# Patient Record
Sex: FEMALE | Race: WHITE | NOT HISPANIC OR LATINO | Employment: FULL TIME | URBAN - METROPOLITAN AREA
[De-identification: names, ages, dates, MRNs, and addresses within clinical notes are randomized per-mention and may not be internally consistent; named-entity substitution may affect disease eponyms.]

---

## 2017-02-21 ENCOUNTER — PATIENT MESSAGE (OUTPATIENT)
Dept: GASTROENTEROLOGY | Facility: CLINIC | Age: 62
End: 2017-02-21

## 2017-06-26 ENCOUNTER — INFUSION (OUTPATIENT)
Dept: INFECTIOUS DISEASES | Facility: HOSPITAL | Age: 62
End: 2017-06-26
Attending: INTERNAL MEDICINE
Payer: COMMERCIAL

## 2017-06-26 VITALS — HEIGHT: 65 IN | BODY MASS INDEX: 20.99 KG/M2 | WEIGHT: 126 LBS

## 2017-06-26 DIAGNOSIS — M81.0 OSTEOPOROSIS, POST-MENOPAUSAL: Primary | ICD-10-CM

## 2017-06-26 PROCEDURE — 63600175 PHARM REV CODE 636 W HCPCS: Performed by: INTERNAL MEDICINE

## 2017-06-26 PROCEDURE — 96401 CHEMO ANTI-NEOPL SQ/IM: CPT

## 2017-06-26 RX ADMIN — DENOSUMAB 60 MG: 60 INJECTION SUBCUTANEOUS at 04:06

## 2017-07-30 ENCOUNTER — PATIENT MESSAGE (OUTPATIENT)
Dept: GASTROENTEROLOGY | Facility: CLINIC | Age: 62
End: 2017-07-30

## 2017-08-04 ENCOUNTER — PATIENT MESSAGE (OUTPATIENT)
Dept: INTERNAL MEDICINE | Facility: CLINIC | Age: 62
End: 2017-08-04

## 2017-08-07 ENCOUNTER — PATIENT MESSAGE (OUTPATIENT)
Dept: INTERNAL MEDICINE | Facility: CLINIC | Age: 62
End: 2017-08-07

## 2017-08-08 ENCOUNTER — PATIENT MESSAGE (OUTPATIENT)
Dept: INTERNAL MEDICINE | Facility: CLINIC | Age: 62
End: 2017-08-08

## 2017-08-08 DIAGNOSIS — M81.0 OSTEOPOROSIS, UNSPECIFIED OSTEOPOROSIS TYPE, UNSPECIFIED PATHOLOGICAL FRACTURE PRESENCE: Primary | ICD-10-CM

## 2017-08-17 ENCOUNTER — OFFICE VISIT (OUTPATIENT)
Dept: GASTROENTEROLOGY | Facility: CLINIC | Age: 62
End: 2017-08-17
Payer: COMMERCIAL

## 2017-08-17 VITALS
SYSTOLIC BLOOD PRESSURE: 101 MMHG | HEIGHT: 65 IN | DIASTOLIC BLOOD PRESSURE: 60 MMHG | BODY MASS INDEX: 21.04 KG/M2 | WEIGHT: 126.31 LBS

## 2017-08-17 DIAGNOSIS — R19.5 LOOSE STOOLS: ICD-10-CM

## 2017-08-17 DIAGNOSIS — Z86.010 HISTORY OF COLON POLYPS: ICD-10-CM

## 2017-08-17 DIAGNOSIS — R11.0 NAUSEA: ICD-10-CM

## 2017-08-17 DIAGNOSIS — R63.0 DECREASED APPETITE: ICD-10-CM

## 2017-08-17 DIAGNOSIS — R10.13 EPIGASTRIC PAIN: Primary | ICD-10-CM

## 2017-08-17 DIAGNOSIS — Z83.719 FAMILY HISTORY OF COLONIC POLYPS: ICD-10-CM

## 2017-08-17 PROCEDURE — 99214 OFFICE O/P EST MOD 30 MIN: CPT | Mod: S$GLB,,, | Performed by: NURSE PRACTITIONER

## 2017-08-17 PROCEDURE — 3008F BODY MASS INDEX DOCD: CPT | Mod: S$GLB,,, | Performed by: NURSE PRACTITIONER

## 2017-08-17 PROCEDURE — 99999 PR PBB SHADOW E&M-EST. PATIENT-LVL II: CPT | Mod: PBBFAC,,, | Performed by: NURSE PRACTITIONER

## 2017-08-17 RX ORDER — LAMOTRIGINE 100 MG/1
100 TABLET ORAL DAILY
COMMUNITY
End: 2020-01-22

## 2017-08-17 NOTE — PROGRESS NOTES
Subjective:       Patient ID: Adelaida Flores is a 61 y.o. female.    Chief Complaint: Colonoscopy and Follow-up    HPI  Presents to discuss colonoscopy.  She has a personal and family history ( mother and father) of colon polyps.  She last had colonoscopy in 5/2012 which was normal.  She has had loose stools chronically.  May have 5 bowel movements daily.  No blood with bowel movements or black stools.    She has had a volvulus in the past and reports that she is hesitant to eat and typically has a liquid or very soft diet since that experience.    She reports today decreased appetite, though reports weight is stable.  She describes epigastric pain.  Occasional nausea. Continues to have heartburn or reflux if she eats late at night.  Will have to use omeprazole for relief.  No dysphagia.  She had EGD 6/2015:  - Mild reflux esophagitis.                        - Small hiatal hernia.                        - Gastric biopsies obtained for Helicobacter pylori     Review of Systems   Constitutional: Positive for appetite change. Negative for activity change, fatigue, fever and unexpected weight change.   HENT: Negative.  Negative for sore throat and trouble swallowing.    Respiratory: Negative.  Negative for cough, choking and shortness of breath.    Cardiovascular: Negative.  Negative for chest pain.   Gastrointestinal: Positive for abdominal pain, diarrhea and nausea. Negative for abdominal distention, blood in stool, constipation and vomiting.   Genitourinary: Negative.    Skin: Negative.    Neurological: Negative.  Negative for dizziness, syncope, weakness and light-headedness.   Psychiatric/Behavioral: Negative.        Objective:      Physical Exam   Constitutional: She is oriented to person, place, and time. No distress.   thin   HENT:   Head: Normocephalic.   Eyes: No scleral icterus.   Cardiovascular: Normal rate.    Pulmonary/Chest: Effort normal. No respiratory distress.   Abdominal: Soft. Bowel sounds are  normal. She exhibits no distension and no mass. There is tenderness in the epigastric area.   Musculoskeletal: Normal range of motion.   Neurological: She is alert and oriented to person, place, and time.   Skin: Skin is warm and dry. She is not diaphoretic.   Psychiatric: She has a normal mood and affect. Her behavior is normal. Judgment and thought content normal.   Vitals reviewed.      Assessment:       1. Epigastric pain    2. Loose stools    3. History of colon polyps    4. Family history of colonic polyps    5. Decreased appetite    6. Nausea        Plan:     Adelaida was seen today for colonoscopy and follow-up.    Diagnoses and all orders for this visit:    Epigastric pain    Loose stools    History of colon polyps    Family history of colonic polyps    Decreased appetite    Nausea    Other orders  -     Case request GI: ESOPHAGOGASTRODUODENOSCOPY (EGD), COLONOSCOPY         I have explained the planned procedures to the patient.The risks, benefits and alternatives of the procedure were also explained in detail. Patient verbalized understanding, all questions were answered. The patient agrees to proceed as planned        Add fiber and probiotics..  She has used questran in the past but reports she was unable to obtain this at some point.  Could consider resuming if needed.

## 2017-08-28 ENCOUNTER — ANESTHESIA (OUTPATIENT)
Dept: ENDOSCOPY | Facility: HOSPITAL | Age: 62
End: 2017-08-28
Payer: COMMERCIAL

## 2017-08-28 ENCOUNTER — HOSPITAL ENCOUNTER (OUTPATIENT)
Facility: HOSPITAL | Age: 62
Discharge: HOME | End: 2017-08-28
Attending: INTERNAL MEDICINE | Admitting: INTERNAL MEDICINE
Payer: COMMERCIAL

## 2017-08-28 ENCOUNTER — ANESTHESIA EVENT (OUTPATIENT)
Dept: ENDOSCOPY | Facility: HOSPITAL | Age: 62
End: 2017-08-28
Payer: COMMERCIAL

## 2017-08-28 VITALS
TEMPERATURE: 98 F | WEIGHT: 126 LBS | BODY MASS INDEX: 20.99 KG/M2 | SYSTOLIC BLOOD PRESSURE: 120 MMHG | OXYGEN SATURATION: 98 % | HEIGHT: 65 IN | DIASTOLIC BLOOD PRESSURE: 62 MMHG | RESPIRATION RATE: 13 BRPM | HEART RATE: 64 BPM

## 2017-08-28 DIAGNOSIS — Z12.11 SCREENING FOR COLORECTAL CANCER: ICD-10-CM

## 2017-08-28 DIAGNOSIS — Z12.12 SCREENING FOR COLORECTAL CANCER: ICD-10-CM

## 2017-08-28 DIAGNOSIS — R10.13 DYSPEPSIA: ICD-10-CM

## 2017-08-28 DIAGNOSIS — Z86.010 HISTORY OF COLON POLYPS: Primary | ICD-10-CM

## 2017-08-28 DIAGNOSIS — R10.13 EPIGASTRIC PAIN: ICD-10-CM

## 2017-08-28 DIAGNOSIS — R19.7 DIARRHEA, UNSPECIFIED TYPE: ICD-10-CM

## 2017-08-28 PROCEDURE — 37000009 HC ANESTHESIA EA ADD 15 MINS: Performed by: INTERNAL MEDICINE

## 2017-08-28 PROCEDURE — 37000008 HC ANESTHESIA 1ST 15 MINUTES: Performed by: INTERNAL MEDICINE

## 2017-08-28 PROCEDURE — 43239 EGD BIOPSY SINGLE/MULTIPLE: CPT | Mod: 51,,, | Performed by: INTERNAL MEDICINE

## 2017-08-28 PROCEDURE — 43239 EGD BIOPSY SINGLE/MULTIPLE: CPT | Performed by: INTERNAL MEDICINE

## 2017-08-28 PROCEDURE — 27201012 HC FORCEPS, HOT/COLD, DISP: Performed by: INTERNAL MEDICINE

## 2017-08-28 PROCEDURE — G0105 COLORECTAL SCRN; HI RISK IND: HCPCS | Mod: ,,, | Performed by: INTERNAL MEDICINE

## 2017-08-28 PROCEDURE — 63600175 PHARM REV CODE 636 W HCPCS: Performed by: NURSE ANESTHETIST, CERTIFIED REGISTERED

## 2017-08-28 PROCEDURE — G0105 COLORECTAL SCRN; HI RISK IND: HCPCS | Performed by: INTERNAL MEDICINE

## 2017-08-28 PROCEDURE — 88312 SPECIAL STAINS GROUP 1: CPT | Mod: 26,,, | Performed by: PATHOLOGY

## 2017-08-28 PROCEDURE — 88305 TISSUE EXAM BY PATHOLOGIST: CPT | Performed by: PATHOLOGY

## 2017-08-28 PROCEDURE — 25000003 PHARM REV CODE 250: Performed by: INTERNAL MEDICINE

## 2017-08-28 PROCEDURE — 88305 TISSUE EXAM BY PATHOLOGIST: CPT | Mod: 26,,, | Performed by: PATHOLOGY

## 2017-08-28 RX ORDER — LIDOCAINE HCL/PF 100 MG/5ML
SYRINGE (ML) INTRAVENOUS
Status: DISCONTINUED | OUTPATIENT
Start: 2017-08-28 | End: 2017-08-28

## 2017-08-28 RX ORDER — SODIUM CHLORIDE 9 MG/ML
INJECTION, SOLUTION INTRAVENOUS CONTINUOUS
Status: DISCONTINUED | OUTPATIENT
Start: 2017-08-29 | End: 2017-08-29 | Stop reason: HOSPADM

## 2017-08-28 RX ORDER — FENTANYL CITRATE 50 UG/ML
INJECTION, SOLUTION INTRAMUSCULAR; INTRAVENOUS
Status: DISCONTINUED | OUTPATIENT
Start: 2017-08-28 | End: 2017-08-28

## 2017-08-28 RX ORDER — PROPOFOL 10 MG/ML
VIAL (ML) INTRAVENOUS
Status: DISCONTINUED | OUTPATIENT
Start: 2017-08-28 | End: 2017-08-28

## 2017-08-28 RX ORDER — PROPOFOL 10 MG/ML
VIAL (ML) INTRAVENOUS CONTINUOUS PRN
Status: DISCONTINUED | OUTPATIENT
Start: 2017-08-28 | End: 2017-08-28

## 2017-08-28 RX ADMIN — LIDOCAINE HYDROCHLORIDE 60 MG: 20 INJECTION, SOLUTION INTRAVENOUS at 02:08

## 2017-08-28 RX ADMIN — SODIUM CHLORIDE: 0.9 INJECTION, SOLUTION INTRAVENOUS at 02:08

## 2017-08-28 RX ADMIN — FENTANYL CITRATE 50 MCG: 50 INJECTION, SOLUTION INTRAMUSCULAR; INTRAVENOUS at 02:08

## 2017-08-28 RX ADMIN — PROPOFOL 60 MG: 10 INJECTION, EMULSION INTRAVENOUS at 02:08

## 2017-08-28 RX ADMIN — PROPOFOL 150 MCG/KG/MIN: 10 INJECTION, EMULSION INTRAVENOUS at 02:08

## 2017-08-28 NOTE — ANESTHESIA PREPROCEDURE EVALUATION
08/28/2017  Adelaida Flores is a 61 y.o., female w multiple  GI complaints for EGD & colonoscopy    PRIOR ANES (in Epic)   2015-06-02 EGD MAC    benzoc prop 70+30+30+20 VSS [facility]  2016-07-15 Lumbar_Fusion GA   [mid 2, fent 100, prop 150 => ->50]   [gabrielle, fent 300, prop 200...175...150...100 mcg/kg/min,    yobany 0.6...0.5 mcg/kg/min,    dexmedetom 0.7...1...0.7 mcg/kg/h phenyleph 450]   [easy mask vent; ETT 7.0, Burger#2, Grade I]    ANES-RELATED MED/SURG  Patient Active Problem List   Diagnosis    Osteoporosis, post-menopausal    Irritable bowel syndrome    Depression    Ventral hernia    Onychomycosis    T12 compression fracture s/p corpectomy    Preop examination    Gastric ulcer, unspecified as acute or chronic, without mention of hemorrhage, perforation, or obstruction    Abnormal EKG    Respiratory insufficiency following shock, trauma, or surgery    Pleural effusion    Back pain    Weakness of back    Joint hyperextensibility of multiple sites    Epigastric pain    Dyspepsia    Decreased appetite    Frequent loose stools    Abdominal pain    Senile osteoporosis     Past Medical History:   Diagnosis Date    Allergy     Colon polyps     Depression     IBS (irritable bowel syndrome)     Osteoporosis      Past Surgical History:   Procedure Laterality Date    COLON SURGERY      FRACTURE SURGERY      right broken wrist and had surgery    SPINE SURGERY      TONSILLECTOMY         ALLERGIES  Review of patient's allergies indicates:  No Known Allergies    ANES-RELATED HOME Rx 2017-08-17  omeprazole    Pre-op Assessment    I have reviewed the Patient Summary Reports.     I have reviewed the Nursing Notes.   I have reviewed the Medications.     Review of Systems  Anesthesia Hx:  No problems with previous Anesthesia  History of prior surgery of interest to airway management or  planning:  Denies Personal Hx of Anesthesia complications.   Social:  Non-Smoker, No Alcohol Use History of ETOH 10 yrs ago   EENT/Dental:EENT/Dental Normal   Cardiovascular:   Exercise tolerance: good Denies Hypertension.  Denies MI.  Dysrhythmias (hx of afib)  History of a. Fib     Test Reason : 553.20  Blood Pressure :mmHG  Vent. Rate : 056 BPM     Atrial Rate : 056 BPM     P-R Int : 168 ms          QRS Dur : 068 ms      QT Int : 428 ms       P-R-T Axes : 070 062 059 degrees     QTc Int : 413 ms    Sinus bradycardia  Otherwise normal ECG  When compared with ECG of 23-JUL-2011 20:36,  Vent. rate has decreased BY  35 BPM   Pulmonary:  Pulmonary Normal    Renal/:  Renal/ Normal     Hepatic/GI:   PUD, GERD    Neurological:   Fracture lumbar vertebra-closed   Endocrine:  Endocrine Normal    Psych:   Psychiatric History        Wt Readings from Last 1 Encounters:   08/17/17 57.3 kg (126 lb 5.2 oz)     Temp Readings from Last 1 Encounters:   10/17/16 37.1 °C (98.8 °F) (Oral)     BP Readings from Last 1 Encounters:   08/17/17 101/60     Pulse Readings from Last 1 Encounters:   10/17/16 64     SpO2 Readings from Last 1 Encounters:   06/02/15 100%       Physical Exam  General:  Well nourished    Airway/Jaw/Neck:  Airway Findings: Mouth Opening: Normal Tongue: Normal  General Airway Assessment: Adult  Mallampati: III  Improves to III, II with phonation.  TM Distance: Normal, at least 6 cm     Eyes/Ears/Nose:  Eyes/Ears/Nose Findings:    Dental:  Dental Findings: In tact   Chest/Lungs:  Chest/Lungs Findings: Clear to auscultation     Heart/Vascular:  Heart Findings: Rate: Bradycardia, Normal        Mental Status:  Mental Status Findings:  Cooperative, Alert and Oriented       Lab Results   Component Value Date    WBC 5.12 08/25/2016    HGB 13.6 08/25/2016    HCT 42.9 08/25/2016    MCV 90 08/25/2016     08/25/2016       Chemistry        Component Value Date/Time     08/25/2016 0700    K 4.2 08/25/2016 0700      08/25/2016 0700    CO2 30 (H) 08/25/2016 0700    BUN 14 08/25/2016 0700    CREATININE 0.8 08/25/2016 0700    GLU 77 08/25/2016 0700        Component Value Date/Time    CALCIUM 9.7 08/25/2016 0700    ALKPHOS 73 08/25/2016 0700    AST 28 08/25/2016 0700    ALT 27 08/25/2016 0700    BILITOT 0.4 08/25/2016 0700    ESTGFRAFRICA >60.0 08/25/2016 0700    EGFRNONAA >60.0 08/25/2016 0700            Lab Results   Component Value Date    ALBUMIN 4.1 08/25/2016      Lab Results   Component Value Date    TSH 4.069 (H) 08/25/2016       CXR 2013-07-19  ... no significant interval change in the appearance of the chest since 7/18/13, with the volume of pleural fluid on the left side appearing essentially stable.  No   pneumothorax.    EKG 2015-06-02  Sinus bradycardia  Nonspecific T wave abnormality  When compared with ECG of 19-JUL-2013 13:45,  Premature atrial complexes are no longer Present  Non-specific change in ST segment in Lateral leads  Nonspecific T wave abnormality no longer evident in Lateral leads  Confirmed by Avinash    ECHO      Anesthesia Plan  Type of Anesthesia, risks & benefits discussed:  Anesthesia Type:  MAC, general  Patient's Preference:   Intra-op Monitoring Plan:   Intra-op Monitoring Plan Comments:   Post Op Pain Control Plan:   Post Op Pain Control Plan Comments:   Induction:   IV  Beta Blocker:         Informed Consent: Patient understands risks and agrees with Anesthesia plan.  Questions answered. Anesthesia consent signed with patient.  ASA Score: 2     Day of Surgery Review of History & Physical: I have interviewed and examined the patient. I have reviewed the patient's H&P dated:  There are no significant changes.  H&P update referred to the surgeon.         Ready For Surgery From Anesthesia Perspective.

## 2017-08-28 NOTE — TRANSFER OF CARE
"Anesthesia Transfer of Care Note    Patient: Adelaida Flores    Procedure(s) Performed: Procedure(s) (LRB):  ESOPHAGOGASTRODUODENOSCOPY (EGD) (N/A)  COLONOSCOPY miralax (N/A)    Patient location: GI    Anesthesia Type: MAC    Transport from OR: Transported from OR on room air with adequate spontaneous ventilation    Post pain: adequate analgesia    Post assessment: no apparent anesthetic complications and tolerated procedure well    Post vital signs: stable    Level of consciousness: awake, alert and oriented    Nausea/Vomiting: no nausea/vomiting    Complications: none    Transfer of care protocol was followed      Last vitals:   Visit Vitals  /62 (BP Location: Right arm, Patient Position: Lying)   Pulse (!) 53   Temp 36.6 °C (97.8 °F) (Oral)   Resp 18   Ht 5' 5" (1.651 m)   Wt 57.2 kg (126 lb)   SpO2 100%   Breastfeeding? No   BMI 20.97 kg/m²     "

## 2017-08-28 NOTE — ANESTHESIA POSTPROCEDURE EVALUATION
"Anesthesia Post Evaluation    Patient: Adelaida Flores    Procedure(s) Performed: Procedure(s) (LRB):  ESOPHAGOGASTRODUODENOSCOPY (EGD) (N/A)  COLONOSCOPY miralax (N/A)    Final Anesthesia Type: MAC  Patient location during evaluation: GI PACU  Patient participation: Yes- Able to Participate  Level of consciousness: awake and alert and oriented  Post-procedure vital signs: reviewed and stable  Pain management: adequate  Airway patency: patent  PONV status at discharge: No PONV  Anesthetic complications: no      Cardiovascular status: blood pressure returned to baseline and hemodynamically stable  Respiratory status: unassisted, spontaneous ventilation and room air  Hydration status: euvolemic  Follow-up not needed.        Visit Vitals  /62 (BP Location: Right arm, Patient Position: Lying)   Pulse (!) 53   Temp 36.6 °C (97.8 °F) (Oral)   Resp 18   Ht 5' 5" (1.651 m)   Wt 57.2 kg (126 lb)   SpO2 100%   Breastfeeding? No   BMI 20.97 kg/m²       Pain/Pascale Score: Pain Assessment Performed: Yes (8/28/2017  3:14 PM)  Presence of Pain: denies (8/28/2017  3:14 PM)  Pascale Score: 9 (8/28/2017  3:14 PM)      "

## 2017-08-29 DIAGNOSIS — R10.13 EPIGASTRIC PAIN: Primary | ICD-10-CM

## 2017-08-29 DIAGNOSIS — K44.9 HIATAL HERNIA: ICD-10-CM

## 2017-08-30 ENCOUNTER — PATIENT MESSAGE (OUTPATIENT)
Dept: GASTROENTEROLOGY | Facility: CLINIC | Age: 62
End: 2017-08-30

## 2017-08-31 ENCOUNTER — TELEPHONE (OUTPATIENT)
Dept: GASTROENTEROLOGY | Facility: CLINIC | Age: 62
End: 2017-08-31

## 2017-08-31 ENCOUNTER — PATIENT MESSAGE (OUTPATIENT)
Dept: GASTROENTEROLOGY | Facility: CLINIC | Age: 62
End: 2017-08-31

## 2017-08-31 DIAGNOSIS — K44.9 HIATAL HERNIA: Primary | ICD-10-CM

## 2017-09-01 ENCOUNTER — TELEPHONE (OUTPATIENT)
Dept: GASTROENTEROLOGY | Facility: CLINIC | Age: 62
End: 2017-09-01

## 2017-09-01 NOTE — TELEPHONE ENCOUNTER
----- Message from Trey Haas Jr., MD sent at 8/29/2017  5:01 PM CDT -----  Patient needs to schedule an upper GI series with radiology.  She has a large hiatal hernia.  I'm trying to define the anatomy.  Order is in Epic  Thanks JH

## 2017-09-06 ENCOUNTER — TELEPHONE (OUTPATIENT)
Dept: ENDOCRINOLOGY | Facility: CLINIC | Age: 62
End: 2017-09-06

## 2017-09-06 ENCOUNTER — HOSPITAL ENCOUNTER (OUTPATIENT)
Dept: RADIOLOGY | Facility: HOSPITAL | Age: 62
Discharge: HOME | End: 2017-09-06
Attending: NURSE PRACTITIONER
Payer: COMMERCIAL

## 2017-09-06 ENCOUNTER — PATIENT MESSAGE (OUTPATIENT)
Dept: ENDOCRINOLOGY | Facility: CLINIC | Age: 62
End: 2017-09-06

## 2017-09-06 DIAGNOSIS — M81.0 OSTEOPOROSIS, UNSPECIFIED OSTEOPOROSIS TYPE, UNSPECIFIED PATHOLOGICAL FRACTURE PRESENCE: Primary | ICD-10-CM

## 2017-09-06 DIAGNOSIS — K44.9 HIATAL HERNIA: ICD-10-CM

## 2017-09-06 PROCEDURE — 74247 FL UPPER GI AIR CONTRAST WITH KUB: CPT | Mod: 26,,, | Performed by: RADIOLOGY

## 2017-09-06 PROCEDURE — 74247 FL UPPER GI AIR CONTRAST WITH KUB: CPT | Mod: TC

## 2017-09-06 NOTE — TELEPHONE ENCOUNTER
----- Message from Amy Adia sent at 9/6/2017 12:06 PM CDT -----  Contact: Adelaida tel:  [phone]   Caller is asking who would she schedule with if she used to see Dr. Massey for Prolia injections and who else specializes in this?   Asking for a nurse to call her w/some info on this.  Due to have another injection in December.    Asking who can she get to order this for her?   Pls call.

## 2017-09-08 ENCOUNTER — PATIENT MESSAGE (OUTPATIENT)
Dept: GASTROENTEROLOGY | Facility: CLINIC | Age: 62
End: 2017-09-08

## 2017-09-13 NOTE — TELEPHONE ENCOUNTER
Patient was notified of report results, patient is scheduled for an OV with the NP on 09/21/2017.

## 2017-09-21 ENCOUNTER — OFFICE VISIT (OUTPATIENT)
Dept: GASTROENTEROLOGY | Facility: CLINIC | Age: 62
End: 2017-09-21
Payer: COMMERCIAL

## 2017-09-21 VITALS
DIASTOLIC BLOOD PRESSURE: 62 MMHG | BODY MASS INDEX: 20.87 KG/M2 | HEIGHT: 65 IN | SYSTOLIC BLOOD PRESSURE: 89 MMHG | WEIGHT: 125.25 LBS

## 2017-09-21 DIAGNOSIS — K21.9 GASTROESOPHAGEAL REFLUX DISEASE, ESOPHAGITIS PRESENCE NOT SPECIFIED: Primary | ICD-10-CM

## 2017-09-21 DIAGNOSIS — R19.5 LOOSE STOOLS: ICD-10-CM

## 2017-09-21 DIAGNOSIS — Z83.719 FAMILY HISTORY OF COLONIC POLYPS: ICD-10-CM

## 2017-09-21 PROCEDURE — 99213 OFFICE O/P EST LOW 20 MIN: CPT | Mod: S$GLB,,, | Performed by: NURSE PRACTITIONER

## 2017-09-21 PROCEDURE — 3008F BODY MASS INDEX DOCD: CPT | Mod: S$GLB,,, | Performed by: NURSE PRACTITIONER

## 2017-09-21 PROCEDURE — 99999 PR PBB SHADOW E&M-EST. PATIENT-LVL II: CPT | Mod: PBBFAC,,, | Performed by: NURSE PRACTITIONER

## 2017-09-21 NOTE — PROGRESS NOTES
Subjective:       Patient ID: Adelaida Flores is a 61 y.o. female.    Chief Complaint: Follow-up    HPI  Presents to follow up after recent EGD and colonoscopy.  EGD:  - Normal antrum. Biopsied.                        - Normal duodenal bulb and second portion of the                         duodenum.                        - Large hiatal hernia.                        - Z-line regular, 40 cm from the incisors.                        - Monilial esophagitis. Biopsied.                        - Normal upper third of esophagus and middle third                         of esophagus.  Colonoscopy:  - Patent end-to-side ileo-colonic anastomosis,                         characterized by healthy appearing mucosa.                        - The examination was otherwise normal.                        - No specimens collected.  Pathology:    1. Antrum, biopsy:  -Mild chronic inflammation in background of reactive change  -No Helicobacter organisms identified on H&E  -Negative for intestinal metaplasia  2. Lower third esophagus, biopsy:  -Squamous mucosa with ulcer  -Mixed neutrophilic and eosinophilic intraepithelial infiltrates  -PAS stain for fungal organisms pending; results will follow in an addendum  -Negative for dysplasia or malignancy  Fungal stains negative      UGI series obtained to further evaluate hiatal hernia:  Postoperative change thoracic lumbar junction noted.     Patient swallowed barium meal erect and recumbent positions without difficulty.  Mild hypertrophied cricopharyngeus muscle.  Esophageal peristalsis and GE junction normal.  Stomach, pyloric channel, duodenal C-loop, proximal small bowel are normal.  No ulcer, mass or other abnormality.    She reports as long as she remembers to use omeprazole at night, that her reflux complaints are well controlled.    She continues to have loose stools, which has been a chronic complaint.  I suggested the addition of fiber and probiotic at our last visit, which she has  not yet started.  Denies any new concerns or complaints.    Review of Systems   Constitutional: Negative.  Negative for activity change, appetite change, fatigue, fever and unexpected weight change.   HENT: Negative for trouble swallowing.    Respiratory: Negative for shortness of breath.    Cardiovascular: Negative for chest pain.   Gastrointestinal: Positive for diarrhea. Negative for abdominal distention, blood in stool, constipation, nausea and vomiting.   Skin: Negative.    Psychiatric/Behavioral: Negative.        Objective:      Physical Exam   Constitutional: She is oriented to person, place, and time. She appears well-developed and well-nourished. No distress.   Eyes: No scleral icterus.   Cardiovascular: Normal rate.    Pulmonary/Chest: Effort normal. No respiratory distress.   Abdominal: Soft. She exhibits no distension. There is no tenderness.   Neurological: She is alert and oriented to person, place, and time.   Skin: Skin is warm and dry. She is not diaphoretic.   Psychiatric: She has a normal mood and affect. Her behavior is normal. Judgment and thought content normal.   Vitals reviewed.      Assessment:       1. Gastroesophageal reflux disease, esophagitis presence not specified    2. Loose stools    3. Family history of colonic polyps        Plan:         Adelaida was seen today for follow-up.    Diagnoses and all orders for this visit:    Gastroesophageal reflux disease, esophagitis presence not specified    Loose stools    Family history of colonic polyps         Continue acid suppressive therapy.  Avoid eating late at night and overeating.     Add fiber supplement and probiotic.    Follow up with continued complaints.     Repeat colonoscopy in 5 years ( family history of colon polyps)

## 2017-11-08 ENCOUNTER — OFFICE VISIT (OUTPATIENT)
Dept: ENDOCRINOLOGY | Facility: CLINIC | Age: 62
End: 2017-11-08
Payer: COMMERCIAL

## 2017-11-08 VITALS
SYSTOLIC BLOOD PRESSURE: 98 MMHG | BODY MASS INDEX: 20.83 KG/M2 | HEIGHT: 65 IN | HEART RATE: 66 BPM | WEIGHT: 125 LBS | DIASTOLIC BLOOD PRESSURE: 64 MMHG

## 2017-11-08 DIAGNOSIS — M81.0 OSTEOPOROSIS, POST-MENOPAUSAL: Primary | ICD-10-CM

## 2017-11-08 PROBLEM — Z12.11 SCREENING FOR COLORECTAL CANCER: Status: RESOLVED | Noted: 2017-08-28 | Resolved: 2017-11-08

## 2017-11-08 PROBLEM — Z12.12 SCREENING FOR COLORECTAL CANCER: Status: RESOLVED | Noted: 2017-08-28 | Resolved: 2017-11-08

## 2017-11-08 PROCEDURE — 99214 OFFICE O/P EST MOD 30 MIN: CPT | Mod: S$GLB,,, | Performed by: INTERNAL MEDICINE

## 2017-11-08 PROCEDURE — 99999 PR PBB SHADOW E&M-EST. PATIENT-LVL IV: CPT | Mod: PBBFAC,,, | Performed by: INTERNAL MEDICINE

## 2017-11-08 NOTE — PROGRESS NOTES
Subjective:      Patient ID: Adelaida Flores is a 61 y.o. female.    Chief Complaint:  Osteoporosis      History of Present Illness  Pt returns for management of postmenopausal osteoporosis.  She was a pt of Dr. Massey and Dr Alex      She used alendronate 2010- 2014 - had GERD and fracture while on therapy    was changed to prolia 2014       No fractures since last visit  Prone to falls - poor balance         Strong FH of OP     Fracture hx:  Right wrist 2009  T12 fx with metal cage placed. 2013      Menarche 11 y/o  Menopause 37 y/o   no hrt use      last bmd 12/31/15  Osteoporosis of lumbar spine. No significant change since prior study.      IBS with abd pain and diarrhea       chronic neck pain - gets acupuncture   +dry eyes and dry mouth     Review of Systems   Constitutional: Negative for unexpected weight change.   Eyes: Negative for visual disturbance.   Respiratory: Negative for shortness of breath.    Cardiovascular: Negative for chest pain.   Gastrointestinal: Negative for abdominal pain.   Musculoskeletal: Negative for arthralgias.   Skin: Negative for wound.   Neurological: Negative for headaches.   Hematological: Does not bruise/bleed easily.   Psychiatric/Behavioral: Negative for sleep disturbance.       Objective:   Physical Exam   Neck: No thyromegaly present.   Cardiovascular: Normal rate.    Pulmonary/Chest: Effort normal.   Abdominal: Soft.   Musculoskeletal: She exhibits no edema.   Vitals reviewed.      Body mass index is 20.8 kg/m².    Lab Review:   Lab Results   Component Value Date    HGBA1C 5.7 10/02/2012     Lab Results   Component Value Date    CHOL 231 (H) 08/25/2016    HDL 58 08/25/2016    LDLCALC 155.4 08/25/2016    TRIG 88 08/25/2016    CHOLHDL 25.1 08/25/2016     Lab Results   Component Value Date     08/25/2016    K 4.2 08/25/2016     08/25/2016    CO2 30 (H) 08/25/2016    GLU 77 08/25/2016    BUN 14 08/25/2016    CREATININE 0.8 08/25/2016    CALCIUM 9.7 08/25/2016     PROT 6.7 08/25/2016    ALBUMIN 4.1 08/25/2016    BILITOT 0.4 08/25/2016    ALKPHOS 73 08/25/2016    AST 28 08/25/2016    ALT 27 08/25/2016    ANIONGAP 6 (L) 08/25/2016    ESTGFRAFRICA >60.0 08/25/2016    EGFRNONAA >60.0 08/25/2016    TSH 4.069 (H) 08/25/2016       Assessment and Plan     Osteoporosis, post-menopausal     Reviewed basics of quantity versus quality  Reassuring she is not fracturing   continue prolia    check bmd 1/2018     RDA of calcium (0700-8618 mg /day in divided doses) and vitamin D 2000iu a day  discussed  Calcium from food sources preferred  Fall precautions emphasized  +weight bearing exercise  [email or web address] website information given    Treatment options and potential side effects discussed for   Denosumab     Low risk for ONJ and atypical fractures reviewed and discussed. Alerted that if dental work needs to be done it should be done prior to initiating therapy     Discussed optimal duration of Denosumab use is unknown        abnormal tsh and hba1c - repeat tests

## 2017-11-09 NOTE — ASSESSMENT & PLAN NOTE
Reviewed basics of quantity versus quality  Reassuring she is not fracturing   continue prolia    check bmd 1/2018     RDA of calcium (2165-0518 mg /day in divided doses) and vitamin D 2000iu a day  discussed  Calcium from food sources preferred  Fall precautions emphasized  +weight bearing exercise  [email or web address] website information given    Treatment options and potential side effects discussed for   Denosumab     Low risk for ONJ and atypical fractures reviewed and discussed. Alerted that if dental work needs to be done it should be done prior to initiating therapy     Discussed optimal duration of Denosumab use is unknown

## 2017-11-10 ENCOUNTER — OFFICE VISIT (OUTPATIENT)
Dept: INTERNAL MEDICINE | Facility: CLINIC | Age: 62
End: 2017-11-10
Payer: COMMERCIAL

## 2017-11-10 ENCOUNTER — TELEPHONE (OUTPATIENT)
Dept: ENDOCRINOLOGY | Facility: CLINIC | Age: 62
End: 2017-11-10

## 2017-11-10 ENCOUNTER — LAB VISIT (OUTPATIENT)
Dept: LAB | Facility: HOSPITAL | Age: 62
End: 2017-11-10
Attending: INTERNAL MEDICINE
Payer: COMMERCIAL

## 2017-11-10 VITALS
TEMPERATURE: 99 F | SYSTOLIC BLOOD PRESSURE: 128 MMHG | HEIGHT: 65 IN | HEART RATE: 72 BPM | WEIGHT: 127.88 LBS | RESPIRATION RATE: 16 BRPM | BODY MASS INDEX: 21.31 KG/M2 | DIASTOLIC BLOOD PRESSURE: 76 MMHG

## 2017-11-10 DIAGNOSIS — Z12.31 VISIT FOR SCREENING MAMMOGRAM: ICD-10-CM

## 2017-11-10 DIAGNOSIS — Z00.00 ANNUAL PHYSICAL EXAM: ICD-10-CM

## 2017-11-10 DIAGNOSIS — Z00.00 ANNUAL PHYSICAL EXAM: Primary | ICD-10-CM

## 2017-11-10 DIAGNOSIS — M81.0 OSTEOPOROSIS, UNSPECIFIED OSTEOPOROSIS TYPE, UNSPECIFIED PATHOLOGICAL FRACTURE PRESENCE: ICD-10-CM

## 2017-11-10 DIAGNOSIS — M81.0 OSTEOPOROSIS, POST-MENOPAUSAL: ICD-10-CM

## 2017-11-10 DIAGNOSIS — Z12.4 SCREENING FOR CERVICAL CANCER: ICD-10-CM

## 2017-11-10 LAB
25(OH)D3+25(OH)D2 SERPL-MCNC: 29 NG/ML
ALBUMIN SERPL BCP-MCNC: 3.7 G/DL
ALP SERPL-CCNC: 71 U/L
ALT SERPL W/O P-5'-P-CCNC: 22 U/L
ANION GAP SERPL CALC-SCNC: 5 MMOL/L
AST SERPL-CCNC: 31 U/L
BASOPHILS # BLD AUTO: 0.02 K/UL
BASOPHILS NFR BLD: 0.4 %
BILIRUB SERPL-MCNC: 0.2 MG/DL
BUN SERPL-MCNC: 7 MG/DL
CALCIUM SERPL-MCNC: 9.5 MG/DL
CHLORIDE SERPL-SCNC: 103 MMOL/L
CHOLEST SERPL-MCNC: 265 MG/DL
CHOLEST/HDLC SERPL: 7.2 {RATIO}
CO2 SERPL-SCNC: 31 MMOL/L
CREAT SERPL-MCNC: 0.8 MG/DL
DIFFERENTIAL METHOD: ABNORMAL
EOSINOPHIL # BLD AUTO: 0.2 K/UL
EOSINOPHIL NFR BLD: 3.8 %
ERYTHROCYTE [DISTWIDTH] IN BLOOD BY AUTOMATED COUNT: 13.9 %
EST. GFR  (AFRICAN AMERICAN): >60 ML/MIN/1.73 M^2
EST. GFR  (NON AFRICAN AMERICAN): >60 ML/MIN/1.73 M^2
ESTIMATED AVG GLUCOSE: 100 MG/DL
GLUCOSE SERPL-MCNC: 47 MG/DL
HBA1C MFR BLD HPLC: 5.1 %
HCT VFR BLD AUTO: 36.4 %
HDLC SERPL-MCNC: 37 MG/DL
HDLC SERPL: 14 %
HGB BLD-MCNC: 11.7 G/DL
IMM GRANULOCYTES # BLD AUTO: 0.01 K/UL
IMM GRANULOCYTES NFR BLD AUTO: 0.2 %
LDLC SERPL CALC-MCNC: ABNORMAL MG/DL
LYMPHOCYTES # BLD AUTO: 1.9 K/UL
LYMPHOCYTES NFR BLD: 34.3 %
MCH RBC QN AUTO: 28.8 PG
MCHC RBC AUTO-ENTMCNC: 32.1 G/DL
MCV RBC AUTO: 90 FL
MONOCYTES # BLD AUTO: 0.4 K/UL
MONOCYTES NFR BLD: 7.9 %
NEUTROPHILS # BLD AUTO: 3 K/UL
NEUTROPHILS NFR BLD: 53.4 %
NONHDLC SERPL-MCNC: 228 MG/DL
NRBC BLD-RTO: 0 /100 WBC
PLATELET # BLD AUTO: 270 K/UL
PMV BLD AUTO: 11.8 FL
POTASSIUM SERPL-SCNC: 3.9 MMOL/L
PROT SERPL-MCNC: 6.6 G/DL
RBC # BLD AUTO: 4.06 M/UL
SODIUM SERPL-SCNC: 139 MMOL/L
TRIGL SERPL-MCNC: 814 MG/DL
TSH SERPL DL<=0.005 MIU/L-ACNC: 2.53 UIU/ML
WBC # BLD AUTO: 5.54 K/UL

## 2017-11-10 PROCEDURE — 82306 VITAMIN D 25 HYDROXY: CPT

## 2017-11-10 PROCEDURE — 80053 COMPREHEN METABOLIC PANEL: CPT

## 2017-11-10 PROCEDURE — 99396 PREV VISIT EST AGE 40-64: CPT | Mod: S$GLB,,, | Performed by: INTERNAL MEDICINE

## 2017-11-10 PROCEDURE — 85025 COMPLETE CBC W/AUTO DIFF WBC: CPT

## 2017-11-10 PROCEDURE — 99999 PR PBB SHADOW E&M-EST. PATIENT-LVL IV: CPT | Mod: PBBFAC,,, | Performed by: INTERNAL MEDICINE

## 2017-11-10 PROCEDURE — 83036 HEMOGLOBIN GLYCOSYLATED A1C: CPT

## 2017-11-10 PROCEDURE — 86376 MICROSOMAL ANTIBODY EACH: CPT

## 2017-11-10 PROCEDURE — 80061 LIPID PANEL: CPT

## 2017-11-10 PROCEDURE — 84443 ASSAY THYROID STIM HORMONE: CPT

## 2017-11-10 PROCEDURE — 36415 COLL VENOUS BLD VENIPUNCTURE: CPT | Mod: PO

## 2017-11-10 RX ORDER — LEVOCETIRIZINE DIHYDROCHLORIDE 5 MG/1
5 TABLET, FILM COATED ORAL NIGHTLY
COMMUNITY
End: 2019-05-08

## 2017-11-10 NOTE — PROGRESS NOTES
Subjective:       Patient ID: Adelaida Flores is a 61 y.o. female.    Chief Complaint: Annual Exam    Patient is a 61 y.o.female who presents today for annual.    Cholesterol: (due now)  Vaccines: Influenza (yearly); Tetanus (2009) ; Pneumovax (up to date); Zoster (done)  Eye exam: up to date  Mammogram: due now  Gyn exam: due now  Colonoscopy: due aug 2022  DEXA: pending    Review of Systems   Constitutional: Negative for activity change, appetite change, chills, diaphoresis, fatigue, fever and unexpected weight change.   HENT: Negative for congestion, dental problem, ear discharge, ear pain, hearing loss, postnasal drip, rhinorrhea, sinus pressure, sore throat and trouble swallowing.    Eyes: Positive for visual disturbance. Negative for discharge, redness and itching.   Respiratory: Negative for cough, chest tightness, shortness of breath and wheezing.    Cardiovascular: Negative for chest pain, palpitations and leg swelling.   Gastrointestinal: Positive for diarrhea. Negative for abdominal pain, blood in stool, constipation, nausea and vomiting.   Endocrine: Negative for cold intolerance, heat intolerance and polydipsia.   Genitourinary: Negative for difficulty urinating, dysuria, frequency, hematuria, menstrual problem and urgency.   Musculoskeletal: Positive for neck pain. Negative for arthralgias, back pain, gait problem, joint swelling and myalgias.   Skin: Negative for color change and rash.   Neurological: Negative for dizziness, syncope, weakness and headaches.   Hematological: Negative for adenopathy.   Psychiatric/Behavioral: Positive for dysphoric mood. Negative for behavioral problems, confusion and sleep disturbance. The patient is not nervous/anxious.        Objective:      Physical Exam   Constitutional: She is oriented to person, place, and time. She appears well-developed and well-nourished. No distress.   HENT:   Head: Normocephalic and atraumatic.   Right Ear: External ear normal.   Left Ear:  External ear normal.   Nose: Nose normal.   Mouth/Throat: Oropharynx is clear and moist. No oropharyngeal exudate.   Eyes: Conjunctivae and EOM are normal. Pupils are equal, round, and reactive to light. Right eye exhibits no discharge. Left eye exhibits no discharge. No scleral icterus.   Neck: Normal range of motion. Neck supple. No JVD present. No thyromegaly present.   Cardiovascular: Normal rate, regular rhythm, normal heart sounds and intact distal pulses.  Exam reveals no gallop and no friction rub.    No murmur heard.  Pulmonary/Chest: Effort normal and breath sounds normal. No stridor. No respiratory distress. She has no wheezes. She has no rales. She exhibits no tenderness.   Abdominal: Soft. Bowel sounds are normal. She exhibits no distension. There is no tenderness. There is no rebound.   Musculoskeletal: Normal range of motion. She exhibits no edema or tenderness.   Lymphadenopathy:     She has no cervical adenopathy.   Neurological: She is alert and oriented to person, place, and time. No cranial nerve deficit.   Skin: Skin is warm and dry. No rash noted. She is not diaphoretic. No erythema.   Psychiatric: She has a normal mood and affect. Her behavior is normal.   Nursing note and vitals reviewed.      Assessment and Plan:       1. Annual physical exam  - labs due now  - gyn exam due now  - mammo due now  - CBC auto differential; Future  - Lipid panel; Future    2. Visit for screening mammogram    - Mammo Digital Screening Bilat with CAD; Future    3. Screening for cervical cancer    - Ambulatory consult to Gynecology          No Follow-up on file.

## 2017-11-11 NOTE — TELEPHONE ENCOUNTER
Called by lab for critical glucose value of 47. Suspect this is from hypertriglyceridemia and not true hypoglycemia. Called patient with no answer, but advised patient to call me back if have any symptoms of a low glucose.

## 2017-11-13 ENCOUNTER — TELEPHONE (OUTPATIENT)
Dept: INTERNAL MEDICINE | Facility: CLINIC | Age: 62
End: 2017-11-13

## 2017-11-13 ENCOUNTER — PATIENT MESSAGE (OUTPATIENT)
Dept: ENDOCRINOLOGY | Facility: CLINIC | Age: 62
End: 2017-11-13

## 2017-11-13 DIAGNOSIS — D64.9 ANEMIA, UNSPECIFIED TYPE: Primary | ICD-10-CM

## 2017-11-13 LAB — THYROPEROXIDASE IGG SERPL-ACNC: <6 IU/ML

## 2017-11-13 NOTE — TELEPHONE ENCOUNTER
Notify pt:  - kidney and liver function is stable  - thyroid function is stable  - diabetic marker is negative at 5.1  - she is anemic; will need iron panel and stool card to determine cause  - cholesterol could not be read due to high triglycerides of 814; start low fat diet; would like to start her on a medication called fenofibrate to lower this level. Let me know if she is agreeable  - iron work up ordered

## 2017-11-14 ENCOUNTER — PATIENT MESSAGE (OUTPATIENT)
Dept: INTERNAL MEDICINE | Facility: CLINIC | Age: 62
End: 2017-11-14

## 2017-11-14 NOTE — TELEPHONE ENCOUNTER
Spoke to pt and informed of lab results. Pt stated that she is already on a low fat diet and that she doesn't want to be on a medication. She stated that she wants her labs to be retested since she wasn't fasting. Clarified with pt that she wasn't fasting for her fasting labs. She said yes. She repeated over and over that she would email Dr. Lees through the portal. Offered to schedule lab apt for iron studies and repeat fasting labs, pt refused stating that she would have to call back. Pt also refused the stool studies.

## 2017-11-15 ENCOUNTER — PATIENT MESSAGE (OUTPATIENT)
Dept: INTERNAL MEDICINE | Facility: CLINIC | Age: 62
End: 2017-11-15

## 2017-11-30 ENCOUNTER — LAB VISIT (OUTPATIENT)
Dept: LAB | Facility: HOSPITAL | Age: 62
End: 2017-11-30
Attending: INTERNAL MEDICINE
Payer: COMMERCIAL

## 2017-11-30 ENCOUNTER — PATIENT MESSAGE (OUTPATIENT)
Dept: INTERNAL MEDICINE | Facility: CLINIC | Age: 62
End: 2017-11-30

## 2017-11-30 ENCOUNTER — TELEPHONE (OUTPATIENT)
Dept: INTERNAL MEDICINE | Facility: CLINIC | Age: 62
End: 2017-11-30

## 2017-11-30 DIAGNOSIS — D64.9 ANEMIA, UNSPECIFIED TYPE: ICD-10-CM

## 2017-11-30 LAB
BASOPHILS # BLD AUTO: 0.02 K/UL
BASOPHILS NFR BLD: 0.4 %
CHOLEST SERPL-MCNC: 191 MG/DL
CHOLEST/HDLC SERPL: 4.2 {RATIO}
DIFFERENTIAL METHOD: NORMAL
EOSINOPHIL # BLD AUTO: 0.3 K/UL
EOSINOPHIL NFR BLD: 5.7 %
ERYTHROCYTE [DISTWIDTH] IN BLOOD BY AUTOMATED COUNT: 13.9 %
FERRITIN SERPL-MCNC: 53 NG/ML
HCT VFR BLD AUTO: 40.7 %
HDLC SERPL-MCNC: 46 MG/DL
HDLC SERPL: 24.1 %
HGB BLD-MCNC: 13.4 G/DL
IRON SERPL-MCNC: 65 UG/DL
LDLC SERPL CALC-MCNC: 127.8 MG/DL
LYMPHOCYTES # BLD AUTO: 1.7 K/UL
LYMPHOCYTES NFR BLD: 31.4 %
MCH RBC QN AUTO: 28.6 PG
MCHC RBC AUTO-ENTMCNC: 32.9 G/DL
MCV RBC AUTO: 87 FL
MONOCYTES # BLD AUTO: 0.5 K/UL
MONOCYTES NFR BLD: 8.3 %
NEUTROPHILS # BLD AUTO: 2.9 K/UL
NEUTROPHILS NFR BLD: 54.2 %
NONHDLC SERPL-MCNC: 145 MG/DL
PLATELET # BLD AUTO: 279 K/UL
PMV BLD AUTO: 11.7 FL
RBC # BLD AUTO: 4.68 M/UL
SATURATED IRON: 17 %
TOTAL IRON BINDING CAPACITY: 380 UG/DL
TRANSFERRIN SERPL-MCNC: 257 MG/DL
TRIGL SERPL-MCNC: 86 MG/DL
WBC # BLD AUTO: 5.42 K/UL

## 2017-11-30 PROCEDURE — 82728 ASSAY OF FERRITIN: CPT

## 2017-11-30 PROCEDURE — 36415 COLL VENOUS BLD VENIPUNCTURE: CPT

## 2017-11-30 PROCEDURE — 85025 COMPLETE CBC W/AUTO DIFF WBC: CPT

## 2017-11-30 PROCEDURE — 83540 ASSAY OF IRON: CPT

## 2017-11-30 PROCEDURE — 80061 LIPID PANEL: CPT

## 2017-12-08 ENCOUNTER — HOSPITAL ENCOUNTER (OUTPATIENT)
Dept: RADIOLOGY | Facility: HOSPITAL | Age: 62
Discharge: HOME | End: 2017-12-08
Attending: INTERNAL MEDICINE
Payer: COMMERCIAL

## 2017-12-08 DIAGNOSIS — Z12.31 VISIT FOR SCREENING MAMMOGRAM: ICD-10-CM

## 2017-12-08 PROCEDURE — 77067 SCR MAMMO BI INCL CAD: CPT | Mod: 26,,, | Performed by: RADIOLOGY

## 2017-12-08 PROCEDURE — 77067 SCR MAMMO BI INCL CAD: CPT | Mod: TC,PO

## 2017-12-08 PROCEDURE — 77063 BREAST TOMOSYNTHESIS BI: CPT | Mod: 26,,, | Performed by: RADIOLOGY

## 2017-12-27 ENCOUNTER — INFUSION (OUTPATIENT)
Dept: INFECTIOUS DISEASES | Facility: HOSPITAL | Age: 62
End: 2017-12-27
Attending: INTERNAL MEDICINE
Payer: COMMERCIAL

## 2017-12-27 VITALS — WEIGHT: 127.88 LBS | HEIGHT: 65 IN | BODY MASS INDEX: 21.31 KG/M2

## 2017-12-27 DIAGNOSIS — M81.0 SENILE OSTEOPOROSIS: Primary | ICD-10-CM

## 2017-12-27 PROCEDURE — 63600175 PHARM REV CODE 636 W HCPCS: Performed by: INTERNAL MEDICINE

## 2017-12-27 PROCEDURE — 96372 THER/PROPH/DIAG INJ SC/IM: CPT

## 2017-12-27 RX ADMIN — DENOSUMAB 60 MG: 60 INJECTION SUBCUTANEOUS at 04:12

## 2017-12-29 ENCOUNTER — OFFICE VISIT (OUTPATIENT)
Dept: OBSTETRICS AND GYNECOLOGY | Facility: CLINIC | Age: 62
End: 2017-12-29
Payer: COMMERCIAL

## 2017-12-29 VITALS — SYSTOLIC BLOOD PRESSURE: 122 MMHG | DIASTOLIC BLOOD PRESSURE: 76 MMHG

## 2017-12-29 DIAGNOSIS — Z01.419 ENCOUNTER FOR GYNECOLOGICAL EXAMINATION WITHOUT ABNORMAL FINDING: Primary | ICD-10-CM

## 2017-12-29 DIAGNOSIS — N95.2 VAGINAL ATROPHY: ICD-10-CM

## 2017-12-29 DIAGNOSIS — Z78.0 POSTMENOPAUSAL: ICD-10-CM

## 2017-12-29 PROCEDURE — 88175 CYTOPATH C/V AUTO FLUID REDO: CPT

## 2017-12-29 PROCEDURE — 99999 PR PBB SHADOW E&M-EST. PATIENT-LVL III: CPT | Mod: PBBFAC,,, | Performed by: OBSTETRICS & GYNECOLOGY

## 2017-12-29 PROCEDURE — 99386 PREV VISIT NEW AGE 40-64: CPT | Mod: S$GLB,,, | Performed by: OBSTETRICS & GYNECOLOGY

## 2017-12-29 RX ORDER — LOTEPREDNOL ETABONATE 5 MG/G
GEL OPHTHALMIC
Refills: 0 | COMMUNITY
Start: 2017-11-30 | End: 2021-01-19

## 2017-12-29 NOTE — LETTER
December 29, 2017      Ingrid Corral, DO  [address] [facility]  [place] [address]           [place] [facility]  [address] [place] [address]  [place] [address]  Phone: [phone]  Fax: [phone]          Patient: Adelaida Flores   MR Number: [id number]   YOB: 1955   Date of Visit: 12/29/2017       Dear Dr. Ingrid Corral:    Thank you for referring Adelaida Flores to me for evaluation. Attached you will find relevant portions of my assessment and plan of care.    If you have questions, please do not hesitate to call me. I look forward to following Adelaida Flores along with you.    Sincerely,    Angella Ricks MD    Enclosure  CC:  No Recipients    If you would like to receive this communication electronically, please contact [email or web address] or [phone] to request more information on [employer] Link access.    For providers and/or their staff who would like to refer a patient to [facility], please contact us through our one-stop-shop provider referral line, [facility], at [phone].    If you feel you have received this communication in error or would no longer like to receive these types of communications, please e-mail [email or web address]

## 2017-12-29 NOTE — PROGRESS NOTES
CC: Well woman exam    Adelaida Flores is a 62 y.o. female No obstetric history on file. presents for a well woman exam.  LMP: No LMP recorded. Patient is postmenopausal..  Vaginal dryness is problematic for the patient.    She did not like the creams she used in the past.     Past Medical History:   Diagnosis Date    Allergy     Colon polyps     Depression     IBS (irritable bowel syndrome)     Osteoporosis     T12 compression fracture s/p corpectomy 2013     Past Surgical History:   Procedure Laterality Date    COLON SURGERY      COLONOSCOPY N/A 2017    Procedure: COLONOSCOPY miralax;  Surgeon: Trey Haas Jr., MD;  Location: [facility] ENDO;  Service: Endoscopy;  Laterality: N/A;    FRACTURE SURGERY      right broken wrist and had surgery    SPINE SURGERY      TONSILLECTOMY       Social History     Social History    Marital status:      Spouse name: N/A    Number of children: N/A    Years of education: N/A     Occupational History    [facility]     Social History Main Topics    Smoking status: Former Smoker     Packs/day: 1.00     Years: 10.00     Quit date: 1993    Smokeless tobacco: Never Used    Alcohol use No    Drug use: No    Sexual activity: Yes     Partners: Male     Other Topics Concern    None     Social History Narrative    None     Family History   Problem Relation Age of Onset    Colon polyps Mother     Cancer Mother      skin    Osteoarthritis Mother     Colon polyps Father     Cancer Father      prostate    Osteoarthritis Maternal Aunt     Osteoarthritis Maternal Grandmother     Diabetes Neg Hx     Heart disease Neg Hx      OB History      Para Term  AB Living        0          SAB TAB Ectopic Multiple Live Births                       /76       ROS:    ROS:  GENERAL: Denies weight gain or weight loss. Feeling well overall.   SKIN: Denies rash or lesions.   HEAD: Denies head injury or headache.   NODES: Denies  enlarged lymph nodes.   CHEST: Denies chest pain or shortness of breath.   CARDIOVASCULAR: Denies palpitations or left sided chest pain.   ABDOMEN: No abdominal pain, constipation, diarrhea, nausea, vomiting or rectal bleeding.   URINARY: No frequency, dysuria, hematuria, or burning on urination.  REPRODUCTIVE: See HPI.   BREASTS: The patient performs breast self-examination and denies pain, lumps, or nipple discharge.   HEMATOLOGIC: No easy bruisability or excessive bleeding.   MUSCULOSKELETAL: Denies joint pain or swelling.   NEUROLOGIC: Denies syncope or weakness.   PSYCHIATRIC: Denies depression, anxiety or mood swings.    PHYSICAL EXAM:    APPEARANCE: Well nourished, well developed, in no acute distress.  AFFECT: WNL, alert and oriented x 3  SKIN: No acne or hirsutism  NECK: Neck symmetric without masses or thyromegaly  NODES: No inguinal, cervical, axillary, or femoral lymph node enlargement  CHEST: Good respiratory effect  ABDOMEN: Soft.  No tenderness or masses.  No hepatosplenomegaly.  No hernias.  BREASTS: Symmetrical, no skin changes or visible lesions.  No palpable masses, nipple discharge bilaterally.  PELVIC: atrohpic external genitalia without lesions.  Labia agglutinated around the clitoral area.  Normal hair distribution.  Adequate perineal body, normal urethral meatus.  Vagina moist and well rugated without lesions or discharge.  Cervix pink, without lesions, discharge or tenderness.  No significant cystocele or rectocele.  Bimanual exam shows uterus to be normal size, regular, mobile and nontender.  Adnexa without masses or tenderness.    RECTAL: Rectovaginal exam confirms above with normal sphincter tone, no masses.  EXTREMITIES: No edema.    Diagnosis    ICD-10-CM ICD-9-CM    1. Encounter for gynecological examination without abnormal finding Z01.419 V72.31 Liquid-based pap smear, screening   2. Postmenopausal Z78.0 V49.81    3. Vaginal atrophy N95.2 627.3      Replens, coconut/ olive oil  PRN    Patient was counseled today on A.C.S. Pap guidelines and recommendations for yearly pelvic exams, mammograms and monthly self breast exams; to see her PCP for other health maintenance.     F/U PRN

## 2018-01-05 ENCOUNTER — HOSPITAL ENCOUNTER (OUTPATIENT)
Dept: RADIOLOGY | Facility: CLINIC | Age: 63
Discharge: HOME | End: 2018-01-05
Attending: INTERNAL MEDICINE
Payer: COMMERCIAL

## 2018-01-05 DIAGNOSIS — M81.0 OSTEOPOROSIS, POST-MENOPAUSAL: ICD-10-CM

## 2018-01-05 PROCEDURE — 77080 DXA BONE DENSITY AXIAL: CPT | Mod: TC

## 2018-01-05 PROCEDURE — 77080 DXA BONE DENSITY AXIAL: CPT | Mod: 26,,, | Performed by: INTERNAL MEDICINE

## 2018-06-28 ENCOUNTER — INFUSION (OUTPATIENT)
Dept: INFECTIOUS DISEASES | Facility: HOSPITAL | Age: 63
End: 2018-06-28
Attending: INTERNAL MEDICINE
Payer: COMMERCIAL

## 2018-06-28 VITALS — HEIGHT: 65 IN | WEIGHT: 127.88 LBS | BODY MASS INDEX: 21.31 KG/M2

## 2018-06-28 DIAGNOSIS — M81.0 SENILE OSTEOPOROSIS: Primary | ICD-10-CM

## 2018-06-28 PROCEDURE — 96372 THER/PROPH/DIAG INJ SC/IM: CPT

## 2018-06-28 PROCEDURE — 63600175 PHARM REV CODE 636 W HCPCS: Mod: JG | Performed by: INTERNAL MEDICINE

## 2018-06-28 RX ADMIN — DENOSUMAB 60 MG: 60 INJECTION SUBCUTANEOUS at 04:06

## 2018-06-28 NOTE — PLAN OF CARE
Problem: Health Knowledge, Opportunity to Enhance (Adult,NICU,[place],Obstetrics,Pediatric)  Goal: Knowledgeable about Health Subject/Topic  Patient will demonstrate the desired outcomes by discharge/transition of care.  Outcome: Revised  PATIENT EDUCATED ON HAND WASHING AND INFECTION CONTROL. PATIENT VERBALIZED UNDERSTANDING

## 2018-08-21 ENCOUNTER — PATIENT MESSAGE (OUTPATIENT)
Dept: ENDOCRINOLOGY | Facility: CLINIC | Age: 63
End: 2018-08-21

## 2018-11-27 ENCOUNTER — PATIENT MESSAGE (OUTPATIENT)
Dept: ENDOCRINOLOGY | Facility: CLINIC | Age: 63
End: 2018-11-27

## 2018-11-27 ENCOUNTER — TELEPHONE (OUTPATIENT)
Dept: INFECTIOUS DISEASES | Facility: HOSPITAL | Age: 63
End: 2018-11-27

## 2018-11-27 ENCOUNTER — TELEPHONE (OUTPATIENT)
Dept: ENDOCRINOLOGY | Facility: CLINIC | Age: 63
End: 2018-11-27

## 2018-11-27 DIAGNOSIS — E55.9 VITAMIN D DEFICIENCY: ICD-10-CM

## 2018-11-27 DIAGNOSIS — M81.0 OSTEOPOROSIS, UNSPECIFIED OSTEOPOROSIS TYPE, UNSPECIFIED PATHOLOGICAL FRACTURE PRESENCE: Primary | ICD-10-CM

## 2018-11-27 NOTE — TELEPHONE ENCOUNTER
I left a message for patient regarding Her prolia inj appt on 12/31/18 that I would need a new order from Dr Ybarra.  I gave her Dr Ybarra appt  And told her to call me if she had a problem.

## 2018-11-29 ENCOUNTER — PATIENT MESSAGE (OUTPATIENT)
Dept: INTERNAL MEDICINE | Facility: CLINIC | Age: 63
End: 2018-11-29

## 2018-11-29 DIAGNOSIS — Z51.81 ENCOUNTER FOR MONITORING PROTON PUMP INHIBITOR THERAPY: ICD-10-CM

## 2018-11-29 DIAGNOSIS — Z00.00 ANNUAL PHYSICAL EXAM: Primary | ICD-10-CM

## 2018-11-29 DIAGNOSIS — Z79.899 ENCOUNTER FOR MONITORING PROTON PUMP INHIBITOR THERAPY: ICD-10-CM

## 2018-11-29 DIAGNOSIS — Z51.81 ENCOUNTER FOR MEDICATION MONITORING: ICD-10-CM

## 2018-11-29 DIAGNOSIS — E55.9 VITAMIN D DEFICIENCY: ICD-10-CM

## 2019-01-02 ENCOUNTER — PATIENT MESSAGE (OUTPATIENT)
Dept: INTERNAL MEDICINE | Facility: CLINIC | Age: 64
End: 2019-01-02

## 2019-01-08 ENCOUNTER — TELEPHONE (OUTPATIENT)
Dept: INFECTIOUS DISEASES | Facility: HOSPITAL | Age: 64
End: 2019-01-08

## 2019-01-09 ENCOUNTER — LAB VISIT (OUTPATIENT)
Dept: LAB | Facility: HOSPITAL | Age: 64
End: 2019-01-09
Attending: INTERNAL MEDICINE
Payer: MEDICARE

## 2019-01-09 DIAGNOSIS — E55.9 VITAMIN D DEFICIENCY: ICD-10-CM

## 2019-01-09 DIAGNOSIS — Z51.81 ENCOUNTER FOR MEDICATION MONITORING: ICD-10-CM

## 2019-01-09 DIAGNOSIS — Z79.899 ENCOUNTER FOR MONITORING PROTON PUMP INHIBITOR THERAPY: ICD-10-CM

## 2019-01-09 DIAGNOSIS — Z51.81 ENCOUNTER FOR MONITORING PROTON PUMP INHIBITOR THERAPY: ICD-10-CM

## 2019-01-09 DIAGNOSIS — Z00.00 ANNUAL PHYSICAL EXAM: ICD-10-CM

## 2019-01-09 DIAGNOSIS — M81.0 OSTEOPOROSIS, UNSPECIFIED OSTEOPOROSIS TYPE, UNSPECIFIED PATHOLOGICAL FRACTURE PRESENCE: ICD-10-CM

## 2019-01-09 LAB
25(OH)D3+25(OH)D2 SERPL-MCNC: 34 NG/ML
ALBUMIN SERPL BCP-MCNC: 3.8 G/DL
ALP SERPL-CCNC: 93 U/L
ALT SERPL W/O P-5'-P-CCNC: 14 U/L
ANION GAP SERPL CALC-SCNC: 8 MMOL/L
AST SERPL-CCNC: 22 U/L
BASOPHILS # BLD AUTO: 0.02 K/UL
BASOPHILS NFR BLD: 0.3 %
BILIRUB SERPL-MCNC: 0.3 MG/DL
BUN SERPL-MCNC: 14 MG/DL
CALCIUM SERPL-MCNC: 10.4 MG/DL
CHLORIDE SERPL-SCNC: 101 MMOL/L
CHOLEST SERPL-MCNC: 230 MG/DL
CHOLEST/HDLC SERPL: 4.3 {RATIO}
CO2 SERPL-SCNC: 31 MMOL/L
CREAT SERPL-MCNC: 0.8 MG/DL
DIFFERENTIAL METHOD: ABNORMAL
EOSINOPHIL # BLD AUTO: 0.4 K/UL
EOSINOPHIL NFR BLD: 6.2 %
ERYTHROCYTE [DISTWIDTH] IN BLOOD BY AUTOMATED COUNT: 14 %
EST. GFR  (AFRICAN AMERICAN): >60 ML/MIN/1.73 M^2
EST. GFR  (NON AFRICAN AMERICAN): >60 ML/MIN/1.73 M^2
ESTIMATED AVG GLUCOSE: 105 MG/DL
GLUCOSE SERPL-MCNC: 97 MG/DL
HBA1C MFR BLD HPLC: 5.3 %
HCT VFR BLD AUTO: 41 %
HDLC SERPL-MCNC: 53 MG/DL
HDLC SERPL: 23 %
HGB BLD-MCNC: 12.8 G/DL
IMM GRANULOCYTES # BLD AUTO: 0.01 K/UL
IMM GRANULOCYTES NFR BLD AUTO: 0.2 %
LDLC SERPL CALC-MCNC: 159.2 MG/DL
LYMPHOCYTES # BLD AUTO: 2 K/UL
LYMPHOCYTES NFR BLD: 34.2 %
MAGNESIUM SERPL-MCNC: 2.4 MG/DL
MCH RBC QN AUTO: 28.5 PG
MCHC RBC AUTO-ENTMCNC: 31.2 G/DL
MCV RBC AUTO: 91 FL
MONOCYTES # BLD AUTO: 0.5 K/UL
MONOCYTES NFR BLD: 8.2 %
NEUTROPHILS # BLD AUTO: 3 K/UL
NEUTROPHILS NFR BLD: 50.9 %
NONHDLC SERPL-MCNC: 177 MG/DL
NRBC BLD-RTO: 0 /100 WBC
PLATELET # BLD AUTO: 341 K/UL
PMV BLD AUTO: 10.8 FL
POTASSIUM SERPL-SCNC: 4.9 MMOL/L
PROT SERPL-MCNC: 7 G/DL
RBC # BLD AUTO: 4.49 M/UL
SODIUM SERPL-SCNC: 140 MMOL/L
TRIGL SERPL-MCNC: 89 MG/DL
TSH SERPL DL<=0.005 MIU/L-ACNC: 3.14 UIU/ML
VIT B12 SERPL-MCNC: 747 PG/ML
WBC # BLD AUTO: 5.94 K/UL

## 2019-01-09 PROCEDURE — 80175 DRUG SCREEN QUAN LAMOTRIGINE: CPT

## 2019-01-09 PROCEDURE — 83735 ASSAY OF MAGNESIUM: CPT

## 2019-01-09 PROCEDURE — 85025 COMPLETE CBC W/AUTO DIFF WBC: CPT

## 2019-01-09 PROCEDURE — 82306 VITAMIN D 25 HYDROXY: CPT

## 2019-01-09 PROCEDURE — 80061 LIPID PANEL: CPT

## 2019-01-09 PROCEDURE — 82607 VITAMIN B-12: CPT

## 2019-01-09 PROCEDURE — 84443 ASSAY THYROID STIM HORMONE: CPT

## 2019-01-09 PROCEDURE — 83036 HEMOGLOBIN GLYCOSYLATED A1C: CPT

## 2019-01-09 PROCEDURE — 80053 COMPREHEN METABOLIC PANEL: CPT

## 2019-01-09 PROCEDURE — 36415 COLL VENOUS BLD VENIPUNCTURE: CPT

## 2019-01-10 LAB — LAMOTRIGINE SERPL-MCNC: 1.5 UG/ML (ref 2–15)

## 2019-01-16 ENCOUNTER — PATIENT MESSAGE (OUTPATIENT)
Dept: INTERNAL MEDICINE | Facility: CLINIC | Age: 64
End: 2019-01-16

## 2019-01-16 ENCOUNTER — OFFICE VISIT (OUTPATIENT)
Dept: ENDOCRINOLOGY | Facility: CLINIC | Age: 64
End: 2019-01-16
Payer: MEDICARE

## 2019-01-16 VITALS
HEIGHT: 65 IN | DIASTOLIC BLOOD PRESSURE: 68 MMHG | SYSTOLIC BLOOD PRESSURE: 108 MMHG | BODY MASS INDEX: 20.9 KG/M2 | HEART RATE: 74 BPM | WEIGHT: 125.44 LBS

## 2019-01-16 DIAGNOSIS — M81.0 OSTEOPOROSIS, POST-MENOPAUSAL: Primary | ICD-10-CM

## 2019-01-16 PROCEDURE — 99214 OFFICE O/P EST MOD 30 MIN: CPT | Mod: PBBFAC | Performed by: INTERNAL MEDICINE

## 2019-01-16 PROCEDURE — 99999 PR PBB SHADOW E&M-EST. PATIENT-LVL IV: CPT | Mod: PBBFAC,,, | Performed by: INTERNAL MEDICINE

## 2019-01-16 PROCEDURE — 99213 OFFICE O/P EST LOW 20 MIN: CPT | Mod: S$PBB,,, | Performed by: INTERNAL MEDICINE

## 2019-01-16 PROCEDURE — 99999 PR PBB SHADOW E&M-EST. PATIENT-LVL IV: ICD-10-PCS | Mod: PBBFAC,,, | Performed by: INTERNAL MEDICINE

## 2019-01-16 PROCEDURE — 99213 PR OFFICE/OUTPT VISIT, EST, LEVL III, 20-29 MIN: ICD-10-PCS | Mod: S$PBB,,, | Performed by: INTERNAL MEDICINE

## 2019-01-16 RX ORDER — FLUOXETINE HYDROCHLORIDE 40 MG/1
80 CAPSULE ORAL DAILY
COMMUNITY
End: 2022-03-21 | Stop reason: SDUPTHER

## 2019-01-16 RX ORDER — LOTEPREDNOL ETABONATE 5 MG/ML
SUSPENSION/ DROPS OPHTHALMIC
Refills: 2 | COMMUNITY
Start: 2018-10-25 | End: 2019-02-22

## 2019-01-16 NOTE — PROGRESS NOTES
Subjective:      Patient ID: Adelaida Flores is a 63 y.o. female.    Chief Complaint:  Osteoporosis      History of Present Illness  Pt returns for management of postmenopausal osteoporosis.  She was a pt of Dr. Massey and Dr Alex      She used alendronate 2010- 2014 - had GERD and fracture while on therapy    was changed to prolia 2014       No fractures since last visit  Prone to falls - poor balance    she had a bad fall with hiking - no fractures        Strong FH of OP     Fracture hx:  Right wrist 2009  T12 fx  2013      Menarche 11 y/o  Menopause 39 y/o   no hrt use      last bmd 1/5/18  Impression      BMD indicates osteopenia, most severe at the lumbar spine. This patient has a diagnosis of osteoporosis on chart review based on past fractures.  Compared with previous DXA, BMD at the total hip has remained stable, BMD at the lumbar spine has increased by 8.7%; correlate clinically for possible artifactual contribution by arthritic changes at the spine.       IBS with abd pain and diarrhea       chronic neck pain - gets acupuncture   +dry eyes and dry mouth     Review of Systems   Constitutional: Negative for unexpected weight change.   Eyes: Negative for visual disturbance.   Respiratory: Negative for shortness of breath.    Cardiovascular: Negative for chest pain.   Gastrointestinal: Negative for abdominal pain.   Musculoskeletal: Negative for arthralgias.   Skin: Negative for wound.   Neurological: Negative for headaches.   Hematological: Does not bruise/bleed easily.   Psychiatric/Behavioral: Negative for sleep disturbance.       Objective:   Physical Exam   Neck: No thyromegaly present.   Cardiovascular: Normal rate.   Pulmonary/Chest: Effort normal.   Abdominal: Soft.   Musculoskeletal: She exhibits no edema.   Vitals reviewed.      Body mass index is 20.87 kg/m².    Lab Review:   Lab Results   Component Value Date    HGBA1C 5.3 01/09/2019     Lab Results   Component Value Date    CHOL 230 (H)  01/09/2019    HDL 53 01/09/2019    LDLCALC 159.2 (H) 01/09/2019    TRIG 89 01/09/2019    CHOLHDL 23.0 01/09/2019     Lab Results   Component Value Date     01/09/2019    K 4.9 01/09/2019     01/09/2019    CO2 31 (H) 01/09/2019    GLU 97 01/09/2019    BUN 14 01/09/2019    CREATININE 0.8 01/09/2019    CALCIUM 10.4 01/09/2019    PROT 7.0 01/09/2019    ALBUMIN 3.8 01/09/2019    BILITOT 0.3 01/09/2019    ALKPHOS 93 01/09/2019    AST 22 01/09/2019    ALT 14 01/09/2019    ANIONGAP 8 01/09/2019    ESTGFRAFRICA >60.0 01/09/2019    EGFRNONAA >60.0 01/09/2019    TSH 3.139 01/09/2019       Assessment and Plan     Osteoporosis, post-menopausal     Reviewed basics of quantity versus quality  Reassuring she is not fracturing   continue prolia    check bmd next year     RDA of calcium (8511-9177 mg /day in divided doses) and vitamin D 2000iu a day  discussed  Calcium from food sources preferred  Fall precautions emphasized  +weight bearing exercise  NOF.org website information given    Treatment options and potential side effects discussed for   Denosumab     Low risk for ONJ and atypical fractures reviewed and discussed.    Discussed optimal duration of Denosumab use is unknown

## 2019-01-16 NOTE — ASSESSMENT & PLAN NOTE
Reviewed basics of quantity versus quality  Reassuring she is not fracturing   continue prolia    check bmd next year     RDA of calcium (0228-8669 mg /day in divided doses) and vitamin D 2000iu a day  discussed  Calcium from food sources preferred  Fall precautions emphasized  +weight bearing exercise  [email or web address] website information given    Treatment options and potential side effects discussed for   Denosumab     Low risk for ONJ and atypical fractures reviewed and discussed.    Discussed optimal duration of Denosumab use is unknown

## 2019-01-25 ENCOUNTER — INFUSION (OUTPATIENT)
Dept: INFECTIOUS DISEASES | Facility: HOSPITAL | Age: 64
End: 2019-01-25
Attending: INTERNAL MEDICINE
Payer: MEDICARE

## 2019-01-25 VITALS — HEIGHT: 65 IN | BODY MASS INDEX: 20.9 KG/M2 | WEIGHT: 125.44 LBS

## 2019-01-25 DIAGNOSIS — M81.0 OSTEOPOROSIS, POST-MENOPAUSAL: Primary | ICD-10-CM

## 2019-01-25 PROCEDURE — 96372 THER/PROPH/DIAG INJ SC/IM: CPT

## 2019-01-25 PROCEDURE — 63600175 PHARM REV CODE 636 W HCPCS: Mod: JG | Performed by: INTERNAL MEDICINE

## 2019-01-25 RX ADMIN — DENOSUMAB 60 MG: 60 INJECTION SUBCUTANEOUS at 03:01

## 2019-02-22 ENCOUNTER — CLINICAL SUPPORT (OUTPATIENT)
Dept: INTERNAL MEDICINE | Facility: CLINIC | Age: 64
End: 2019-02-22
Payer: MEDICARE

## 2019-02-22 ENCOUNTER — OFFICE VISIT (OUTPATIENT)
Dept: INTERNAL MEDICINE | Facility: CLINIC | Age: 64
End: 2019-02-22
Payer: MEDICARE

## 2019-02-22 VITALS
HEIGHT: 66 IN | SYSTOLIC BLOOD PRESSURE: 110 MMHG | TEMPERATURE: 99 F | HEART RATE: 80 BPM | BODY MASS INDEX: 20.05 KG/M2 | DIASTOLIC BLOOD PRESSURE: 64 MMHG | WEIGHT: 124.75 LBS

## 2019-02-22 DIAGNOSIS — F41.9 ANXIETY: ICD-10-CM

## 2019-02-22 DIAGNOSIS — M81.0 SENILE OSTEOPOROSIS: ICD-10-CM

## 2019-02-22 DIAGNOSIS — R10.13 DYSPEPSIA: ICD-10-CM

## 2019-02-22 DIAGNOSIS — E78.5 HYPERLIPIDEMIA, UNSPECIFIED HYPERLIPIDEMIA TYPE: ICD-10-CM

## 2019-02-22 DIAGNOSIS — F32.A DEPRESSION, UNSPECIFIED DEPRESSION TYPE: Primary | ICD-10-CM

## 2019-02-22 DIAGNOSIS — R19.7 DIARRHEA, UNSPECIFIED TYPE: ICD-10-CM

## 2019-02-22 PROCEDURE — 99214 OFFICE O/P EST MOD 30 MIN: CPT | Mod: S$PBB,,, | Performed by: INTERNAL MEDICINE

## 2019-02-22 PROCEDURE — 99211 OFF/OP EST MAY X REQ PHY/QHP: CPT | Mod: PBBFAC,27,25

## 2019-02-22 PROCEDURE — 99213 OFFICE O/P EST LOW 20 MIN: CPT | Mod: PBBFAC,25 | Performed by: INTERNAL MEDICINE

## 2019-02-22 PROCEDURE — 99999 PR PBB SHADOW E&M-EST. PATIENT-LVL I: ICD-10-PCS | Mod: PBBFAC,,,

## 2019-02-22 PROCEDURE — 99999 PR PBB SHADOW E&M-EST. PATIENT-LVL III: ICD-10-PCS | Mod: PBBFAC,,, | Performed by: INTERNAL MEDICINE

## 2019-02-22 PROCEDURE — 99999 PR PBB SHADOW E&M-EST. PATIENT-LVL III: CPT | Mod: PBBFAC,,, | Performed by: INTERNAL MEDICINE

## 2019-02-22 PROCEDURE — G0009 ADMIN PNEUMOCOCCAL VACCINE: HCPCS | Mod: PBBFAC

## 2019-02-22 PROCEDURE — 99214 PR OFFICE/OUTPT VISIT, EST, LEVL IV, 30-39 MIN: ICD-10-PCS | Mod: S$PBB,,, | Performed by: INTERNAL MEDICINE

## 2019-02-22 PROCEDURE — 99999 PR PBB SHADOW E&M-EST. PATIENT-LVL I: CPT | Mod: PBBFAC,,,

## 2019-02-22 RX ORDER — HYDROXYZINE HYDROCHLORIDE 10 MG/1
TABLET, FILM COATED ORAL
COMMUNITY
Start: 2019-01-23 | End: 2019-05-08

## 2019-02-22 RX ORDER — DULOXETIN HYDROCHLORIDE 60 MG/1
CAPSULE, DELAYED RELEASE ORAL
COMMUNITY
Start: 2019-01-23 | End: 2020-01-22

## 2019-02-22 NOTE — PROGRESS NOTES
INTERNAL MEDICINE INITIAL VISIT NOTE      CHIEF COMPLAINT     Chief Complaint   Patient presents with    [facility]     HPI     Adelaida Flores is a 63 y.o. C female who presents to est care.  Trevin Calderon.    H/o volvulus x 2 s/p partial colectomy.  Last Cscope 8/2017 - ileocolonic anastomosis looks ok.   Frequent diarrhea - about 3x/day; assoc w/ anxiety also.  No blood in stools. Sometimes w/ abdominal pain/tight huong now while she's thinking about her mom  GERD - takes omeprazole prn. Tries to watch what she eats.    Osteoporosis - prolia.   Dr. Ybarra 11/9/17  Drinks a lot of soy milk w/ calcium.   Alendronate-->GERD and fx while on therapy.  DEXA 11/8/17-->osteonepnia. Increased BMD at L spine by 8.7%.    MDD, MISTY  cymbalta 60mg daily, prozac 80mg daily, lamictal 100mg daily.   Only had to take atarax once - takes prn.   Last seen Dr. Lottie Singh.  93 y/o mom in ICU in [place].    Allergies - on xyzal 5mg nightly.     Lipids reviewed.  The 10-year ASCVD risk score (Owen VANESSA Mackey., et al., 2013) is: 4.6%    Values used to calculate the score:      Age: 63 years      Sex: Female      Is Non- : No      Diabetic: No      Tobacco smoker: No      Systolic Blood Pressure: 110 mmHg      Is BP treated: No      HDL Cholesterol: 53 mg/dL      Total Cholesterol: 230 mg/dL      Past Medical History:  Past Medical History:   Diagnosis Date    Allergy     Colon polyps     Depression     IBS (irritable bowel syndrome)     Osteoporosis     T12 compression fracture s/p corpectomy 7/13/2013       Past Surgical History:  Past Surgical History:   Procedure Laterality Date    COLON SURGERY      COLONOSCOPY miralax N/A 8/28/2017    [facility] ENDO    ESOPHAGOGASTRODUODENOSCOPY (EGD) N/A 8/28/2017    [facility] ENDO    ESOPHAGOGASTRODUODENOSCOPY (EGD) N/A 6/2/2015    [facility] by Gamal Beatty [facility] at [facility] ENDO    FRACTURE  SURGERY      right broken wrist and had surgery    FUSION, SPINE, LUMBAR, DLIF, WITH MINIMALLY INVASIVE POSTERIOR COLUMN FUSION OF LUMBAR SPINE N/A 7/15/2013    Per[facility] at [facility] OR [address] [place] [address]    SPINE SURGERY      TONSILLECTOMY         Allergies:  Review of patient's allergies indicates:  No Known Allergies    Home Medications:  Prior to Admission medications    Medication Sig Start Date End Date Taking? Authorizing Provider   duloxetine (CYMBALTA) 30 MG capsule Take 1 capsule (30 mg total) by mouth once daily.  Patient taking differently: Take 60 mg by mouth once daily.  7/25/16  Yes Estrella Condon DO   FLUoxetine 40 MG capsule Take 80 mg by mouth once daily.   Yes Historical Provider, MD   lamotrigine (LAMICTAL) 100 MG tablet Take 100 mg by mouth once daily.   Yes Historical Provider, MD   levocetirizine (XYZAL) 5 MG tablet Take 5 mg by mouth every evening.   Yes Historical Provider, MD   LOTEMAX 0.5 % ophthalmic suspension SHAKE BOTTLE WELL AND INSTILL 1 GTT IN BOTH EYES TID 10/25/18  Yes Historical Provider, MD   multivitamin capsule Take 1 capsule by mouth once daily.     Yes Historical Provider, MD   omeprazole (PRILOSEC) 40 MG capsule Take 1 capsule (40 mg total) by mouth once daily. 12/23/16 2/22/19 Yes SRIRAM Ray   LOTEMAX 0.5 % DrpG PLACE 1 DROP IN OU TID 11/30/17   Historical Provider, MD   CA/D3/MAG OX/ZINC//ESMER/BOR (CALCIUM 600+D3 PLUS ORAL) Take by mouth.  2/22/19  Historical Provider, MD       Family History:  Family History   Problem Relation Age of Onset    Colon polyps Mother     Cancer Mother         skin    Osteoarthritis Mother     Colon polyps Father     Cancer Father         prostate    Osteoarthritis Maternal Aunt     Osteoarthritis Maternal Grandmother     Diabetes Neg Hx     Heart disease Neg Hx        Social History:  Social History     Tobacco Use    Smoking status: Former Smoker     Packs/day: 1.00     Years: 10.00     Pack years: 10.00      "Last attempt to quit: 1993     Years since quittin.1    Smokeless tobacco: Never Used   Substance Use Topics    Alcohol use: No    Drug use: No       Review of Systems:  Review of Systems   Constitutional: Negative for activity change and unexpected weight change.   HENT: Positive for hearing loss. Negative for rhinorrhea and trouble swallowing.    Eyes: Negative for discharge and visual disturbance.   Respiratory: Negative for chest tightness and wheezing.    Cardiovascular: Negative for chest pain and palpitations.   Gastrointestinal: Positive for diarrhea. Negative for blood in stool, constipation and vomiting.   Endocrine: Negative for polydipsia and polyuria.   Genitourinary: Negative for difficulty urinating, dysuria, hematuria and menstrual problem.   Musculoskeletal: Positive for neck pain. Negative for arthralgias and joint swelling.   Neurological: Negative for weakness and headaches.   Psychiatric/Behavioral: Positive for dysphoric mood. Negative for confusion.       Health Maintainence:   Td 10/5/09  Flu updated.   Prevnar - due  Pneumovax - next yr  Zoster   MMG 2017  C-SCOPE 17  DEXA 18  Hep C screening 7/28/10    PHYSICAL EXAM     /64 (BP Location: Left arm, Patient Position: Sitting, BP Method: Medium (Manual))   Pulse 80   Temp 98.5 °F (36.9 °C)   Ht 5' 6" (1.676 m)   Wt 56.6 kg (124 lb 12.5 oz)   BMI 20.14 kg/m²     GEN - A+OX4, NAD   HEENT - PERRL, EOMI, OP clear. MMM.   Neck - No thyromegaly or cervical LAD. No thyroid masses felt.  CV - RRR, no m/r   Chest - CTAB, no wheezing or rhonchi  Abd - S/NT/ND/+BS.   Ext - 2+BDP and radial pulses. No LE edema.   Neuro - PERRL, EOMI, no nystagmus, eyebrow raise, facial sensation, m of mastication, smile, palatal raise, shoulder shrug, tongue protrusion symmetric and intact. 5/5 BUE and BLE strength. Sensation to light touch intact throughout. 2+ DTRs. Normal gait. Decreased hearing on the R. Slight head tremor.  MSK " - No spinal tenderness to palpation. Normal gait.   Skin - No rash.    LABS     Previous labs reviewed.    ASSESSMENT/PLAN     Adelaida Flores is a 63 y.o. female with  Adelaida was seen today for establish care.    Diagnoses and all orders for this visit:    Depression, unspecified depression type/anxiety - on cymbalta, prozac, lamictal. F/u w/ psychiatrist. Doing ok except mom is in ICU currently.     Diarrhea, unspecified type - chronic w/ h/o partial colectomy due to volvulus.     Dyspepsia - omeprazole prn.     Senile osteoporosis - cont prolia. Cont high Ca and vit D diet. Cont light weight bearing exercises.    Hyperlipidemia - low fat diet.    Other orders  -     DULoxetine (CYMBALTA) 60 MG capsule  -     OMEPRAZOLE ORAL  -     hydrOXYzine HCl (ATARAX) 10 MG Tab  -     KRILL OIL ORAL    prevnar today.  MMG due in Dec.    RTC in 12 months, sooner if needed and depending on labs.    Catie Portillo MD  Department of Internal Medicine - [facility]  7:34 AM

## 2019-05-08 ENCOUNTER — OFFICE VISIT (OUTPATIENT)
Dept: URGENT CARE | Facility: CLINIC | Age: 64
End: 2019-05-08
Payer: MEDICARE

## 2019-05-08 VITALS
HEART RATE: 72 BPM | WEIGHT: 119 LBS | BODY MASS INDEX: 18.68 KG/M2 | TEMPERATURE: 99 F | HEIGHT: 67 IN | SYSTOLIC BLOOD PRESSURE: 121 MMHG | DIASTOLIC BLOOD PRESSURE: 79 MMHG

## 2019-05-08 DIAGNOSIS — B96.89 ACUTE BACTERIAL SINUSITIS: Primary | ICD-10-CM

## 2019-05-08 DIAGNOSIS — R05.9 COUGH: ICD-10-CM

## 2019-05-08 DIAGNOSIS — J01.90 ACUTE BACTERIAL SINUSITIS: Primary | ICD-10-CM

## 2019-05-08 PROCEDURE — 99214 PR OFFICE/OUTPT VISIT, EST, LEVL IV, 30-39 MIN: ICD-10-PCS | Mod: 25,S$GLB,, | Performed by: PHYSICIAN ASSISTANT

## 2019-05-08 PROCEDURE — 99214 OFFICE O/P EST MOD 30 MIN: CPT | Mod: 25,S$GLB,, | Performed by: PHYSICIAN ASSISTANT

## 2019-05-08 PROCEDURE — 96372 THER/PROPH/DIAG INJ SC/IM: CPT | Mod: S$GLB,,, | Performed by: PHYSICIAN ASSISTANT

## 2019-05-08 PROCEDURE — 96372 PR INJECTION,THERAP/PROPH/DIAG2ST, IM OR SUBCUT: ICD-10-PCS | Mod: S$GLB,,, | Performed by: PHYSICIAN ASSISTANT

## 2019-05-08 RX ORDER — DEXAMETHASONE SODIUM PHOSPHATE 100 MG/10ML
6 INJECTION INTRAMUSCULAR; INTRAVENOUS
Status: COMPLETED | OUTPATIENT
Start: 2019-05-08 | End: 2019-05-08

## 2019-05-08 RX ORDER — AMOXICILLIN AND CLAVULANATE POTASSIUM 875; 125 MG/1; MG/1
1 TABLET, FILM COATED ORAL 2 TIMES DAILY
Qty: 20 TABLET | Refills: 0 | Status: SHIPPED | OUTPATIENT
Start: 2019-05-08 | End: 2019-05-18

## 2019-05-08 RX ADMIN — DEXAMETHASONE SODIUM PHOSPHATE 6 MG: 100 INJECTION INTRAMUSCULAR; INTRAVENOUS at 12:05

## 2019-05-08 NOTE — PATIENT INSTRUCTIONS
You must understand that you've received an Urgent Care treatment only and that you may be released before all your medical problems are known or treated. You, the patient, will arrange for follow up care as instructed.  Follow up with your PCP or specialty clinic as directed if not improved or as needed. You can call [phone] to schedule an appointment with the appropriate provider.  If your condition worsens we recommend that you receive another evaluation at the Emergency Department for any concerns or worsening of condition.  Patient aware and verbalized understanding.    You received a steroid shot today - this can elevate your blood pressure, elevate your blood sugar, water weight gain, nervous energy, redness to the face and dimpling of the skin where the shot goes in.   Patient aware and verbalized understanding.    Increase fluids and rest is important.  Avoid contact with sick individuals.  Humidifier use at home.  Augmentin RX as prescribed for sinusitis.  OTC Claritin or Zyrtec daily as directed.  Flonase Nasal [place] as directed for nasal congestion.  OTC Tylenol or Motrin every 4 - 6 hours as needed for fever or pain.  Follow-up with your PCP in the next 48hrs or sooner for re-evaluation especially if no improvement in symptoms.  ER precautions given to patient.  Patient aware and verbalized understanding.    Acute Bacterial Rhinosinusitis (ABRS)    Acute bacterial rhinosinusitis (ABRS) is an infection of your nasal cavity and sinuses. Its caused by bacteria. Acute means that youve had symptoms for less than 12 weeks.  Understanding your sinuses  The nasal cavity is the large air-filled space behind your nose. The sinuses are a group of spaces formed by the bones of your face. They connect with your nasal cavity. ABRS causes the tissue lining these spaces to become inflamed. Mucus may not drain normally. This leads to facial pain and other symptoms.  What causes ABRS?  ABRS most often follows an  upper respiratory infection caused by a virus. Bacteria then infect the lining of your nasal cavity and sinuses. But you can also get ABRS if you have:  · Nasal allergies  · Long-term nasal swelling and congestion not caused by allergies  · Blockage in the nose  Symptoms of ABRS  The symptoms of ABRS may be different for each person, and can include:  · Nasal congestion  · Runny nose  · Fluid draining from the nose down the throat (postnasal drip)  · Headache  · Cough  · Pain in the sinuses  · Thick, colored fluid from the nose (mucus)  · Fever  Diagnosing ABRS  ABRS may be diagnosed if youve had an upper respiratory infection like a cold and cough for longer than 10 to 14 days. Your health care provider will ask about your symptoms and your medical history. The provider will check your vital signs, including your temperature. Youll have a physical exam. The health care provider will check your ears, nose, and throat. You likely wont need any tests. If ABRS comes back, you may have a culture or other tests.  Treatment for ABRS  Treatment may include:  · Antibiotic medicine. This is for symptoms that last for at least 10 to 14 days.  · Nasal corticosteroid medicine. Drops or spray used in the nose can lessen swelling and congestion.  · Over-the-counter pain medicine. This is to lessen sinus pain and pressure.  · Nasal decongestant medicine. Spray or drops may help to lessen congestion. Do not use them for more than a few days.  · Salt wash (saline irrigation). This can help to loosen mucus.  Possible complications of ABRS  ABRS may come back or become long-term (chronic).  In rare cases, ABRS may cause complications such as:   · Inflamed tissue around the brain and spinal cord (meningitis)  · Inflamed tissue around the eyes (orbital cellulitis)  · Inflamed bones around the sinuses (osteitis)  These problems may need to be treated in a hospital with intravenous (IV) antibiotic medicine or surgery.  When to call the  health care provider  Call your health care provider if you have any of the following:  · Symptoms that dont get better, or get worse  · Symptoms that dont get better after 3 to 5 days on antibiotics  · Trouble seeing  · Swelling around your eyes  · Confusion or trouble staying awake   Date Last Reviewed: 3/3/2015  © 3545-1258 [employer]. [address] [place] [address], [place] [address]. All rights reserved. This information is not intended as a substitute for professional medical care. Always follow your healthcare professional's instructions.

## 2019-05-14 ENCOUNTER — PATIENT MESSAGE (OUTPATIENT)
Dept: INTERNAL MEDICINE | Facility: CLINIC | Age: 64
End: 2019-05-14

## 2019-07-26 ENCOUNTER — INFUSION (OUTPATIENT)
Dept: INFECTIOUS DISEASES | Facility: HOSPITAL | Age: 64
End: 2019-07-26
Attending: INTERNAL MEDICINE
Payer: MEDICARE

## 2019-07-26 VITALS — WEIGHT: 119.06 LBS | BODY MASS INDEX: 18.69 KG/M2 | HEIGHT: 67 IN

## 2019-07-26 DIAGNOSIS — M81.0 OSTEOPOROSIS, POST-MENOPAUSAL: Primary | ICD-10-CM

## 2019-07-26 PROCEDURE — 96372 THER/PROPH/DIAG INJ SC/IM: CPT

## 2019-07-26 PROCEDURE — 63600175 PHARM REV CODE 636 W HCPCS: Mod: JG | Performed by: INTERNAL MEDICINE

## 2019-07-26 RX ADMIN — DENOSUMAB 60 MG: 60 INJECTION SUBCUTANEOUS at 02:07

## 2020-01-15 ENCOUNTER — PATIENT MESSAGE (OUTPATIENT)
Dept: ENDOCRINOLOGY | Facility: CLINIC | Age: 65
End: 2020-01-15

## 2020-01-16 ENCOUNTER — TELEPHONE (OUTPATIENT)
Dept: ENDOCRINOLOGY | Facility: CLINIC | Age: 65
End: 2020-01-16

## 2020-01-16 ENCOUNTER — OFFICE VISIT (OUTPATIENT)
Dept: URGENT CARE | Facility: CLINIC | Age: 65
End: 2020-01-16
Payer: MEDICARE

## 2020-01-16 VITALS
HEIGHT: 67 IN | WEIGHT: 119 LBS | BODY MASS INDEX: 18.68 KG/M2 | RESPIRATION RATE: 16 BRPM | TEMPERATURE: 98 F | OXYGEN SATURATION: 99 % | DIASTOLIC BLOOD PRESSURE: 90 MMHG | SYSTOLIC BLOOD PRESSURE: 114 MMHG | HEART RATE: 60 BPM

## 2020-01-16 DIAGNOSIS — M81.0 OSTEOPOROSIS, UNSPECIFIED OSTEOPOROSIS TYPE, UNSPECIFIED PATHOLOGICAL FRACTURE PRESENCE: Primary | ICD-10-CM

## 2020-01-16 DIAGNOSIS — M79.671 PAIN IN RIGHT FOOT: Primary | ICD-10-CM

## 2020-01-16 DIAGNOSIS — E55.9 VITAMIN D DEFICIENCY: ICD-10-CM

## 2020-01-16 PROCEDURE — 99214 OFFICE O/P EST MOD 30 MIN: CPT | Mod: S$GLB,,, | Performed by: FAMILY MEDICINE

## 2020-01-16 PROCEDURE — 73630 X-RAY EXAM OF FOOT: CPT | Mod: FY,RT,S$GLB, | Performed by: RADIOLOGY

## 2020-01-16 PROCEDURE — 99214 PR OFFICE/OUTPT VISIT, EST, LEVL IV, 30-39 MIN: ICD-10-PCS | Mod: S$GLB,,, | Performed by: FAMILY MEDICINE

## 2020-01-16 PROCEDURE — 73630 XR FOOT COMPLETE 3 VIEW RIGHT: ICD-10-PCS | Mod: FY,RT,S$GLB, | Performed by: RADIOLOGY

## 2020-01-16 NOTE — PROGRESS NOTES
"Subjective:       Patient ID: Adelaida Flores is a 64 y.o. female.    Vitals:  height is 5' 7" (1.702 m) and weight is 54 kg (119 lb).     Chief Complaint: Foot Injury (R FOOT)    64-year-old female with complaint of right foot pain x2 days recalls 2 sting while at home the pain did not start until later on that evening no previous injuries or fractures otherwise ambulatory without    Foot Injury    The incident occurred 2 days ago. The incident occurred at home. The injury mechanism was a twisting injury. The pain is present in the right foot. The quality of the pain is described as aching. The pain is at a severity of 4/10. The pain is mild. The pain has been constant since onset. Associated symptoms include an inability to bear weight and muscle weakness. She reports no foreign bodies present. The symptoms are aggravated by movement and weight bearing. Treatments tried: IBUPROFEN. The treatment provided no relief.       Constitution: Negative for fatigue.   HENT: Negative for facial swelling and facial trauma.    Neck: Negative for neck stiffness.   Cardiovascular: Negative for chest trauma.   Eyes: Negative for eye trauma, double vision and blurred vision.   Gastrointestinal: Negative for abdominal trauma, abdominal pain and rectal bleeding.   Genitourinary: Negative for hematuria, missed menses, genital trauma and pelvic pain.   Musculoskeletal: Positive for pain, trauma and joint swelling. Negative for abnormal ROM of joint.   Skin: Negative for color change, wound, abrasion, laceration and bruising.   Neurological: Negative for dizziness, history of vertigo, light-headedness, coordination disturbances, altered mental status and loss of consciousness.   Hematologic/Lymphatic: Negative for history of bleeding disorder.   Psychiatric/Behavioral: Negative for altered mental status.       Objective:      Physical Exam   Constitutional: She is oriented to person, place, and time. She appears well-developed and " well-nourished. She is cooperative.  Non-toxic appearance. She does not appear ill. No distress.   HENT:   Head: Normocephalic and atraumatic.   Right Ear: Hearing, tympanic membrane, external ear and ear canal normal.   Left Ear: Hearing, tympanic membrane, external ear and ear canal normal.   Nose: Nose normal. No mucosal edema, rhinorrhea or nasal deformity. No epistaxis. Right sinus exhibits no maxillary sinus tenderness and no frontal sinus tenderness. Left sinus exhibits no maxillary sinus tenderness and no frontal sinus tenderness.   Mouth/Throat: Uvula is midline, oropharynx is clear and moist and mucous membranes are normal. No trismus in the jaw. Normal dentition. No uvula swelling. No posterior oropharyngeal erythema.   Eyes: Conjunctivae and lids are normal. Right eye exhibits no discharge. Left eye exhibits no discharge. No scleral icterus.   Neck: Trachea normal, normal range of motion, full passive range of motion without pain and phonation normal. Neck supple.   Cardiovascular: Normal rate, regular rhythm, normal heart sounds, intact distal pulses and normal pulses.   Pulmonary/Chest: Effort normal and breath sounds normal. No respiratory distress.   Abdominal: Soft. Normal appearance and bowel sounds are normal. She exhibits no distension, no pulsatile midline mass and no mass. There is no tenderness.   Musculoskeletal: Normal range of motion. She exhibits no edema or deformity.   Right foot no swelling or edema mild tenderness at the arch of the right foot   Neurological: She is alert and oriented to person, place, and time. She exhibits normal muscle tone. Coordination normal.   Skin: Skin is warm, dry, intact, not diaphoretic and not pale.   Psychiatric: She has a normal mood and affect. Her speech is normal and behavior is normal. Judgment and thought content normal. Cognition and memory are normal.   Nursing note and vitals reviewed.        Assessment:       No diagnosis found.    Plan:          There are no diagnoses linked to this encounter.

## 2020-01-17 DIAGNOSIS — Z12.39 BREAST CANCER SCREENING: ICD-10-CM

## 2020-01-17 DIAGNOSIS — Z12.31 ENCOUNTER FOR SCREENING MAMMOGRAM FOR MALIGNANT NEOPLASM OF BREAST: ICD-10-CM

## 2020-01-20 ENCOUNTER — APPOINTMENT (OUTPATIENT)
Dept: RADIOLOGY | Facility: CLINIC | Age: 65
End: 2020-01-20
Attending: INTERNAL MEDICINE
Payer: MEDICARE

## 2020-01-20 DIAGNOSIS — M81.0 OSTEOPOROSIS, UNSPECIFIED OSTEOPOROSIS TYPE, UNSPECIFIED PATHOLOGICAL FRACTURE PRESENCE: ICD-10-CM

## 2020-01-20 PROCEDURE — 77080 DEXA BONE DENSITY SPINE HIP: ICD-10-PCS | Mod: 26,,, | Performed by: INTERNAL MEDICINE

## 2020-01-20 PROCEDURE — 77080 DXA BONE DENSITY AXIAL: CPT | Mod: 26,,, | Performed by: INTERNAL MEDICINE

## 2020-01-20 PROCEDURE — 77080 DXA BONE DENSITY AXIAL: CPT | Mod: TC,PO
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
## 2020-01-22 ENCOUNTER — OFFICE VISIT (OUTPATIENT)
Dept: ENDOCRINOLOGY | Facility: CLINIC | Age: 65
End: 2020-01-22
Payer: MEDICARE

## 2020-01-22 ENCOUNTER — PATIENT OUTREACH (OUTPATIENT)
Dept: ADMINISTRATIVE | Facility: OTHER | Age: 65
End: 2020-01-22

## 2020-01-22 ENCOUNTER — PATIENT MESSAGE (OUTPATIENT)
Dept: ENDOCRINOLOGY | Facility: CLINIC | Age: 65
End: 2020-01-22

## 2020-01-22 VITALS
SYSTOLIC BLOOD PRESSURE: 100 MMHG | WEIGHT: 123 LBS | HEIGHT: 67 IN | RESPIRATION RATE: 16 BRPM | DIASTOLIC BLOOD PRESSURE: 70 MMHG | BODY MASS INDEX: 19.3 KG/M2

## 2020-01-22 DIAGNOSIS — E78.5 HYPERLIPIDEMIA, UNSPECIFIED HYPERLIPIDEMIA TYPE: ICD-10-CM

## 2020-01-22 DIAGNOSIS — M81.0 OSTEOPOROSIS, POST-MENOPAUSAL: Primary | ICD-10-CM

## 2020-01-22 PROCEDURE — 1126F PR PAIN SEVERITY QUANTIFIED, NO PAIN PRESENT: ICD-10-PCS | Mod: ,,, | Performed by: NURSE PRACTITIONER

## 2020-01-22 PROCEDURE — 1126F AMNT PAIN NOTED NONE PRSNT: CPT | Mod: ,,, | Performed by: NURSE PRACTITIONER

## 2020-01-22 PROCEDURE — 1159F PR MEDICATION LIST DOCUMENTED IN MEDICAL RECORD: ICD-10-PCS | Mod: ,,, | Performed by: NURSE PRACTITIONER

## 2020-01-22 PROCEDURE — 99213 OFFICE O/P EST LOW 20 MIN: CPT | Mod: PBBFAC | Performed by: NURSE PRACTITIONER

## 2020-01-22 PROCEDURE — 99999 PR PBB SHADOW E&M-EST. PATIENT-LVL III: ICD-10-PCS | Mod: PBBFAC,,, | Performed by: NURSE PRACTITIONER

## 2020-01-22 PROCEDURE — 1159F MED LIST DOCD IN RCRD: CPT | Mod: ,,, | Performed by: NURSE PRACTITIONER

## 2020-01-22 PROCEDURE — 99214 PR OFFICE/OUTPT VISIT, EST, LEVL IV, 30-39 MIN: ICD-10-PCS | Mod: S$PBB,,, | Performed by: NURSE PRACTITIONER

## 2020-01-22 PROCEDURE — 99214 OFFICE O/P EST MOD 30 MIN: CPT | Mod: S$PBB,,, | Performed by: NURSE PRACTITIONER

## 2020-01-22 PROCEDURE — 99999 PR PBB SHADOW E&M-EST. PATIENT-LVL III: CPT | Mod: PBBFAC,,, | Performed by: NURSE PRACTITIONER

## 2020-01-22 NOTE — ASSESSMENT & PLAN NOTE
-- Risks include low weight,  race, menopause,  +FH.  -- Reviewed basics of quantity versus quality.  -- Reassuring they are not fracturing.  -- Recommend:  -Pharmacological therapy is recommended for patients with osteopenia if the 10 year probability of a hip fracture is >3% and 10 year probability of other major osteoporotic fractures is >20%.  Treatment options and potential side effects discussed for PO bisphosphonates, reclast, Denosumab, and Teriparatide.   -Treatment: Prolia (started 2014) last dose 7/26/19.  CMP Vit D UTD and unremarkable.  Therapy plan reordered.   -Calcium and Vitamin D RDD provided.  -Exercise: recommended.  -Fall precautions made in the home.  -Alerted that if dental work needs to be done it should be done prior to initiating therapy. Dental health: UTD.  -- Repeat DEXA scan 1/2022.

## 2020-01-28 ENCOUNTER — INFUSION (OUTPATIENT)
Dept: INFECTIOUS DISEASES | Facility: HOSPITAL | Age: 65
End: 2020-01-28
Attending: INTERNAL MEDICINE
Payer: MEDICARE

## 2020-01-28 VITALS
TEMPERATURE: 99 F | SYSTOLIC BLOOD PRESSURE: 108 MMHG | OXYGEN SATURATION: 98 % | RESPIRATION RATE: 15 BRPM | HEART RATE: 61 BPM | DIASTOLIC BLOOD PRESSURE: 53 MMHG

## 2020-01-28 DIAGNOSIS — M81.0 SENILE OSTEOPOROSIS: Primary | ICD-10-CM

## 2020-01-28 PROCEDURE — 96401 CHEMO ANTI-NEOPL SQ/IM: CPT

## 2020-01-28 PROCEDURE — 63600175 PHARM REV CODE 636 W HCPCS: Mod: JG | Performed by: NURSE PRACTITIONER

## 2020-01-28 RX ADMIN — DENOSUMAB 60 MG: 60 INJECTION SUBCUTANEOUS at 04:01

## 2020-01-28 NOTE — PROGRESS NOTES
Pt tolerated Prolia injection to left arm without incidence, pt instructed to move as frequently as possible  and to call with any questions/concerns related to this visit, pt acknowledged understanding, no questions/concerns noted at this time

## 2020-02-14 ENCOUNTER — PATIENT MESSAGE (OUTPATIENT)
Dept: ENDOCRINOLOGY | Facility: CLINIC | Age: 65
End: 2020-02-14

## 2020-02-21 ENCOUNTER — PATIENT MESSAGE (OUTPATIENT)
Dept: ENDOCRINOLOGY | Facility: CLINIC | Age: 65
End: 2020-02-21

## 2020-03-19 ENCOUNTER — PATIENT MESSAGE (OUTPATIENT)
Dept: INTERNAL MEDICINE | Facility: CLINIC | Age: 65
End: 2020-03-19

## 2020-03-24 ENCOUNTER — PATIENT MESSAGE (OUTPATIENT)
Dept: INTERNAL MEDICINE | Facility: CLINIC | Age: 65
End: 2020-03-24

## 2020-04-10 ENCOUNTER — PATIENT MESSAGE (OUTPATIENT)
Dept: INTERNAL MEDICINE | Facility: CLINIC | Age: 65
End: 2020-04-10

## 2020-06-30 ENCOUNTER — HOSPITAL ENCOUNTER (OUTPATIENT)
Dept: RADIOLOGY | Facility: HOSPITAL | Age: 65
Discharge: HOME | End: 2020-06-30
Attending: INTERNAL MEDICINE
Payer: MEDICARE

## 2020-06-30 DIAGNOSIS — Z12.31 BREAST CANCER SCREENING BY MAMMOGRAM: ICD-10-CM

## 2020-06-30 DIAGNOSIS — Z12.39 BREAST CANCER SCREENING: ICD-10-CM

## 2020-06-30 PROCEDURE — 77067 SCR MAMMO BI INCL CAD: CPT | Mod: 26,,, | Performed by: RADIOLOGY

## 2020-06-30 PROCEDURE — 77067 MAMMO DIGITAL SCREENING BILAT WITH TOMOSYNTHESIS_CAD: ICD-10-PCS | Mod: 26,,, | Performed by: RADIOLOGY

## 2020-06-30 PROCEDURE — 77063 MAMMO DIGITAL SCREENING BILAT WITH TOMOSYNTHESIS_CAD: ICD-10-PCS | Mod: 26,,, | Performed by: RADIOLOGY

## 2020-06-30 PROCEDURE — 77067 SCR MAMMO BI INCL CAD: CPT | Mod: TC

## 2020-06-30 PROCEDURE — 77063 BREAST TOMOSYNTHESIS BI: CPT | Mod: 26,,, | Performed by: RADIOLOGY

## 2020-07-17 DIAGNOSIS — Z71.89 COMPLEX CARE COORDINATION: ICD-10-CM

## 2020-08-10 ENCOUNTER — INFUSION (OUTPATIENT)
Dept: INFECTIOUS DISEASES | Facility: HOSPITAL | Age: 65
End: 2020-08-10
Attending: INTERNAL MEDICINE
Payer: MEDICARE

## 2020-08-10 VITALS
HEART RATE: 58 BPM | BODY MASS INDEX: 19.3 KG/M2 | WEIGHT: 123 LBS | HEIGHT: 67 IN | DIASTOLIC BLOOD PRESSURE: 66 MMHG | SYSTOLIC BLOOD PRESSURE: 102 MMHG

## 2020-08-10 DIAGNOSIS — M81.0 SENILE OSTEOPOROSIS: Primary | ICD-10-CM

## 2020-08-10 PROCEDURE — 63600175 PHARM REV CODE 636 W HCPCS: Mod: JG | Performed by: NURSE PRACTITIONER

## 2020-08-10 PROCEDURE — 96372 THER/PROPH/DIAG INJ SC/IM: CPT

## 2020-08-10 RX ORDER — HYDROCODONE BITARTRATE AND ACETAMINOPHEN 7.5; 325 MG/1; MG/1
TABLET ORAL
COMMUNITY
Start: 2020-06-08 | End: 2020-09-24

## 2020-08-10 RX ORDER — NEOMYCIN SULFATE, POLYMYXIN B SULFATE AND DEXAMETHASONE 3.5; 10000; 1 MG/ML; [USP'U]/ML; MG/ML
SUSPENSION/ DROPS OPHTHALMIC
COMMUNITY
Start: 2020-07-28 | End: 2021-10-13

## 2020-08-10 RX ORDER — LEVOCETIRIZINE DIHYDROCHLORIDE 5 MG/1
TABLET, FILM COATED ORAL
COMMUNITY
End: 2023-12-12

## 2020-08-10 RX ADMIN — DENOSUMAB 60 MG: 60 INJECTION SUBCUTANEOUS at 08:08

## 2020-08-10 NOTE — PROGRESS NOTES
Patient received Prolia 60 mg injection sub Q in the left arm.  Tolerated well and left in NAD.    Patient is taking vit d

## 2020-09-24 ENCOUNTER — CLINICAL SUPPORT (OUTPATIENT)
Dept: INTERNAL MEDICINE | Facility: CLINIC | Age: 65
End: 2020-09-24
Payer: MEDICARE

## 2020-09-24 ENCOUNTER — OFFICE VISIT (OUTPATIENT)
Dept: INTERNAL MEDICINE | Facility: CLINIC | Age: 65
End: 2020-09-24
Payer: MEDICARE

## 2020-09-24 ENCOUNTER — LAB VISIT (OUTPATIENT)
Dept: LAB | Facility: HOSPITAL | Age: 65
End: 2020-09-24
Attending: INTERNAL MEDICINE
Payer: MEDICARE

## 2020-09-24 VITALS
HEART RATE: 59 BPM | BODY MASS INDEX: 19.27 KG/M2 | SYSTOLIC BLOOD PRESSURE: 112 MMHG | HEIGHT: 67 IN | DIASTOLIC BLOOD PRESSURE: 74 MMHG

## 2020-09-24 DIAGNOSIS — F32.A DEPRESSION, UNSPECIFIED DEPRESSION TYPE: ICD-10-CM

## 2020-09-24 DIAGNOSIS — E78.5 HYPERLIPIDEMIA, UNSPECIFIED HYPERLIPIDEMIA TYPE: ICD-10-CM

## 2020-09-24 DIAGNOSIS — Z51.81 MEDICATION MONITORING ENCOUNTER: ICD-10-CM

## 2020-09-24 DIAGNOSIS — M81.0 SENILE OSTEOPOROSIS: ICD-10-CM

## 2020-09-24 DIAGNOSIS — R19.7 DIARRHEA, UNSPECIFIED TYPE: ICD-10-CM

## 2020-09-24 DIAGNOSIS — F41.9 ANXIETY: ICD-10-CM

## 2020-09-24 DIAGNOSIS — F41.9 ANXIETY: Primary | ICD-10-CM

## 2020-09-24 DIAGNOSIS — J30.9 ALLERGIC RHINITIS, UNSPECIFIED SEASONALITY, UNSPECIFIED TRIGGER: ICD-10-CM

## 2020-09-24 DIAGNOSIS — Z90.49 HISTORY OF PARTIAL COLECTOMY: ICD-10-CM

## 2020-09-24 LAB
ALBUMIN SERPL BCP-MCNC: 4.2 G/DL (ref 3.5–5.2)
ALP SERPL-CCNC: 94 U/L (ref 55–135)
ALT SERPL W/O P-5'-P-CCNC: 57 U/L (ref 10–44)
ANION GAP SERPL CALC-SCNC: 6 MMOL/L (ref 8–16)
AST SERPL-CCNC: 49 U/L (ref 10–40)
BASOPHILS # BLD AUTO: 0.03 K/UL (ref 0–0.2)
BASOPHILS NFR BLD: 0.5 % (ref 0–1.9)
BILIRUB SERPL-MCNC: 0.3 MG/DL (ref 0.1–1)
BUN SERPL-MCNC: 15 MG/DL (ref 8–23)
CALCIUM SERPL-MCNC: 9.5 MG/DL (ref 8.7–10.5)
CHLORIDE SERPL-SCNC: 100 MMOL/L (ref 95–110)
CHOLEST SERPL-MCNC: 207 MG/DL (ref 120–199)
CHOLEST/HDLC SERPL: 4.2 {RATIO} (ref 2–5)
CO2 SERPL-SCNC: 31 MMOL/L (ref 23–29)
CREAT SERPL-MCNC: 0.9 MG/DL (ref 0.5–1.4)
DIFFERENTIAL METHOD: ABNORMAL
EOSINOPHIL # BLD AUTO: 0.3 K/UL (ref 0–0.5)
EOSINOPHIL NFR BLD: 4.2 % (ref 0–8)
ERYTHROCYTE [DISTWIDTH] IN BLOOD BY AUTOMATED COUNT: 13.3 % (ref 11.5–14.5)
EST. GFR  (AFRICAN AMERICAN): >60 ML/MIN/1.73 M^2
EST. GFR  (NON AFRICAN AMERICAN): >60 ML/MIN/1.73 M^2
GLUCOSE SERPL-MCNC: 78 MG/DL (ref 70–110)
HCT VFR BLD AUTO: 43.6 % (ref 37–48.5)
HDLC SERPL-MCNC: 49 MG/DL (ref 40–75)
HDLC SERPL: 23.7 % (ref 20–50)
HGB BLD-MCNC: 13.5 G/DL (ref 12–16)
IMM GRANULOCYTES # BLD AUTO: 0.02 K/UL (ref 0–0.04)
IMM GRANULOCYTES NFR BLD AUTO: 0.3 % (ref 0–0.5)
LDLC SERPL CALC-MCNC: 143 MG/DL (ref 63–159)
LYMPHOCYTES # BLD AUTO: 2.1 K/UL (ref 1–4.8)
LYMPHOCYTES NFR BLD: 32.5 % (ref 18–48)
MCH RBC QN AUTO: 29.6 PG (ref 27–31)
MCHC RBC AUTO-ENTMCNC: 31 G/DL (ref 32–36)
MCV RBC AUTO: 96 FL (ref 82–98)
MONOCYTES # BLD AUTO: 0.5 K/UL (ref 0.3–1)
MONOCYTES NFR BLD: 8.3 % (ref 4–15)
NEUTROPHILS # BLD AUTO: 3.5 K/UL (ref 1.8–7.7)
NEUTROPHILS NFR BLD: 54.2 % (ref 38–73)
NONHDLC SERPL-MCNC: 158 MG/DL
NRBC BLD-RTO: 0 /100 WBC
PLATELET # BLD AUTO: 281 K/UL (ref 150–350)
PMV BLD AUTO: 11.9 FL (ref 9.2–12.9)
POTASSIUM SERPL-SCNC: 4.6 MMOL/L (ref 3.5–5.1)
PROT SERPL-MCNC: 6.9 G/DL (ref 6–8.4)
RBC # BLD AUTO: 4.56 M/UL (ref 4–5.4)
SODIUM SERPL-SCNC: 137 MMOL/L (ref 136–145)
TRIGL SERPL-MCNC: 75 MG/DL (ref 30–150)
TSH SERPL DL<=0.005 MIU/L-ACNC: 3.6 UIU/ML (ref 0.4–4)
WBC # BLD AUTO: 6.37 K/UL (ref 3.9–12.7)

## 2020-09-24 PROCEDURE — 99999 PR PBB SHADOW E&M-EST. PATIENT-LVL I: ICD-10-PCS | Mod: PBBFAC,,,

## 2020-09-24 PROCEDURE — 99999 PR PBB SHADOW E&M-EST. PATIENT-LVL I: CPT | Mod: PBBFAC,,,

## 2020-09-24 PROCEDURE — 99211 OFF/OP EST MAY X REQ PHY/QHP: CPT | Mod: PBBFAC,27,25

## 2020-09-24 PROCEDURE — 99214 PR OFFICE/OUTPT VISIT, EST, LEVL IV, 30-39 MIN: ICD-10-PCS | Mod: S$PBB,,, | Performed by: INTERNAL MEDICINE

## 2020-09-24 PROCEDURE — 84443 ASSAY THYROID STIM HORMONE: CPT

## 2020-09-24 PROCEDURE — 80175 DRUG SCREEN QUAN LAMOTRIGINE: CPT

## 2020-09-24 PROCEDURE — 85025 COMPLETE CBC W/AUTO DIFF WBC: CPT

## 2020-09-24 PROCEDURE — 99999 PR PBB SHADOW E&M-EST. PATIENT-LVL III: ICD-10-PCS | Mod: PBBFAC,,, | Performed by: INTERNAL MEDICINE

## 2020-09-24 PROCEDURE — 99999 PR PBB SHADOW E&M-EST. PATIENT-LVL III: CPT | Mod: PBBFAC,,, | Performed by: INTERNAL MEDICINE

## 2020-09-24 PROCEDURE — 99214 OFFICE O/P EST MOD 30 MIN: CPT | Mod: S$PBB,,, | Performed by: INTERNAL MEDICINE

## 2020-09-24 PROCEDURE — 80053 COMPREHEN METABOLIC PANEL: CPT

## 2020-09-24 PROCEDURE — G0009 ADMIN PNEUMOCOCCAL VACCINE: HCPCS | Mod: PBBFAC

## 2020-09-24 PROCEDURE — 80061 LIPID PANEL: CPT

## 2020-09-24 PROCEDURE — 99213 OFFICE O/P EST LOW 20 MIN: CPT | Mod: PBBFAC,25 | Performed by: INTERNAL MEDICINE

## 2020-09-24 RX ORDER — LAMOTRIGINE 100 MG/1
100 TABLET ORAL DAILY
COMMUNITY
End: 2022-02-17
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
## 2020-09-28 ENCOUNTER — TELEPHONE (OUTPATIENT)
Dept: INTERNAL MEDICINE | Facility: CLINIC | Age: 65
End: 2020-09-28

## 2020-09-28 DIAGNOSIS — R74.01 TRANSAMINITIS: Primary | ICD-10-CM

## 2020-09-28 LAB — LAMOTRIGINE SERPL-MCNC: 2.1 UG/ML (ref 2–15)

## 2020-09-28 NOTE — TELEPHONE ENCOUNTER
Please call and notify pt:    lamictal level is normal. Blood counts, kidney and thyroid functions are normal. Liver enzymes are elevated. Would like to repeat in 2 weeks. If continued elevation, will get abdominal US.     Cholesterol is high. Would recommend low fat diet or trying Mediterranean diet at this time.

## 2020-09-29 NOTE — TELEPHONE ENCOUNTER
Pt informed of all results and to follow a low fat diet or trying a mediterranean diet. Pt verbally understood. Labs scheduled for October 13th at the [place] location.

## 2020-10-13 ENCOUNTER — LAB VISIT (OUTPATIENT)
Dept: LAB | Facility: HOSPITAL | Age: 65
End: 2020-10-13
Attending: INTERNAL MEDICINE
Payer: MEDICARE

## 2020-10-13 DIAGNOSIS — R74.01 TRANSAMINITIS: ICD-10-CM

## 2020-10-13 LAB
ALBUMIN SERPL BCP-MCNC: 3.9 G/DL (ref 3.5–5.2)
ALP SERPL-CCNC: 82 U/L (ref 55–135)
ALT SERPL W/O P-5'-P-CCNC: 38 U/L (ref 10–44)
AST SERPL-CCNC: 34 U/L (ref 10–40)
BILIRUB DIRECT SERPL-MCNC: 0.2 MG/DL (ref 0.1–0.3)
BILIRUB SERPL-MCNC: 0.4 MG/DL (ref 0.1–1)
PROT SERPL-MCNC: 6.7 G/DL (ref 6–8.4)

## 2020-10-13 PROCEDURE — 80076 HEPATIC FUNCTION PANEL: CPT

## 2020-10-13 PROCEDURE — 36415 COLL VENOUS BLD VENIPUNCTURE: CPT | Mod: PO

## 2020-12-22 ENCOUNTER — PATIENT MESSAGE (OUTPATIENT)
Dept: INTERNAL MEDICINE | Facility: CLINIC | Age: 65
End: 2020-12-22

## 2021-01-16 ENCOUNTER — OFFICE VISIT (OUTPATIENT)
Dept: URGENT CARE | Facility: CLINIC | Age: 66
End: 2021-01-16
Payer: MEDICARE

## 2021-01-16 ENCOUNTER — NURSE TRIAGE (OUTPATIENT)
Dept: ADMINISTRATIVE | Facility: CLINIC | Age: 66
End: 2021-01-16

## 2021-01-16 VITALS
SYSTOLIC BLOOD PRESSURE: 110 MMHG | HEIGHT: 67 IN | WEIGHT: 123 LBS | OXYGEN SATURATION: 95 % | HEART RATE: 66 BPM | BODY MASS INDEX: 19.3 KG/M2 | TEMPERATURE: 99 F | RESPIRATION RATE: 18 BRPM | DIASTOLIC BLOOD PRESSURE: 76 MMHG

## 2021-01-16 DIAGNOSIS — U07.1 COVID-19 VIRUS INFECTION: Primary | ICD-10-CM

## 2021-01-16 DIAGNOSIS — J32.9 SINUSITIS, UNSPECIFIED CHRONICITY, UNSPECIFIED LOCATION: ICD-10-CM

## 2021-01-16 LAB
CTP QC/QA: YES
SARS-COV-2 RDRP RESP QL NAA+PROBE: POSITIVE

## 2021-01-16 PROCEDURE — 99213 PR OFFICE/OUTPT VISIT, EST, LEVL III, 20-29 MIN: ICD-10-PCS | Mod: CR,S$GLB,, | Performed by: NURSE PRACTITIONER

## 2021-01-16 PROCEDURE — U0002 COVID-19 LAB TEST NON-CDC: HCPCS | Mod: QW,CR,S$GLB, | Performed by: NURSE PRACTITIONER

## 2021-01-16 PROCEDURE — 99213 OFFICE O/P EST LOW 20 MIN: CPT | Mod: CR,S$GLB,, | Performed by: NURSE PRACTITIONER

## 2021-01-16 PROCEDURE — U0002: ICD-10-PCS | Mod: QW,CR,S$GLB, | Performed by: NURSE PRACTITIONER

## 2021-01-16 RX ORDER — ALBUTEROL SULFATE 90 UG/1
2 AEROSOL, METERED RESPIRATORY (INHALATION) EVERY 4 HOURS PRN
Qty: 8 G | Refills: 0 | Status: SHIPPED | OUTPATIENT
Start: 2021-01-16 | End: 2021-10-13

## 2021-01-18 ENCOUNTER — NURSE TRIAGE (OUTPATIENT)
Dept: ADMINISTRATIVE | Facility: CLINIC | Age: 66
End: 2021-01-18

## 2021-01-18 ENCOUNTER — PATIENT MESSAGE (OUTPATIENT)
Dept: INTERNAL MEDICINE | Facility: CLINIC | Age: 66
End: 2021-01-18

## 2021-01-19 ENCOUNTER — OFFICE VISIT (OUTPATIENT)
Dept: INTERNAL MEDICINE | Facility: CLINIC | Age: 66
End: 2021-01-19
Payer: MEDICARE

## 2021-01-19 DIAGNOSIS — U07.1 COVID-19 VIRUS INFECTION: Primary | ICD-10-CM

## 2021-01-19 PROCEDURE — 99213 PR OFFICE/OUTPT VISIT, EST, LEVL III, 20-29 MIN: ICD-10-PCS | Mod: CR,95,, | Performed by: INTERNAL MEDICINE

## 2021-01-19 PROCEDURE — 99213 OFFICE O/P EST LOW 20 MIN: CPT | Mod: CR,95,, | Performed by: INTERNAL MEDICINE

## 2021-01-21 ENCOUNTER — PATIENT MESSAGE (OUTPATIENT)
Dept: ENDOCRINOLOGY | Facility: CLINIC | Age: 66
End: 2021-01-21

## 2021-01-21 ENCOUNTER — PATIENT MESSAGE (OUTPATIENT)
Dept: ADMINISTRATIVE | Facility: OTHER | Age: 66
End: 2021-01-21

## 2021-01-22 ENCOUNTER — PATIENT MESSAGE (OUTPATIENT)
Dept: INTERNAL MEDICINE | Facility: CLINIC | Age: 66
End: 2021-01-22

## 2021-02-04 ENCOUNTER — OFFICE VISIT (OUTPATIENT)
Dept: ENDOCRINOLOGY | Facility: CLINIC | Age: 66
End: 2021-02-04
Payer: MEDICARE

## 2021-02-04 DIAGNOSIS — M81.0 OSTEOPOROSIS, POST-MENOPAUSAL: ICD-10-CM

## 2021-02-04 PROCEDURE — 99213 PR OFFICE/OUTPT VISIT, EST, LEVL III, 20-29 MIN: ICD-10-PCS | Mod: 95,,, | Performed by: INTERNAL MEDICINE

## 2021-02-04 PROCEDURE — 99213 OFFICE O/P EST LOW 20 MIN: CPT | Mod: 95,,, | Performed by: INTERNAL MEDICINE

## 2021-02-11 ENCOUNTER — INFUSION (OUTPATIENT)
Dept: INFECTIOUS DISEASES | Facility: HOSPITAL | Age: 66
End: 2021-02-11
Attending: INTERNAL MEDICINE
Payer: MEDICARE

## 2021-02-11 VITALS
HEART RATE: 60 BPM | SYSTOLIC BLOOD PRESSURE: 94 MMHG | HEIGHT: 67 IN | BODY MASS INDEX: 19.3 KG/M2 | DIASTOLIC BLOOD PRESSURE: 62 MMHG | WEIGHT: 123 LBS

## 2021-02-11 DIAGNOSIS — M81.0 OSTEOPOROSIS, POST-MENOPAUSAL: Primary | ICD-10-CM

## 2021-02-11 PROCEDURE — 63600175 PHARM REV CODE 636 W HCPCS: Mod: JG | Performed by: INTERNAL MEDICINE

## 2021-02-11 PROCEDURE — 96372 THER/PROPH/DIAG INJ SC/IM: CPT

## 2021-02-11 RX ADMIN — DENOSUMAB 60 MG: 60 INJECTION SUBCUTANEOUS at 08:02

## 2021-04-12 ENCOUNTER — PATIENT MESSAGE (OUTPATIENT)
Dept: INTERNAL MEDICINE | Facility: CLINIC | Age: 66
End: 2021-04-12

## 2021-04-14 ENCOUNTER — PATIENT MESSAGE (OUTPATIENT)
Dept: INTERNAL MEDICINE | Facility: CLINIC | Age: 66
End: 2021-04-14

## 2021-05-25 ENCOUNTER — PES CALL (OUTPATIENT)
Dept: ADMINISTRATIVE | Facility: CLINIC | Age: 66
End: 2021-05-25

## 2021-08-12 ENCOUNTER — INFUSION (OUTPATIENT)
Dept: INFECTIOUS DISEASES | Facility: HOSPITAL | Age: 66
End: 2021-08-12
Attending: INTERNAL MEDICINE
Payer: MEDICARE

## 2021-08-12 VITALS
OXYGEN SATURATION: 100 % | DIASTOLIC BLOOD PRESSURE: 60 MMHG | HEIGHT: 67 IN | TEMPERATURE: 99 F | BODY MASS INDEX: 19.24 KG/M2 | SYSTOLIC BLOOD PRESSURE: 106 MMHG | HEART RATE: 53 BPM | WEIGHT: 122.56 LBS | RESPIRATION RATE: 18 BRPM

## 2021-08-12 DIAGNOSIS — M81.0 OSTEOPOROSIS, POST-MENOPAUSAL: Primary | ICD-10-CM

## 2021-08-12 PROCEDURE — 96372 THER/PROPH/DIAG INJ SC/IM: CPT

## 2021-08-12 PROCEDURE — 63600175 PHARM REV CODE 636 W HCPCS: Mod: JG | Performed by: INTERNAL MEDICINE

## 2021-08-12 RX ADMIN — DENOSUMAB 60 MG: 60 INJECTION SUBCUTANEOUS at 08:08

## 2021-10-08 ENCOUNTER — PATIENT MESSAGE (OUTPATIENT)
Dept: ENDOCRINOLOGY | Facility: CLINIC | Age: 66
End: 2021-10-08

## 2021-10-13 ENCOUNTER — OFFICE VISIT (OUTPATIENT)
Dept: INTERNAL MEDICINE | Facility: CLINIC | Age: 66
End: 2021-10-13
Payer: MEDICARE

## 2021-10-13 VITALS
BODY MASS INDEX: 18.39 KG/M2 | DIASTOLIC BLOOD PRESSURE: 60 MMHG | WEIGHT: 114.44 LBS | HEIGHT: 66 IN | HEART RATE: 58 BPM | SYSTOLIC BLOOD PRESSURE: 110 MMHG | OXYGEN SATURATION: 96 %

## 2021-10-13 DIAGNOSIS — R45.89 DEPRESSED MOOD: ICD-10-CM

## 2021-10-13 DIAGNOSIS — M81.0 OSTEOPOROSIS, POST-MENOPAUSAL: ICD-10-CM

## 2021-10-13 DIAGNOSIS — Z00.00 ENCOUNTER FOR PREVENTIVE HEALTH EXAMINATION: Primary | ICD-10-CM

## 2021-10-13 DIAGNOSIS — E78.2 MIXED HYPERLIPIDEMIA: ICD-10-CM

## 2021-10-13 DIAGNOSIS — R63.6 UNDERWEIGHT: ICD-10-CM

## 2021-10-13 DIAGNOSIS — F33.9 EPISODE OF RECURRENT MAJOR DEPRESSIVE DISORDER, UNSPECIFIED DEPRESSION EPISODE SEVERITY: ICD-10-CM

## 2021-10-13 DIAGNOSIS — R10.13 DYSPEPSIA: ICD-10-CM

## 2021-10-13 DIAGNOSIS — Z90.49 HISTORY OF PARTIAL COLECTOMY: ICD-10-CM

## 2021-10-13 DIAGNOSIS — R63.4 WEIGHT LOSS, ABNORMAL: ICD-10-CM

## 2021-10-13 DIAGNOSIS — M81.0 SENILE OSTEOPOROSIS: ICD-10-CM

## 2021-10-13 DIAGNOSIS — F41.9 ANXIETY: ICD-10-CM

## 2021-10-13 DIAGNOSIS — E46 MALNUTRITION, UNSPECIFIED TYPE: ICD-10-CM

## 2021-10-13 DIAGNOSIS — Z13.31 POSITIVE DEPRESSION SCREENING: ICD-10-CM

## 2021-10-13 PROCEDURE — G0439 PR MEDICARE ANNUAL WELLNESS SUBSEQUENT VISIT: ICD-10-PCS | Mod: ,,, | Performed by: NURSE PRACTITIONER

## 2021-10-13 PROCEDURE — 99999 PR PBB SHADOW E&M-EST. PATIENT-LVL III: ICD-10-PCS | Mod: PBBFAC,,, | Performed by: NURSE PRACTITIONER

## 2021-10-13 PROCEDURE — G0439 PPPS, SUBSEQ VISIT: HCPCS | Mod: ,,, | Performed by: NURSE PRACTITIONER

## 2021-10-13 PROCEDURE — 99999 PR PBB SHADOW E&M-EST. PATIENT-LVL III: CPT | Mod: PBBFAC,,, | Performed by: NURSE PRACTITIONER

## 2021-10-13 PROCEDURE — 99213 OFFICE O/P EST LOW 20 MIN: CPT | Mod: PBBFAC | Performed by: NURSE PRACTITIONER

## 2021-10-20 ENCOUNTER — PATIENT MESSAGE (OUTPATIENT)
Dept: INTERNAL MEDICINE | Facility: CLINIC | Age: 66
End: 2021-10-20
Payer: MEDICARE

## 2021-10-21 ENCOUNTER — OFFICE VISIT (OUTPATIENT)
Dept: INTERNAL MEDICINE | Facility: CLINIC | Age: 66
End: 2021-10-21
Payer: MEDICARE

## 2021-10-21 VITALS
HEIGHT: 66 IN | BODY MASS INDEX: 19.03 KG/M2 | WEIGHT: 118.38 LBS | OXYGEN SATURATION: 98 % | SYSTOLIC BLOOD PRESSURE: 92 MMHG | DIASTOLIC BLOOD PRESSURE: 66 MMHG | HEART RATE: 66 BPM

## 2021-10-21 DIAGNOSIS — I83.93 ASYMPTOMATIC VARICOSE VEINS OF BOTH LOWER EXTREMITIES: ICD-10-CM

## 2021-10-21 DIAGNOSIS — K90.9 INTESTINAL MALABSORPTION, UNSPECIFIED TYPE: ICD-10-CM

## 2021-10-21 DIAGNOSIS — R79.9 ABNORMAL FINDING OF BLOOD CHEMISTRY, UNSPECIFIED: ICD-10-CM

## 2021-10-21 DIAGNOSIS — M81.0 SENILE OSTEOPOROSIS: ICD-10-CM

## 2021-10-21 DIAGNOSIS — Z00.00 PREVENTATIVE HEALTH CARE: Primary | ICD-10-CM

## 2021-10-21 DIAGNOSIS — F32.A DEPRESSION, UNSPECIFIED DEPRESSION TYPE: ICD-10-CM

## 2021-10-21 DIAGNOSIS — K21.9 GASTROESOPHAGEAL REFLUX DISEASE, UNSPECIFIED WHETHER ESOPHAGITIS PRESENT: ICD-10-CM

## 2021-10-21 DIAGNOSIS — Z90.49 HISTORY OF PARTIAL COLECTOMY: ICD-10-CM

## 2021-10-21 DIAGNOSIS — F41.9 ANXIETY: ICD-10-CM

## 2021-10-21 DIAGNOSIS — E78.2 MIXED HYPERLIPIDEMIA: ICD-10-CM

## 2021-10-21 DIAGNOSIS — R19.7 DIARRHEA, UNSPECIFIED TYPE: ICD-10-CM

## 2021-10-21 PROCEDURE — 99214 OFFICE O/P EST MOD 30 MIN: CPT | Mod: S$PBB,,, | Performed by: NURSE PRACTITIONER

## 2021-10-21 PROCEDURE — 99999 PR PBB SHADOW E&M-EST. PATIENT-LVL IV: CPT | Mod: PBBFAC,,, | Performed by: NURSE PRACTITIONER

## 2021-10-21 PROCEDURE — 99214 OFFICE O/P EST MOD 30 MIN: CPT | Mod: PBBFAC | Performed by: NURSE PRACTITIONER

## 2021-10-21 PROCEDURE — 99214 PR OFFICE/OUTPT VISIT, EST, LEVL IV, 30-39 MIN: ICD-10-PCS | Mod: S$PBB,,, | Performed by: NURSE PRACTITIONER

## 2021-10-21 PROCEDURE — 99999 PR PBB SHADOW E&M-EST. PATIENT-LVL IV: ICD-10-PCS | Mod: PBBFAC,,, | Performed by: NURSE PRACTITIONER

## 2021-10-22 ENCOUNTER — LAB VISIT (OUTPATIENT)
Dept: LAB | Facility: HOSPITAL | Age: 66
End: 2021-10-22
Payer: MEDICARE

## 2021-10-22 ENCOUNTER — PATIENT MESSAGE (OUTPATIENT)
Dept: INTERNAL MEDICINE | Facility: CLINIC | Age: 66
End: 2021-10-22

## 2021-10-22 DIAGNOSIS — K90.9 INTESTINAL MALABSORPTION, UNSPECIFIED TYPE: ICD-10-CM

## 2021-10-22 DIAGNOSIS — R79.9 ABNORMAL FINDING OF BLOOD CHEMISTRY, UNSPECIFIED: ICD-10-CM

## 2021-10-22 DIAGNOSIS — F41.9 ANXIETY: ICD-10-CM

## 2021-10-22 DIAGNOSIS — Z90.49 HISTORY OF PARTIAL COLECTOMY: ICD-10-CM

## 2021-10-22 DIAGNOSIS — Z00.00 PREVENTATIVE HEALTH CARE: ICD-10-CM

## 2021-10-22 DIAGNOSIS — F32.A DEPRESSION, UNSPECIFIED DEPRESSION TYPE: ICD-10-CM

## 2021-10-22 DIAGNOSIS — E78.2 MIXED HYPERLIPIDEMIA: ICD-10-CM

## 2021-10-22 DIAGNOSIS — K21.9 GASTROESOPHAGEAL REFLUX DISEASE, UNSPECIFIED WHETHER ESOPHAGITIS PRESENT: ICD-10-CM

## 2021-10-22 DIAGNOSIS — R19.7 DIARRHEA, UNSPECIFIED TYPE: ICD-10-CM

## 2021-10-22 LAB
25(OH)D3+25(OH)D2 SERPL-MCNC: 43 NG/ML (ref 30–96)
ALBUMIN SERPL BCP-MCNC: 3.9 G/DL (ref 3.5–5.2)
ALP SERPL-CCNC: 91 U/L (ref 55–135)
ALT SERPL W/O P-5'-P-CCNC: 101 U/L (ref 10–44)
ANION GAP SERPL CALC-SCNC: 10 MMOL/L (ref 8–16)
AST SERPL-CCNC: 77 U/L (ref 10–40)
BASOPHILS # BLD AUTO: 0.03 K/UL (ref 0–0.2)
BASOPHILS NFR BLD: 0.4 % (ref 0–1.9)
BILIRUB SERPL-MCNC: 0.4 MG/DL (ref 0.1–1)
BUN SERPL-MCNC: 13 MG/DL (ref 8–23)
CALCIUM SERPL-MCNC: 10.2 MG/DL (ref 8.7–10.5)
CHLORIDE SERPL-SCNC: 97 MMOL/L (ref 95–110)
CHOLEST SERPL-MCNC: 196 MG/DL (ref 120–199)
CHOLEST/HDLC SERPL: 3.8 {RATIO} (ref 2–5)
CO2 SERPL-SCNC: 28 MMOL/L (ref 23–29)
CREAT SERPL-MCNC: 0.8 MG/DL (ref 0.5–1.4)
DIFFERENTIAL METHOD: NORMAL
EOSINOPHIL # BLD AUTO: 0.3 K/UL (ref 0–0.5)
EOSINOPHIL NFR BLD: 3.7 % (ref 0–8)
ERYTHROCYTE [DISTWIDTH] IN BLOOD BY AUTOMATED COUNT: 14.1 % (ref 11.5–14.5)
EST. GFR  (AFRICAN AMERICAN): >60 ML/MIN/1.73 M^2
EST. GFR  (NON AFRICAN AMERICAN): >60 ML/MIN/1.73 M^2
ESTIMATED AVG GLUCOSE: 103 MG/DL (ref 68–131)
GLUCOSE SERPL-MCNC: 87 MG/DL (ref 70–110)
HBA1C MFR BLD: 5.2 % (ref 4–5.6)
HCT VFR BLD AUTO: 40 % (ref 37–48.5)
HDLC SERPL-MCNC: 52 MG/DL (ref 40–75)
HDLC SERPL: 26.5 % (ref 20–50)
HGB BLD-MCNC: 12.8 G/DL (ref 12–16)
IMM GRANULOCYTES # BLD AUTO: 0.02 K/UL (ref 0–0.04)
IMM GRANULOCYTES NFR BLD AUTO: 0.3 % (ref 0–0.5)
LDLC SERPL CALC-MCNC: 126.2 MG/DL (ref 63–159)
LYMPHOCYTES # BLD AUTO: 2 K/UL (ref 1–4.8)
LYMPHOCYTES NFR BLD: 29.4 % (ref 18–48)
MCH RBC QN AUTO: 29.1 PG (ref 27–31)
MCHC RBC AUTO-ENTMCNC: 32 G/DL (ref 32–36)
MCV RBC AUTO: 91 FL (ref 82–98)
MONOCYTES # BLD AUTO: 0.5 K/UL (ref 0.3–1)
MONOCYTES NFR BLD: 7.9 % (ref 4–15)
NEUTROPHILS # BLD AUTO: 3.9 K/UL (ref 1.8–7.7)
NEUTROPHILS NFR BLD: 58.3 % (ref 38–73)
NONHDLC SERPL-MCNC: 144 MG/DL
NRBC BLD-RTO: 0 /100 WBC
PLATELET # BLD AUTO: 261 K/UL (ref 150–450)
PMV BLD AUTO: 11.7 FL (ref 9.2–12.9)
POTASSIUM SERPL-SCNC: 4.4 MMOL/L (ref 3.5–5.1)
PROT SERPL-MCNC: 6.8 G/DL (ref 6–8.4)
RBC # BLD AUTO: 4.4 M/UL (ref 4–5.4)
SODIUM SERPL-SCNC: 135 MMOL/L (ref 136–145)
TRIGL SERPL-MCNC: 89 MG/DL (ref 30–150)
TSH SERPL DL<=0.005 MIU/L-ACNC: 2.78 UIU/ML (ref 0.4–4)
WBC # BLD AUTO: 6.67 K/UL (ref 3.9–12.7)

## 2021-10-22 PROCEDURE — 80175 DRUG SCREEN QUAN LAMOTRIGINE: CPT | Performed by: NURSE PRACTITIONER

## 2021-10-22 PROCEDURE — 36415 COLL VENOUS BLD VENIPUNCTURE: CPT | Performed by: NURSE PRACTITIONER

## 2021-10-22 PROCEDURE — 83036 HEMOGLOBIN GLYCOSYLATED A1C: CPT | Performed by: NURSE PRACTITIONER

## 2021-10-22 PROCEDURE — 85025 COMPLETE CBC W/AUTO DIFF WBC: CPT | Performed by: NURSE PRACTITIONER

## 2021-10-22 PROCEDURE — 80061 LIPID PANEL: CPT | Performed by: NURSE PRACTITIONER

## 2021-10-22 PROCEDURE — 80053 COMPREHEN METABOLIC PANEL: CPT | Performed by: NURSE PRACTITIONER

## 2021-10-22 PROCEDURE — 84443 ASSAY THYROID STIM HORMONE: CPT | Performed by: NURSE PRACTITIONER

## 2021-10-22 PROCEDURE — 82306 VITAMIN D 25 HYDROXY: CPT | Performed by: NURSE PRACTITIONER

## 2021-10-24 ENCOUNTER — TELEPHONE (OUTPATIENT)
Dept: INTERNAL MEDICINE | Facility: CLINIC | Age: 66
End: 2021-10-24
Payer: MEDICARE

## 2021-10-24 DIAGNOSIS — R19.7 DIARRHEA, UNSPECIFIED TYPE: Primary | ICD-10-CM

## 2021-10-24 DIAGNOSIS — R79.89 ELEVATED LFTS: ICD-10-CM

## 2021-10-24 DIAGNOSIS — R10.13 DYSPEPSIA: ICD-10-CM

## 2021-10-25 ENCOUNTER — TELEPHONE (OUTPATIENT)
Dept: INTERNAL MEDICINE | Facility: CLINIC | Age: 66
End: 2021-10-25
Payer: MEDICARE

## 2021-10-25 ENCOUNTER — PATIENT MESSAGE (OUTPATIENT)
Dept: INTERNAL MEDICINE | Facility: CLINIC | Age: 66
End: 2021-10-25
Payer: MEDICARE

## 2021-10-25 LAB — LAMOTRIGINE SERPL-MCNC: 2.4 UG/ML (ref 2–15)

## 2021-10-26 ENCOUNTER — TELEPHONE (OUTPATIENT)
Dept: INTERNAL MEDICINE | Facility: CLINIC | Age: 66
End: 2021-10-26
Payer: MEDICARE

## 2021-10-26 ENCOUNTER — HOSPITAL ENCOUNTER (OUTPATIENT)
Dept: RADIOLOGY | Facility: HOSPITAL | Age: 66
Discharge: HOME | End: 2021-10-26
Attending: NURSE PRACTITIONER
Payer: MEDICARE

## 2021-10-26 DIAGNOSIS — K76.0 HEPATIC STEATOSIS: Primary | ICD-10-CM

## 2021-10-26 DIAGNOSIS — R10.13 DYSPEPSIA: ICD-10-CM

## 2021-10-26 DIAGNOSIS — R79.89 ELEVATED LFTS: ICD-10-CM

## 2021-10-26 DIAGNOSIS — R19.7 DIARRHEA, UNSPECIFIED TYPE: ICD-10-CM

## 2021-10-26 PROCEDURE — 76700 US ABDOMEN COMPLETE: ICD-10-PCS | Mod: 26,,, | Performed by: RADIOLOGY

## 2021-10-26 PROCEDURE — 76700 US EXAM ABDOM COMPLETE: CPT | Mod: 26,,, | Performed by: RADIOLOGY

## 2021-10-26 PROCEDURE — 76700 US EXAM ABDOM COMPLETE: CPT | Mod: TC

## 2021-10-28 ENCOUNTER — OFFICE VISIT (OUTPATIENT)
Dept: GASTROENTEROLOGY | Facility: CLINIC | Age: 66
End: 2021-10-28
Payer: MEDICARE

## 2021-10-28 ENCOUNTER — PATIENT MESSAGE (OUTPATIENT)
Dept: GASTROENTEROLOGY | Facility: CLINIC | Age: 66
End: 2021-10-28

## 2021-10-28 ENCOUNTER — LAB VISIT (OUTPATIENT)
Dept: LAB | Facility: HOSPITAL | Age: 66
End: 2021-10-28
Attending: INTERNAL MEDICINE
Payer: MEDICARE

## 2021-10-28 VITALS
SYSTOLIC BLOOD PRESSURE: 102 MMHG | BODY MASS INDEX: 18.67 KG/M2 | HEART RATE: 53 BPM | HEIGHT: 66 IN | WEIGHT: 116.19 LBS | DIASTOLIC BLOOD PRESSURE: 64 MMHG

## 2021-10-28 DIAGNOSIS — R93.2 ABNORMAL ULTRASOUND OF LIVER: ICD-10-CM

## 2021-10-28 DIAGNOSIS — K52.9 CHRONIC DIARRHEA: ICD-10-CM

## 2021-10-28 DIAGNOSIS — Z90.49 HISTORY OF PARTIAL COLECTOMY: ICD-10-CM

## 2021-10-28 DIAGNOSIS — R79.89 ELEVATED LFTS: ICD-10-CM

## 2021-10-28 DIAGNOSIS — K52.9 CHRONIC DIARRHEA: Primary | ICD-10-CM

## 2021-10-28 LAB
A1AT SERPL-MCNC: 185 MG/DL (ref 100–190)
CERULOPLASMIN SERPL-MCNC: 39 MG/DL (ref 15–45)
IGA SERPL-MCNC: 136 MG/DL (ref 40–350)
IGG SERPL-MCNC: 614 MG/DL (ref 650–1600)
IGM SERPL-MCNC: 62 MG/DL (ref 50–300)

## 2021-10-28 PROCEDURE — 87340 HEPATITIS B SURFACE AG IA: CPT | Performed by: INTERNAL MEDICINE

## 2021-10-28 PROCEDURE — 82784 ASSAY IGA/IGD/IGG/IGM EACH: CPT | Performed by: INTERNAL MEDICINE

## 2021-10-28 PROCEDURE — 86803 HEPATITIS C AB TEST: CPT | Performed by: INTERNAL MEDICINE

## 2021-10-28 PROCEDURE — 86376 MICROSOMAL ANTIBODY EACH: CPT | Performed by: INTERNAL MEDICINE

## 2021-10-28 PROCEDURE — 99214 OFFICE O/P EST MOD 30 MIN: CPT | Mod: PBBFAC | Performed by: INTERNAL MEDICINE

## 2021-10-28 PROCEDURE — 86038 ANTINUCLEAR ANTIBODIES: CPT | Performed by: INTERNAL MEDICINE

## 2021-10-28 PROCEDURE — 83516 IMMUNOASSAY NONANTIBODY: CPT | Performed by: INTERNAL MEDICINE

## 2021-10-28 PROCEDURE — 99204 PR OFFICE/OUTPT VISIT, NEW, LEVL IV, 45-59 MIN: ICD-10-PCS | Mod: S$PBB,,, | Performed by: INTERNAL MEDICINE

## 2021-10-28 PROCEDURE — 86704 HEP B CORE ANTIBODY TOTAL: CPT | Performed by: INTERNAL MEDICINE

## 2021-10-28 PROCEDURE — 82390 ASSAY OF CERULOPLASMIN: CPT | Performed by: INTERNAL MEDICINE

## 2021-10-28 PROCEDURE — 86235 NUCLEAR ANTIGEN ANTIBODY: CPT | Mod: 59 | Performed by: INTERNAL MEDICINE

## 2021-10-28 PROCEDURE — 99999 PR PBB SHADOW E&M-EST. PATIENT-LVL IV: ICD-10-PCS | Mod: PBBFAC,,, | Performed by: INTERNAL MEDICINE

## 2021-10-28 PROCEDURE — 99204 OFFICE O/P NEW MOD 45 MIN: CPT | Mod: S$PBB,,, | Performed by: INTERNAL MEDICINE

## 2021-10-28 PROCEDURE — 86790 VIRUS ANTIBODY NOS: CPT | Performed by: INTERNAL MEDICINE

## 2021-10-28 PROCEDURE — 86256 FLUORESCENT ANTIBODY TITER: CPT | Performed by: INTERNAL MEDICINE

## 2021-10-28 PROCEDURE — 86706 HEP B SURFACE ANTIBODY: CPT | Performed by: INTERNAL MEDICINE

## 2021-10-28 PROCEDURE — 86235 NUCLEAR ANTIGEN ANTIBODY: CPT | Mod: 91 | Performed by: INTERNAL MEDICINE

## 2021-10-28 PROCEDURE — 86039 ANTINUCLEAR ANTIBODIES (ANA): CPT | Performed by: INTERNAL MEDICINE

## 2021-10-28 PROCEDURE — 36415 COLL VENOUS BLD VENIPUNCTURE: CPT | Performed by: INTERNAL MEDICINE

## 2021-10-28 PROCEDURE — 99999 PR PBB SHADOW E&M-EST. PATIENT-LVL IV: CPT | Mod: PBBFAC,,, | Performed by: INTERNAL MEDICINE

## 2021-10-28 RX ORDER — COLESEVELAM 180 1/1
1250 TABLET ORAL NIGHTLY
Qty: 180 TABLET | Refills: 0 | OUTPATIENT
Start: 2021-10-28 | End: 2022-01-26

## 2021-10-28 RX ORDER — COLESEVELAM 180 1/1
1250 TABLET ORAL NIGHTLY
Qty: 180 TABLET | Refills: 0 | Status: SHIPPED | OUTPATIENT
Start: 2021-10-28 | End: 2021-10-28

## 2021-10-28 RX ORDER — CHOLESTYRAMINE 4 G/9G
4 POWDER, FOR SUSPENSION ORAL NIGHTLY
Qty: 90 PACKET | Refills: 0 | Status: SHIPPED | OUTPATIENT
Start: 2021-10-28 | End: 2023-01-26

## 2021-10-29 ENCOUNTER — PATIENT MESSAGE (OUTPATIENT)
Dept: INTERNAL MEDICINE | Facility: CLINIC | Age: 66
End: 2021-10-29
Payer: MEDICARE

## 2021-10-29 LAB
ANA PATTERN 1: NORMAL
ANA SER QL IF: POSITIVE
ANA TITR SER IF: NORMAL {TITER}
HBV CORE AB SERPL QL IA: NEGATIVE
HBV SURFACE AB SER-ACNC: ABNORMAL M[IU]/ML
HBV SURFACE AG SERPL QL IA: NEGATIVE
HCV AB SERPL QL IA: NEGATIVE
HEPATITIS A ANTIBODY, IGG: NEGATIVE

## 2021-11-01 LAB
LKM AB SER-ACNC: 1.3 UNITS
MITOCHONDRIA AB TITR SER IF: NORMAL {TITER}
TTG IGA SER-ACNC: 5 UNITS

## 2021-11-02 ENCOUNTER — LAB VISIT (OUTPATIENT)
Dept: LAB | Facility: HOSPITAL | Age: 66
End: 2021-11-02
Payer: MEDICARE

## 2021-11-02 ENCOUNTER — OFFICE VISIT (OUTPATIENT)
Dept: HEPATOLOGY | Facility: CLINIC | Age: 66
End: 2021-11-02
Payer: MEDICARE

## 2021-11-02 VITALS
BODY MASS INDEX: 18.5 KG/M2 | DIASTOLIC BLOOD PRESSURE: 53 MMHG | HEART RATE: 50 BPM | TEMPERATURE: 98 F | WEIGHT: 114.63 LBS | OXYGEN SATURATION: 99 % | SYSTOLIC BLOOD PRESSURE: 99 MMHG

## 2021-11-02 DIAGNOSIS — R74.8 ELEVATED LIVER ENZYMES: Primary | ICD-10-CM

## 2021-11-02 DIAGNOSIS — K76.0 HEPATIC STEATOSIS: ICD-10-CM

## 2021-11-02 DIAGNOSIS — R74.8 ELEVATED LIVER ENZYMES: ICD-10-CM

## 2021-11-02 LAB
ALBUMIN SERPL BCP-MCNC: 3.9 G/DL (ref 3.5–5.2)
ALP SERPL-CCNC: 100 U/L (ref 55–135)
ALT SERPL W/O P-5'-P-CCNC: 83 U/L (ref 10–44)
ANTI SM ANTIBODY: 0.09 RATIO (ref 0–0.99)
ANTI SM/RNP ANTIBODY: 0.36 RATIO (ref 0–0.99)
ANTI-SM INTERPRETATION: NEGATIVE
ANTI-SM/RNP INTERPRETATION: NEGATIVE
ANTI-SSA ANTIBODY: 0.08 RATIO (ref 0–0.99)
ANTI-SSA INTERPRETATION: NEGATIVE
ANTI-SSB ANTIBODY: 0.07 RATIO (ref 0–0.99)
ANTI-SSB INTERPRETATION: NEGATIVE
AST SERPL-CCNC: 72 U/L (ref 10–40)
BILIRUB DIRECT SERPL-MCNC: 0.1 MG/DL (ref 0.1–0.3)
BILIRUB SERPL-MCNC: 0.3 MG/DL (ref 0.1–1)
DSDNA AB SER-ACNC: NORMAL [IU]/ML
PROT SERPL-MCNC: 6.7 G/DL (ref 6–8.4)

## 2021-11-02 PROCEDURE — 99214 OFFICE O/P EST MOD 30 MIN: CPT | Mod: S$PBB,,, | Performed by: NURSE PRACTITIONER

## 2021-11-02 PROCEDURE — 36415 COLL VENOUS BLD VENIPUNCTURE: CPT | Performed by: NURSE PRACTITIONER

## 2021-11-02 PROCEDURE — 99999 PR PBB SHADOW E&M-EST. PATIENT-LVL V: CPT | Mod: PBBFAC,,, | Performed by: NURSE PRACTITIONER

## 2021-11-02 PROCEDURE — 99999 PR PBB SHADOW E&M-EST. PATIENT-LVL V: ICD-10-PCS | Mod: PBBFAC,,, | Performed by: NURSE PRACTITIONER

## 2021-11-02 PROCEDURE — 80076 HEPATIC FUNCTION PANEL: CPT | Performed by: NURSE PRACTITIONER

## 2021-11-02 PROCEDURE — 86706 HEP B SURFACE ANTIBODY: CPT | Performed by: NURSE PRACTITIONER

## 2021-11-02 PROCEDURE — 99215 OFFICE O/P EST HI 40 MIN: CPT | Mod: PBBFAC | Performed by: NURSE PRACTITIONER

## 2021-11-02 PROCEDURE — 86256 FLUORESCENT ANTIBODY TITER: CPT | Performed by: NURSE PRACTITIONER

## 2021-11-02 PROCEDURE — 99214 PR OFFICE/OUTPT VISIT, EST, LEVL IV, 30-39 MIN: ICD-10-PCS | Mod: S$PBB,,, | Performed by: NURSE PRACTITIONER

## 2021-11-02 RX ORDER — FLUOROMETHOLONE 1 MG/ML
SUSPENSION/ DROPS OPHTHALMIC
COMMUNITY
Start: 2021-10-18

## 2021-11-02 RX ORDER — AZITHROMYCIN MONOHYDRATE 10 MG/ML
1 SOLUTION/ DROPS OPHTHALMIC 2 TIMES DAILY
COMMUNITY
Start: 2021-06-25 | End: 2022-09-27

## 2021-11-02 RX ORDER — DENOSUMAB 60 MG/ML
INJECTION SUBCUTANEOUS
COMMUNITY
Start: 2013-12-01

## 2021-11-02 RX ORDER — HYDROXYZINE HYDROCHLORIDE 10 MG/1
TABLET, FILM COATED ORAL
COMMUNITY
Start: 2021-08-10 | End: 2022-03-25

## 2021-11-04 LAB
HBV SURFACE AB SER QL IA: POSITIVE
HBV SURFACE AB SERPL IA-ACNC: 12 MIU/ML
SMOOTH MUSCLE AB TITR SER IF: NORMAL {TITER}

## 2021-11-05 ENCOUNTER — PATIENT MESSAGE (OUTPATIENT)
Dept: HEPATOLOGY | Facility: CLINIC | Age: 66
End: 2021-11-05
Payer: MEDICARE

## 2021-11-05 LAB — SMOOTH MUSCLE AB TITR SER IF: ABNORMAL {TITER}

## 2021-11-10 ENCOUNTER — TELEPHONE (OUTPATIENT)
Dept: INTERNAL MEDICINE | Facility: CLINIC | Age: 66
End: 2021-11-10
Payer: MEDICARE

## 2021-11-12 ENCOUNTER — PATIENT MESSAGE (OUTPATIENT)
Dept: GASTROENTEROLOGY | Facility: CLINIC | Age: 66
End: 2021-11-12
Payer: MEDICARE

## 2021-11-16 ENCOUNTER — PATIENT MESSAGE (OUTPATIENT)
Dept: HEPATOLOGY | Facility: CLINIC | Age: 66
End: 2021-11-16
Payer: MEDICARE

## 2021-11-17 ENCOUNTER — OFFICE VISIT (OUTPATIENT)
Dept: HEPATOLOGY | Facility: CLINIC | Age: 66
End: 2021-11-17
Payer: MEDICARE

## 2021-11-17 ENCOUNTER — PROCEDURE VISIT (OUTPATIENT)
Dept: HEPATOLOGY | Facility: CLINIC | Age: 66
End: 2021-11-17
Payer: MEDICARE

## 2021-11-17 VITALS
BODY MASS INDEX: 18.49 KG/M2 | HEART RATE: 96 BPM | TEMPERATURE: 97 F | WEIGHT: 115.06 LBS | OXYGEN SATURATION: 98 % | RESPIRATION RATE: 18 BRPM | HEIGHT: 66 IN | DIASTOLIC BLOOD PRESSURE: 50 MMHG | SYSTOLIC BLOOD PRESSURE: 95 MMHG

## 2021-11-17 DIAGNOSIS — R74.8 ELEVATED LIVER ENZYMES: Primary | ICD-10-CM

## 2021-11-17 DIAGNOSIS — Z23 NEED FOR HEPATITIS A IMMUNIZATION: ICD-10-CM

## 2021-11-17 DIAGNOSIS — K75.9 INFLAMMATORY LIVER DISEASE, UNSPECIFIED: ICD-10-CM

## 2021-11-17 DIAGNOSIS — R74.8 ELEVATED LIVER ENZYMES: ICD-10-CM

## 2021-11-17 PROCEDURE — 99214 OFFICE O/P EST MOD 30 MIN: CPT | Mod: S$PBB,,, | Performed by: NURSE PRACTITIONER

## 2021-11-17 PROCEDURE — 91200 LIVER ELASTOGRAPHY: CPT | Mod: PBBFAC | Performed by: NURSE PRACTITIONER

## 2021-11-17 PROCEDURE — 99215 OFFICE O/P EST HI 40 MIN: CPT | Mod: PBBFAC | Performed by: NURSE PRACTITIONER

## 2021-11-17 PROCEDURE — 99214 PR OFFICE/OUTPT VISIT, EST, LEVL IV, 30-39 MIN: ICD-10-PCS | Mod: S$PBB,,, | Performed by: NURSE PRACTITIONER

## 2021-11-17 PROCEDURE — 99999 PR PBB SHADOW E&M-EST. PATIENT-LVL V: ICD-10-PCS | Mod: PBBFAC,,, | Performed by: NURSE PRACTITIONER

## 2021-11-17 PROCEDURE — 91200 LIVER ELASTOGRAPHY: CPT | Mod: 26,S$PBB,, | Performed by: NURSE PRACTITIONER

## 2021-11-17 PROCEDURE — 99999 PR PBB SHADOW E&M-EST. PATIENT-LVL V: CPT | Mod: PBBFAC,,, | Performed by: NURSE PRACTITIONER

## 2021-11-17 PROCEDURE — 91200 FIBROSCAN (VIBRATION CONTROLLED TRANSIENT ELASTOGRAPHY): ICD-10-PCS | Mod: 26,S$PBB,, | Performed by: NURSE PRACTITIONER

## 2021-11-19 ENCOUNTER — CLINICAL SUPPORT (OUTPATIENT)
Dept: INFECTIOUS DISEASES | Facility: CLINIC | Age: 66
End: 2021-11-19
Payer: MEDICARE

## 2021-11-19 DIAGNOSIS — Z23 NEED FOR HEPATITIS A IMMUNIZATION: ICD-10-CM

## 2021-11-19 PROCEDURE — 99212 OFFICE O/P EST SF 10 MIN: CPT | Mod: PBBFAC

## 2021-11-19 PROCEDURE — 99999 PR PBB SHADOW E&M-EST. PATIENT-LVL II: ICD-10-PCS | Mod: PBBFAC,,,

## 2021-11-19 PROCEDURE — 99999 PR PBB SHADOW E&M-EST. PATIENT-LVL II: CPT | Mod: PBBFAC,,,

## 2021-11-19 PROCEDURE — 90471 IMMUNIZATION ADMIN: CPT | Mod: PBBFAC

## 2021-12-02 ENCOUNTER — PATIENT MESSAGE (OUTPATIENT)
Dept: ENDOCRINOLOGY | Facility: CLINIC | Age: 66
End: 2021-12-02
Payer: MEDICARE

## 2021-12-03 ENCOUNTER — PATIENT MESSAGE (OUTPATIENT)
Dept: ENDOCRINOLOGY | Facility: CLINIC | Age: 66
End: 2021-12-03
Payer: MEDICARE

## 2021-12-14 ENCOUNTER — PATIENT MESSAGE (OUTPATIENT)
Dept: ENDOCRINOLOGY | Facility: CLINIC | Age: 66
End: 2021-12-14
Payer: MEDICARE

## 2021-12-27 ENCOUNTER — PATIENT MESSAGE (OUTPATIENT)
Dept: ENDOCRINOLOGY | Facility: CLINIC | Age: 66
End: 2021-12-27
Payer: MEDICARE
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
## 2022-01-06 ENCOUNTER — OFFICE VISIT (OUTPATIENT)
Dept: ENDOCRINOLOGY | Facility: CLINIC | Age: 67
End: 2022-01-06
Payer: MEDICARE

## 2022-01-06 DIAGNOSIS — M81.0 OSTEOPOROSIS, POST-MENOPAUSAL: Primary | ICD-10-CM

## 2022-01-06 PROCEDURE — 99214 OFFICE O/P EST MOD 30 MIN: CPT | Mod: 95,,, | Performed by: NURSE PRACTITIONER

## 2022-01-06 PROCEDURE — 1160F PR REVIEW ALL MEDS BY PRESCRIBER/CLIN PHARMACIST DOCUMENTED: ICD-10-PCS | Mod: CPTII,95,, | Performed by: NURSE PRACTITIONER

## 2022-01-06 PROCEDURE — 99214 PR OFFICE/OUTPT VISIT, EST, LEVL IV, 30-39 MIN: ICD-10-PCS | Mod: 95,,, | Performed by: NURSE PRACTITIONER

## 2022-01-06 PROCEDURE — 1159F PR MEDICATION LIST DOCUMENTED IN MEDICAL RECORD: ICD-10-PCS | Mod: CPTII,95,, | Performed by: NURSE PRACTITIONER

## 2022-01-06 PROCEDURE — 1159F MED LIST DOCD IN RCRD: CPT | Mod: CPTII,95,, | Performed by: NURSE PRACTITIONER

## 2022-01-06 PROCEDURE — 1160F RVW MEDS BY RX/DR IN RCRD: CPT | Mod: CPTII,95,, | Performed by: NURSE PRACTITIONER

## 2022-01-06 NOTE — ASSESSMENT & PLAN NOTE
-- Risks include low weight,  race, menopause,  +FH.  -- Reviewed basics of quantity versus quality.  -- Reassuring they are not fracturing.  -- Recommend:  -Pharmacological therapy is recommended for patients with osteopenia if the 10 year probability of a hip fracture is >3% and 10 year probability of other major osteoporotic fractures is >20%.  Treatment options and potential side effects discussed for PO bisphosphonates, reclast, Denosumab, and Teriparatide.   -Treatment:   Fosamax ([id number]) - GERD and Fx while on treatment.  Prolia   Started: 2014  Last dose: 8/12/2021    Therapy plan reordered.  -Calcium and Vitamin D RDD provided.  -Exercise: recommended.  -Fall precautions made in the home.  -Alerted that if dental work needs to be done it should be done prior to initiating therapy. Dental health: UTD.  -- Repeat DEXA scan 1/2022.

## 2022-01-15 ENCOUNTER — LAB VISIT (OUTPATIENT)
Dept: LAB | Facility: HOSPITAL | Age: 67
End: 2022-01-15
Attending: NURSE PRACTITIONER
Payer: MEDICARE

## 2022-01-15 DIAGNOSIS — R74.8 ELEVATED LIVER ENZYMES: ICD-10-CM

## 2022-01-15 LAB
ALBUMIN SERPL BCP-MCNC: 4.1 G/DL (ref 3.5–5.2)
ALP SERPL-CCNC: 92 U/L (ref 55–135)
ALT SERPL W/O P-5'-P-CCNC: 56 U/L (ref 10–44)
AST SERPL-CCNC: 38 U/L (ref 10–40)
BILIRUB DIRECT SERPL-MCNC: 0.1 MG/DL (ref 0.1–0.3)
BILIRUB SERPL-MCNC: 0.3 MG/DL (ref 0.1–1)
PROT SERPL-MCNC: 7.1 G/DL (ref 6–8.4)

## 2022-01-15 PROCEDURE — 80076 HEPATIC FUNCTION PANEL: CPT | Performed by: NURSE PRACTITIONER

## 2022-01-15 PROCEDURE — 80321 ALCOHOLS BIOMARKERS 1OR 2: CPT | Performed by: NURSE PRACTITIONER

## 2022-01-15 PROCEDURE — 36415 COLL VENOUS BLD VENIPUNCTURE: CPT | Performed by: NURSE PRACTITIONER

## 2022-01-19 LAB
PETH 16:0/18.1 (POPETH): <10 NG/ML
PETH 16:0/18.2 (PLPETH): <10 NG/ML

## 2022-01-21 ENCOUNTER — APPOINTMENT (OUTPATIENT)
Dept: RADIOLOGY | Facility: CLINIC | Age: 67
End: 2022-01-21
Attending: INTERNAL MEDICINE
Payer: MEDICARE

## 2022-01-21 DIAGNOSIS — M81.0 OSTEOPOROSIS, POST-MENOPAUSAL: ICD-10-CM

## 2022-01-21 PROCEDURE — 77080 DEXA BONE DENSITY SPINE HIP: ICD-10-PCS | Mod: 26,,, | Performed by: INTERNAL MEDICINE

## 2022-01-21 PROCEDURE — 77080 DXA BONE DENSITY AXIAL: CPT | Mod: 26,,, | Performed by: INTERNAL MEDICINE

## 2022-01-21 PROCEDURE — 77080 DXA BONE DENSITY AXIAL: CPT | Mod: TC,PO

## 2022-01-27 ENCOUNTER — PATIENT MESSAGE (OUTPATIENT)
Dept: HEPATOLOGY | Facility: CLINIC | Age: 67
End: 2022-01-27
Payer: MEDICARE

## 2022-02-08 ENCOUNTER — PATIENT MESSAGE (OUTPATIENT)
Dept: ENDOCRINOLOGY | Facility: CLINIC | Age: 67
End: 2022-02-08
Payer: MEDICARE

## 2022-02-08 DIAGNOSIS — M81.0 OSTEOPOROSIS, POST-MENOPAUSAL: Primary | ICD-10-CM

## 2022-02-15 ENCOUNTER — INFUSION (OUTPATIENT)
Dept: INFECTIOUS DISEASES | Facility: HOSPITAL | Age: 67
End: 2022-02-15
Attending: INTERNAL MEDICINE
Payer: MEDICARE

## 2022-02-15 VITALS
TEMPERATURE: 98 F | RESPIRATION RATE: 16 BRPM | OXYGEN SATURATION: 98 % | BODY MASS INDEX: 18.86 KG/M2 | DIASTOLIC BLOOD PRESSURE: 56 MMHG | HEIGHT: 66 IN | SYSTOLIC BLOOD PRESSURE: 98 MMHG | WEIGHT: 117.38 LBS | HEART RATE: 54 BPM

## 2022-02-15 DIAGNOSIS — M81.0 SENILE OSTEOPOROSIS: Primary | ICD-10-CM

## 2022-02-15 PROCEDURE — 96372 THER/PROPH/DIAG INJ SC/IM: CPT

## 2022-02-15 PROCEDURE — 63600175 PHARM REV CODE 636 W HCPCS: Mod: JG | Performed by: NURSE PRACTITIONER

## 2022-02-15 RX ADMIN — DENOSUMAB 60 MG: 60 INJECTION SUBCUTANEOUS at 08:02

## 2022-02-15 NOTE — PROGRESS NOTES
Arrived for Prolia injection, tolerated w/o difficulty,  currently taking Vitamin D and calcium supplements, instructed to remain on campus for the next fifteen minutes, if no reaction to Prolia injection okay to leave, pt acknowledged understanding, left unit in NAD with future appointment on hand

## 2022-02-16 ENCOUNTER — PATIENT MESSAGE (OUTPATIENT)
Dept: ENDOCRINOLOGY | Facility: CLINIC | Age: 67
End: 2022-02-16
Payer: MEDICARE

## 2022-02-16 ENCOUNTER — PATIENT MESSAGE (OUTPATIENT)
Dept: PRIMARY CARE CLINIC | Facility: CLINIC | Age: 67
End: 2022-02-16
Payer: MEDICARE

## 2022-02-17 ENCOUNTER — TELEPHONE (OUTPATIENT)
Dept: PRIMARY CARE CLINIC | Facility: CLINIC | Age: 67
End: 2022-02-17

## 2022-02-17 ENCOUNTER — LAB VISIT (OUTPATIENT)
Dept: LAB | Facility: HOSPITAL | Age: 67
End: 2022-02-17
Attending: INTERNAL MEDICINE
Payer: MEDICARE

## 2022-02-17 ENCOUNTER — OFFICE VISIT (OUTPATIENT)
Dept: PRIMARY CARE CLINIC | Facility: CLINIC | Age: 67
End: 2022-02-17
Payer: MEDICARE

## 2022-02-17 VITALS
DIASTOLIC BLOOD PRESSURE: 78 MMHG | OXYGEN SATURATION: 100 % | BODY MASS INDEX: 18.53 KG/M2 | HEIGHT: 66 IN | WEIGHT: 115.31 LBS | SYSTOLIC BLOOD PRESSURE: 132 MMHG | HEART RATE: 68 BPM

## 2022-02-17 DIAGNOSIS — R74.01 TRANSAMINITIS: ICD-10-CM

## 2022-02-17 DIAGNOSIS — F41.1 GAD (GENERALIZED ANXIETY DISORDER): Primary | ICD-10-CM

## 2022-02-17 DIAGNOSIS — F41.1 GAD (GENERALIZED ANXIETY DISORDER): ICD-10-CM

## 2022-02-17 DIAGNOSIS — F33.1 MODERATE EPISODE OF RECURRENT MAJOR DEPRESSIVE DISORDER: ICD-10-CM

## 2022-02-17 LAB
ALBUMIN SERPL BCP-MCNC: 3.5 G/DL (ref 3.5–5.2)
ALP SERPL-CCNC: 100 U/L (ref 55–135)
ALT SERPL W/O P-5'-P-CCNC: 56 U/L (ref 10–44)
ANION GAP SERPL CALC-SCNC: 7 MMOL/L (ref 8–16)
AST SERPL-CCNC: 37 U/L (ref 10–40)
BILIRUB SERPL-MCNC: 0.3 MG/DL (ref 0.1–1)
BUN SERPL-MCNC: 12 MG/DL (ref 8–23)
CALCIUM SERPL-MCNC: 9.7 MG/DL (ref 8.7–10.5)
CHLORIDE SERPL-SCNC: 103 MMOL/L (ref 95–110)
CO2 SERPL-SCNC: 27 MMOL/L (ref 23–29)
CREAT SERPL-MCNC: 0.7 MG/DL (ref 0.5–1.4)
EST. GFR  (AFRICAN AMERICAN): >60 ML/MIN/1.73 M^2
EST. GFR  (NON AFRICAN AMERICAN): >60 ML/MIN/1.73 M^2
GLUCOSE SERPL-MCNC: 83 MG/DL (ref 70–110)
POTASSIUM SERPL-SCNC: 3.8 MMOL/L (ref 3.5–5.1)
PROT SERPL-MCNC: 6.7 G/DL (ref 6–8.4)
SODIUM SERPL-SCNC: 137 MMOL/L (ref 136–145)

## 2022-02-17 PROCEDURE — 1126F PR PAIN SEVERITY QUANTIFIED, NO PAIN PRESENT: ICD-10-PCS | Mod: CPTII,S$GLB,, | Performed by: INTERNAL MEDICINE

## 2022-02-17 PROCEDURE — 1160F RVW MEDS BY RX/DR IN RCRD: CPT | Mod: CPTII,S$GLB,, | Performed by: INTERNAL MEDICINE

## 2022-02-17 PROCEDURE — 3288F PR FALLS RISK ASSESSMENT DOCUMENTED: ICD-10-PCS | Mod: CPTII,S$GLB,, | Performed by: INTERNAL MEDICINE

## 2022-02-17 PROCEDURE — 1159F PR MEDICATION LIST DOCUMENTED IN MEDICAL RECORD: ICD-10-PCS | Mod: CPTII,S$GLB,, | Performed by: INTERNAL MEDICINE

## 2022-02-17 PROCEDURE — 80053 COMPREHEN METABOLIC PANEL: CPT | Performed by: INTERNAL MEDICINE

## 2022-02-17 PROCEDURE — 3288F FALL RISK ASSESSMENT DOCD: CPT | Mod: CPTII,S$GLB,, | Performed by: INTERNAL MEDICINE

## 2022-02-17 PROCEDURE — 1101F PT FALLS ASSESS-DOCD LE1/YR: CPT | Mod: CPTII,S$GLB,, | Performed by: INTERNAL MEDICINE

## 2022-02-17 PROCEDURE — 3078F PR MOST RECENT DIASTOLIC BLOOD PRESSURE < 80 MM HG: ICD-10-PCS | Mod: CPTII,S$GLB,, | Performed by: INTERNAL MEDICINE

## 2022-02-17 PROCEDURE — 3008F PR BODY MASS INDEX (BMI) DOCUMENTED: ICD-10-PCS | Mod: CPTII,S$GLB,, | Performed by: INTERNAL MEDICINE

## 2022-02-17 PROCEDURE — 1101F PR PT FALLS ASSESS DOC 0-1 FALLS W/OUT INJ PAST YR: ICD-10-PCS | Mod: CPTII,S$GLB,, | Performed by: INTERNAL MEDICINE

## 2022-02-17 PROCEDURE — 3078F DIAST BP <80 MM HG: CPT | Mod: CPTII,S$GLB,, | Performed by: INTERNAL MEDICINE

## 2022-02-17 PROCEDURE — 1126F AMNT PAIN NOTED NONE PRSNT: CPT | Mod: CPTII,S$GLB,, | Performed by: INTERNAL MEDICINE

## 2022-02-17 PROCEDURE — 3075F SYST BP GE 130 - 139MM HG: CPT | Mod: CPTII,S$GLB,, | Performed by: INTERNAL MEDICINE

## 2022-02-17 PROCEDURE — 3075F PR MOST RECENT SYSTOLIC BLOOD PRESS GE 130-139MM HG: ICD-10-PCS | Mod: CPTII,S$GLB,, | Performed by: INTERNAL MEDICINE

## 2022-02-17 PROCEDURE — 36415 COLL VENOUS BLD VENIPUNCTURE: CPT | Mod: PN | Performed by: INTERNAL MEDICINE

## 2022-02-17 PROCEDURE — 1159F MED LIST DOCD IN RCRD: CPT | Mod: CPTII,S$GLB,, | Performed by: INTERNAL MEDICINE

## 2022-02-17 PROCEDURE — 1160F PR REVIEW ALL MEDS BY PRESCRIBER/CLIN PHARMACIST DOCUMENTED: ICD-10-PCS | Mod: CPTII,S$GLB,, | Performed by: INTERNAL MEDICINE

## 2022-02-17 PROCEDURE — 3008F BODY MASS INDEX DOCD: CPT | Mod: CPTII,S$GLB,, | Performed by: INTERNAL MEDICINE

## 2022-02-17 PROCEDURE — 99214 OFFICE O/P EST MOD 30 MIN: CPT | Mod: S$GLB,,, | Performed by: INTERNAL MEDICINE

## 2022-02-17 PROCEDURE — 99999 PR PBB SHADOW E&M-EST. PATIENT-LVL IV: ICD-10-PCS | Mod: PBBFAC,,, | Performed by: INTERNAL MEDICINE

## 2022-02-17 PROCEDURE — 99999 PR PBB SHADOW E&M-EST. PATIENT-LVL IV: CPT | Mod: PBBFAC,,, | Performed by: INTERNAL MEDICINE

## 2022-02-17 PROCEDURE — 99214 PR OFFICE/OUTPT VISIT, EST, LEVL IV, 30-39 MIN: ICD-10-PCS | Mod: S$GLB,,, | Performed by: INTERNAL MEDICINE

## 2022-02-17 RX ORDER — LAMOTRIGINE 25 MG/1
50 TABLET ORAL DAILY
Qty: 60 TABLET | Refills: 2 | Status: SHIPPED | OUTPATIENT
Start: 2022-02-17 | End: 2022-03-11 | Stop reason: SDUPTHER

## 2022-02-17 NOTE — Clinical Note
Milana/coverage provider, I saw Ms. Flores today for depression and anxiety. Since she's been off her lamictal in early Dec, she's not in a good place. Her psychiatrist is CHUCK and her therapist is now out of network. I plugged her in to our therapist w/ [facility] 63[facility] But I'm a little concerned b/c she's tried many different meds including ECT for her depression. Her AST did improve and ALT decreased mildly but is still elevated on labs 1/2022. I'm repeating labs today but was wondering if it's ok to go back on the lamictal. I'd probably start 50 instead of 100 that she was on.   Catie

## 2022-02-17 NOTE — PROGRESS NOTES
"Subjective:       Patient ID: Adelaida Flores is a 66 y.o. female.    Chief Complaint: Anxiety    HPI   MDD (had depression all her life and dx at 17 y/o)/Anxiety - fluoxetine 80mg daily and was previously on lamictal 100mg daily. Had to wean off lamictal due to elevated liver enzymes. Off since beginning of Dec 2021. AST normalized 1/2022, ALT dec from 83 to 56.   Has been on all kinds of meds including thorazine and ECT. Hasn't been hosp since 30s.   On hydroxyzine tid prn.   Follows w/ Dr. Singh in psychiatry but is currently [facility]UCK per pt.   Anhedonia - if she could sit and do nothing all day, she'd prefer it. Loss of appetite. Was biking and spinning, which helps w/ anxiety but stopped due to omicron risk.   PHQ-9 today 19 (moderately severe)  MISTY-7 14 (moderate)  No SI/HI.     Review of Systems  as above in HPI.     Objective:      Physical Exam    /78 (BP Location: Left arm, Patient Position: Sitting, BP Method: Medium (Manual))   Pulse 68   Ht 5' 6" (1.676 m)   Wt 52.3 kg (115 lb 4.8 oz)   SpO2 100%   BMI 18.61 kg/m²     GEN - A+OX4, NAD   pSYCH- anxious mood and affect. Fidgeting w/ hands.  HEENT - PERRL, EOMI, OP clear. MMM.   Neck - No thyromegaly or cervical LAD. No thyroid masses felt.  CV - RRR, no m/r   Chest - CTAB, no wheezing or rhonchi  Abd - S/NT/ND/+BS.   Ext - 2+BDP and radial pulses. No C/C/E.  Skin - No rash.    Previous labs reviewed.     Assessment/Plan     Adelaida was seen today for anxiety.    Diagnoses and all orders for this visit:    MISTY (generalized anxiety disorder) - moderate. Try to get back to exercising, which has helped in the past.   REMEDIOS BabbW will see pt today for CBT. Refer to psychiatry. Will send message to hepatology team to see if ok to restart lamictal. Feel that benefits may outweigh risk at this time, at least till she could get in to see psychiatry.   -     Ambulatory referral/consult to Psychiatry; Future  -     Comprehensive Metabolic Panel; " Future    Transaminitis  -     Comprehensive Metabolic Panel; Future    Moderate episode of recurrent major depressive disorder  No SI/HI. As above.       Follow up in about 4 weeks (around 3/17/2022).      Catie Portillo MD  Department of Internal Medicine - [facility]  2:23 PM

## 2022-02-18 ENCOUNTER — TELEPHONE (OUTPATIENT)
Dept: PRIMARY CARE CLINIC | Facility: CLINIC | Age: 67
End: 2022-02-18
Payer: MEDICARE

## 2022-02-18 NOTE — TELEPHONE ENCOUNTER
----- Message from Vidhi Corona sent at 2/18/2022  9:46 AM CST -----  Contact: Dana with [facility] @  [phone]  Pharmacy is calling to clarify an RX.  RX name:  lamoTRIgine (LAMICTAL) 25 MG tablet  What do they need to clarify: directions and mg  Comments:

## 2022-02-22 ENCOUNTER — PATIENT MESSAGE (OUTPATIENT)
Dept: ENDOCRINOLOGY | Facility: CLINIC | Age: 67
End: 2022-02-22
Payer: MEDICARE

## 2022-02-22 ENCOUNTER — PATIENT MESSAGE (OUTPATIENT)
Dept: RESEARCH | Facility: HOSPITAL | Age: 67
End: 2022-02-22
Payer: MEDICARE

## 2022-02-24 ENCOUNTER — PATIENT MESSAGE (OUTPATIENT)
Dept: PRIMARY CARE CLINIC | Facility: CLINIC | Age: 67
End: 2022-02-24
Payer: MEDICARE

## 2022-03-02 ENCOUNTER — PATIENT MESSAGE (OUTPATIENT)
Dept: PRIMARY CARE CLINIC | Facility: CLINIC | Age: 67
End: 2022-03-02
Payer: MEDICARE

## 2022-03-03 ENCOUNTER — SOCIAL WORK (OUTPATIENT)
Dept: PRIMARY CARE CLINIC | Facility: CLINIC | Age: 67
End: 2022-03-03
Payer: MEDICARE

## 2022-03-03 ENCOUNTER — PATIENT MESSAGE (OUTPATIENT)
Dept: PRIMARY CARE CLINIC | Facility: CLINIC | Age: 67
End: 2022-03-03
Payer: MEDICARE

## 2022-03-03 NOTE — PROGRESS NOTES
[facility] BEHAVIORAL HEALTH INTAKE    DATE:  3/3/2022  REFERRAL SOURCE:  Catie Portillo MD  TYPE OF VISIT:  Video Session  LENGTH OF SESSION: 30  .  HISTORY OF PRESENTING ILLNESS:  Adelaida Flores, a 66 y.o. female with history of Anxiety disorders; generalized anxiety disorder [F41.1] and Major Depressive Disorder, Recurrent, Moderate (F33.1).    CHIEF COMPLAINT/REASON FOR ENCOUNTER:  Met with patient. Pt's chief complaint includes the following: anxiety.    Patient does currently have a psychiatrist. CARMINA Manrique MD  Patient does not currently have a therapist.    Pt is taking fluoxetine (Prozac) 40mg once daily for Depression. They are interested in medication changes * to consult with psychiatrist    Current symptoms:  · Depression: worthlessness/guilt and difficulty concentrating.  · Anxiety: excessive worrying, muscle tension, panic attacks and obsessions/compulsions.  · Insomnia: denies.  · Kasey:  denies.  · Psychosis: denies .    PHQ9 2/17/2022   Total Score 19     GAD7 2/17/2022   1. Feeling nervous, anxious, or on edge? 3   2. Not being able to stop or control worrying? 3   3. Worrying too much about different things? 3   4. Trouble relaxing? 3   5. Being so restless that it is hard to sit still? 2   6. Becoming easily annoyed or irritable? 0   7. Feeling afraid as if something awful might happen? 0   8. If you checked off any problems, how difficult have these problems made it for you to do your work, take care of things at home, or get along with other people? -   MISTY-7 Score 14        Current social stressors:   Pt and  are currently in process of assessing and implementing long-term care plans. Pt reports decreasing support from step-children. Recent loss of income.    Risk assessment:  Patient reports no suicidal ideation  Patient reports no homicidal ideation  Patient reports no self-injurious behavior  Patient reports no violent behavior    PSYCHIATRIC HISTORY:  History of Kasey or diagnosis of Bipolar  Disorder in the past:  no  History of Psychosis or diagnosis of Schizophrenia in the past:  no  Previous Psychiatric Hospitalizations:  yes, last 1984 (o/d)  Previous SI/HI:   yes, several, last in 1984  Previous Suicide Attempts: yes, several previous, last in 1984  Previous Medication Trials: Yes Pt has taken fluoxetine (Prozac) 40mg once daily for Depression.  Previous Psychiatric Outpatient Treatment:  yes, outpatient eating disorder treatment and psychiatric  History of Trauma:  denies  History of Violence:  denies  Access to a Gun:  yes    SUBSTANCE ABUSE HISTORY:  Tobacco:  no  Alcohol: none  Illicit Substances: denies  Misuse of Prescription Medications:  no    MEDICAL HISTORY:  Past Medical History:   Diagnosis Date    Allergy     Anxiety 2/22/2019    Colon polyps     Depression     Hyperlipidemia 2/22/2019    IBS (irritable bowel syndrome)     Osteoporosis     T12 compression fracture s/p corpectomy 7/13/2013    Volvulus     s/p colectomy       NEUROLOGIC HISTORY:  Seizures:  no  Head trauma:  no  Memory loss:  no    SOCIAL HISTORY (MARRIAGE, EMPLOYMENT, etc.):  Living Situation: lives with , no bio children, 2 step-children  Family Life Cycle:   Family:   Nuclear/Marriage:   Extended Family:   Supports:  Education/Vocation: finance, CPA  Yarsanism/Spirituality: Synagogue  Hobbies and Interests: bike riding, exercise    PSYCHIATRIC FAMILY HISTORY: depression (mother and aunt)      MENTAL HEALTH STATUS EXAM  General Appearance:  unremarkable, age appropriate   Speech: normal tone, normal rate, normal pitch, normal volume      Level of Cooperation: cooperative      Thought Processes: normal and logical   Mood: euthymic, anxious      Thought Content: normal, no suicidality, no homicidality, delusions, or paranoia   Affect: congruent and appropriate   Orientation: Oriented x3   Memory: recent >  intact, remote >  intact   Attention Span & Concentration: intact   Fund of General Knowledge: intact  and appropriate to age and level of education   Abstract Reasoning: interpretation of similarities was abstract   Judgment & Insight: good     Language  intact       IMPRESSION:   My diagnostic impression is Anxiety disorders; generalized anxiety disorder [F41.1], as evidenced by ongoing anxiety symtpoms.     PROVISIONAL DIAGNOSES:  No diagnosis found.     STRENGTHS AND LIABILITIES: Strength: Patient accepts guidance/feedback, Strength: Patient is expressive/articulate., Strength: Patient is intelligent., Strength: Patient is motivated for change., Strength: Patient has reasonable judgment., Liability: Patient is impulsive., Liability: Patient has no suport network., Liability: Patient lacks coping skills.    TREATMENT GOALS: Anxiety: eliminating assumptions about dangerousness of anxiety, positive value of worry, or other assumptions (specify N/A), reducing negative automatic thoughts and reducing time spent worrying (<30 minutes/day)    PLAN: In this session a psych evaluation was conducted to get history and process pt's life. Problem-solving Therapy, Mindfulness Techniques and Relaxation Techniques  will be utilized in future individual  therapy sessions to increase insight and behavior modification.     RETURN TO CLINIC: 1 week

## 2022-03-09 ENCOUNTER — PATIENT MESSAGE (OUTPATIENT)
Dept: PRIMARY CARE CLINIC | Facility: CLINIC | Age: 67
End: 2022-03-09
Payer: MEDICARE

## 2022-03-10 ENCOUNTER — SOCIAL WORK (OUTPATIENT)
Dept: PRIMARY CARE CLINIC | Facility: CLINIC | Age: 67
End: 2022-03-10
Payer: MEDICARE

## 2022-03-10 NOTE — PROGRESS NOTES
"Individual Psychotherapy (LCSW/PhD)  Adelaida Flores,  3/10/2022    Site:  [place]/Virtual    Therapeutic Intervention: Met with patient for individual psychotherapy.    Chief complaint/reason for encounter: anxiety     Interval history and content of current session: Pt continues to practice deep breathing technique to calm anxiety. Pt reports they have practiced 30s of deep breathing each day since last session. Pt reports that it was difficult to perform "deep breathing". Through journaling pt noticed that her breathing was "shallow" and it was often difficult to not become distracted. SW and pt discussed physical sensations of deep breathing and bio-feedback. SW encouraged pt to extend practice time to 1 min and focus on breathing/bodily sensations.     Treatment plan:  · Target symptoms: distractability, anxiety   · Why chosen therapy is appropriate versus another modality: relevant to diagnosis  · Outcome monitoring methods: self-report, checklist/rating scale  · Therapeutic intervention type: behavior modifying psychotherapy, mindfulness    Risk parameters:  Patient reports no suicidal ideation  Patient reports no homicidal ideation  Patient reports no self-injurious behavior  Patient reports no violent behavior    Verbal deficits: None    Patient's response to intervention:  The patient's response to intervention is accepting.    Progress toward goals and other mental status changes:  The patient's progress toward goals is good.    Diagnosis:   No diagnosis found.    Plan: Pt plans to continue individual psychotherapy    Return to clinic: 2 weeks    Length of Service (minutes): 30        "

## 2022-03-15 RX ORDER — LAMOTRIGINE 25 MG/1
50 TABLET ORAL DAILY
Qty: 60 TABLET | Refills: 2 | Status: SHIPPED | OUTPATIENT
Start: 2022-03-15 | End: 2022-03-21 | Stop reason: DRUGHIGH

## 2022-03-15 NOTE — TELEPHONE ENCOUNTER
This Rx Request does not qualify for assessment with the [facility]   Please review protocol details and the Care Due Message for extra clinical information    Reasons Rx Request may be deferred:  1. Labs/Vitals overdue  2. Labs/Vitals abnormal  3. Patient has been seen in the ED/Hospital since the last PCP visit  4. Medication is non-delegated  5. Medication associated diagnosis code is outside of scope  6. Provider is a non-participating provider  7. Visit criteria not met (Patient needs to be seen at least every 15 months by PCP)    Note composed:2:32 PM 03/15/2022

## 2022-03-17 ENCOUNTER — PATIENT MESSAGE (OUTPATIENT)
Dept: PRIMARY CARE CLINIC | Facility: CLINIC | Age: 67
End: 2022-03-17
Payer: MEDICARE

## 2022-03-17 ENCOUNTER — SOCIAL WORK (OUTPATIENT)
Dept: PRIMARY CARE CLINIC | Facility: CLINIC | Age: 67
End: 2022-03-17
Payer: MEDICARE

## 2022-03-17 NOTE — PROGRESS NOTES
Individual Psychotherapy (LCSW/PhD)  Adelaida HIRSCH Mark,  3/17/2022    Site:  Telemed         Therapeutic Intervention: Met with patient for individual psychotherapy.    Chief complaint/reason for encounter: anxiety     Interval history and content of current session: Pt ctinues practicing deep breathing techniques to reduce anxiety. Pt reports ongoing anxiety regarding eating. Pt feels guilt and shame when spending money on food. Pt provided Hx realted to anxorexia  And bulimia. Pt would like to reduce anxiety and be able to eat more. Pt and SW disucssed deep breathing as a tool for reducing anxiety. SW and pt agreed to discuss specific eating goals I.e. 2 solid food eals per week further.    Treatment plan:  · Target symptoms: anxiety   · Why chosen therapy is appropriate versus another modality: relevant to diagnosis  · Outcome monitoring methods: self-report, checklist/rating scale  · Therapeutic intervention type: insight oriented psychotherapy, behavior modifying psychotherapy    Risk parameters:  Patient reports no suicidal ideation  Patient reports no homicidal ideation  Patient reports no self-injurious behavior  Patient reports no violent behavior    Verbal deficits: None    Patient's response to intervention:  The patient's response to intervention is accepting.    Progress toward goals and other mental status changes:  The patient's progress toward goals is good.    Diagnosis:   No diagnosis found.    Plan: Pt plans to continue individual psychotherapy    Return to clinic: 1 week, 2 weeks    Length of Service (minutes): 30       Problem: Safety Risk - Non-Violent Restraints  Goal: Patient will remain free from self-harm  Description: INTERVENTIONS:  - Apply the least restrictive restraint to prevent harm  - Notify patient and family of reasons restraints applied  - Assess for an Update / inform patient on plan of care  - See additional Care Plan goals for specific interventions  Outcome: Progressing     Problem: RESPIRATORY - ADULT  Goal: Achieves optimal ventilation and oxygenation  Description: INTERVENTIONS:  - Assess for peck gait  - Educate and engage patient/family in tolerated activity level and precautions  - Recommend use of total lift for transfers  - Recommend patient transfer to bedside chair toward strongest side  - Recommend use of chair position in bed 3 times per [employer] patient needs post-hospital services based on physician/LIP order or complex needs related to functional status, cognitive ability or social support system  Outcome: Progressing     Pt VSS. Receiving continuous Tube feedings.  Confused/ agitated/ combative;

## 2022-03-18 ENCOUNTER — LAB VISIT (OUTPATIENT)
Dept: LAB | Facility: HOSPITAL | Age: 67
End: 2022-03-18
Attending: INTERNAL MEDICINE
Payer: MEDICARE

## 2022-03-18 ENCOUNTER — OFFICE VISIT (OUTPATIENT)
Dept: PRIMARY CARE CLINIC | Facility: CLINIC | Age: 67
End: 2022-03-18
Payer: MEDICARE

## 2022-03-18 VITALS
WEIGHT: 114.88 LBS | TEMPERATURE: 98 F | OXYGEN SATURATION: 100 % | SYSTOLIC BLOOD PRESSURE: 134 MMHG | BODY MASS INDEX: 18.46 KG/M2 | HEIGHT: 66 IN | DIASTOLIC BLOOD PRESSURE: 82 MMHG

## 2022-03-18 DIAGNOSIS — R74.01 TRANSAMINITIS: ICD-10-CM

## 2022-03-18 DIAGNOSIS — F41.9 ANXIETY: ICD-10-CM

## 2022-03-18 DIAGNOSIS — F32.A DEPRESSION, UNSPECIFIED DEPRESSION TYPE: ICD-10-CM

## 2022-03-18 DIAGNOSIS — K75.9 INFLAMMATORY LIVER DISEASE, UNSPECIFIED: ICD-10-CM

## 2022-03-18 DIAGNOSIS — R09.81 SINUS CONGESTION: Primary | ICD-10-CM

## 2022-03-18 LAB
ALBUMIN SERPL BCP-MCNC: 3.9 G/DL (ref 3.5–5.2)
ALP SERPL-CCNC: 86 U/L (ref 55–135)
ALT SERPL W/O P-5'-P-CCNC: 62 U/L (ref 10–44)
ANION GAP SERPL CALC-SCNC: 9 MMOL/L (ref 8–16)
AST SERPL-CCNC: 40 U/L (ref 10–40)
BASOPHILS # BLD AUTO: 0.02 K/UL (ref 0–0.2)
BASOPHILS NFR BLD: 0.3 % (ref 0–1.9)
BILIRUB SERPL-MCNC: 0.3 MG/DL (ref 0.1–1)
BUN SERPL-MCNC: 25 MG/DL (ref 8–23)
CALCIUM SERPL-MCNC: 9.7 MG/DL (ref 8.7–10.5)
CHLORIDE SERPL-SCNC: 102 MMOL/L (ref 95–110)
CO2 SERPL-SCNC: 24 MMOL/L (ref 23–29)
CREAT SERPL-MCNC: 0.7 MG/DL (ref 0.5–1.4)
CTP QC/QA: YES
DIFFERENTIAL METHOD: ABNORMAL
EOSINOPHIL # BLD AUTO: 0.2 K/UL (ref 0–0.5)
EOSINOPHIL NFR BLD: 2.9 % (ref 0–8)
ERYTHROCYTE [DISTWIDTH] IN BLOOD BY AUTOMATED COUNT: 13.5 % (ref 11.5–14.5)
EST. GFR  (AFRICAN AMERICAN): >60 ML/MIN/1.73 M^2
EST. GFR  (NON AFRICAN AMERICAN): >60 ML/MIN/1.73 M^2
GLUCOSE SERPL-MCNC: 73 MG/DL (ref 70–110)
HCT VFR BLD AUTO: 40.6 % (ref 37–48.5)
HGB BLD-MCNC: 12.8 G/DL (ref 12–16)
IMM GRANULOCYTES # BLD AUTO: 0.02 K/UL (ref 0–0.04)
IMM GRANULOCYTES NFR BLD AUTO: 0.3 % (ref 0–0.5)
INR PPP: 1 (ref 0.8–1.2)
LYMPHOCYTES # BLD AUTO: 1.7 K/UL (ref 1–4.8)
LYMPHOCYTES NFR BLD: 27.6 % (ref 18–48)
MCH RBC QN AUTO: 29.6 PG (ref 27–31)
MCHC RBC AUTO-ENTMCNC: 31.5 G/DL (ref 32–36)
MCV RBC AUTO: 94 FL (ref 82–98)
MONOCYTES # BLD AUTO: 0.4 K/UL (ref 0.3–1)
MONOCYTES NFR BLD: 6.3 % (ref 4–15)
NEUTROPHILS # BLD AUTO: 3.9 K/UL (ref 1.8–7.7)
NEUTROPHILS NFR BLD: 62.6 % (ref 38–73)
NRBC BLD-RTO: 0 /100 WBC
PLATELET # BLD AUTO: 267 K/UL (ref 150–450)
PMV BLD AUTO: 11.9 FL (ref 9.2–12.9)
POTASSIUM SERPL-SCNC: 4.4 MMOL/L (ref 3.5–5.1)
PROT SERPL-MCNC: 6.7 G/DL (ref 6–8.4)
PROTHROMBIN TIME: 10.2 SEC (ref 9–12.5)
RBC # BLD AUTO: 4.32 M/UL (ref 4–5.4)
SARS-COV-2 RDRP RESP QL NAA+PROBE: NEGATIVE
SODIUM SERPL-SCNC: 135 MMOL/L (ref 136–145)
WBC # BLD AUTO: 6.3 K/UL (ref 3.9–12.7)

## 2022-03-18 PROCEDURE — 36415 COLL VENOUS BLD VENIPUNCTURE: CPT | Mod: PN | Performed by: INTERNAL MEDICINE

## 2022-03-18 PROCEDURE — 85610 PROTHROMBIN TIME: CPT | Performed by: INTERNAL MEDICINE

## 2022-03-18 PROCEDURE — 1159F MED LIST DOCD IN RCRD: CPT | Mod: CPTII,S$GLB,, | Performed by: INTERNAL MEDICINE

## 2022-03-18 PROCEDURE — 3079F PR MOST RECENT DIASTOLIC BLOOD PRESSURE 80-89 MM HG: ICD-10-PCS | Mod: CPTII,S$GLB,, | Performed by: INTERNAL MEDICINE

## 2022-03-18 PROCEDURE — 99214 OFFICE O/P EST MOD 30 MIN: CPT | Mod: S$GLB,,, | Performed by: INTERNAL MEDICINE

## 2022-03-18 PROCEDURE — 1101F PR PT FALLS ASSESS DOC 0-1 FALLS W/OUT INJ PAST YR: ICD-10-PCS | Mod: CPTII,S$GLB,, | Performed by: INTERNAL MEDICINE

## 2022-03-18 PROCEDURE — 3075F PR MOST RECENT SYSTOLIC BLOOD PRESS GE 130-139MM HG: ICD-10-PCS | Mod: CPTII,S$GLB,, | Performed by: INTERNAL MEDICINE

## 2022-03-18 PROCEDURE — 1160F RVW MEDS BY RX/DR IN RCRD: CPT | Mod: CPTII,S$GLB,, | Performed by: INTERNAL MEDICINE

## 2022-03-18 PROCEDURE — 3075F SYST BP GE 130 - 139MM HG: CPT | Mod: CPTII,S$GLB,, | Performed by: INTERNAL MEDICINE

## 2022-03-18 PROCEDURE — U0002: ICD-10-PCS | Mod: QW,S$GLB,, | Performed by: INTERNAL MEDICINE

## 2022-03-18 PROCEDURE — 3008F PR BODY MASS INDEX (BMI) DOCUMENTED: ICD-10-PCS | Mod: CPTII,S$GLB,, | Performed by: INTERNAL MEDICINE

## 2022-03-18 PROCEDURE — 1160F PR REVIEW ALL MEDS BY PRESCRIBER/CLIN PHARMACIST DOCUMENTED: ICD-10-PCS | Mod: CPTII,S$GLB,, | Performed by: INTERNAL MEDICINE

## 2022-03-18 PROCEDURE — U0002 COVID-19 LAB TEST NON-CDC: HCPCS | Mod: QW,S$GLB,, | Performed by: INTERNAL MEDICINE

## 2022-03-18 PROCEDURE — 3079F DIAST BP 80-89 MM HG: CPT | Mod: CPTII,S$GLB,, | Performed by: INTERNAL MEDICINE

## 2022-03-18 PROCEDURE — 99999 PR PBB SHADOW E&M-EST. PATIENT-LVL IV: ICD-10-PCS | Mod: PBBFAC,,, | Performed by: INTERNAL MEDICINE

## 2022-03-18 PROCEDURE — 1125F PR PAIN SEVERITY QUANTIFIED, PAIN PRESENT: ICD-10-PCS | Mod: CPTII,S$GLB,, | Performed by: INTERNAL MEDICINE

## 2022-03-18 PROCEDURE — 85025 COMPLETE CBC W/AUTO DIFF WBC: CPT | Performed by: INTERNAL MEDICINE

## 2022-03-18 PROCEDURE — 3008F BODY MASS INDEX DOCD: CPT | Mod: CPTII,S$GLB,, | Performed by: INTERNAL MEDICINE

## 2022-03-18 PROCEDURE — 1125F AMNT PAIN NOTED PAIN PRSNT: CPT | Mod: CPTII,S$GLB,, | Performed by: INTERNAL MEDICINE

## 2022-03-18 PROCEDURE — 99214 PR OFFICE/OUTPT VISIT, EST, LEVL IV, 30-39 MIN: ICD-10-PCS | Mod: S$GLB,,, | Performed by: INTERNAL MEDICINE

## 2022-03-18 PROCEDURE — 1101F PT FALLS ASSESS-DOCD LE1/YR: CPT | Mod: CPTII,S$GLB,, | Performed by: INTERNAL MEDICINE

## 2022-03-18 PROCEDURE — 3288F PR FALLS RISK ASSESSMENT DOCUMENTED: ICD-10-PCS | Mod: CPTII,S$GLB,, | Performed by: INTERNAL MEDICINE

## 2022-03-18 PROCEDURE — 80053 COMPREHEN METABOLIC PANEL: CPT | Performed by: INTERNAL MEDICINE

## 2022-03-18 PROCEDURE — 99999 PR PBB SHADOW E&M-EST. PATIENT-LVL IV: CPT | Mod: PBBFAC,,, | Performed by: INTERNAL MEDICINE

## 2022-03-18 PROCEDURE — 3288F FALL RISK ASSESSMENT DOCD: CPT | Mod: CPTII,S$GLB,, | Performed by: INTERNAL MEDICINE

## 2022-03-18 PROCEDURE — 1159F PR MEDICATION LIST DOCUMENTED IN MEDICAL RECORD: ICD-10-PCS | Mod: CPTII,S$GLB,, | Performed by: INTERNAL MEDICINE

## 2022-03-18 RX ORDER — DOXYCYCLINE HYCLATE 100 MG
100 TABLET ORAL 2 TIMES DAILY
Qty: 14 TABLET | Refills: 0 | Status: SHIPPED | OUTPATIENT
Start: 2022-03-18 | End: 2022-03-25

## 2022-03-18 RX ORDER — METHYLPREDNISOLONE 4 MG/1
TABLET ORAL
Qty: 21 TABLET | Refills: 0 | Status: SHIPPED | OUTPATIENT
Start: 2022-03-18 | End: 2022-04-08

## 2022-03-18 NOTE — PROGRESS NOTES
Subjective:       Patient ID: Adelaida Flores is a 66 y.o. female.    Chief Complaint: Follow-up    HPI   Reports always feels like she's a little congested in the chest. This week, started having more congestion in the nose, ears, sinuses. Slight sore throat and swollen glands. Feels more tired than usual. Hard to keep eyes open. For the most part sleeping ok. A week ago had some chills but none after that.   No loss of taste or smell but at baseline taste is not great even before her COVID in 1/2021. No abdominal pain/nausea/vomiting. No body or joint aches. Has diarrhea at baseline and so not more than usually for her. Not coughing. Some chest heaviness. No wheezing/SOB.  Always takes xyzal at night but this morning, took tylenol sinus. Didn't really help.   Did not do home COVID testing. Had 1 of 3 COVID vaccines today.   Visiting mom in [place] next week - in assisted living. Doesn't want to bring her any illnesses.    At our last visit 2/17/22, restarted on lamictal 50mg daily. The depression is definitely better. Still have the anxiety.     Will be seeing new psychiatrist - FEMI Munoz.   Working w/ our LMSW, Seth Jauregui.   PHQ-9 of 19 at last visit. Today is 14  GAD7 was  14 t last visit and 11 this visit.  Depression Patient Health Questionnaire 2/17/2022 2/15/2022 10/28/2021 10/13/2021 12/29/2017   Over the last two weeks how often have you been bothered by little interest or pleasure in doing things 3 0 0 2 0   Over the last two weeks how often have you been bothered by feeling down, depressed or hopeless 3 0 0 2 0   PHQ-2 Total Score 6 0 0 4 0   Over the last two weeks how often have you been bothered by trouble falling or staying asleep, or sleeping too much 1 - - 1 -   Over the last two weeks how often have you been bothered by feeling tired or having little energy 3 - - 0 -   Over the last two weeks how often have you been bothered by a poor appetite or overeating 3 - - 3 -   Over the last two  weeks how often have you been bothered by feeling bad about yourself - or that you are a failure or have let yourself or your family down 3 - - 2 -   Over the last two weeks how often have you been bothered by trouble concentrating on things, such as reading the newspaper or watching television 3 - - 1 -   Over the last two weeks how often have you been bothered by moving or speaking so slowly that other people could have noticed. Or the opposite - being so fidgety or restless that you have been moving around a lot more than usual. 0 - - 0 -   Over the last two weeks how often have you been bothered by thoughts that you would be better off dead, or of hurting yourself 0 - - 0 -   If you checked off any problems, how difficult have these problems made it for you to do your work, take care of things at home or get along with other people? Very difficult - - Somewhat difficult -   Total Score 19 - - 11 -   Interpretation Moderately Severe - - Moderate -     GAD7 2/17/2022 2/17/2022   1. Feeling nervous, anxious, or on edge? 3 3   2. Not being able to stop or control worrying? 3 3   3. Worrying too much about different things? 3 3   4. Trouble relaxing? 3 3   5. Being so restless that it is hard to sit still? 2 0   6. Becoming easily annoyed or irritable? 0 1   7. Feeling afraid as if something awful might happen? 0 1   8. If you checked off any problems, how difficult have these problems made it for you to do your work, take care of things at home, or get along with other people? - 2   MISTY-7 Score 14 14     Lost significant weight since [place] about 9-10lbs.   Used to weigh herself every day - recommended to weigh herself weekly per therapist.   Reports eats one good meal a week but otherwise drinks supplements - Premier Ensure or protein powder in soy milk/yogurt/no sugar ice cream/tomato juice. Eats/drinks about 1500 kcal a day.   H/o volvulus x 2 in her lifetime so afraid to be eating solids.   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 Motivated to not lose any more weight.     Review of Systems  Comprehensive review of systems otherwise negative. See history/subjective section for more details.    Objective:      Physical Exam    /82 (BP Location: Left arm, Patient Position: Sitting, BP Method: Medium (Manual))   Temp 98.1 °F (36.7 °C) (Oral)   Ht 5' 6" (1.676 m)   Wt 52.1 kg (114 lb 13.8 oz)   SpO2 100%   BMI 18.54 kg/m²     GEN - A+OX4, NAD, thin  HEENT - PERRL, EOMI, OP clear. MMM.   Neck - No thyromegaly or cervical LAD. No thyroid masses felt.  CV - RRR, no m/r   Chest - CTAB, no wheezing or rhonchi  Abd - S/NT/ND/+BS.   Ext - 2+BDP and radial pulses. No C/C/E.  MSK - no spinal tenderness to palpation. Normal gait.   Skin - No rash.    Previous labs reviewed.     Assessment/Plan     Adelaida was seen today for follow-up.    Diagnoses and all orders for this visit:    Sinus congestion  -     POCT COVID-19 Rapid Screening; Future  -     doxycycline (VIBRA-TABS) 100 MG tablet; Take 1 tablet (100 mg total) by mouth 2 (two) times daily. for 7 days  -     methylPREDNISolone (MEDROL DOSEPACK) 4 mg tablet; use as directed    Transaminitis - if ALT is stable, consider going up on lamictal from 50 to 75mg.  -     Comprehensive Metabolic Panel; Future  -     Protime-INR; Future  -     CBC Auto Differential; Future    Depression, unspecified depression type/Anxiety - doing better. As above. Has appt w/ new psychiatrist soon. Cont seeing SW.     Inflammatory liver disease, unspecified - has f/u w/ hepatology.   -     Protime-INR; Future    Adult BMI <19 kg/sq m - will work on 2 solid meals a week. Will send me message next Friday w/ her weight. Motivated to not lose any more weight.     Follow up in about 4 weeks (around 4/15/2022).      Catie Portillo MD  Department of Internal Medicine - [facility]  8:54 AM    "

## 2022-03-21 ENCOUNTER — PATIENT MESSAGE (OUTPATIENT)
Dept: PRIMARY CARE CLINIC | Facility: CLINIC | Age: 67
End: 2022-03-21
Payer: MEDICARE

## 2022-03-21 ENCOUNTER — OFFICE VISIT (OUTPATIENT)
Dept: PSYCHIATRY | Facility: CLINIC | Age: 67
End: 2022-03-21
Payer: MEDICARE

## 2022-03-21 ENCOUNTER — TELEPHONE (OUTPATIENT)
Dept: PRIMARY CARE CLINIC | Facility: CLINIC | Age: 67
End: 2022-03-21
Payer: MEDICARE

## 2022-03-21 VITALS
DIASTOLIC BLOOD PRESSURE: 61 MMHG | HEART RATE: 64 BPM | SYSTOLIC BLOOD PRESSURE: 104 MMHG | WEIGHT: 116.5 LBS | BODY MASS INDEX: 18.81 KG/M2

## 2022-03-21 DIAGNOSIS — F33.1 MDD (MAJOR DEPRESSIVE DISORDER), RECURRENT EPISODE, MODERATE: Primary | ICD-10-CM

## 2022-03-21 DIAGNOSIS — F10.21 ALCOHOL DEPENDENCE IN SUSTAINED FULL REMISSION: ICD-10-CM

## 2022-03-21 DIAGNOSIS — K76.0 FATTY LIVER: Primary | ICD-10-CM

## 2022-03-21 DIAGNOSIS — F41.1 GAD (GENERALIZED ANXIETY DISORDER): ICD-10-CM

## 2022-03-21 PROCEDURE — 99999 PR PBB SHADOW E&M-EST. PATIENT-LVL III: ICD-10-PCS | Mod: PBBFAC,,, | Performed by: PHYSICIAN ASSISTANT

## 2022-03-21 PROCEDURE — 3074F PR MOST RECENT SYSTOLIC BLOOD PRESSURE < 130 MM HG: ICD-10-PCS | Mod: CPTII,S$GLB,, | Performed by: PHYSICIAN ASSISTANT

## 2022-03-21 PROCEDURE — 99205 OFFICE O/P NEW HI 60 MIN: CPT | Mod: S$GLB,,, | Performed by: PHYSICIAN ASSISTANT

## 2022-03-21 PROCEDURE — 90833 PR PSYCHOTHERAPY W/PATIENT W/E&M, 30 MIN (ADD ON): ICD-10-PCS | Mod: S$GLB,,, | Performed by: PHYSICIAN ASSISTANT

## 2022-03-21 PROCEDURE — 3078F PR MOST RECENT DIASTOLIC BLOOD PRESSURE < 80 MM HG: ICD-10-PCS | Mod: CPTII,S$GLB,, | Performed by: PHYSICIAN ASSISTANT

## 2022-03-21 PROCEDURE — 1160F PR REVIEW ALL MEDS BY PRESCRIBER/CLIN PHARMACIST DOCUMENTED: ICD-10-PCS | Mod: CPTII,S$GLB,, | Performed by: PHYSICIAN ASSISTANT

## 2022-03-21 PROCEDURE — 3078F DIAST BP <80 MM HG: CPT | Mod: CPTII,S$GLB,, | Performed by: PHYSICIAN ASSISTANT

## 2022-03-21 PROCEDURE — 99205 PR OFFICE/OUTPT VISIT, NEW, LEVL V, 60-74 MIN: ICD-10-PCS | Mod: S$GLB,,, | Performed by: PHYSICIAN ASSISTANT

## 2022-03-21 PROCEDURE — 3008F PR BODY MASS INDEX (BMI) DOCUMENTED: ICD-10-PCS | Mod: CPTII,S$GLB,, | Performed by: PHYSICIAN ASSISTANT

## 2022-03-21 PROCEDURE — 99999 PR PBB SHADOW E&M-EST. PATIENT-LVL III: CPT | Mod: PBBFAC,,, | Performed by: PHYSICIAN ASSISTANT

## 2022-03-21 PROCEDURE — 1159F MED LIST DOCD IN RCRD: CPT | Mod: CPTII,S$GLB,, | Performed by: PHYSICIAN ASSISTANT

## 2022-03-21 PROCEDURE — 1160F RVW MEDS BY RX/DR IN RCRD: CPT | Mod: CPTII,S$GLB,, | Performed by: PHYSICIAN ASSISTANT

## 2022-03-21 PROCEDURE — 90833 PSYTX W PT W E/M 30 MIN: CPT | Mod: S$GLB,,, | Performed by: PHYSICIAN ASSISTANT

## 2022-03-21 PROCEDURE — 1159F PR MEDICATION LIST DOCUMENTED IN MEDICAL RECORD: ICD-10-PCS | Mod: CPTII,S$GLB,, | Performed by: PHYSICIAN ASSISTANT

## 2022-03-21 PROCEDURE — 3074F SYST BP LT 130 MM HG: CPT | Mod: CPTII,S$GLB,, | Performed by: PHYSICIAN ASSISTANT

## 2022-03-21 PROCEDURE — 3008F BODY MASS INDEX DOCD: CPT | Mod: CPTII,S$GLB,, | Performed by: PHYSICIAN ASSISTANT

## 2022-03-21 RX ORDER — FLUOXETINE HYDROCHLORIDE 40 MG/1
80 CAPSULE ORAL DAILY
Qty: 180 CAPSULE | Refills: 2 | Status: SHIPPED | OUTPATIENT
Start: 2022-03-21 | End: 2022-11-07 | Stop reason: SDUPTHER

## 2022-03-21 RX ORDER — LAMOTRIGINE 25 MG/1
75 TABLET ORAL DAILY
Qty: 270 TABLET | Refills: 2 | Status: SHIPPED | OUTPATIENT
Start: 2022-03-21 | End: 2022-04-24

## 2022-03-21 NOTE — TELEPHONE ENCOUNTER
Spoke with Ms. Son. She agreed to the increase in Lamictal. She stated she just came from Psych NP and they were in agreement as well.

## 2022-03-21 NOTE — TELEPHONE ENCOUNTER
Please call and notify pt:    Liver enzyme went up very mildly from 56 to 62 since restarting lamictal. Would recommend to go ahead and go up to 3 pills of lamictal 25mg (75mg total) daily. Recheck liver enzymes next week.

## 2022-03-21 NOTE — PROGRESS NOTES
"Outpatient Psychiatry Initial Visit (MD/AMBER)    3/21/2022    Adelaida Flores, a 66 y.o. female, presenting for initial evaluation visit. Met with patient.    Reason for Encounter: Referral from PCP. Patient complains of   Chief Complaint   Patient presents with    Depression         History of Present Illness:    SUBJECTIVE:   Psych Interview 03/21/2022:   Adelaida Flores is a 66 y.o. female with past psychiatric history of depression, anxiety, hx eating disorder with binging and purging presented to for initial evaluation and treatment for reestablish with psychiatry for management of anxiety and depression.    Hx multiple prior psychiatric hospitalizations for eating disorder and suicidal ideation. Hx 5 suicide attempts by intentionally ingested poisoning chemicals or OD on rx pills, last attempt 35 years ago. Pt denies hx self harm. Pt deniex hx hallucinations.    Hx severe eating disorder, binging and purging, sometimes episodes of starving self or extending periods of times.  Patient reports continues to have issues with food and have negative or anxiety inducing thoughts about food such as I do not deserve DE, I want be able to stop eating once I start, I will have panic attacks if I eat more"    Patient reports does not eat normal standard meals, states that she snacks on Ensure and other protein shakes throughout the day.    Patient is a registered dietician and continues to work.  Also has a master's degree in health administration.    Pt denies hx trauma. Denies physical/sexual abuse. Pt denies symptoms including nightmares, hypervigilance, flashbacks, avoidance behaviors, and disassociation.    About 16 yrs was first treated for anxiety and depression.  States did not respond well to medications used during her teenage years until Prozac was released decades later.    Pt reports currently taking lamictal 50mg daily, prozac 80mg daily.  Patient was previously on Lamictal 100 mg daily, there was concerned " "that elevation in liver enzymes related to Lamictal and it was discontinued last December.  Patient reports after discontinuing the Lamictal she became severely depressed, discussed with her PCP who has been monitoring liver enzymes and PCP has had her restarted on Lamictal 50 mg daily which she states that she started a about 1 month ago.  Reports although she does continue to experience depressive symptoms that her depression is significantly improved    Lamictal restarted 1 month ago and reports improvement in depression. Reports depression today as 5/10, down from 9/10 1-2 months ago and anxiety as 8/10, down from 10/10 1-2 months ago.    Denies suicidal ideation in the last month. Denies HI/AVH. Pt reports sleeping well 7 hrs per night and decreased appetite-states she eats ensures or other protein shakes, yogurt, soup during the day. Denies side effects of medications.    The patient complained of depressed mood with lethargy, decreased appetite , insomnia with broken sleep, anhedonia, apathy, worsening self-esteem, decreased concentration, decreased ability to make decisions.    Pt denies hx symptoms/episodes of brittni.    Pt thc recreational drug use, "very rare thing for me". Pt reports 0 drinks per week-previously severe alcoholic, sober for last 20 years. Denies tobacco use-states stopped smoking about 28 years ago.    Access to gun: has access but "I dont know how to use it." encouraged pt that  needs to lock gun away so she does not access. She agrees to plan to inform  to move gun to safe/somewhere she does not have access.      Review Of Systems:     Review of Systems   Constitutional: Negative for fever.   HENT: Negative for sore throat.    Eyes: Negative for photophobia.   Respiratory: Negative for cough.    Cardiovascular: Negative for chest pain and palpitations.   Gastrointestinal: Negative for abdominal pain.   Genitourinary: Negative for dysuria.   Musculoskeletal: Negative for " "myalgias.   Skin: Negative for rash.   Neurological: Negative for dizziness.   Endo/Heme/Allergies: Does not bruise/bleed easily.   Psychiatric/Behavioral: Positive for depression and substance abuse (alcoholism in remission). Negative for hallucinations and suicidal ideas. The patient is nervous/anxious and has insomnia (frequently has broken sleep).        Psychiatric Review Of Systems - Is patient experiencing or having changes in:  sleep: no  appetite: yes  weight: yes  energy/anergy: yes  interest/pleasure/anhedonia: yes  somatic symptoms: no  libido: no  anxiety/panic: yes  guilty/hopelessness: yes  concentration: no  S.I.B.s/risky behavior: no  Irritability: no  Racing thoughts: no  Impulsive behaviors: no  Paranoia: no  AVH: no    OBJECTIVE     Past Psychiatric History:   Previous Psychiatric Hospitalizations: YES:  Multiple hospitalizations     Previous Medication Trials: YES:  Prozac, Lamictal, Cymbalta, trazodone     History of psychotherapy:  YES     Previous Suicide Attempts: YES: x 5 last 35 years ago     History of Violence:  NO  History of physical/sexual abuse: NO  Outpatient psychiatric provider(past): YES       Substance Abuse History:   Tobacco: YES: former smoker     Alcohol: YES: former alcoholic     Illicit Substances: thc use-rare  Detox/Rehab: YES       Neurological History:   Seizures: NO  Head trauma: NO    Family Psychiatric History: maternal grandfather-dementia  Social History:  Developmental/Childhood:Achieved all developmental milestones timely  *Education:Professional Master's/PhD  Employment Status/Finances:Employed   Relationship Status/Sexual Orientation: : Relationship intact  Children: 0  Housing Status: Home    history:  NO  Access to gun: has access but "I dont know how to use it." encouraged pt that  needs to lock gun away so she does not access.    Legal History:   Past Charges/Incarcerations:  No      Past Medical/Surgical History:   Past Medical " History:   Diagnosis Date    Alcohol dependence in sustained full remission 3/21/2022    Allergy     Anxiety 2/22/2019    Colon polyps     Depression     Hyperlipidemia 2/22/2019    IBS (irritable bowel syndrome)     Osteoporosis     T12 compression fracture s/p corpectomy 7/13/2013    Volvulus     s/p colectomy     Past Surgical History:   Procedure Laterality Date    COLON SURGERY      due to volvulus    COLONOSCOPY N/A 8/28/2017    Procedure: COLONOSCOPY miralax;  Surgeon: Trey Haas Jr., MD;  Location: [facility];  Service: Endoscopy;  Laterality: N/A;    EYE SURGERY  2012    Cataract/torc    FRACTURE SURGERY      right broken wrist and had surgery    HERNIA REPAIR  2012    SMALL INTESTINE SURGERY  2011    volvulus x 2    SPINE SURGERY      due to fall while on a boat.    TONSILLECTOMY           Current Medications:   Medication List with Changes/Refills   Current Medications    AZASITE 1 % DROP    Place 1 drop into both eyes 2 (two) times daily.    CHOLESTYRAMINE (QUESTRAN) 4 GRAM PACKET    Take 1 packet (4 g total) by mouth every evening.    DENOSUMAB (PROLIA) 60 MG/ML SYRG        DOXYCYCLINE (VIBRA-TABS) 100 MG TABLET    Take 1 tablet (100 mg total) by mouth 2 (two) times daily. for 7 days    FLUOROMETHOLONE 0.1% (FML) 0.1 % DRPS    Place into both eyes.    FLUOXETINE 40 MG CAPSULE    Take 80 mg by mouth once daily.    HYDROXYZINE HCL (ATARAX) 10 MG TAB        KRILL OIL ORAL        LAMOTRIGINE (LAMICTAL) 25 MG TABLET    Take 2 tablets (50 mg total) by mouth once daily.    LEVOCETIRIZINE (XYZAL) 5 MG TABLET        METHYLPREDNISOLONE (MEDROL DOSEPACK) 4 MG TABLET    use as directed    MULTIVITAMIN CAPSULE    Take 1 capsule by mouth once daily.    ZINC 50 MG TAB           Allergies:   Review of patient's allergies indicates:   Allergen Reactions    Cefdinir Diarrhea    Grass pollen-red top, standard     House dust Itching         Vitals   Vitals:    03/21/22 1005   BP: 104/61   Pulse:  64        Labs/Imaging/Studies:   No results found for this or any previous visit (from the past 48 hour(s)).   No results found for: PHENYTOIN, PHENOBARB, VALPROATE, CBMZ      Nutritional Screening: Considering the patient's height and weight, medications, medical history and preferences, should a referral be made to the dietitian? no    Constitutional  Vitals:  Most recent vital signs, dated less than 90 days prior to this appointment, were reviewed.    Vitals:    03/21/22 1005   BP: 104/61   Pulse: 64   Weight: 52.9 kg (116 lb 8.2 oz)        General:  unremarkable, age appropriate     Musculoskeletal  Muscle Strength/Tone:  not examined   Gait & Station:  non-ataxic       Psychiatric Mental Status Exam:  Arousal: alert  Sensorium/Orientation: oriented to grossly intact  Behavior/Cooperation: normal, friendly and cooperative, eye contact normal   Speech: normal tone, normal rate, normal pitch, normal volume  Language: grossly intact  Mood: anxious, depressed  Affect: congruent and appropriate  Thought Process: normal and logical  Thought Content:   Auditory hallucinations: NO  Visual hallucinations: NO  Paranoia: NO  Delusions:  NO  Suicidal ideation: NO  Homicidal ideation: NO  Attention/Concentration:  intact  Memory:    Recent: [facility] Recent Memory: WNL   Remote: [facility] Remote Memory: WNL , past events, as relates history  Fund of Knowledge: Aware of current events   Intelligence: [facility] Intelligence: Above Average, based on history, based on vocabulary, syntax, grammar and content  Insight: {[facility] insight: Good, understanding severity of illness/history of present illness  Judgment: [facility] Judgement: Fair, per patient's behavior/history of present illness      Relevant Elements of Neurological Exam: normal gait        Laboratory Data  Lab Visit on 03/18/2022   Component Date Value Ref Range Status    Sodium 03/18/2022 135 (A) 136 - 145 mmol/L Final    Potassium 03/18/2022 4.4  3.5 - 5.1 mmol/L Final    Chloride  03/18/2022 102  95 - 110 mmol/L Final    CO2 03/18/2022 24  23 - 29 mmol/L Final    Glucose 03/18/2022 73  70 - 110 mg/dL Final    BUN 03/18/2022 25 (A) 8 - 23 mg/dL Final    Creatinine 03/18/2022 0.7  0.5 - 1.4 mg/dL Final    Calcium 03/18/2022 9.7  8.7 - 10.5 mg/dL Final    Total Protein 03/18/2022 6.7  6.0 - 8.4 g/dL Final    Albumin 03/18/2022 3.9  3.5 - 5.2 g/dL Final    Total Bilirubin 03/18/2022 0.3  0.1 - 1.0 mg/dL Final    Alkaline Phosphatase 03/18/2022 86  55 - 135 U/L Final    AST 03/18/2022 40  10 - 40 U/L Final    ALT 03/18/2022 62 (A) 10 - 44 U/L Final    Anion Gap 03/18/2022 9  8 - 16 mmol/L Final    eGFR if African American 03/18/2022 >60.0  >60 mL/min/1.73 m^2 Final    eGFR if non African American 03/18/2022 >60.0  >60 mL/min/1.73 m^2 Final    Prothrombin Time 03/18/2022 10.2  9.0 - 12.5 sec Final    INR 03/18/2022 1.0  0.8 - 1.2 Final    WBC 03/18/2022 6.30  3.90 - 12.70 K/uL Final    RBC 03/18/2022 4.32  4.00 - 5.40 M/uL Final    Hemoglobin 03/18/2022 12.8  12.0 - 16.0 g/dL Final    Hematocrit 03/18/2022 40.6  37.0 - 48.5 % Final    MCV 03/18/2022 94  82 - 98 fL Final    MCH 03/18/2022 29.6  27.0 - 31.0 pg Final    MCHC 03/18/2022 31.5 (A) 32.0 - 36.0 g/dL Final    RDW 03/18/2022 13.5  11.5 - 14.5 % Final    Platelets 03/18/2022 267  150 - 450 K/uL Final    MPV 03/18/2022 11.9  9.2 - 12.9 fL Final    Immature Granulocytes 03/18/2022 0.3  0.0 - 0.5 % Final    Gran # (ANC) 03/18/2022 3.9  1.8 - 7.7 K/uL Final    Immature Grans (Abs) 03/18/2022 0.02  0.00 - 0.04 K/uL Final    Lymph # 03/18/2022 1.7  1.0 - 4.8 K/uL Final    Mono # 03/18/2022 0.4  0.3 - 1.0 K/uL Final    Eos # 03/18/2022 0.2  0.0 - 0.5 K/uL Final    Baso # 03/18/2022 0.02  0.00 - 0.20 K/uL Final    nRBC 03/18/2022 0  0 /100 WBC Final    Gran % 03/18/2022 62.6  38.0 - 73.0 % Final    Lymph % 03/18/2022 27.6  18.0 - 48.0 % Final    Mono % 03/18/2022 6.3  4.0 - 15.0 % Final    Eosinophil % 03/18/2022  2.9  0.0 - 8.0 % Final    Basophil % 03/18/2022 0.3  0.0 - 1.9 % Final    Differential Method 03/18/2022 Automated   Final   Office Visit on 03/18/2022   Component Date Value Ref Range Status    POC Rapid COVID 03/18/2022 Negative  Negative Final     Acceptable 03/18/2022 Yes   Final         Medications  Outpatient Encounter Medications as of 3/21/2022   Medication Sig Dispense Refill    AZASITE 1 % Drop Place 1 drop into both eyes 2 (two) times daily.      cholestyramine (QUESTRAN) 4 gram packet Take 1 packet (4 g total) by mouth every evening. 90 packet 0    denosumab (PROLIA) 60 mg/mL Syrg       doxycycline (VIBRA-TABS) 100 MG tablet Take 1 tablet (100 mg total) by mouth 2 (two) times daily. for 7 days 14 tablet 0    fluorometholone 0.1% (FML) 0.1 % DrpS Place into both eyes.      FLUoxetine 40 MG capsule Take 80 mg by mouth once daily.      hydrOXYzine HCL (ATARAX) 10 MG Tab       KRILL OIL ORAL       lamoTRIgine (LAMICTAL) 25 MG tablet Take 2 tablets (50 mg total) by mouth once daily. 60 tablet 2    levocetirizine (XYZAL) 5 MG tablet       methylPREDNISolone (MEDROL DOSEPACK) 4 mg tablet use as directed 21 tablet 0    multivitamin capsule Take 1 capsule by mouth once daily.      zinc 50 mg Tab        No facility-administered encounter medications on file as of 3/21/2022.           Assessment / Plan:     Diagnosis:      ICD-10-CM ICD-9-CM   1. MISTY (generalized anxiety disorder)  F41.1 300.02   2. Alcohol dependence in sustained full remission  F10.21 303.93       Strengths and Liabilities: Strength: Patient accepts guidance/feedback, Strength: Patient is expressive/articulate., Strength: Patient is intelligent., Strength: Patient is motivated for change., Strength: Patient has positive support network., Strength: Patient has reasonable judgment., Liability: Patient is unstable., Liability: Patient lacks coping skills.    Treatment Goals:  Specify outcomes written in observable,  behavioral terms:   Anxiety: reducing negative automatic thoughts, reducing physical symptoms of anxiety and reducing time spent worrying (<30 minutes/day)  Depression: increasing energy, increasing interest in usual activities, increasing motivation, reducing excessive guilt, reducing fatigue and reducing negative automatic thoughts  Improved relationship with food, eat more standardized males, decrease anxiety regarding eating and worries about losing control of eating.  discussed at length CBT techniques for stopping depressive and anxious thought loops.    Treatment Plan/Recommendations:   · Medication Management: The risks and benefits of medication were discussed with the patient.  · The treatment plan and follow up plan were reviewed with the patient.  -increase lamictal to 75mg daily  -continue prozac 80mg daily  -restart vistaril 25mg q8h prn anxiety, can also take 25-50mg qhs prn insomnia  -restart vitamin D 1k IU daily      Return to Clinic: 6 weeks    Counseling time: 25 min  Total face to face time: 55 min  Total time (chart review, patient contact, documentation): 65 min    Last Manrique PA-C      Psychotherapy:  · Target symptoms: alcohol abuse, depression, anxiety , eating disorder  · Why chosen therapy is appropriate versus another modality: relevant to diagnosis  · Outcome monitoring methods: self-report  · Therapeutic intervention type: insight oriented psychotherapy, behavior modifying psychotherapy, supportive psychotherapy  · Topics discussed/themes: building skills sets for symptom management, symptom recognition, substance abuse  · The patient's response to the intervention is accepting. The patient's progress toward treatment goals is fair.   · Duration of intervention: 25 minutes.    *This note has been prepared using a combination of a dictation device and typing.  It has been checked for errors but some errors may still exist within the note as a result of speech recognition errors and/or  typographical errors.

## 2022-03-24 ENCOUNTER — PATIENT MESSAGE (OUTPATIENT)
Dept: PSYCHIATRY | Facility: CLINIC | Age: 67
End: 2022-03-24
Payer: MEDICARE

## 2022-03-25 ENCOUNTER — PATIENT MESSAGE (OUTPATIENT)
Dept: PRIMARY CARE CLINIC | Facility: CLINIC | Age: 67
End: 2022-03-25
Payer: MEDICARE

## 2022-03-25 ENCOUNTER — PATIENT MESSAGE (OUTPATIENT)
Dept: HEPATOLOGY | Facility: CLINIC | Age: 67
End: 2022-03-25
Payer: MEDICARE

## 2022-03-25 RX ORDER — HYDROXYZINE PAMOATE 25 MG/1
25 CAPSULE ORAL NIGHTLY PRN
Qty: 90 CAPSULE | Refills: 2 | Status: SHIPPED | OUTPATIENT
Start: 2022-03-25

## 2022-03-28 ENCOUNTER — LAB VISIT (OUTPATIENT)
Dept: LAB | Facility: HOSPITAL | Age: 67
End: 2022-03-28
Attending: INTERNAL MEDICINE
Payer: MEDICARE

## 2022-03-28 DIAGNOSIS — K76.0 FATTY LIVER: ICD-10-CM

## 2022-03-28 LAB
ALBUMIN SERPL BCP-MCNC: 3.7 G/DL (ref 3.5–5.2)
ALP SERPL-CCNC: 91 U/L (ref 55–135)
ALT SERPL W/O P-5'-P-CCNC: 35 U/L (ref 10–44)
ANION GAP SERPL CALC-SCNC: 10 MMOL/L (ref 8–16)
AST SERPL-CCNC: 30 U/L (ref 10–40)
BILIRUB SERPL-MCNC: 0.3 MG/DL (ref 0.1–1)
BUN SERPL-MCNC: 26 MG/DL (ref 8–23)
CALCIUM SERPL-MCNC: 10.2 MG/DL (ref 8.7–10.5)
CHLORIDE SERPL-SCNC: 106 MMOL/L (ref 95–110)
CO2 SERPL-SCNC: 24 MMOL/L (ref 23–29)
CREAT SERPL-MCNC: 0.7 MG/DL (ref 0.5–1.4)
EST. GFR  (AFRICAN AMERICAN): >60 ML/MIN/1.73 M^2
EST. GFR  (NON AFRICAN AMERICAN): >60 ML/MIN/1.73 M^2
GLUCOSE SERPL-MCNC: 61 MG/DL (ref 70–110)
POTASSIUM SERPL-SCNC: 4.4 MMOL/L (ref 3.5–5.1)
PROT SERPL-MCNC: 6.4 G/DL (ref 6–8.4)
SODIUM SERPL-SCNC: 140 MMOL/L (ref 136–145)

## 2022-03-28 PROCEDURE — 36415 COLL VENOUS BLD VENIPUNCTURE: CPT | Performed by: INTERNAL MEDICINE

## 2022-03-28 PROCEDURE — 80053 COMPREHEN METABOLIC PANEL: CPT | Performed by: INTERNAL MEDICINE

## 2022-03-30 ENCOUNTER — PATIENT MESSAGE (OUTPATIENT)
Dept: PRIMARY CARE CLINIC | Facility: CLINIC | Age: 67
End: 2022-03-30
Payer: MEDICARE

## 2022-03-31 ENCOUNTER — SOCIAL WORK (OUTPATIENT)
Dept: PRIMARY CARE CLINIC | Facility: CLINIC | Age: 67
End: 2022-03-31
Payer: MEDICARE

## 2022-03-31 NOTE — PROGRESS NOTES
"Individual Psychotherapy (LCSW/PhD)  Adelaida Flores,  3/31/2022    Site:  Telemed         Therapeutic Intervention: Met with patient for individual psychotherapy.    Chief complaint/reason for encounter: anxiety and eating disorder     Interval history and content of current session: Pt completed "Thought Record" worksheet from previous session. SW and pt reviewed thought record and discussed CBT principles and cognitive model. SW and pt discussed somatic experiences related to anxiety and practiced deep breathing as a coping skill. Pt to continue "Thought Record" realtive eating behaviors for review and discussion.    Treatment plan:  · Target symptoms: distractability, anxiety   · Why chosen therapy is appropriate versus another modality: relevant to diagnosis, evidence based practice  · Outcome monitoring methods: self-report, checklist/rating scale  · Therapeutic intervention type: insight oriented psychotherapy, behavior modifying psychotherapy, supportive psychotherapy    Risk parameters:  Patient reports no suicidal ideation  Patient reports no homicidal ideation  Patient reports no self-injurious behavior  Patient reports no violent behavior    Verbal deficits: None    Patient's response to intervention:  The patient's response to intervention is motivated.    Progress toward goals and other mental status changes:  The patient's progress toward goals is good.    Diagnosis:   No diagnosis found.    Plan: Pt plans to continue individual psychotherapy    Return to clinic: 1 week    Length of Service (minutes): 30        "

## 2022-04-01 ENCOUNTER — PATIENT MESSAGE (OUTPATIENT)
Dept: PRIMARY CARE CLINIC | Facility: CLINIC | Age: 67
End: 2022-04-01
Payer: MEDICARE

## 2022-04-04 ENCOUNTER — PATIENT MESSAGE (OUTPATIENT)
Dept: PRIMARY CARE CLINIC | Facility: CLINIC | Age: 67
End: 2022-04-04
Payer: MEDICARE

## 2022-04-06 ENCOUNTER — PATIENT MESSAGE (OUTPATIENT)
Dept: PRIMARY CARE CLINIC | Facility: CLINIC | Age: 67
End: 2022-04-06
Payer: MEDICARE

## 2022-04-08 ENCOUNTER — SOCIAL WORK (OUTPATIENT)
Dept: PRIMARY CARE CLINIC | Facility: CLINIC | Age: 67
End: 2022-04-08
Payer: MEDICARE

## 2022-04-08 ENCOUNTER — PATIENT MESSAGE (OUTPATIENT)
Dept: PRIMARY CARE CLINIC | Facility: CLINIC | Age: 67
End: 2022-04-08
Payer: MEDICARE

## 2022-04-08 NOTE — PROGRESS NOTES
Individual Psychotherapy (LCSW/PhD)  Adelaida HIRSCH Mark,  4/8/2022    Site:  Telemed         Therapeutic Intervention: Met with patient for individual psychotherapy.    Chief complaint/reason for encounter: anxiety and depression    Interval history and content of current session: Pt reports ongoing anxiety surrounding eating and meals. Per pt, still eating 1 meal per week. Pt states the meals are a trigger for anxiety and result in caloric restriction for remainder of the week. Pt provided additional Hx related to food insecurity and family Hx of anxiety, food and body weight beliefs.    SW and pt reviewed CBT triangle and CBT concepts I.e. automatic thoughts and conceptualized meal time experiences. Pt and SW reviewed CBT thought journal and explored pt's core beliefs related to food insecurity and self-image. Pt to continue thought journal and record thoughts related to eating/meals for further exploration next session.      Treatment plan:  · Target symptoms: depression, anxiety   · Why chosen therapy is appropriate versus another modality: relevant to diagnosis, evidence based practice  · Outcome monitoring methods: self-report, checklist/rating scale  · Therapeutic intervention type: insight oriented psychotherapy, behavior modifying psychotherapy    Risk parameters:  Patient reports no suicidal ideation  Patient reports no homicidal ideation  Patient reports no self-injurious behavior  Patient reports no violent behavior    Verbal deficits: None    Patient's response to intervention:  The patient's response to intervention is accepting.    Progress toward goals and other mental status changes:  The patient's progress toward goals is limited.    Diagnosis:   No diagnosis found.    Plan: Pt plans to continue individual psychotherapy    Return to clinic: 1 week    Length of Service (minutes): 30

## 2022-04-13 ENCOUNTER — PATIENT MESSAGE (OUTPATIENT)
Dept: ADMINISTRATIVE | Facility: OTHER | Age: 67
End: 2022-04-13
Payer: MEDICARE

## 2022-04-13 ENCOUNTER — PATIENT MESSAGE (OUTPATIENT)
Dept: PRIMARY CARE CLINIC | Facility: CLINIC | Age: 67
End: 2022-04-13
Payer: MEDICARE

## 2022-04-14 ENCOUNTER — SOCIAL WORK (OUTPATIENT)
Dept: PRIMARY CARE CLINIC | Facility: CLINIC | Age: 67
End: 2022-04-14
Payer: MEDICARE

## 2022-04-14 NOTE — PROGRESS NOTES
"Individual Psychotherapy (LCSW/PhD)  Adelaida Flores,  4/14/2022    Site:  Telemed         Therapeutic Intervention: Met with patient for individual psychotherapy.    Chief complaint/reason for encounter: anxiety     Interval history and content of current session: Pt reported feeling very anxious". Pt feels anxiety has worsened due to having to provide additional care for mother-in-law. Per pt, beginning last week she has been responsible for bringing her mother-in-law to several appointments per week. Pt worries that this new responsibility will complicate her ability to care for family's new dog, complete work at home and self-care.    SW and pt discussed current support from . Pt stated that their  often minimizes their feelings of anxiety. Pt referred to recent incident where they "exploded" in response to their  making comments about pt level of anxiety.    SW and pt reviewed communication skills I.e. using "I" statements, empathic listening. SW and pt role played hypothetical conversation with . Pt practiced taking breaks, deep breathing and specific statements of need. Pt and SW discussed additional strategies such as pre-planning talk, using non-verbal communication I.e. a list, calendar, to communicate goals in working with  to express goals and concerns. SW and pt discussed somatic sensations and emotions related to increased anxiety and anger for increased self-monitoring.    Treatment plan:  Target symptoms: anxiety   Why chosen therapy is appropriate versus another modality: relevant to diagnosis, evidence based practice  Outcome monitoring methods: self-report, checklist/rating scale  Therapeutic intervention type: insight oriented psychotherapy, behavior modifying psychotherapy, supportive psychotherapy    Risk parameters:  Patient reports no suicidal ideation  Patient reports no homicidal ideation  Patient reports no self-injurious behavior  Patient reports no " violent behavior    Verbal deficits: None    Patient's response to intervention:  The patient's response to intervention is accepting.    Progress toward goals and other mental status changes:  The patient's progress toward goals is fair .    Diagnosis:   No diagnosis found.    Plan: Pt plans to continue individual psychotherapy    Return to clinic: 1 week    Length of Service (minutes): 30

## 2022-04-18 ENCOUNTER — PATIENT MESSAGE (OUTPATIENT)
Dept: PRIMARY CARE CLINIC | Facility: CLINIC | Age: 67
End: 2022-04-18
Payer: MEDICARE

## 2022-04-20 ENCOUNTER — PATIENT MESSAGE (OUTPATIENT)
Dept: PRIMARY CARE CLINIC | Facility: CLINIC | Age: 67
End: 2022-04-20
Payer: MEDICARE

## 2022-04-21 ENCOUNTER — SOCIAL WORK (OUTPATIENT)
Dept: PRIMARY CARE CLINIC | Facility: CLINIC | Age: 67
End: 2022-04-21
Payer: MEDICARE
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
## 2022-04-22 ENCOUNTER — OFFICE VISIT (OUTPATIENT)
Dept: PRIMARY CARE CLINIC | Facility: CLINIC | Age: 67
End: 2022-04-22
Payer: MEDICARE

## 2022-04-22 ENCOUNTER — LAB VISIT (OUTPATIENT)
Dept: LAB | Facility: HOSPITAL | Age: 67
End: 2022-04-22
Attending: INTERNAL MEDICINE
Payer: MEDICARE

## 2022-04-22 VITALS
OXYGEN SATURATION: 98 % | WEIGHT: 113.13 LBS | BODY MASS INDEX: 18.85 KG/M2 | SYSTOLIC BLOOD PRESSURE: 98 MMHG | HEART RATE: 69 BPM | HEIGHT: 65 IN | DIASTOLIC BLOOD PRESSURE: 64 MMHG | TEMPERATURE: 99 F

## 2022-04-22 DIAGNOSIS — F33.2 SEVERE EPISODE OF RECURRENT MAJOR DEPRESSIVE DISORDER, WITHOUT PSYCHOTIC FEATURES: Primary | ICD-10-CM

## 2022-04-22 DIAGNOSIS — H61.23 EXCESSIVE CERUMEN IN BOTH EAR CANALS: ICD-10-CM

## 2022-04-22 DIAGNOSIS — F41.1 GAD (GENERALIZED ANXIETY DISORDER): ICD-10-CM

## 2022-04-22 DIAGNOSIS — F33.2 SEVERE EPISODE OF RECURRENT MAJOR DEPRESSIVE DISORDER, WITHOUT PSYCHOTIC FEATURES: ICD-10-CM

## 2022-04-22 LAB
ALBUMIN SERPL BCP-MCNC: 4 G/DL (ref 3.5–5.2)
ALP SERPL-CCNC: 90 U/L (ref 55–135)
ALT SERPL W/O P-5'-P-CCNC: 50 U/L (ref 10–44)
AST SERPL-CCNC: 34 U/L (ref 10–40)
BILIRUB DIRECT SERPL-MCNC: 0.1 MG/DL (ref 0.1–0.3)
BILIRUB SERPL-MCNC: 0.3 MG/DL (ref 0.1–1)
PROT SERPL-MCNC: 6.9 G/DL (ref 6–8.4)

## 2022-04-22 PROCEDURE — 1160F RVW MEDS BY RX/DR IN RCRD: CPT | Mod: CPTII,S$GLB,, | Performed by: INTERNAL MEDICINE

## 2022-04-22 PROCEDURE — 99999 PR PBB SHADOW E&M-EST. PATIENT-LVL IV: CPT | Mod: PBBFAC,,, | Performed by: INTERNAL MEDICINE

## 2022-04-22 PROCEDURE — 3078F PR MOST RECENT DIASTOLIC BLOOD PRESSURE < 80 MM HG: ICD-10-PCS | Mod: CPTII,S$GLB,, | Performed by: INTERNAL MEDICINE

## 2022-04-22 PROCEDURE — 1160F PR REVIEW ALL MEDS BY PRESCRIBER/CLIN PHARMACIST DOCUMENTED: ICD-10-PCS | Mod: CPTII,S$GLB,, | Performed by: INTERNAL MEDICINE

## 2022-04-22 PROCEDURE — 3074F PR MOST RECENT SYSTOLIC BLOOD PRESSURE < 130 MM HG: ICD-10-PCS | Mod: CPTII,S$GLB,, | Performed by: INTERNAL MEDICINE

## 2022-04-22 PROCEDURE — 80076 HEPATIC FUNCTION PANEL: CPT | Performed by: INTERNAL MEDICINE

## 2022-04-22 PROCEDURE — 1159F PR MEDICATION LIST DOCUMENTED IN MEDICAL RECORD: ICD-10-PCS | Mod: CPTII,S$GLB,, | Performed by: INTERNAL MEDICINE

## 2022-04-22 PROCEDURE — 1126F AMNT PAIN NOTED NONE PRSNT: CPT | Mod: CPTII,S$GLB,, | Performed by: INTERNAL MEDICINE

## 2022-04-22 PROCEDURE — 99214 PR OFFICE/OUTPT VISIT, EST, LEVL IV, 30-39 MIN: ICD-10-PCS | Mod: S$GLB,,, | Performed by: INTERNAL MEDICINE

## 2022-04-22 PROCEDURE — 3288F PR FALLS RISK ASSESSMENT DOCUMENTED: ICD-10-PCS | Mod: CPTII,S$GLB,, | Performed by: INTERNAL MEDICINE

## 2022-04-22 PROCEDURE — 99214 OFFICE O/P EST MOD 30 MIN: CPT | Mod: S$GLB,,, | Performed by: INTERNAL MEDICINE

## 2022-04-22 PROCEDURE — 3078F DIAST BP <80 MM HG: CPT | Mod: CPTII,S$GLB,, | Performed by: INTERNAL MEDICINE

## 2022-04-22 PROCEDURE — 3288F FALL RISK ASSESSMENT DOCD: CPT | Mod: CPTII,S$GLB,, | Performed by: INTERNAL MEDICINE

## 2022-04-22 PROCEDURE — 99999 PR PBB SHADOW E&M-EST. PATIENT-LVL IV: ICD-10-PCS | Mod: PBBFAC,,, | Performed by: INTERNAL MEDICINE

## 2022-04-22 PROCEDURE — 3008F BODY MASS INDEX DOCD: CPT | Mod: CPTII,S$GLB,, | Performed by: INTERNAL MEDICINE

## 2022-04-22 PROCEDURE — 1159F MED LIST DOCD IN RCRD: CPT | Mod: CPTII,S$GLB,, | Performed by: INTERNAL MEDICINE

## 2022-04-22 PROCEDURE — 3074F SYST BP LT 130 MM HG: CPT | Mod: CPTII,S$GLB,, | Performed by: INTERNAL MEDICINE

## 2022-04-22 PROCEDURE — 1101F PR PT FALLS ASSESS DOC 0-1 FALLS W/OUT INJ PAST YR: ICD-10-PCS | Mod: CPTII,S$GLB,, | Performed by: INTERNAL MEDICINE

## 2022-04-22 PROCEDURE — 1126F PR PAIN SEVERITY QUANTIFIED, NO PAIN PRESENT: ICD-10-PCS | Mod: CPTII,S$GLB,, | Performed by: INTERNAL MEDICINE

## 2022-04-22 PROCEDURE — 1101F PT FALLS ASSESS-DOCD LE1/YR: CPT | Mod: CPTII,S$GLB,, | Performed by: INTERNAL MEDICINE

## 2022-04-22 PROCEDURE — 36415 COLL VENOUS BLD VENIPUNCTURE: CPT | Mod: PN | Performed by: INTERNAL MEDICINE

## 2022-04-22 PROCEDURE — 3008F PR BODY MASS INDEX (BMI) DOCUMENTED: ICD-10-PCS | Mod: CPTII,S$GLB,, | Performed by: INTERNAL MEDICINE

## 2022-04-22 NOTE — PROGRESS NOTES
"Subjective:       Patient ID: Adelaida Flores is a 66 y.o. female.    Chief Complaint: MDD/MISTY f/u    HPI   MDD/MISTY - lamictal 75mg daily (inc from 50), prozac 25mg q 8 hours prn anxiety or 25-50mg qhs prn insomnia.   Saw FEMI Munoz 3/21/22 in psych. Restarted on Vit D and the above changes. F/u in 6 weeks.   Follows w/ Seth Jauregui LMSW.  Depression is not as bad but feels anxiety is worse. Doing deep breathing. Wakes up in the middle of the night anxious (start worrying about different things). Reports on Saturday, felt hyped up and almost manic, which scared her.     Ate 2 solid meals in 1 day and working towards 2 solid meals on different days. Different stressors affect this including her  recently being sick. He's doing better now.     Uses hydrogen peroxide in the ears for earwax. Wonders if ok. No ear pain.     Review of Systems  as above in HPI.     Objective:      Physical Exam    BP 98/64 (BP Location: Left arm, Patient Position: Sitting, BP Method: Medium (Manual))   Pulse 69   Temp 98.6 °F (37 °C) (Oral)   Ht 5' 5" (1.651 m)   Wt 51.3 kg (113 lb 1.5 oz)   SpO2 98%   BMI 18.82 kg/m²     GEN - A+OX4, NAD, thin  HEENT - PERRL, EOMI, OP clear. MMM.   Neck - No thyromegaly or cervical LAD. No thyroid masses felt.  CV - RRR, no m/r   Chest - CTAB, no wheezing or rhonchi  Abd - S/NT/ND/+BS.   Ext - 2+BDP and radial pulses. No C/C/E.  Skin - No rash.    PREVIOUS LABS REVIEWED.     Assessment/Plan     Adelaida was seen today for follow-up.    Diagnoses and all orders for this visit:    Severe episode of recurrent major depressive disorder, without psychotic features - if LFT ok, will go up on lamictal to 100 and send message to Seth and psychiatrist NP.   -     Hepatic Function Panel; Future    MISTY (generalized anxiety disorder) - as above.   -     Hepatic Function Panel; Future    Excessive cerumen in both ear canals - ok to use hydrogen peroxide:water 1:1 dilution but to also make sure to " use the insufflation bulb w/ warm water to clear out the melted wax. Point it towards the wall of the ears and not directed at eardrum.     RTC in 2.5 mo, sooner if needed.   Has ACP documents at home. Will send hers and Mr. Calderon's w/ his appt next week.     Catie Portillo MD  Department of Internal Medicine - [facility]  7:52 AM

## 2022-04-24 ENCOUNTER — TELEPHONE (OUTPATIENT)
Dept: PRIMARY CARE CLINIC | Facility: CLINIC | Age: 67
End: 2022-04-24
Payer: MEDICARE

## 2022-04-24 RX ORDER — LAMOTRIGINE 100 MG/1
100 TABLET ORAL DAILY
Qty: 90 TABLET | Refills: 3 | Status: SHIPPED | OUTPATIENT
Start: 2022-04-24 | End: 2022-06-20

## 2022-04-24 NOTE — TELEPHONE ENCOUNTER
ALT just very mildly elevated. Will inc lamictal back to 100mg daily. Will cc FEMI Munoz and Seth Jauregui LMSW.

## 2022-04-25 ENCOUNTER — PATIENT MESSAGE (OUTPATIENT)
Dept: PRIMARY CARE CLINIC | Facility: CLINIC | Age: 67
End: 2022-04-25
Payer: MEDICARE

## 2022-04-27 ENCOUNTER — PATIENT MESSAGE (OUTPATIENT)
Dept: PRIMARY CARE CLINIC | Facility: CLINIC | Age: 67
End: 2022-04-27
Payer: MEDICARE

## 2022-05-04 ENCOUNTER — PATIENT MESSAGE (OUTPATIENT)
Dept: PRIMARY CARE CLINIC | Facility: CLINIC | Age: 67
End: 2022-05-04
Payer: MEDICARE

## 2022-05-05 ENCOUNTER — PATIENT MESSAGE (OUTPATIENT)
Dept: PRIMARY CARE CLINIC | Facility: CLINIC | Age: 67
End: 2022-05-05
Payer: MEDICARE

## 2022-05-05 ENCOUNTER — SOCIAL WORK (OUTPATIENT)
Dept: PRIMARY CARE CLINIC | Facility: CLINIC | Age: 67
End: 2022-05-05
Payer: MEDICARE

## 2022-05-05 NOTE — PROGRESS NOTES
Individual Psychotherapy (LCSW/PhD)  Adelaida HIRSCH Mark,  5/5/2022    Site:  [facility]         Therapeutic Intervention: Met with patient for individual psychotherapy.    Chief complaint/reason for encounter: anxiety     Interval history and content of current session: Pt and  recently purchased a dog. Pt states they are excited but that the purchase has created anxiety for them. Pt states they have been waking up at approximately 3 am each morning worrying about whether the dog had urinated on the floor. Pt also states this thoughts triggers a steam of worrying thoughts including if caring for the dog in the morning will cause the pt to be late for work.     SW and pt utilized the CBT triangle to analyze pt's thoughts. SW and pt discussed harmful vs. Helpful thoughts and ways of examining validity of worrying thoughts. Pt and SW agreed that pt would utilize the CBT thought record she provided to process anxiety provoking situations discussed.     Pt to complete worksheet and attempt to utilize helpful thoughts and deep breathing to reduce anxiety. SW and pot to discuss effort next session.    Treatment plan:  · Target symptoms: anxiety   · Why chosen therapy is appropriate versus another modality: relevant to diagnosis, patient responds to this modality, evidence based practice  · Outcome monitoring methods: self-report, checklist/rating scale  · Therapeutic intervention type: insight oriented psychotherapy, behavior modifying psychotherapy, supportive psychotherapy    Risk parameters:  Patient reports no suicidal ideation  Patient reports no homicidal ideation  Patient reports no self-injurious behavior  Patient reports no violent behavior    Verbal deficits: None    Patient's response to intervention:  The patient's response to intervention is motivated.    Progress toward goals and other mental status changes:  The patient's progress toward goals is fair .    Diagnosis:   No diagnosis found.    Plan: Pt plans  to continue individual psychotherapy    Return to clinic: 1 week, 2 weeks    Length of Service (minutes): 30

## 2022-05-10 ENCOUNTER — LAB VISIT (OUTPATIENT)
Dept: LAB | Facility: HOSPITAL | Age: 67
End: 2022-05-10
Attending: NURSE PRACTITIONER
Payer: MEDICARE

## 2022-05-10 ENCOUNTER — OFFICE VISIT (OUTPATIENT)
Dept: PSYCHIATRY | Facility: CLINIC | Age: 67
End: 2022-05-10
Payer: MEDICARE

## 2022-05-10 DIAGNOSIS — F33.1 MDD (MAJOR DEPRESSIVE DISORDER), RECURRENT EPISODE, MODERATE: Primary | ICD-10-CM

## 2022-05-10 DIAGNOSIS — F41.1 GAD (GENERALIZED ANXIETY DISORDER): ICD-10-CM

## 2022-05-10 DIAGNOSIS — F10.21 ALCOHOL DEPENDENCE IN SUSTAINED FULL REMISSION: ICD-10-CM

## 2022-05-10 DIAGNOSIS — K75.9 INFLAMMATORY LIVER DISEASE, UNSPECIFIED: ICD-10-CM

## 2022-05-10 DIAGNOSIS — R74.8 ELEVATED LIVER ENZYMES: ICD-10-CM

## 2022-05-10 LAB
ALBUMIN SERPL BCP-MCNC: 4 G/DL (ref 3.5–5.2)
ALP SERPL-CCNC: 88 U/L (ref 55–135)
ALT SERPL W/O P-5'-P-CCNC: 40 U/L (ref 10–44)
ANION GAP SERPL CALC-SCNC: 11 MMOL/L (ref 8–16)
AST SERPL-CCNC: 39 U/L (ref 10–40)
BILIRUB SERPL-MCNC: 0.4 MG/DL (ref 0.1–1)
BUN SERPL-MCNC: 19 MG/DL (ref 8–23)
CALCIUM SERPL-MCNC: 10.5 MG/DL (ref 8.7–10.5)
CHLORIDE SERPL-SCNC: 104 MMOL/L (ref 95–110)
CO2 SERPL-SCNC: 23 MMOL/L (ref 23–29)
CREAT SERPL-MCNC: 0.7 MG/DL (ref 0.5–1.4)
ERYTHROCYTE [DISTWIDTH] IN BLOOD BY AUTOMATED COUNT: 13.5 % (ref 11.5–14.5)
EST. GFR  (AFRICAN AMERICAN): >60 ML/MIN/1.73 M^2
EST. GFR  (NON AFRICAN AMERICAN): >60 ML/MIN/1.73 M^2
GLUCOSE SERPL-MCNC: 85 MG/DL (ref 70–110)
HCT VFR BLD AUTO: 39.4 % (ref 37–48.5)
HGB BLD-MCNC: 12.7 G/DL (ref 12–16)
INR PPP: 1 (ref 0.8–1.2)
MCH RBC QN AUTO: 29.3 PG (ref 27–31)
MCHC RBC AUTO-ENTMCNC: 32.2 G/DL (ref 32–36)
MCV RBC AUTO: 91 FL (ref 82–98)
PLATELET # BLD AUTO: 291 K/UL (ref 150–450)
PMV BLD AUTO: 11.2 FL (ref 9.2–12.9)
POTASSIUM SERPL-SCNC: 4.1 MMOL/L (ref 3.5–5.1)
PROT SERPL-MCNC: 6.9 G/DL (ref 6–8.4)
PROTHROMBIN TIME: 10.3 SEC (ref 9–12.5)
RBC # BLD AUTO: 4.34 M/UL (ref 4–5.4)
SODIUM SERPL-SCNC: 138 MMOL/L (ref 136–145)
WBC # BLD AUTO: 7.51 K/UL (ref 3.9–12.7)

## 2022-05-10 PROCEDURE — 1160F RVW MEDS BY RX/DR IN RCRD: CPT | Mod: CPTII,95,, | Performed by: PHYSICIAN ASSISTANT

## 2022-05-10 PROCEDURE — 1159F MED LIST DOCD IN RCRD: CPT | Mod: CPTII,95,, | Performed by: PHYSICIAN ASSISTANT

## 2022-05-10 PROCEDURE — 1159F PR MEDICATION LIST DOCUMENTED IN MEDICAL RECORD: ICD-10-PCS | Mod: CPTII,95,, | Performed by: PHYSICIAN ASSISTANT

## 2022-05-10 PROCEDURE — 1160F PR REVIEW ALL MEDS BY PRESCRIBER/CLIN PHARMACIST DOCUMENTED: ICD-10-PCS | Mod: CPTII,95,, | Performed by: PHYSICIAN ASSISTANT

## 2022-05-10 PROCEDURE — 85610 PROTHROMBIN TIME: CPT | Performed by: NURSE PRACTITIONER

## 2022-05-10 PROCEDURE — 85027 COMPLETE CBC AUTOMATED: CPT | Performed by: NURSE PRACTITIONER

## 2022-05-10 PROCEDURE — 99499 RISK ADDL DX/OHS AUDIT: ICD-10-PCS | Mod: 95,,, | Performed by: PHYSICIAN ASSISTANT

## 2022-05-10 PROCEDURE — 99214 PR OFFICE/OUTPT VISIT, EST, LEVL IV, 30-39 MIN: ICD-10-PCS | Mod: 95,,, | Performed by: PHYSICIAN ASSISTANT

## 2022-05-10 PROCEDURE — 99499 UNLISTED E&M SERVICE: CPT | Mod: 95,,, | Performed by: PHYSICIAN ASSISTANT

## 2022-05-10 PROCEDURE — 99214 OFFICE O/P EST MOD 30 MIN: CPT | Mod: 95,,, | Performed by: PHYSICIAN ASSISTANT

## 2022-05-10 PROCEDURE — 36415 COLL VENOUS BLD VENIPUNCTURE: CPT | Performed by: NURSE PRACTITIONER

## 2022-05-10 PROCEDURE — 80053 COMPREHEN METABOLIC PANEL: CPT | Performed by: NURSE PRACTITIONER

## 2022-05-10 NOTE — PROGRESS NOTES
The patient location is: [place], [place]  The chief complaint leading to consultation is: depression, anxiety f/u    Visit type: audiovisual    Face to Face time with patient: 20  24 minutes of total time spent on the encounter, which includes face to face time and non-face to face time preparing to see the patient (eg, review of tests), Obtaining and/or reviewing separately obtained history, Documenting clinical information in the electronic or other health record, Independently interpreting results (not separately reported) and communicating results to the patient/family/caregiver, or Care coordination (not separately reported).         Each patient to whom he or she provides medical services by telemedicine is:  (1) informed of the relationship between the physician and patient and the respective role of any other health care provider with respect to management of the patient; and (2) notified that he or she may decline to receive medical services by telemedicine and may withdraw from such care at any time.    Notes:       Outpatient Psychiatry Follow-Up Visit (MD/AMBER)    5/10/2022    Clinical Status of Patient:  Outpatient (Ambulatory)    Chief Complaint:  Adelaida Flores is a 66 y.o. female who presents today for follow-up of depression and anxiety.  Met with patient.      Interval History and Content of Current Session:  Interim Events/Subjective Report/Content of Current Session:    Pt reports depression has been improved on the lamictal to 100mg daily last week, concerned for elevated LFTs in past. LFTs were drawn again today, results are wnl.    Pt has been on high dose prozac for many years since 2005, reports not as effective for anxiety as previously.  Discussed with patient will consider switching Prozac to another SSRI at future visit if anxiety continues to be poorly controlled but having improved depression with the addition of Lamictal.    Reports depression much improved but having significant anxiety,  "has been using Vistaril p.r.n. for anxiety which she said has been effective.  Sleep has been fair 6-7 hours per night but having some night awakenings due to anxiety.  Discussed with patient can use Vistaril 25-50 mg as needed for insomnia.  Patient reports understanding and is agreeable to plan    Patients mood is anxious, affect appears mood congruent. Linear and logical, friendly and cooperative, good eye contact.    Denies SI/HI/AVH. Pt reports normalizing appetite. Denies side effects of medications.    Pt reports taking medications as prescribed and behaving appropriately during interview today.      INITIAL PSYCHIATRIC EVALUATION:  Psych Interview 03/21/2022:   Adelaida Flores is a 66 y.o. female with past psychiatric history of depression, anxiety, hx eating disorder with binging and purging presented to for initial evaluation and treatment for reestablish with psychiatry for management of anxiety and depression.     Hx multiple prior psychiatric hospitalizations for eating disorder and suicidal ideation. Hx 5 suicide attempts by intentionally ingested poisoning chemicals or OD on rx pills, last attempt 35 years ago. Pt denies hx self harm. Pt deniex hx hallucinations.     Hx severe eating disorder, binging and purging, sometimes episodes of starving self or extending periods of times.  Patient reports continues to have issues with food and have negative or anxiety inducing thoughts about food such as I do not deserve DE, I want be able to stop eating once I start, I will have panic attacks if I eat more"     Patient reports does not eat normal standard meals, states that she snacks on Ensure and other protein shakes throughout the day.     Patient is a registered dietician and continues to work.  Also has a master's degree in health administration.     Pt denies hx trauma. Denies physical/sexual abuse. Pt denies symptoms including nightmares, hypervigilance, flashbacks, avoidance behaviors, and " "disassociation.     About 16 yrs was first treated for anxiety and depression.  States did not respond well to medications used during her teenage years until Prozac was released decades later.     Pt reports currently taking lamictal 50mg daily, prozac 80mg daily.  Patient was previously on Lamictal 100 mg daily, there was concerned that elevation in liver enzymes related to Lamictal and it was discontinued last December.  Patient reports after discontinuing the Lamictal she became severely depressed, discussed with her PCP who has been monitoring liver enzymes and PCP has had her restarted on Lamictal 50 mg daily which she states that she started a about 1 month ago.  Reports although she does continue to experience depressive symptoms that her depression is significantly improved     Lamictal restarted 1 month ago and reports improvement in depression. Reports depression today as 5/10, down from 9/10 1-2 months ago and anxiety as 8/10, down from 10/10 1-2 months ago.     Denies suicidal ideation in the last month. Denies HI/AVH. Pt reports sleeping well 7 hrs per night and decreased appetite-states she eats ensures or other protein shakes, yogurt, soup during the day. Denies side effects of medications.     The patient complained of depressed mood with lethargy, decreased appetite , insomnia with broken sleep, anhedonia, apathy, worsening self-esteem, decreased concentration, decreased ability to make decisions.     Pt denies hx symptoms/episodes of brittni.     Pt thc recreational drug use, "very rare thing for me". Pt reports 0 drinks per week-previously severe alcoholic, sober for last 20 years. Denies tobacco use-states stopped smoking about 28 years ago.     Access to gun: has access but "I dont know how to use it." encouraged pt that  needs to lock gun away so she does not access. She agrees to plan to inform  to move gun to safe/somewhere she does not have access.          Review of Systems "     Review of Systems   Constitutional: Negative for fever.   HENT: Negative for sore throat.    Eyes: Negative for photophobia.   Respiratory: Negative for cough.    Cardiovascular: Negative for chest pain and palpitations.   Gastrointestinal: Negative for abdominal pain.   Genitourinary: Negative for dysuria.   Musculoskeletal: Negative for myalgias.   Skin: Negative for rash.   Neurological: Negative for dizziness.   Endo/Heme/Allergies: Does not bruise/bleed easily.   Psychiatric/Behavioral: Positive for depression. Negative for hallucinations, memory loss, substance abuse and suicidal ideas. The patient is nervous/anxious and has insomnia.        Psychiatric Review Of Systems - Is patient experiencing or having changes in:  sleep: yes  appetite: no  weight: no  energy/anergy: yes  interest/pleasure/anhedonia: yes  somatic symptoms: no  libido: no  anxiety/panic: yes  guilty/hopelessness: no  concentration: no  S.I.B.s/risky behavior: no  Irritability: no  Racing thoughts: yes  Impulsive behaviors: no  Paranoia: no  AVH: no      Past Medical, Family and Social History: The patient's past medical, family and social history have been reviewed and updated as appropriate within the electronic medical record - see encounter notes.      Current Medications:   Medication List with Changes/Refills   Current Medications    AZASITE 1 % DROP    Place 1 drop into both eyes 2 (two) times daily.    CHOLESTYRAMINE (QUESTRAN) 4 GRAM PACKET    Take 1 packet (4 g total) by mouth every evening.    DENOSUMAB (PROLIA) 60 MG/ML SYRG        FLUOROMETHOLONE 0.1% (FML) 0.1 % DRPS    Place into both eyes.    FLUOXETINE 40 MG CAPSULE    Take 2 capsules (80 mg total) by mouth once daily.    HYDROXYZINE PAMOATE (VISTARIL) 25 MG CAP    Take 1 capsule (25 mg total) by mouth nightly as needed (insomnia).    KRILL OIL ORAL        LAMOTRIGINE (LAMICTAL) 100 MG TABLET    Take 1 tablet (100 mg total) by mouth once daily.    LEVOCETIRIZINE (XYZAL)  5 MG TABLET        MULTIVITAMIN CAPSULE    Take 1 capsule by mouth once daily.    ZINC 50 MG TAB             Allergies:   Review of patient's allergies indicates:   Allergen Reactions    Cefdinir Diarrhea    Grass pollen-red top, standard     House dust Itching         Vitals   There were no vitals filed for this visit.       Labs/Imaging/Studies:   Recent Results (from the past 48 hour(s))   CBC Without Differential    Collection Time: 05/10/22  8:09 AM   Result Value Ref Range    WBC 7.51 3.90 - 12.70 K/uL    RBC 4.34 4.00 - 5.40 M/uL    Hemoglobin 12.7 12.0 - 16.0 g/dL    Hematocrit 39.4 37.0 - 48.5 %    MCV 91 82 - 98 fL    MCH 29.3 27.0 - 31.0 pg    MCHC 32.2 32.0 - 36.0 g/dL    RDW 13.5 11.5 - 14.5 %    Platelets 291 150 - 450 K/uL    MPV 11.2 9.2 - 12.9 fL   Comprehensive Metabolic Panel    Collection Time: 05/10/22  8:09 AM   Result Value Ref Range    Sodium 138 136 - 145 mmol/L    Potassium 4.1 3.5 - 5.1 mmol/L    Chloride 104 95 - 110 mmol/L    CO2 23 23 - 29 mmol/L    Glucose 85 70 - 110 mg/dL    BUN 19 8 - 23 mg/dL    Creatinine 0.7 0.5 - 1.4 mg/dL    Calcium 10.5 8.7 - 10.5 mg/dL    Total Protein 6.9 6.0 - 8.4 g/dL    Albumin 4.0 3.5 - 5.2 g/dL    Total Bilirubin 0.4 0.1 - 1.0 mg/dL    Alkaline Phosphatase 88 55 - 135 U/L    AST 39 10 - 40 U/L    ALT 40 10 - 44 U/L    Anion Gap 11 8 - 16 mmol/L    eGFR if African American >60 >60 mL/min/1.73 m^2    eGFR if non African American >60 >60 mL/min/1.73 m^2   Protime-INR    Collection Time: 05/10/22  8:09 AM   Result Value Ref Range    Prothrombin Time 10.3 9.0 - 12.5 sec    INR 1.0 0.8 - 1.2      No results found for: PHENYTOIN, PHENOBARB, VALPROATE, CBMZ    Compliance: yes    Side effects: None    Risk Parameters:  Patient reports no suicidal ideation  Patient reports no homicidal ideation  Patient reports no self-injurious behavior  Patient reports no violent behavior    Exam (detailed: at least 9 elements; comprehensive: all 15 elements)    Constitutional  Vitals:  Most recent vital signs, dated greater than 90 days prior to this appointment, were reviewed.   There were no vitals filed for this visit.     General:  unremarkable, age appropriate     Musculoskeletal  Muscle Strength/Tone:  not examined   Gait & Station:  Not examined     Psychiatric  Speech:  no latency; no press   Mood & Affect:  anxious  congruent and appropriate   Thought Process:  normal and logical   Associations:  intact   Thought Content:  normal, no suicidality, no homicidality, delusions, or paranoia   Insight:  intact, has awareness of illness   Judgement: behavior is adequate to circumstances   Orientation:  grossly intact   Memory: intact for content of interview   Language: grossly intact   Attention Span & Concentration:  able to focus   Fund of Knowledge:  intact and appropriate to age and level of education     Assessment and Diagnosis   Status/Progress: Based on the examination today, the patient's problem(s) is/are improved and adequately but not ideally controlled.  New problems have not been presented today.   Co-morbidities, Diagnostic uncertainty and Lack of compliance are not complicating management of the primary condition.  There are no active rule-out diagnoses for this patient at this time.     General Impression:      ICD-10-CM ICD-9-CM   1. MDD (major depressive disorder), recurrent episode, moderate  F33.1 296.32   2. MISTY (generalized anxiety disorder)  F41.1 300.02   3. Alcohol dependence in sustained full remission  F10.21 303.93       Intervention/Counseling/Treatment Plan   · Medication Management: Continue current medications. The risks and benefits of medication were discussed with the patient.  -continue Prozac 80 mg daily  -continue Lamictal 100 mg daily, was recently increased by PCP after LFTs came back improved  -continue Vistaril p.r.n. anxiety/insomnia as discussed    Return to Clinic: as scheduled        Last Manrique PA-C      Total face to  face time: 20 min  Total time (chart review, patient contact, documentation): 24 min      *This note has been prepared using a combination of a dictation device and typing.  It has been checked for errors but some errors may still exist within the note as a result of speech recognition errors and/or typographical errors.

## 2022-05-11 ENCOUNTER — PATIENT MESSAGE (OUTPATIENT)
Dept: PRIMARY CARE CLINIC | Facility: CLINIC | Age: 67
End: 2022-05-11
Payer: MEDICARE

## 2022-05-12 ENCOUNTER — PATIENT MESSAGE (OUTPATIENT)
Dept: PRIMARY CARE CLINIC | Facility: CLINIC | Age: 67
End: 2022-05-12
Payer: MEDICARE

## 2022-05-12 ENCOUNTER — SOCIAL WORK (OUTPATIENT)
Dept: PRIMARY CARE CLINIC | Facility: CLINIC | Age: 67
End: 2022-05-12
Payer: MEDICARE

## 2022-05-12 NOTE — PROGRESS NOTES
"Individual Psychotherapy (LCSW/PhD)  Adelaida Flores,  5/12/2022    Site:  Tele[facility]         Therapeutic Intervention: Met with patient for individual psychotherapy.    Chief complaint/reason for encounter: anxiety     Interval history and content of current session: Pt reports feeling "better" overall since last session. Pt stated they utilized the CBT thought record to help with anxiety surrounding care for their dog. Pt stated they were able to ask  about a concern they had for dog ruining home floor. Pt used helpful thought "I am assuming the worse.." to initiate conversation. Pt and SW discussed power of "catastrophic thinking" and utilizing CBT further to counteract anxiety and "catastrophic thinking".    Treatment plan:  · Target symptoms: anxiety   · Why chosen therapy is appropriate versus another modality: relevant to diagnosis, patient responds to this modality, evidence based practice  · Outcome monitoring methods: self-report, checklist/rating scale  · Therapeutic intervention type: insight oriented psychotherapy, behavior modifying psychotherapy, supportive psychotherapy    Risk parameters:  Patient reports no suicidal ideation  Patient reports no homicidal ideation  Patient reports no self-injurious behavior  Patient reports no violent behavior    Verbal deficits: None    Patient's response to intervention:  The patient's response to intervention is motivated.    Progress toward goals and other mental status changes:  The patient's progress toward goals is fair .    Diagnosis:   No diagnosis found.    Plan: Pt plans to continue individual psychotherapy    Return to clinic: 1 week    Length of Service (minutes): 30        "

## 2022-05-13 ENCOUNTER — PATIENT MESSAGE (OUTPATIENT)
Dept: PRIMARY CARE CLINIC | Facility: CLINIC | Age: 67
End: 2022-05-13
Payer: MEDICARE

## 2022-05-16 ENCOUNTER — PATIENT OUTREACH (OUTPATIENT)
Dept: ADMINISTRATIVE | Facility: OTHER | Age: 67
End: 2022-05-16
Payer: MEDICARE

## 2022-05-16 NOTE — PROGRESS NOTES
Health Maintenance Due   Topic Date Due    COVID-19 Vaccine (4 - Booster for Moderna series) 03/18/2022    Mammogram  06/30/2022     Updates were requested from care everywhere.  Chart was reviewed for overdue Proactive [facility] Encounters ([facility]) topics (CRS, Breast Cancer Screening, Eye exam)  Health Maintenance has been updated.  [facility] immunization registry triggered.  Immunizations were reconciled.

## 2022-05-17 ENCOUNTER — CLINICAL SUPPORT (OUTPATIENT)
Dept: INFECTIOUS DISEASES | Facility: CLINIC | Age: 67
End: 2022-05-17
Payer: MEDICARE

## 2022-05-17 ENCOUNTER — OFFICE VISIT (OUTPATIENT)
Dept: HEPATOLOGY | Facility: CLINIC | Age: 67
End: 2022-05-17
Payer: MEDICARE

## 2022-05-17 VITALS
TEMPERATURE: 98 F | RESPIRATION RATE: 18 BRPM | SYSTOLIC BLOOD PRESSURE: 116 MMHG | HEART RATE: 58 BPM | HEIGHT: 65 IN | DIASTOLIC BLOOD PRESSURE: 72 MMHG | WEIGHT: 112.63 LBS | BODY MASS INDEX: 18.77 KG/M2 | OXYGEN SATURATION: 95 %

## 2022-05-17 DIAGNOSIS — R76.8 POSITIVE ANA (ANTINUCLEAR ANTIBODY): ICD-10-CM

## 2022-05-17 DIAGNOSIS — Z23 NEED FOR HEPATITIS A IMMUNIZATION: ICD-10-CM

## 2022-05-17 DIAGNOSIS — R74.8 ELEVATED LIVER ENZYMES: Primary | ICD-10-CM

## 2022-05-17 PROCEDURE — 3078F PR MOST RECENT DIASTOLIC BLOOD PRESSURE < 80 MM HG: ICD-10-PCS | Mod: CPTII,S$GLB,, | Performed by: NURSE PRACTITIONER

## 2022-05-17 PROCEDURE — 90632 HEPATITIS A VACCINE ADULT IM: ICD-10-PCS | Mod: S$GLB,,, | Performed by: INTERNAL MEDICINE

## 2022-05-17 PROCEDURE — 3008F BODY MASS INDEX DOCD: CPT | Mod: CPTII,S$GLB,, | Performed by: NURSE PRACTITIONER

## 2022-05-17 PROCEDURE — 1159F PR MEDICATION LIST DOCUMENTED IN MEDICAL RECORD: ICD-10-PCS | Mod: CPTII,S$GLB,, | Performed by: NURSE PRACTITIONER

## 2022-05-17 PROCEDURE — 99214 OFFICE O/P EST MOD 30 MIN: CPT | Mod: S$GLB,,, | Performed by: NURSE PRACTITIONER

## 2022-05-17 PROCEDURE — 1126F PR PAIN SEVERITY QUANTIFIED, NO PAIN PRESENT: ICD-10-PCS | Mod: CPTII,S$GLB,, | Performed by: NURSE PRACTITIONER

## 2022-05-17 PROCEDURE — 3074F PR MOST RECENT SYSTOLIC BLOOD PRESSURE < 130 MM HG: ICD-10-PCS | Mod: CPTII,S$GLB,, | Performed by: NURSE PRACTITIONER

## 2022-05-17 PROCEDURE — 3288F PR FALLS RISK ASSESSMENT DOCUMENTED: ICD-10-PCS | Mod: CPTII,S$GLB,, | Performed by: NURSE PRACTITIONER

## 2022-05-17 PROCEDURE — 99999 PR PBB SHADOW E&M-EST. PATIENT-LVL I: CPT | Mod: PBBFAC,,,

## 2022-05-17 PROCEDURE — 1160F RVW MEDS BY RX/DR IN RCRD: CPT | Mod: CPTII,S$GLB,, | Performed by: NURSE PRACTITIONER

## 2022-05-17 PROCEDURE — 99214 PR OFFICE/OUTPT VISIT, EST, LEVL IV, 30-39 MIN: ICD-10-PCS | Mod: S$GLB,,, | Performed by: NURSE PRACTITIONER

## 2022-05-17 PROCEDURE — 90632 HEPA VACCINE ADULT IM: CPT | Mod: S$GLB,,, | Performed by: INTERNAL MEDICINE

## 2022-05-17 PROCEDURE — 3008F PR BODY MASS INDEX (BMI) DOCUMENTED: ICD-10-PCS | Mod: CPTII,S$GLB,, | Performed by: NURSE PRACTITIONER

## 2022-05-17 PROCEDURE — 3288F FALL RISK ASSESSMENT DOCD: CPT | Mod: CPTII,S$GLB,, | Performed by: NURSE PRACTITIONER

## 2022-05-17 PROCEDURE — 99999 PR PBB SHADOW E&M-EST. PATIENT-LVL V: ICD-10-PCS | Mod: PBBFAC,,, | Performed by: NURSE PRACTITIONER

## 2022-05-17 PROCEDURE — 3074F SYST BP LT 130 MM HG: CPT | Mod: CPTII,S$GLB,, | Performed by: NURSE PRACTITIONER

## 2022-05-17 PROCEDURE — 1160F PR REVIEW ALL MEDS BY PRESCRIBER/CLIN PHARMACIST DOCUMENTED: ICD-10-PCS | Mod: CPTII,S$GLB,, | Performed by: NURSE PRACTITIONER

## 2022-05-17 PROCEDURE — 1159F MED LIST DOCD IN RCRD: CPT | Mod: CPTII,S$GLB,, | Performed by: NURSE PRACTITIONER

## 2022-05-17 PROCEDURE — 90471 HEPATITIS A VACCINE ADULT IM: ICD-10-PCS | Mod: S$GLB,,, | Performed by: INTERNAL MEDICINE

## 2022-05-17 PROCEDURE — 1126F AMNT PAIN NOTED NONE PRSNT: CPT | Mod: CPTII,S$GLB,, | Performed by: NURSE PRACTITIONER

## 2022-05-17 PROCEDURE — 1101F PT FALLS ASSESS-DOCD LE1/YR: CPT | Mod: CPTII,S$GLB,, | Performed by: NURSE PRACTITIONER

## 2022-05-17 PROCEDURE — 90471 IMMUNIZATION ADMIN: CPT | Mod: S$GLB,,, | Performed by: INTERNAL MEDICINE

## 2022-05-17 PROCEDURE — 99999 PR PBB SHADOW E&M-EST. PATIENT-LVL V: CPT | Mod: PBBFAC,,, | Performed by: NURSE PRACTITIONER

## 2022-05-17 PROCEDURE — 99999 PR PBB SHADOW E&M-EST. PATIENT-LVL I: ICD-10-PCS | Mod: PBBFAC,,,

## 2022-05-17 PROCEDURE — 1101F PR PT FALLS ASSESS DOC 0-1 FALLS W/OUT INJ PAST YR: ICD-10-PCS | Mod: CPTII,S$GLB,, | Performed by: NURSE PRACTITIONER

## 2022-05-17 PROCEDURE — 3078F DIAST BP <80 MM HG: CPT | Mod: CPTII,S$GLB,, | Performed by: NURSE PRACTITIONER

## 2022-05-17 RX ORDER — MINERAL OIL
180 ENEMA (ML) RECTAL DAILY
COMMUNITY
End: 2022-09-27

## 2022-05-17 NOTE — PROGRESS NOTES
[facility] VISIT FOLLOW UP NOTE    PCP: Catie Portillo MD     CHIEF COMPLAINT: elevated liver enzymes     HPI: This is a 66 y.o. White female with PMH noted below, presenting for follow up of   elevated liver enzymes    Previous serologic w/u negative for Hiren's, alpha-1 antitrypsin deficiency, hemochromatosis, autoimmune etiology (except mildly positive LENORA 1:80 and ASMA 1:40), and viral hepatitis A, B and C  Mildly low IgG, IgM normal    Prior serologic workup:   Lab Results   Component Value Date    SMOOTHMUSCAB Positive 1:40 (A) 11/02/2021    AMAIFA Negative 1:40 10/28/2021    IGGSERUM 614 (L) 10/28/2021    ANASCREEN Positive (A) 10/28/2021    FERRITIN 53 11/30/2017    FESATURATED 17 (L) 11/30/2017    CERULOPLSM 39.0 10/28/2021    HEPBSAG Negative 10/28/2021    HEPCAB Negative 10/28/2021    HEPAIGM Negative 07/28/2010       Liver fibrosis staging:  -- fibroscan 11/2021 noted F0, S1 (kPA 6.3, )    Minimal risk factors for fatty liver    Interval HPI: Presents today alone. On Fluoxetine for years (likely not culprit)  Lamictal for >1 year (well known risk of DILI), Pt reports she stopped per her psychiatrist ~12/2021 -  1/2022 but then her depression worsened so it was resumed in January 2022  Prolia (low risk) x 5 years     Labs done 5/2022 show mildly elevated transaminase levels (ALT > AST, elevated since 10/22/2021 and 9/2020 x1 but normal 10/2020, otherwise chronically WNL)  Platelets WNL, alk phos WNL  Synthetic liver functioning WNL    Lab Results   Component Value Date    ALT 40 05/10/2022    AST 39 05/10/2022    ALKPHOS 88 05/10/2022    BILITOT 0.4 05/10/2022    ALBUMIN 4.0 05/10/2022    INR 1.0 05/10/2022     05/10/2022     Abd U/S done 10/2021 showed possible fatty liver, no splenomegaly - can repeat with RTC    Denies family history of liver disease . Denies alcohol consumption currently or in the past    +Immunity to Hep B - started Hep A vaccine series, last dose this month  "       Allergy and medication list reviewed and updated     PMHX:  has a past medical history of Alcohol dependence in sustained full remission (3/21/2022), Allergy, Anxiety (2/22/2019), Colon polyps, Depression, Hyperlipidemia (2/22/2019), IBS (irritable bowel syndrome), Osteoporosis, T12 compression fracture s/p corpectomy (7/13/2013), and Volvulus.    PSHX:  has a past surgical history that includes Fracture surgery; Tonsillectomy; Colonoscopy (N/A, 8/28/2017); Colon surgery; Spine surgery; Eye surgery (2012); Small intestine surgery (2011); and Hernia repair (2012).    FAMILY HISTORY: Updated and reviewed in [facility]    SOCIAL HISTORY:   Social History     Substance and Sexual Activity   Alcohol Use No       Social History     Substance and Sexual Activity   Drug Use No       ROS:   GENERAL: Denies fatigue  CARDIOVASCULAR: Denies edema  GI: + intermittent abdominal pain/fullness (followed by GI)  SKIN: Denies rash, itching   NEURO: Denies confusion, memory loss, or mood changes    PHYSICAL EXAM:   Friendly White female, in no acute distress; alert and oriented to person, place and time  VITALS: /72 (BP Location: Right arm, Patient Position: Sitting, BP Method: Small (Automatic))   Pulse (!) 58   Temp 98.3 °F (36.8 °C) (Oral)   Resp 18   Ht 5' 5" (1.651 m)   Wt 51.1 kg (112 lb 10.5 oz)   SpO2 95%   BMI 18.75 kg/m²   EYES: Sclerae anicteric  GI: Soft, non-tender, non-distended. No ascites.  EXTREMITIES:  No edema.  SKIN: Warm and dry. No jaundice. No telangectasias noted. No palmar erythema.  NEURO:  No asterixis.  PSYCH:  Thought and speech pattern appropriate. Behavior normal      EDUCATION:  See instructions discussed with patient in Instructions section of the After Visit Summary     ASSESSMENT & PLAN:  66 y.o. White female with:  1. Elevated liver enzymes   -- Labs improved, mildly elevated, some fluctuation   --- synthetic liver function WNL  --- Abd U/S done 10/2021 showed possible fatty liver, no " splenomegaly  - can repeat with RTC  -- medications possibly contributing : see HPI  --- Previous serologic : see HPI  +Immunity to Hep B - started Hep A vaccine series, last dose this month   -- fibroscan 11/2021 no fibrosis, minimal steatosis - can repeat with RTC    2. + LENORA and ASMA  -- lower suspicion for AIH given near normal liver enzymes, will repeat with RTC    3. Fatty liver (?)  -- noted on US but with minimal risk factors  -- minimal on fibroscan        Follow up in about 1 year (around 5/17/2023). with labs and uS a few days before, fibroscan before  Orders Placed This Encounter   Procedures    FibroScan (Vibration Controlled Transient Elastography)    US Abdomen Complete    Hepatic Function Panel    LENORA Screen w/Reflex    Anti-Smooth Muscle Antibody    IgG    IgM        Thank you for allowing me to participate in the care of KAI Vital    I spent a total of 30 minutes on the day of the visit.This includes face to face time and non-face to face time preparing to see the patient (eg, review of tests), obtaining and/or reviewing separately obtained history, documenting clinical information in the electronic or other health record, independently interpreting results and communicating results to the patient/family/caregiver, and coordinating care.         CC'ed note to:

## 2022-05-17 NOTE — PATIENT INSTRUCTIONS
1. Fibroscan in the past to look for fat or scar tissue in the liver showed no scar tissue, minimal fatty liver - will repeat in 1 year   2. Recommend vaccines for Hepatitis  A, last one due this month    3. Follow up in 1 year with US and labs a few days before, fibroscan same day   4. Let me know (send me a message) if psychiatry changes your depression medication and I can check your labs 6 weeks after startng

## 2022-05-19 ENCOUNTER — DOCUMENTATION ONLY (OUTPATIENT)
Dept: PRIMARY CARE CLINIC | Facility: CLINIC | Age: 67
End: 2022-05-19
Payer: MEDICARE

## 2022-05-25 ENCOUNTER — PATIENT MESSAGE (OUTPATIENT)
Dept: PRIMARY CARE CLINIC | Facility: CLINIC | Age: 67
End: 2022-05-25
Payer: MEDICARE

## 2022-05-26 ENCOUNTER — DOCUMENTATION ONLY (OUTPATIENT)
Dept: PRIMARY CARE CLINIC | Facility: CLINIC | Age: 67
End: 2022-05-26
Payer: MEDICARE

## 2022-05-26 ENCOUNTER — PATIENT MESSAGE (OUTPATIENT)
Dept: PRIMARY CARE CLINIC | Facility: CLINIC | Age: 67
End: 2022-05-26
Payer: MEDICARE

## 2022-05-26 NOTE — PROGRESS NOTES
"Individual Psychotherapy (LCSW/PhD)  Adelaida Flores,  5/26/2022    Site:  Telemed         Therapeutic Intervention: Met with patient for individual psychotherapy.    Chief complaint/reason for encounter: anxiety     Interval history and content of current session: Pt reports increased stress and anxiety related to incident where dog was injure due to "play date" with other dog. Pt stated this incident has resulted in dog having to be medicated etc. Pt states they are worried that medication with make dog sick or "addicted".     Using CBT framework, SW guided pt through harmful and helpful thoughts related to situation. Pt identified harmful thoughts such as "my dog will get sick" and "the medication will make my dog addicted". Pt identified helpful thoughts as " the medication was prescribed by a  and is safe" and "the medication will help my dog rest and heal". Pt also described feeling "worthless" and having thoughts of "bad things always happen to me" and "I don't deserve a dog". SW and pt practiced identifying un-helpful thoughts and verbalizing helpful and realistic thoughts such as" I do deserve nice things" and "what happened to my dog was just an accident".    SW and pt practiced deep breathing and discussed the importance of utilizing deep breathing to calm anxiety and facilitate helpful thinking. SW and pt practiced deep breathing and reciting helpful thoughts in mock scenarios. SW and pt also discussed pt scheduling "worry time" and structuring day more to reduce anxiety.    Treatment plan:  · Target symptoms: anxiety   · Why chosen therapy is appropriate versus another modality: relevant to diagnosis, patient responds to this modality, evidence based practice  · Outcome monitoring methods: self-report, checklist/rating scale  · Therapeutic intervention type: insight oriented psychotherapy, behavior modifying psychotherapy, supportive psychotherapy    Risk parameters:  Patient reports no " suicidal ideation  Patient reports no homicidal ideation  Patient reports no self-injurious behavior  Patient reports no violent behavior    Verbal deficits: None    Patient's response to intervention:  The patient's response to intervention is motivated.    Progress toward goals and other mental status changes:  The patient's progress toward goals is good.    Diagnosis:   No diagnosis found.    Plan: Pt plans to continue individual psychotherapy    Return to clinic: 1 week    Length of Service (minutes): 30

## 2022-05-27 ENCOUNTER — PATIENT MESSAGE (OUTPATIENT)
Dept: PRIMARY CARE CLINIC | Facility: CLINIC | Age: 67
End: 2022-05-27
Payer: MEDICARE

## 2022-06-02 ENCOUNTER — PATIENT MESSAGE (OUTPATIENT)
Dept: PRIMARY CARE CLINIC | Facility: CLINIC | Age: 67
End: 2022-06-02
Payer: MEDICARE

## 2022-06-03 ENCOUNTER — DOCUMENTATION ONLY (OUTPATIENT)
Dept: PRIMARY CARE CLINIC | Facility: CLINIC | Age: 67
End: 2022-06-03
Payer: MEDICARE

## 2022-06-03 NOTE — PROGRESS NOTES
"Individual Psychotherapy (LCSW/PhD)  Adelaida Flores,  6/3/2022    Site:  Telemed         Therapeutic Intervention: Met with patient for individual psychotherapy.    Chief complaint/reason for encounter: anxiety     Interval history and content of current session: Pt reports ongoing anxiety related to care for new dog. Pt sates she has been responsible for taking dog to training classes and care for the dog during the day. Pt states this has added "a lot" of stress to pt's day. SW and pt examined list of issues with dog and focused on daytime care.    Pt stated that dog has "nipped" her legs multiple times during the day while pt was working in their office. Pt stated that she tried to discuss dog placing dog in open pen during day the day with . Per pt, this conversation turned into an argument where  became angry and ridiculed pt. SW supported pt in processing feelings of anger and hurt at argument. SW guided pt through role play of argument and provided pt with feedback on responses. SW and pt discussed nature of anger and accompanying behavior I.e. need based, irrationality. SW encouraged pt to practice verbalizing thoughts and needs in a direct and calm manner. SW educated pt on strategies for dealing with angry persons including setting calm clear boundaries and acknowledging feelings. SW and pt discussed safety including removing oneself from an escalating argument and calling 911 if needed. Pt sates that  is not "physical" and that they feel they can work on their response to not escalate arguments. SW and pt discussed importance of self-advocacy and not assuming responsibility for others emotions and behaviors. Pt to practice approaches to conversation with  and report back next session.    Treatment plan:  · Target symptoms: anxiety   · Why chosen therapy is appropriate versus another modality: relevant to diagnosis, evidence based practice  · Outcome monitoring methods: " self-report, checklist/rating scale  · Therapeutic intervention type: behavior modifying psychotherapy, supportive psychotherapy    Risk parameters:  Patient reports no suicidal ideation  Patient reports no homicidal ideation  Patient reports no self-injurious behavior  Patient reports no violent behavior    Verbal deficits: None    Patient's response to intervention:  The patient's response to intervention is motivated.    Progress toward goals and other mental status changes:  The patient's progress toward goals is fair .    Diagnosis:   No diagnosis found.    Plan: Pt plans to continue individual psychotherapy    Return to clinic: 1 week    Length of Service (minutes): 30

## 2022-06-06 ENCOUNTER — OFFICE VISIT (OUTPATIENT)
Dept: PRIMARY CARE CLINIC | Facility: CLINIC | Age: 67
End: 2022-06-06
Payer: MEDICARE

## 2022-06-06 ENCOUNTER — PATIENT MESSAGE (OUTPATIENT)
Dept: PRIMARY CARE CLINIC | Facility: CLINIC | Age: 67
End: 2022-06-06

## 2022-06-06 VITALS
DIASTOLIC BLOOD PRESSURE: 53 MMHG | BODY MASS INDEX: 18.18 KG/M2 | WEIGHT: 113.13 LBS | OXYGEN SATURATION: 97 % | HEART RATE: 58 BPM | HEIGHT: 66 IN | SYSTOLIC BLOOD PRESSURE: 108 MMHG

## 2022-06-06 DIAGNOSIS — R09.89 CHEST CONGESTION: ICD-10-CM

## 2022-06-06 DIAGNOSIS — H61.23 CERUMINOSIS, BILATERAL: ICD-10-CM

## 2022-06-06 DIAGNOSIS — H93.8X9 SENSATION OF FULLNESS IN EAR, UNSPECIFIED LATERALITY: Primary | ICD-10-CM

## 2022-06-06 LAB
CTP QC/QA: YES
SARS-COV-2 RDRP RESP QL NAA+PROBE: NEGATIVE

## 2022-06-06 PROCEDURE — 3078F DIAST BP <80 MM HG: CPT | Mod: CPTII,S$GLB,, | Performed by: INTERNAL MEDICINE

## 2022-06-06 PROCEDURE — 3074F SYST BP LT 130 MM HG: CPT | Mod: CPTII,S$GLB,, | Performed by: INTERNAL MEDICINE

## 2022-06-06 PROCEDURE — 1126F PR PAIN SEVERITY QUANTIFIED, NO PAIN PRESENT: ICD-10-PCS | Mod: CPTII,S$GLB,, | Performed by: INTERNAL MEDICINE

## 2022-06-06 PROCEDURE — 99999 PR PBB SHADOW E&M-EST. PATIENT-LVL IV: ICD-10-PCS | Mod: PBBFAC,,, | Performed by: INTERNAL MEDICINE

## 2022-06-06 PROCEDURE — U0002 COVID-19 LAB TEST NON-CDC: HCPCS | Mod: QW,S$GLB,, | Performed by: INTERNAL MEDICINE

## 2022-06-06 PROCEDURE — 3008F PR BODY MASS INDEX (BMI) DOCUMENTED: ICD-10-PCS | Mod: CPTII,S$GLB,, | Performed by: INTERNAL MEDICINE

## 2022-06-06 PROCEDURE — 1101F PR PT FALLS ASSESS DOC 0-1 FALLS W/OUT INJ PAST YR: ICD-10-PCS | Mod: CPTII,S$GLB,, | Performed by: INTERNAL MEDICINE

## 2022-06-06 PROCEDURE — 99213 OFFICE O/P EST LOW 20 MIN: CPT | Mod: S$GLB,,, | Performed by: INTERNAL MEDICINE

## 2022-06-06 PROCEDURE — 99213 PR OFFICE/OUTPT VISIT, EST, LEVL III, 20-29 MIN: ICD-10-PCS | Mod: S$GLB,,, | Performed by: INTERNAL MEDICINE

## 2022-06-06 PROCEDURE — 1101F PT FALLS ASSESS-DOCD LE1/YR: CPT | Mod: CPTII,S$GLB,, | Performed by: INTERNAL MEDICINE

## 2022-06-06 PROCEDURE — 3288F PR FALLS RISK ASSESSMENT DOCUMENTED: ICD-10-PCS | Mod: CPTII,S$GLB,, | Performed by: INTERNAL MEDICINE

## 2022-06-06 PROCEDURE — 99999 PR PBB SHADOW E&M-EST. PATIENT-LVL IV: CPT | Mod: PBBFAC,,, | Performed by: INTERNAL MEDICINE

## 2022-06-06 PROCEDURE — 1160F PR REVIEW ALL MEDS BY PRESCRIBER/CLIN PHARMACIST DOCUMENTED: ICD-10-PCS | Mod: CPTII,S$GLB,, | Performed by: INTERNAL MEDICINE

## 2022-06-06 PROCEDURE — 3078F PR MOST RECENT DIASTOLIC BLOOD PRESSURE < 80 MM HG: ICD-10-PCS | Mod: CPTII,S$GLB,, | Performed by: INTERNAL MEDICINE

## 2022-06-06 PROCEDURE — 3288F FALL RISK ASSESSMENT DOCD: CPT | Mod: CPTII,S$GLB,, | Performed by: INTERNAL MEDICINE

## 2022-06-06 PROCEDURE — 3008F BODY MASS INDEX DOCD: CPT | Mod: CPTII,S$GLB,, | Performed by: INTERNAL MEDICINE

## 2022-06-06 PROCEDURE — 1159F PR MEDICATION LIST DOCUMENTED IN MEDICAL RECORD: ICD-10-PCS | Mod: CPTII,S$GLB,, | Performed by: INTERNAL MEDICINE

## 2022-06-06 PROCEDURE — 1160F RVW MEDS BY RX/DR IN RCRD: CPT | Mod: CPTII,S$GLB,, | Performed by: INTERNAL MEDICINE

## 2022-06-06 PROCEDURE — U0002: ICD-10-PCS | Mod: QW,S$GLB,, | Performed by: INTERNAL MEDICINE

## 2022-06-06 PROCEDURE — 1159F MED LIST DOCD IN RCRD: CPT | Mod: CPTII,S$GLB,, | Performed by: INTERNAL MEDICINE

## 2022-06-06 PROCEDURE — 1126F AMNT PAIN NOTED NONE PRSNT: CPT | Mod: CPTII,S$GLB,, | Performed by: INTERNAL MEDICINE

## 2022-06-06 PROCEDURE — 3074F PR MOST RECENT SYSTOLIC BLOOD PRESSURE < 130 MM HG: ICD-10-PCS | Mod: CPTII,S$GLB,, | Performed by: INTERNAL MEDICINE

## 2022-06-06 NOTE — PROGRESS NOTES
"Subjective:       Patient ID: Adelaida Flores is a 66 y.o. female.    Chief Complaint: congestion and lethargy    HPI  visit.     1 week ago, congestion and ear fullness. HA. No sinus pain. Sneezing. No rhinorrhea. Some slight congestion in the chest - needs to clear throat. No postnasal drip. Not coughing up anything. Slight cough. No fevers. Chills.   Took mucinex daily and flonase for the past few days. Doesn't think mucinex/flonase has helped all that much.   Reports these symptoms were there in March and then symptoms improved. Then came back in May and worsened a week ago.   Did home COVID test about 2 days ago and was negative.     Review of Systems  Comprehensive review of systems otherwise negative. See history/subjective section for more details.    Objective:      Physical Exam    BP (!) 108/53 (BP Location: Left arm, Patient Position: Sitting, BP Method: Medium (Manual))   Pulse (!) 58   Ht 5' 6" (1.676 m)   Wt 51.3 kg (113 lb 1.5 oz)   SpO2 97%   BMI 18.25 kg/m²     GEN - A+OX4, NAD. Thin.   HEENT - PERRL, EOMI, OP mildly erythematous. No exudates. TM unable to be visualized due to completely blocked by cerumen.  No sinus tenderness to palpation.   Neck - No thyromegaly or cervical LAD. No thyroid masses felt.  CV - RRR, no m/r   Chest - CTAB, no wheezing or rhonchi  Abd - S/NT/ND/+BS.   Ext - 2+BDP and radial pulses. No C/C/E.  Skin - multiple scratches from puppy on arms and legs but none appears infected.     Assessment/Plan     Adelaida was seen today for ear fullness.    Diagnoses and all orders for this visit:    Sensation of fullness in ear, unspecified laterality - flonase 1 SEN bid. ENT to have impaction removed.     Ceruminosis, bilateral  -     Ambulatory referral/consult to ENT; Future    Chest congestion  -     POCT COVID-19 Rapid Screening; Future  -     POCT COVID-19 Rapid Screening      Follow up if symptoms worsen or fail to improve.      Catie Portillo MD  Department of Internal Medicine " - [facility]  12:45 PM

## 2022-06-07 ENCOUNTER — PATIENT MESSAGE (OUTPATIENT)
Dept: PRIMARY CARE CLINIC | Facility: CLINIC | Age: 67
End: 2022-06-07
Payer: MEDICARE

## 2022-06-08 ENCOUNTER — PATIENT MESSAGE (OUTPATIENT)
Dept: PRIMARY CARE CLINIC | Facility: CLINIC | Age: 67
End: 2022-06-08
Payer: MEDICARE

## 2022-06-08 ENCOUNTER — TELEPHONE (OUTPATIENT)
Dept: OTOLARYNGOLOGY | Facility: CLINIC | Age: 67
End: 2022-06-08
Payer: MEDICARE

## 2022-06-08 NOTE — TELEPHONE ENCOUNTER
Returned call to patient and offered an appointment today and Monday at [facility] ENT office. Pt declined and stated she would prefer to reschedule for the 8/2 visit at [facility]. Also put patient on the wait list. Was thanked for calling.  ----- Message from Sanna Nunez sent at 6/8/2022  8:51 AM CDT -----  Contact: [phone]  Type:  Sooner Apoointment Request    Caller is requesting a sooner appointment.  Caller declined first available appointment listed below.  Caller will not accept being placed on the waitlist and is requesting a message be sent to doctor.  Name of Caller:pt called   When is the first available appointment?08/25/2022  Symptoms:Ear discomfort   Would the patient rather a call back or a response via [email or web address]? Call back  Best Call Back Number:[phone]  Additional Information:

## 2022-06-09 ENCOUNTER — DOCUMENTATION ONLY (OUTPATIENT)
Dept: PRIMARY CARE CLINIC | Facility: CLINIC | Age: 67
End: 2022-06-09
Payer: MEDICARE

## 2022-06-09 NOTE — PROGRESS NOTES
"Individual Psychotherapy (LCSW/PhD)  Adelaida Flores,  6/9/2022    Site:  Telemed         Therapeutic Intervention: Met with patient for individual psychotherapy.    Chief complaint/reason for encounter: anxiety     Interval history and content of current session: Pt reports progress in challenging negative thoughts. Pt states they have also made progress towards structuring their day. Pt is now setting aside specific times for different tasks including CBT work to reduce anxiety . Pt wrote down challenging thoughts on CBT journal before today's session.    Pt and SW utilized an "evidence for" "evidence against" exercise to evaluate pt's thoughts/beliefs. As an example,  Pt reported the belief "this dog was a bad idea, I am stupid for getting this dog". As evidence pt identified dog recently urinating in the home as evidence for this belief. SW and pt discussed "evidence against" this belief. Pt pointed to fact that dog was urinating outside previously but only recently regressed. Pt stated they believe this may be related to dog receiving vaccination from  lately.    SW and pt discussed importance of recognizing and challenging thoughts and beliefs. Pt plans on engaging in conversation with  regarding dog care and will utilize "I" statements and attempt to schedule a specific time with  to discuss issues.    Treatment plan:  · Target symptoms: anxiety   · Why chosen therapy is appropriate versus another modality: relevant to diagnosis, evidence based practice  · Outcome monitoring methods: self-report, checklist/rating scale  · Therapeutic intervention type: insight oriented psychotherapy, behavior modifying psychotherapy, supportive psychotherapy    Risk parameters:  Patient reports no suicidal ideation  Patient reports no homicidal ideation  Patient reports no self-injurious behavior  Patient reports no violent behavior    Verbal deficits: None    Patient's response to intervention:  The " patient's response to intervention is motivated.    Progress toward goals and other mental status changes:  The patient's progress toward goals is good .    Diagnosis:   No diagnosis found.    Plan: Pt plans to continue individual psychotherapy    Return to clinic: 1 week    Length of Service (minutes): 30

## 2022-06-13 ENCOUNTER — OFFICE VISIT (OUTPATIENT)
Dept: OTOLARYNGOLOGY | Facility: CLINIC | Age: 67
End: 2022-06-13
Payer: MEDICARE

## 2022-06-13 DIAGNOSIS — H61.23 BILATERAL IMPACTED CERUMEN: ICD-10-CM

## 2022-06-13 PROCEDURE — 99999 PR PBB SHADOW E&M-EST. PATIENT-LVL III: CPT | Mod: PBBFAC,,, | Performed by: NURSE PRACTITIONER

## 2022-06-13 PROCEDURE — 1126F PR PAIN SEVERITY QUANTIFIED, NO PAIN PRESENT: ICD-10-PCS | Mod: CPTII,S$GLB,, | Performed by: NURSE PRACTITIONER

## 2022-06-13 PROCEDURE — 3288F PR FALLS RISK ASSESSMENT DOCUMENTED: ICD-10-PCS | Mod: CPTII,S$GLB,, | Performed by: NURSE PRACTITIONER

## 2022-06-13 PROCEDURE — 3288F FALL RISK ASSESSMENT DOCD: CPT | Mod: CPTII,S$GLB,, | Performed by: NURSE PRACTITIONER

## 2022-06-13 PROCEDURE — 1160F PR REVIEW ALL MEDS BY PRESCRIBER/CLIN PHARMACIST DOCUMENTED: ICD-10-PCS | Mod: CPTII,S$GLB,, | Performed by: NURSE PRACTITIONER

## 2022-06-13 PROCEDURE — 69210 REMOVE IMPACTED EAR WAX UNI: CPT | Mod: S$GLB,,, | Performed by: NURSE PRACTITIONER

## 2022-06-13 PROCEDURE — 99999 PR PBB SHADOW E&M-EST. PATIENT-LVL III: ICD-10-PCS | Mod: PBBFAC,,, | Performed by: NURSE PRACTITIONER

## 2022-06-13 PROCEDURE — 99499 UNLISTED E&M SERVICE: CPT | Mod: S$GLB,,, | Performed by: NURSE PRACTITIONER

## 2022-06-13 PROCEDURE — 1101F PT FALLS ASSESS-DOCD LE1/YR: CPT | Mod: CPTII,S$GLB,, | Performed by: NURSE PRACTITIONER

## 2022-06-13 PROCEDURE — 1159F MED LIST DOCD IN RCRD: CPT | Mod: CPTII,S$GLB,, | Performed by: NURSE PRACTITIONER

## 2022-06-13 PROCEDURE — 99499 NO LOS: ICD-10-PCS | Mod: S$GLB,,, | Performed by: NURSE PRACTITIONER

## 2022-06-13 PROCEDURE — 1160F RVW MEDS BY RX/DR IN RCRD: CPT | Mod: CPTII,S$GLB,, | Performed by: NURSE PRACTITIONER

## 2022-06-13 PROCEDURE — 1101F PR PT FALLS ASSESS DOC 0-1 FALLS W/OUT INJ PAST YR: ICD-10-PCS | Mod: CPTII,S$GLB,, | Performed by: NURSE PRACTITIONER

## 2022-06-13 PROCEDURE — 69210 EAR CERUMEN REMOVAL: ICD-10-PCS | Mod: S$GLB,,, | Performed by: NURSE PRACTITIONER

## 2022-06-13 PROCEDURE — 1126F AMNT PAIN NOTED NONE PRSNT: CPT | Mod: CPTII,S$GLB,, | Performed by: NURSE PRACTITIONER

## 2022-06-13 PROCEDURE — 1159F PR MEDICATION LIST DOCUMENTED IN MEDICAL RECORD: ICD-10-PCS | Mod: CPTII,S$GLB,, | Performed by: NURSE PRACTITIONER

## 2022-06-13 NOTE — PROCEDURES
Ear Cerumen Removal    Date/Time: 6/13/2022 11:30 AM  Performed by: Kathleen Guzman DNP, FNP-C  Authorized by: Kathleen Guzman DNP, FNP-C     Consent Done?:  Yes (Verbal)    Local anesthetic:  None  Location details:  Both ears  Procedure type: curette    Cerumen  Removal Results:  Cerumen completely removed  Patient tolerance:  Patient tolerated the procedure well with no immediate complications     Procedure Note:    The patient was brought to the minor procedure room and placed under the operating microscope of the left ear canal which was cleaned of ceruminous debris. Using a combination of suction, curettes and cup forceps the patient's cerumen impaction was removed. The tympanic membrane was evaluated and was unremarkable. The patient tolerated the procedure well. There were no complications.    Procedure Note:    Patient was brought to the minor procedure room and using the operating microscope of the right ear canal which was cleaned of ceruminous debris. There was a significant cerumen impaction.  Using a combination of suction, curettes and cup forceps the patient's cerumen impaction was removed. Tympanic membrane intact. Pt tolerated well. There were no complications.

## 2022-06-15 ENCOUNTER — PATIENT MESSAGE (OUTPATIENT)
Dept: PRIMARY CARE CLINIC | Facility: CLINIC | Age: 67
End: 2022-06-15
Payer: MEDICARE

## 2022-06-16 ENCOUNTER — DOCUMENTATION ONLY (OUTPATIENT)
Dept: PRIMARY CARE CLINIC | Facility: CLINIC | Age: 67
End: 2022-06-16
Payer: MEDICARE

## 2022-06-16 NOTE — PROGRESS NOTES
"Individual Psychotherapy (LCSW/PhD)  Adelaida Flores,  6/16/2022    Site:  Telemed         Therapeutic Intervention: Met with patient for individual psychotherapy.    Chief complaint/reason for encounter: anxiety     Interval history and content of current session: Pt reports improvement in mood since last session. Pt was able to identify helpful thoughts such as "Ly is getting better but it takes time" regarding their dog's behavior. Pt and SW reviewed dog's progress I.e. following sit command etc. And discussed importance of acknowledging this progress to counter "black and white" thinking. Pt states they are still working to design a weekly schedule that will structure their week more. Pt stated that they were having difficulty setting boundaries with . SW and pt discussed approaches to boundary conversations with  and ways to look for mutually beneficial scenarios. Pt states they feel that speaking to  about importance of a work schedule and it's impact on pt's free time would be helpful. SW and pt reviewed "I" statements and ways to acknowledge positive behaviors in . Pt will continue to work to set a consistent daily schedule and communicate boundaries with .    Treatment plan:  · Target symptoms: anxiety   · Why chosen therapy is appropriate versus another modality: relevant to diagnosis, evidence based practice  · Outcome monitoring methods: self-report, checklist/rating scale  · Therapeutic intervention type: insight oriented psychotherapy, supportive psychotherapy    Risk parameters:  Patient reports no suicidal ideation  Patient reports no homicidal ideation  Patient reports no self-injurious behavior  Patient reports no violent behavior    Verbal deficits: None    Patient's response to intervention:  The patient's response to intervention is motivated.    Progress toward goals and other mental status changes:  The patient's progress toward goals is fair , " good.    Diagnosis:   No diagnosis found.    Plan: Pt plans to continue individual psychotherapy    Return to clinic: 1 week    Length of Service (minutes): 30

## 2022-06-20 ENCOUNTER — OFFICE VISIT (OUTPATIENT)
Dept: PSYCHIATRY | Facility: CLINIC | Age: 67
End: 2022-06-20
Payer: COMMERCIAL

## 2022-06-20 DIAGNOSIS — F41.1 GAD (GENERALIZED ANXIETY DISORDER): ICD-10-CM

## 2022-06-20 DIAGNOSIS — F33.1 MDD (MAJOR DEPRESSIVE DISORDER), RECURRENT EPISODE, MODERATE: Primary | ICD-10-CM

## 2022-06-20 DIAGNOSIS — F10.21 ALCOHOL DEPENDENCE IN SUSTAINED FULL REMISSION: ICD-10-CM

## 2022-06-20 DIAGNOSIS — R53.83 FATIGUE, UNSPECIFIED TYPE: ICD-10-CM

## 2022-06-20 PROCEDURE — 1159F PR MEDICATION LIST DOCUMENTED IN MEDICAL RECORD: ICD-10-PCS | Mod: CPTII,95,, | Performed by: PHYSICIAN ASSISTANT

## 2022-06-20 PROCEDURE — 1160F RVW MEDS BY RX/DR IN RCRD: CPT | Mod: CPTII,95,, | Performed by: PHYSICIAN ASSISTANT

## 2022-06-20 PROCEDURE — 99213 PR OFFICE/OUTPT VISIT, EST, LEVL III, 20-29 MIN: ICD-10-PCS | Mod: 95,,, | Performed by: PHYSICIAN ASSISTANT

## 2022-06-20 PROCEDURE — 99213 OFFICE O/P EST LOW 20 MIN: CPT | Mod: 95,,, | Performed by: PHYSICIAN ASSISTANT

## 2022-06-20 PROCEDURE — 1160F PR REVIEW ALL MEDS BY PRESCRIBER/CLIN PHARMACIST DOCUMENTED: ICD-10-PCS | Mod: CPTII,95,, | Performed by: PHYSICIAN ASSISTANT

## 2022-06-20 PROCEDURE — 1159F MED LIST DOCD IN RCRD: CPT | Mod: CPTII,95,, | Performed by: PHYSICIAN ASSISTANT

## 2022-06-20 RX ORDER — LAMOTRIGINE 100 MG/1
100 TABLET ORAL DAILY
Qty: 90 TABLET | Refills: 3 | Status: SHIPPED | OUTPATIENT
Start: 2022-06-20 | End: 2022-11-07 | Stop reason: SDUPTHER

## 2022-06-20 NOTE — PROGRESS NOTES
"The patient location is: [place], [place]  The chief complaint leading to consultation is: depression f/u    Visit type: audiovisual    Face to Face time with patient: 20  25 minutes of total time spent on the encounter, which includes face to face time and non-face to face time preparing to see the patient (eg, review of tests), Obtaining and/or reviewing separately obtained history, Documenting clinical information in the electronic or other health record, Independently interpreting results (not separately reported) and communicating results to the patient/family/caregiver, or Care coordination (not separately reported).         Each patient to whom he or she provides medical services by telemedicine is:  (1) informed of the relationship between the physician and patient and the respective role of any other health care provider with respect to management of the patient; and (2) notified that he or she may decline to receive medical services by telemedicine and may withdraw from such care at any time.    Notes:     Outpatient Psychiatry Follow-Up Visit (MD/AMBER)    6/20/2022    Clinical Status of Patient:  Outpatient (Ambulatory)    Chief Complaint:  Adelaida Flores is a 66 y.o. female who presents today for follow-up of depression.  Met with patient.      Interval History and Content of Current Session:  Interim Events/Subjective Report/Content of Current Session:    Pt reports today: "feeling more tired lately, I think it's dealing with the puppy all the time"    States increased lamictal 100mg daily has improved mood, depression significantly improved. "the anxiety and depression are definitely better."    "depression is ok, I just feel tired all the time." Recommended pt go back to PCP who she saw 2 weeks ago to get blood work up.    Patients mood is steady, mildly depressive, affect appears mood congruent. Linear and logical, friendly and cooperative, good eye contact.    Denies SI/HI/AVH. Pt reports sleeping well " "and normal appetite. Denies side effects of medications.    Pt reports taking medications as prescribed and behaving appropriately during interview today.        Prior visit 3/21/22:  Psych Interview 03/21/2022:   Adelaida Flores is a 66 y.o. female with past psychiatric history of depression, anxiety, hx eating disorder with binging and purging presented to for initial evaluation and treatment for reestablish with psychiatry for management of anxiety and depression.     Hx multiple prior psychiatric hospitalizations for eating disorder and suicidal ideation. Hx 5 suicide attempts by intentionally ingested poisoning chemicals or OD on rx pills, last attempt 35 years ago. Pt denies hx self harm. Pt deniex hx hallucinations.     Hx severe eating disorder, binging and purging, sometimes episodes of starving self or extending periods of times.  Patient reports continues to have issues with food and have negative or anxiety inducing thoughts about food such as I do not deserve DE, I want be able to stop eating once I start, I will have panic attacks if I eat more"     Patient reports does not eat normal standard meals, states that she snacks on Ensure and other protein shakes throughout the day.     Patient is a registered dietician and continues to work.  Also has a master's degree in health administration.     Pt denies hx trauma. Denies physical/sexual abuse. Pt denies symptoms including nightmares, hypervigilance, flashbacks, avoidance behaviors, and disassociation.     About 16 yrs was first treated for anxiety and depression.  States did not respond well to medications used during her teenage years until Prozac was released decades later.     Pt reports currently taking lamictal 50mg daily, prozac 80mg daily.  Patient was previously on Lamictal 100 mg daily, there was concerned that elevation in liver enzymes related to Lamictal and it was discontinued last December.  Patient reports after discontinuing the " "Lamictal she became severely depressed, discussed with her PCP who has been monitoring liver enzymes and PCP has had her restarted on Lamictal 50 mg daily which she states that she started a about 1 month ago.  Reports although she does continue to experience depressive symptoms that her depression is significantly improved     Lamictal restarted 1 month ago and reports improvement in depression. Reports depression today as 5/10, down from 9/10 1-2 months ago and anxiety as 8/10, down from 10/10 1-2 months ago.     Denies suicidal ideation in the last month. Denies HI/AVH. Pt reports sleeping well 7 hrs per night and decreased appetite-states she eats ensures or other protein shakes, yogurt, soup during the day. Denies side effects of medications.     The patient complained of depressed mood with lethargy, decreased appetite , insomnia with broken sleep, anhedonia, apathy, worsening self-esteem, decreased concentration, decreased ability to make decisions.     Pt denies hx symptoms/episodes of brittni.     Pt thc recreational drug use, "very rare thing for me". Pt reports 0 drinks per week-previously severe alcoholic, sober for last 20 years. Denies tobacco use-states stopped smoking about 28 years ago.     Access to gun: has access but "I dont know how to use it." encouraged pt that  needs to lock gun away so she does not access. She agrees to plan to inform  to move gun to safe/somewhere she does not have access.             Review of Systems     Review of Systems   Constitutional: Positive for malaise/fatigue. Negative for fever.   HENT: Negative for sore throat.    Eyes: Negative for photophobia.   Respiratory: Negative for cough.    Cardiovascular: Negative for chest pain.   Gastrointestinal: Negative for abdominal pain.   Genitourinary: Negative for dysuria.   Musculoskeletal: Negative for myalgias.   Skin: Negative for rash.   Neurological: Negative for dizziness.   Endo/Heme/Allergies: Does not " bruise/bleed easily.       Psychiatric Review Of Systems - Is patient experiencing or having changes in:  sleep: no  appetite: no  weight: no  energy/anergy: yes, increased fatigue  interest/pleasure/anhedonia: no  somatic symptoms: no  libido: no  anxiety/panic: no  guilty/hopelessness: no  concentration: no  S.I.B.s/risky behavior: no  Irritability: no  Racing thoughts: no  Impulsive behaviors: no  Paranoia: no  AVH: no      Past Medical, Family and Social History: The patient's past medical, family and social history have been reviewed and updated as appropriate within the electronic medical record - see encounter notes.      Current Medications:   Medication List with Changes/Refills   Current Medications    AZASITE 1 % DROP    Place 1 drop into both eyes 2 (two) times daily.    CHOLESTYRAMINE (QUESTRAN) 4 GRAM PACKET    Take 1 packet (4 g total) by mouth every evening.    DENOSUMAB (PROLIA) 60 MG/ML SYRG        FEXOFENADINE (ALLEGRA) 180 MG TABLET    Take 180 mg by mouth once daily.    FLUOROMETHOLONE 0.1% (FML) 0.1 % DRPS    Place into both eyes.    FLUOXETINE 40 MG CAPSULE    Take 2 capsules (80 mg total) by mouth once daily.    HYDROXYZINE PAMOATE (VISTARIL) 25 MG CAP    Take 1 capsule (25 mg total) by mouth nightly as needed (insomnia).    KRILL OIL ORAL        LEVOCETIRIZINE (XYZAL) 5 MG TABLET        MULTIVITAMIN CAPSULE    Take 1 capsule by mouth once daily.   Changed and/or Refilled Medications    Modified Medication Previous Medication    LAMOTRIGINE (LAMICTAL) 100 MG TABLET lamoTRIgine (LAMICTAL) 100 MG tablet       Take 1 tablet (100 mg total) by mouth once daily.    Take 1 tablet (100 mg total) by mouth once daily.         Allergies:   Review of patient's allergies indicates:   Allergen Reactions    Cefdinir Diarrhea    Grass pollen-red top, standard     House dust Itching         Vitals   There were no vitals filed for this visit.       Labs/Imaging/Studies:   No results found for this or any  previous visit (from the past 48 hour(s)).   No results found for: PHENYTOIN, PHENOBARB, VALPROATE, CBMZ    Compliance: yes    Side effects: None    Risk Parameters:  Patient reports no suicidal ideation  Patient reports no homicidal ideation  Patient reports no self-injurious behavior  Patient reports no violent behavior    Exam (detailed: at least 9 elements; comprehensive: all 15 elements)   Constitutional  Vitals:  Most recent vital signs, dated less than 90 days prior to this appointment, were reviewed.   There were no vitals filed for this visit.     General:  unremarkable, age appropriate     Musculoskeletal  Muscle Strength/Tone:  not examined   Gait & Station:  Not examined     Psychiatric  Speech:  no latency; no press   Mood & Affect:  depressed  congruent and appropriate   Thought Process:  normal and logical   Associations:  intact   Thought Content:  normal, no suicidality, no homicidality, delusions, or paranoia   Insight:  intact, has awareness of illness   Judgement: behavior is adequate to circumstances   Orientation:  grossly intact   Memory: intact for content of interview   Language: grossly intact   Attention Span & Concentration:  able to focus   Fund of Knowledge:  intact and appropriate to age and level of education     Assessment and Diagnosis   Status/Progress: Based on the examination today, the patient's problem(s) is/are improved and adequately but not ideally controlled.  New problems have been presented today.   Co-morbidities, Diagnostic uncertainty and Lack of compliance are not complicating management of the primary condition.  There are no active rule-out diagnoses for this patient at this time.     General Impression:      ICD-10-CM ICD-9-CM   1. MDD (major depressive disorder), recurrent episode, moderate  F33.1 296.32   2. MISTY (generalized anxiety disorder)  F41.1 300.02   3. Alcohol dependence in sustained full remission  F10.21 303.93   4. Fatigue, unspecified type  R53.83  780.79       Intervention/Counseling/Treatment Plan   · Medication Management: Continue current medications. The risks and benefits of medication were discussed with the patient.  -continue prozac 80mg daily  -continue lamictal 100mg daily    -f/u with PCP for significant fatigue    Return to Clinic: as scheduled        Last Manrique PA-C      Total face to face time: 20 min  Total time (chart review, patient contact, documentation): 25 min      *This note has been prepared using a combination of a dictation device and typing.  It has been checked for errors but some errors may still exist within the note as a result of speech recognition errors and/or typographical errors.

## 2022-06-22 ENCOUNTER — PATIENT MESSAGE (OUTPATIENT)
Dept: PRIMARY CARE CLINIC | Facility: CLINIC | Age: 67
End: 2022-06-22
Payer: MEDICARE

## 2022-06-24 ENCOUNTER — PATIENT MESSAGE (OUTPATIENT)
Dept: PRIMARY CARE CLINIC | Facility: CLINIC | Age: 67
End: 2022-06-24
Payer: MEDICARE

## 2022-06-29 ENCOUNTER — PATIENT MESSAGE (OUTPATIENT)
Dept: PRIMARY CARE CLINIC | Facility: CLINIC | Age: 67
End: 2022-06-29
Payer: MEDICARE

## 2022-07-05 ENCOUNTER — PATIENT MESSAGE (OUTPATIENT)
Dept: PRIMARY CARE CLINIC | Facility: CLINIC | Age: 67
End: 2022-07-05
Payer: MEDICARE

## 2022-07-08 ENCOUNTER — HOSPITAL ENCOUNTER (OUTPATIENT)
Dept: RADIOLOGY | Facility: HOSPITAL | Age: 67
Discharge: HOME | End: 2022-07-08
Attending: INTERNAL MEDICINE
Payer: MEDICARE

## 2022-07-08 ENCOUNTER — OFFICE VISIT (OUTPATIENT)
Dept: PRIMARY CARE CLINIC | Facility: CLINIC | Age: 67
End: 2022-07-08
Payer: MEDICARE

## 2022-07-08 VITALS
HEIGHT: 66 IN | DIASTOLIC BLOOD PRESSURE: 84 MMHG | SYSTOLIC BLOOD PRESSURE: 118 MMHG | BODY MASS INDEX: 17.61 KG/M2 | WEIGHT: 109.56 LBS | TEMPERATURE: 99 F

## 2022-07-08 DIAGNOSIS — R26.81 UNSTEADY GAIT: ICD-10-CM

## 2022-07-08 DIAGNOSIS — E55.9 VITAMIN D DEFICIENCY: ICD-10-CM

## 2022-07-08 DIAGNOSIS — J01.00 SUBACUTE MAXILLARY SINUSITIS: ICD-10-CM

## 2022-07-08 DIAGNOSIS — F50.00 ANOREXIA NERVOSA: ICD-10-CM

## 2022-07-08 DIAGNOSIS — R53.83 FATIGUE, UNSPECIFIED TYPE: Primary | ICD-10-CM

## 2022-07-08 DIAGNOSIS — F33.1 MODERATE EPISODE OF RECURRENT MAJOR DEPRESSIVE DISORDER: ICD-10-CM

## 2022-07-08 DIAGNOSIS — R53.83 FATIGUE, UNSPECIFIED TYPE: ICD-10-CM

## 2022-07-08 LAB
CTP QC/QA: YES
SARS-COV-2 RDRP RESP QL NAA+PROBE: NEGATIVE

## 2022-07-08 PROCEDURE — 99999 PR PBB SHADOW E&M-EST. PATIENT-LVL IV: CPT | Mod: PBBFAC,,, | Performed by: INTERNAL MEDICINE

## 2022-07-08 PROCEDURE — 1101F PR PT FALLS ASSESS DOC 0-1 FALLS W/OUT INJ PAST YR: ICD-10-PCS | Mod: CPTII,S$GLB,, | Performed by: INTERNAL MEDICINE

## 2022-07-08 PROCEDURE — 99214 PR OFFICE/OUTPT VISIT, EST, LEVL IV, 30-39 MIN: ICD-10-PCS | Mod: S$GLB,,, | Performed by: INTERNAL MEDICINE

## 2022-07-08 PROCEDURE — 3288F PR FALLS RISK ASSESSMENT DOCUMENTED: ICD-10-PCS | Mod: CPTII,S$GLB,, | Performed by: INTERNAL MEDICINE

## 2022-07-08 PROCEDURE — 71046 XR CHEST PA AND LATERAL: ICD-10-PCS | Mod: 26,,, | Performed by: RADIOLOGY

## 2022-07-08 PROCEDURE — 3008F BODY MASS INDEX DOCD: CPT | Mod: CPTII,S$GLB,, | Performed by: INTERNAL MEDICINE

## 2022-07-08 PROCEDURE — 1159F PR MEDICATION LIST DOCUMENTED IN MEDICAL RECORD: ICD-10-PCS | Mod: CPTII,S$GLB,, | Performed by: INTERNAL MEDICINE

## 2022-07-08 PROCEDURE — 1126F PR PAIN SEVERITY QUANTIFIED, NO PAIN PRESENT: ICD-10-PCS | Mod: CPTII,S$GLB,, | Performed by: INTERNAL MEDICINE

## 2022-07-08 PROCEDURE — 3079F DIAST BP 80-89 MM HG: CPT | Mod: CPTII,S$GLB,, | Performed by: INTERNAL MEDICINE

## 2022-07-08 PROCEDURE — 1160F PR REVIEW ALL MEDS BY PRESCRIBER/CLIN PHARMACIST DOCUMENTED: ICD-10-PCS | Mod: CPTII,S$GLB,, | Performed by: INTERNAL MEDICINE

## 2022-07-08 PROCEDURE — 1160F RVW MEDS BY RX/DR IN RCRD: CPT | Mod: CPTII,S$GLB,, | Performed by: INTERNAL MEDICINE

## 2022-07-08 PROCEDURE — 3079F PR MOST RECENT DIASTOLIC BLOOD PRESSURE 80-89 MM HG: ICD-10-PCS | Mod: CPTII,S$GLB,, | Performed by: INTERNAL MEDICINE

## 2022-07-08 PROCEDURE — 1126F AMNT PAIN NOTED NONE PRSNT: CPT | Mod: CPTII,S$GLB,, | Performed by: INTERNAL MEDICINE

## 2022-07-08 PROCEDURE — U0002 COVID-19 LAB TEST NON-CDC: HCPCS | Mod: QW,S$GLB,, | Performed by: INTERNAL MEDICINE

## 2022-07-08 PROCEDURE — 99214 OFFICE O/P EST MOD 30 MIN: CPT | Mod: S$GLB,,, | Performed by: INTERNAL MEDICINE

## 2022-07-08 PROCEDURE — 99999 PR PBB SHADOW E&M-EST. PATIENT-LVL IV: ICD-10-PCS | Mod: PBBFAC,,, | Performed by: INTERNAL MEDICINE

## 2022-07-08 PROCEDURE — 71046 X-RAY EXAM CHEST 2 VIEWS: CPT | Mod: TC

## 2022-07-08 PROCEDURE — U0002: ICD-10-PCS | Mod: QW,S$GLB,, | Performed by: INTERNAL MEDICINE

## 2022-07-08 PROCEDURE — 3288F FALL RISK ASSESSMENT DOCD: CPT | Mod: CPTII,S$GLB,, | Performed by: INTERNAL MEDICINE

## 2022-07-08 PROCEDURE — 1101F PT FALLS ASSESS-DOCD LE1/YR: CPT | Mod: CPTII,S$GLB,, | Performed by: INTERNAL MEDICINE

## 2022-07-08 PROCEDURE — 1159F MED LIST DOCD IN RCRD: CPT | Mod: CPTII,S$GLB,, | Performed by: INTERNAL MEDICINE

## 2022-07-08 PROCEDURE — 71046 X-RAY EXAM CHEST 2 VIEWS: CPT | Mod: 26,,, | Performed by: RADIOLOGY

## 2022-07-08 PROCEDURE — 3074F PR MOST RECENT SYSTOLIC BLOOD PRESSURE < 130 MM HG: ICD-10-PCS | Mod: CPTII,S$GLB,, | Performed by: INTERNAL MEDICINE

## 2022-07-08 PROCEDURE — 3008F PR BODY MASS INDEX (BMI) DOCUMENTED: ICD-10-PCS | Mod: CPTII,S$GLB,, | Performed by: INTERNAL MEDICINE

## 2022-07-08 PROCEDURE — 3074F SYST BP LT 130 MM HG: CPT | Mod: CPTII,S$GLB,, | Performed by: INTERNAL MEDICINE

## 2022-07-08 RX ORDER — AMOXICILLIN AND CLAVULANATE POTASSIUM 875; 125 MG/1; MG/1
1 TABLET, FILM COATED ORAL 2 TIMES DAILY
Qty: 20 TABLET | Refills: 0 | Status: SHIPPED | OUTPATIENT
Start: 2022-07-08 | End: 2022-07-18

## 2022-07-08 NOTE — PROGRESS NOTES
"Subjective:       Patient ID: Adelaida Flores is a 66 y.o. female.    Chief Complaint: Fatigue    HPI  Saw me for lethargy 6/6/22. Started on flonase 1 SEN BID and referred to ENT for B cerumen impaction. COVID neg.   S/p cerumen removal w/ Kathleen Guzman NP 6/13.  Has also seen FEMI Munoz 6/20/22 in psych. Cont prozac 80mg qd and lamictal 100mg qd.   CBC, CMP 5/10/22 WNL.     H/O eating disorder and has been monitoring weights.    Fatigue x over a month.  Has a fairly new puppy - getting all scratched up and nibbles on the arms. None of them are very painful or looks infected.   Sleeps from 6-8 hours a night - continuous. Every now and then may have a poor sleep night due to puppy keeping her up.   Does not feel well rested when she wakes up in the morning.   C/o some chest congestion - huong at night. Very little cough. No wheezing. Ear fullness is better.   Her temp today is 99.1. had some chills on and off for the few weeks.   Still working. Feels like she's dragging herself at work.   Feels like she has a bad taste in her mouth (thinks due to back of throat) all the time and so that decreases her appetite. Sinus pain.   Snores at night.     No falls. No numbness/tingling.   No body aches or sore throat/swollen glands/muscle aches.     Review of Systems  as above in HPI.     Objective:      Physical Exam    /84 (BP Location: Left arm, Patient Position: Sitting, BP Method: Medium (Manual))   Temp 99.1 °F (37.3 °C) (Oral)   Ht 5' 6" (1.676 m)   Wt 49.7 kg (109 lb 9.1 oz)   BMI 17.68 kg/m²     gEN - a+ox4, nad, thin  heent - perrl, op clear. MMM. TM dullness B. No sinus tenderness to palpation  Neck - no LAD  CV - RRR,  No m/r  Chest - CTAB, no rhonchi/crackles  Abd - S/NT/ND/+BS  Ext -2 + B radial and DP pulses. No LE edema.   MSK - lots of scratches on BUE. None appears infected.     Previous labs reviewed.    Assessment/Plan     Adelaida was seen today for fatigue.    Diagnoses and all orders for " this visit:    Fatigue, unspecified type  -     Comprehensive Metabolic Panel; Future  -     CBC Auto Differential; Future  -     TSH; Future  -     Vitamin D; Future  -     Vitamin B12; Future  -     Sedimentation rate; Future  -     C-Reactive Protein; Future  -     X-Ray Chest PA And Lateral; Future  -     Urinalysis; Future  -     Urinalysis Microscopic; Future    Subacute maxillary sinusitis  -     amoxicillin-clavulanate 875-125mg (AUGMENTIN) 875-125 mg per tablet; Take 1 tablet by mouth 2 (two) times daily. for 10 days  -     POCT COVID-19 Rapid Screening; Future    Unsteady gait  -     Vitamin B12; Future    Vitamin D deficiency  -     Vitamin D; Future    Moderate episode of recurrent major depressive disorder - cont lamictal 100mg daily. Check CMP.     Anorexia nervosa - weight loss of about 3-4lbs in the last month due to bad taste in her mouth that she attributes to postnasal drip. Hasn't been eating much although has been working on consciously drinking shakes.   Discussed w/ pt about my concerns w/ weight loss. Pt understands. Cont to work w/ Seth Jauregui LMSW.     Follow up in about 2 weeks (around 7/22/2022). for fatigue and weight check. Consider home sleep study.      Catie Portillo MD  Department of Internal Medicine - [facility]  9:33 AM

## 2022-07-08 NOTE — PATIENT INSTRUCTIONS
Follow up with me on 7/22/22 (Friday) at 11:40am for your weight and fatigue.   Labs and urine today.   Schedule chest x-ray.

## 2022-07-11 ENCOUNTER — PATIENT MESSAGE (OUTPATIENT)
Dept: PRIMARY CARE CLINIC | Facility: CLINIC | Age: 67
End: 2022-07-11
Payer: MEDICARE

## 2022-07-12 ENCOUNTER — PATIENT MESSAGE (OUTPATIENT)
Dept: PRIMARY CARE CLINIC | Facility: CLINIC | Age: 67
End: 2022-07-12
Payer: MEDICARE

## 2022-07-12 ENCOUNTER — TELEPHONE (OUTPATIENT)
Dept: PRIMARY CARE CLINIC | Facility: CLINIC | Age: 67
End: 2022-07-12
Payer: MEDICARE

## 2022-07-12 NOTE — TELEPHONE ENCOUNTER
Please call and notify pt:    Compared to her chest xray back in 2013, the lungs are now clear except the left diaphragm is higher and so it pushes against the lungs a little.   Please see how her congestion and symptoms are on antibiotics.     Urine, inflammatory markers, vitamin D are all normal. B12 can be high if she's on any vitamin supplements (please see if she's on any).   Blood counts are normal. Sodium is borderline low and one of th eliver enzymes is a hair above normal. Will recheck at our visit in 2 weeks.  If the augmentin hasn't helped her fatigue, then the labs do not explain it well. We'll probably discuss sleep study at next visit.     Thyroid stimulating hormone, secreted by the brain, is slightly high, which may be a sign of hypothyroidism (low thyroid). However the hormones secreted by the thyroid itself is normal. Thus this is called subclinical hypothyroidism. You do not need to be started on a medication at this point. We do have to recheck the thyroid levels in 6-12 months as you are at increased risk of developing true hyper or hypothyroidism.         Please also see how her weight is doing. Thanks!

## 2022-07-20 ENCOUNTER — PATIENT MESSAGE (OUTPATIENT)
Dept: PRIMARY CARE CLINIC | Facility: CLINIC | Age: 67
End: 2022-07-20
Payer: MEDICARE

## 2022-07-21 ENCOUNTER — DOCUMENTATION ONLY (OUTPATIENT)
Dept: PRIMARY CARE CLINIC | Facility: CLINIC | Age: 67
End: 2022-07-21
Payer: MEDICARE

## 2022-07-21 NOTE — PROGRESS NOTES
"Individual Psychotherapy (LCSW/PhD)  Adelaida Flores,  7/21/2022    Site:  [facility]         Therapeutic Intervention: Met with patient for individual psychotherapy.    Chief complaint/reason for encounter: anxiety     Interval history and content of current session: Pt reports ongoing stress with creating schedule to reduce stress. Pt has been feeling ill lately and has been "losing weight". Pt has been having GI issues and taking antibiotics for an illness. Pt and SW discussed importance of continuing to manage stress and triggers. Pt has been distracted by dog and began placing dog in crate during work hours.    SW and pt reviewed use of "I" statements. Pt reports success expressing frustration to . Pt reports positive response from  and feelings of encouragement. SW and pt role-played use of "I" statements and coping skills during conversations with . SW and pt discussed pt's concern for weight loss and importance of pt communicating changes in eating patterns and need for support with nutrition.    Treatment plan:  · Target symptoms: anxiety   · Why chosen therapy is appropriate versus another modality: relevant to diagnosis, evidence based practice  · Outcome monitoring methods: self-report, checklist/rating scale  · Therapeutic intervention type: behavior modifying psychotherapy, supportive psychotherapy    Risk parameters:  Patient reports no suicidal ideation  Patient reports no homicidal ideation  Patient reports no self-injurious behavior  Patient reports no violent behavior    Verbal deficits: None    Patient's response to intervention:  The patient's response to intervention is motivated.    Progress toward goals and other mental status changes:  The patient's progress toward goals is good.    Diagnosis:   No diagnosis found.    Plan: Pt plans to continue individual psychotherapy    Return to clinic: 2 weeks    Length of Service (minutes): 30        "

## 2022-07-22 ENCOUNTER — LAB VISIT (OUTPATIENT)
Dept: LAB | Facility: HOSPITAL | Age: 67
End: 2022-07-22
Attending: INTERNAL MEDICINE
Payer: MEDICARE

## 2022-07-22 ENCOUNTER — OFFICE VISIT (OUTPATIENT)
Dept: PRIMARY CARE CLINIC | Facility: CLINIC | Age: 67
End: 2022-07-22
Payer: MEDICARE

## 2022-07-22 VITALS
OXYGEN SATURATION: 97 % | WEIGHT: 107.81 LBS | SYSTOLIC BLOOD PRESSURE: 108 MMHG | HEART RATE: 67 BPM | HEIGHT: 66 IN | BODY MASS INDEX: 17.33 KG/M2 | DIASTOLIC BLOOD PRESSURE: 68 MMHG

## 2022-07-22 DIAGNOSIS — R10.13 EPIGASTRIC PAIN: ICD-10-CM

## 2022-07-22 DIAGNOSIS — G47.19 EXCESSIVE DAYTIME SLEEPINESS: ICD-10-CM

## 2022-07-22 DIAGNOSIS — R19.7 DIARRHEA, UNSPECIFIED TYPE: ICD-10-CM

## 2022-07-22 DIAGNOSIS — R11.0 NAUSEA: ICD-10-CM

## 2022-07-22 DIAGNOSIS — R06.83 SNORING: ICD-10-CM

## 2022-07-22 DIAGNOSIS — R53.83 FATIGUE, UNSPECIFIED TYPE: Primary | ICD-10-CM

## 2022-07-22 PROCEDURE — 87338 HPYLORI STOOL AG IA: CPT | Performed by: INTERNAL MEDICINE

## 2022-07-22 PROCEDURE — 3008F PR BODY MASS INDEX (BMI) DOCUMENTED: ICD-10-PCS | Mod: CPTII,S$GLB,, | Performed by: INTERNAL MEDICINE

## 2022-07-22 PROCEDURE — 3008F BODY MASS INDEX DOCD: CPT | Mod: CPTII,S$GLB,, | Performed by: INTERNAL MEDICINE

## 2022-07-22 PROCEDURE — 99999 PR PBB SHADOW E&M-EST. PATIENT-LVL IV: CPT | Mod: PBBFAC,,, | Performed by: INTERNAL MEDICINE

## 2022-07-22 PROCEDURE — 99214 OFFICE O/P EST MOD 30 MIN: CPT | Mod: S$GLB,,, | Performed by: INTERNAL MEDICINE

## 2022-07-22 PROCEDURE — 3288F FALL RISK ASSESSMENT DOCD: CPT | Mod: CPTII,S$GLB,, | Performed by: INTERNAL MEDICINE

## 2022-07-22 PROCEDURE — 1126F PR PAIN SEVERITY QUANTIFIED, NO PAIN PRESENT: ICD-10-PCS | Mod: CPTII,S$GLB,, | Performed by: INTERNAL MEDICINE

## 2022-07-22 PROCEDURE — 99214 PR OFFICE/OUTPT VISIT, EST, LEVL IV, 30-39 MIN: ICD-10-PCS | Mod: S$GLB,,, | Performed by: INTERNAL MEDICINE

## 2022-07-22 PROCEDURE — 89055 LEUKOCYTE ASSESSMENT FECAL: CPT | Performed by: INTERNAL MEDICINE

## 2022-07-22 PROCEDURE — 3078F PR MOST RECENT DIASTOLIC BLOOD PRESSURE < 80 MM HG: ICD-10-PCS | Mod: CPTII,S$GLB,, | Performed by: INTERNAL MEDICINE

## 2022-07-22 PROCEDURE — 1160F RVW MEDS BY RX/DR IN RCRD: CPT | Mod: CPTII,S$GLB,, | Performed by: INTERNAL MEDICINE

## 2022-07-22 PROCEDURE — 1101F PR PT FALLS ASSESS DOC 0-1 FALLS W/OUT INJ PAST YR: ICD-10-PCS | Mod: CPTII,S$GLB,, | Performed by: INTERNAL MEDICINE

## 2022-07-22 PROCEDURE — 82272 OCCULT BLD FECES 1-3 TESTS: CPT | Performed by: INTERNAL MEDICINE

## 2022-07-22 PROCEDURE — 3074F PR MOST RECENT SYSTOLIC BLOOD PRESSURE < 130 MM HG: ICD-10-PCS | Mod: CPTII,S$GLB,, | Performed by: INTERNAL MEDICINE

## 2022-07-22 PROCEDURE — 1101F PT FALLS ASSESS-DOCD LE1/YR: CPT | Mod: CPTII,S$GLB,, | Performed by: INTERNAL MEDICINE

## 2022-07-22 PROCEDURE — 1126F AMNT PAIN NOTED NONE PRSNT: CPT | Mod: CPTII,S$GLB,, | Performed by: INTERNAL MEDICINE

## 2022-07-22 PROCEDURE — 3288F PR FALLS RISK ASSESSMENT DOCUMENTED: ICD-10-PCS | Mod: CPTII,S$GLB,, | Performed by: INTERNAL MEDICINE

## 2022-07-22 PROCEDURE — 99999 PR PBB SHADOW E&M-EST. PATIENT-LVL IV: ICD-10-PCS | Mod: PBBFAC,,, | Performed by: INTERNAL MEDICINE

## 2022-07-22 PROCEDURE — 87449 NOS EACH ORGANISM AG IA: CPT | Performed by: INTERNAL MEDICINE

## 2022-07-22 PROCEDURE — 1160F PR REVIEW ALL MEDS BY PRESCRIBER/CLIN PHARMACIST DOCUMENTED: ICD-10-PCS | Mod: CPTII,S$GLB,, | Performed by: INTERNAL MEDICINE

## 2022-07-22 PROCEDURE — 1159F PR MEDICATION LIST DOCUMENTED IN MEDICAL RECORD: ICD-10-PCS | Mod: CPTII,S$GLB,, | Performed by: INTERNAL MEDICINE

## 2022-07-22 PROCEDURE — 3078F DIAST BP <80 MM HG: CPT | Mod: CPTII,S$GLB,, | Performed by: INTERNAL MEDICINE

## 2022-07-22 PROCEDURE — 3074F SYST BP LT 130 MM HG: CPT | Mod: CPTII,S$GLB,, | Performed by: INTERNAL MEDICINE

## 2022-07-22 PROCEDURE — 1159F MED LIST DOCD IN RCRD: CPT | Mod: CPTII,S$GLB,, | Performed by: INTERNAL MEDICINE

## 2022-07-22 RX ORDER — ONDANSETRON 4 MG/1
4 TABLET, ORALLY DISINTEGRATING ORAL EVERY 8 HOURS PRN
Qty: 30 TABLET | Refills: 0 | Status: SHIPPED | OUTPATIENT
Start: 2022-07-22 | End: 2024-01-17

## 2022-07-22 RX ORDER — OMEPRAZOLE 40 MG/1
40 CAPSULE, DELAYED RELEASE ORAL
Qty: 30 CAPSULE | Refills: 0 | Status: SHIPPED | OUTPATIENT
Start: 2022-07-22 | End: 2022-08-15

## 2022-07-22 RX ORDER — ONDANSETRON 4 MG/1
4 TABLET, ORALLY DISINTEGRATING ORAL EVERY 8 HOURS PRN
Qty: 30 TABLET | Refills: 0 | Status: SHIPPED | OUTPATIENT
Start: 2022-07-22 | End: 2022-07-22 | Stop reason: SDUPTHER

## 2022-07-22 NOTE — PROGRESS NOTES
"Subjective:       Patient ID: Adelaida Flores is a 66 y.o. female.    Chief Complaint: fatigue f/u    HPI   At our last visit, pt was having a lot of fatigue x >1 mo.  Labs - ESR/CRP/Vit D/TSH/CBC wnl. B12 is high. Na was 134. ALT is 49.   CXR - 7/8/22 - improved L pleural effusion and pulm vascular congestion. LLL atelectasis related to L hemidiaphragm elevation.  UA - WNL.   Rx augmentin for subacute sinusitis x 10 days.  MDD - on lamictal 100mg qd and fluoxetine 80mg daily. Has f/u w/ psych PA 8/1. Also follows w/ Seth Jauregui, [facility].   Weight loss of another 2lbs. Attributes to diarrhea w/ augmentin.   Reports sinuses are much better. Fatigue is a little better but was still fatigue at the end of the day where she cannot keep her eyes open anymore at 7:30pm. Still have watery stools about 4-5x/day. Nonbloody. Some nausea. No vomiting. Chills about 5 days ago but no fevers. Pain in the epigastric region every time she eats.     Due for MMG. Defers at this time.     Review of Systems  as above in HPI.     Objective:      Physical Exam    /68 (BP Location: Left arm, Patient Position: Sitting, BP Method: Medium (Manual))   Pulse 67   Ht 5' 6" (1.676 m)   Wt 48.9 kg (107 lb 12.9 oz)   SpO2 97%   BMI 17.40 kg/m²     GEN - A+OX4, NAD, thin  HEENT - PERRL, EOMI, OP clear. MMM.  Neck - No thyromegaly or cervical LAD. No thyroid masses felt.  CV - RRR, no m/r   Chest - CTAB, no wheezing or rhonchi  Abd - S/NT/ND/+BS.   Ext - 2+BDP and radial pulses. No C/C/E.  Skin - No rash.    Assessment/Plan     Diagnoses and all orders for this visit:    Fatigue, unspecified type    Excessive daytime sleepiness  -     Home Sleep Studies; Future    Snoring  -     Home Sleep Studies; Future    Diarrhea, unspecified type  -     Clostridium difficile EIA; Future  -     Occult blood x 1, stool; Future  -     WBC, Stool; Future    Nausea  -     Discontinue: ondansetron (ZOFRAN-ODT) 4 MG TbDL; Take 1 tablet (4 mg total) by mouth " every 8 (eight) hours as needed (nausea).  -     ondansetron (ZOFRAN-ODT) 4 MG TbDL; Take 1 tablet (4 mg total) by mouth every 8 (eight) hours as needed (nausea).  -     H. pylori antigen, stool; Future    Epigastric pain  -     omeprazole (PRILOSEC) 40 MG capsule; Take 1 capsule (40 mg total) by mouth before breakfast.  -     H. pylori antigen, stool; Future    2 week f/u w/ weight check in.      Catie Portillo MD  Department of Internal Medicine - [facility]  11:39 AM

## 2022-07-23 LAB
C DIFF GDH STL QL: NEGATIVE
C DIFF TOX A+B STL QL IA: NEGATIVE
OB PNL STL: NEGATIVE
WBC #/AREA STL HPF: NORMAL /[HPF]

## 2022-07-26 ENCOUNTER — TELEPHONE (OUTPATIENT)
Dept: SLEEP MEDICINE | Facility: OTHER | Age: 67
End: 2022-07-26
Payer: MEDICARE

## 2022-07-27 ENCOUNTER — TELEPHONE (OUTPATIENT)
Dept: PRIMARY CARE CLINIC | Facility: CLINIC | Age: 67
End: 2022-07-27
Payer: MEDICARE

## 2022-07-27 ENCOUNTER — PATIENT MESSAGE (OUTPATIENT)
Dept: PRIMARY CARE CLINIC | Facility: CLINIC | Age: 67
End: 2022-07-27
Payer: MEDICARE

## 2022-07-27 NOTE — TELEPHONE ENCOUNTER
----- Message from Jacqueline Burger MD sent at 7/26/2022  4:41 PM CDT -----  Labs shows normal stool studies.

## 2022-07-28 ENCOUNTER — PATIENT MESSAGE (OUTPATIENT)
Dept: PRIMARY CARE CLINIC | Facility: CLINIC | Age: 67
End: 2022-07-28
Payer: MEDICARE

## 2022-07-28 ENCOUNTER — DOCUMENTATION ONLY (OUTPATIENT)
Dept: PRIMARY CARE CLINIC | Facility: CLINIC | Age: 67
End: 2022-07-28
Payer: MEDICARE

## 2022-07-28 ENCOUNTER — TELEPHONE (OUTPATIENT)
Dept: SLEEP MEDICINE | Facility: OTHER | Age: 67
End: 2022-07-28
Payer: MEDICARE

## 2022-07-28 NOTE — PROGRESS NOTES
"Individual Psychotherapy (LCSW/PhD)  Adelaida Flores,  7/28/2022    Site:  [place]         Therapeutic Intervention: Met with patient for individual psychotherapy.    Chief complaint/reason for encounter: anxiety and interpersonal     Interval history and content of current session: Pt reports feeling better physically. Pt is working on contacting a  to help with dogs behavior which interferes with their daily work. Pt reports frustration with ability to concentrate. Pt has been writing and practicing "I statements" but has not had a chance to use with . Pt was able to communicate effectively with  using "I need" statement to receive support.     Pt reports frustration with inability to concentrate. Pt identifies chart review and documentation for work as especially challenging. SW and opt discussed pt breaking tasks into smaller groups and taking set breaks during the day to prevent eye strain and mental fatigue. Pt and SW discussed importance of hydration. SW and pt expressed desire to begin addressing eating and nutrition issues next session.      Treatment plan:  Target symptoms: distractability, anxiety , work stress  Why chosen therapy is appropriate versus another modality: relevant to diagnosis, evidence based practice  Outcome monitoring methods: self-report, checklist/rating scale  Therapeutic intervention type: behavior modifying psychotherapy, supportive psychotherapy    Risk parameters:  Patient reports no suicidal ideation  Patient reports no homicidal ideation  Patient reports no self-injurious behavior  Patient reports no violent behavior    Verbal deficits: None    Patient's response to intervention:  The patient's response to intervention is motivated.    Progress toward goals and other mental status changes:  The patient's progress toward goals is fair .    Diagnosis:   Anxiety, distractibility    Plan: Pt plans to continue individual psychotherapy    Return to clinic: 1 " week    Length of Service (minutes): 30

## 2022-07-29 LAB
H PYLORI AG STL QL IA: NOT DETECTED
SPECIMEN SOURCE: NORMAL

## 2022-08-01 ENCOUNTER — TELEPHONE (OUTPATIENT)
Dept: PSYCHIATRY | Facility: CLINIC | Age: 67
End: 2022-08-01
Payer: MEDICARE

## 2022-08-01 ENCOUNTER — TELEPHONE (OUTPATIENT)
Dept: SLEEP MEDICINE | Facility: OTHER | Age: 67
End: 2022-08-01
Payer: MEDICARE

## 2022-08-01 ENCOUNTER — PATIENT MESSAGE (OUTPATIENT)
Dept: HEPATOLOGY | Facility: CLINIC | Age: 67
End: 2022-08-01
Payer: MEDICARE

## 2022-08-01 ENCOUNTER — PATIENT MESSAGE (OUTPATIENT)
Dept: ENDOCRINOLOGY | Facility: CLINIC | Age: 67
End: 2022-08-01
Payer: MEDICARE

## 2022-08-01 ENCOUNTER — TELEPHONE (OUTPATIENT)
Dept: PRIMARY CARE CLINIC | Facility: CLINIC | Age: 67
End: 2022-08-01
Payer: MEDICARE

## 2022-08-01 NOTE — TELEPHONE ENCOUNTER
Patient not sure she wants to have the home sleep study,she's feeling better.  She will call Dr. Portillo.

## 2022-08-01 NOTE — TELEPHONE ENCOUNTER
Attempt to contact the patient to inform provider is out sick and appt will have to be r/s. No answer, LVM

## 2022-08-01 NOTE — TELEPHONE ENCOUNTER
Sea, I think she canceled her f/u appt w/ me today. Can you see if she can be scheduled to see me in 2-3 weeks to follow up on the stomach pain and nausea? Thanks! Please see how her loose stools are doing.

## 2022-08-03 ENCOUNTER — PATIENT MESSAGE (OUTPATIENT)
Dept: PRIMARY CARE CLINIC | Facility: CLINIC | Age: 67
End: 2022-08-03
Payer: MEDICARE

## 2022-08-04 ENCOUNTER — DOCUMENTATION ONLY (OUTPATIENT)
Dept: PRIMARY CARE CLINIC | Facility: CLINIC | Age: 67
End: 2022-08-04
Payer: MEDICARE

## 2022-08-04 ENCOUNTER — PATIENT MESSAGE (OUTPATIENT)
Dept: PSYCHIATRY | Facility: CLINIC | Age: 67
End: 2022-08-04
Payer: MEDICARE

## 2022-08-04 ENCOUNTER — PATIENT MESSAGE (OUTPATIENT)
Dept: PRIMARY CARE CLINIC | Facility: CLINIC | Age: 67
End: 2022-08-04
Payer: MEDICARE

## 2022-08-04 NOTE — PROGRESS NOTES
"Individual Psychotherapy (LCSW/PhD)  Adelaida Flores,  8/4/2022    Site:  [facility]         Therapeutic Intervention: Met with patient for individual psychotherapy.    Chief complaint/reason for encounter: anxiety and behavior     Interval history and content of current session: Pt reports recent increase in stressors. Pt and  recently argued about having family visit the home. Pt used "I" statements but still felt that  was not "hearing me". Pt reports feeling "very distracted" and "overwhelmed". SW and pt practiced deep breathing and began using CBT triangle to analyze upsetting situations. Pt and SW discussed recent situation where opt was triggered by a non-response to a Facebook for sister's birthday. Using the CBT triangle,  SW and pt slowly progressed through each facet listing thoughts, emotions and behaviors. Pt and SW discussed ways to alter thoughts and behaviors to reduce anxiety and practiced helpful self-talk.     Pt reports losing weight over last week. Pt and SW discussed safety plan if pt drops further weight. Pt to contact PCP for check-in ASAP. Pt identified protein shakes and soup as desirable foods and stated they planned on having these items for "lunch and dinner". SW notified PCP and MA for coordination.    Treatment plan:  · Target symptoms: anxiety   · Why chosen therapy is appropriate versus another modality: relevant to diagnosis, evidence based practice  · Outcome monitoring methods: self-report, checklist/rating scale  · Therapeutic intervention type: behavior modifying psychotherapy, supportive psychotherapy    Risk parameters:  Patient reports no suicidal ideation  Patient reports no homicidal ideation  Patient reports no self-injurious behavior  Patient reports no violent behavior    Verbal deficits: None    Patient's response to intervention:  The patient's response to intervention is accepting.    Progress toward goals and other mental status changes:  The patient's " progress toward goals is limited.    Diagnosis:   No diagnosis found.    Plan: Pt plans to continue individual psychotherapy    Return to clinic: 1 week    Length of Service (minutes): 30

## 2022-08-05 ENCOUNTER — PATIENT MESSAGE (OUTPATIENT)
Dept: PRIMARY CARE CLINIC | Facility: CLINIC | Age: 67
End: 2022-08-05
Payer: MEDICARE

## 2022-08-05 NOTE — TELEPHONE ENCOUNTER
I would want her MIL to be seen after the hand surgeon. After 8/29 in Sept is fine if there's a back to back appt available.

## 2022-08-05 NOTE — TELEPHONE ENCOUNTER
Dr. Portillo we dont have any back to back apts for a while, unless you would be ok with a 11:40 and 1pm together.

## 2022-08-10 ENCOUNTER — PATIENT MESSAGE (OUTPATIENT)
Dept: PRIMARY CARE CLINIC | Facility: CLINIC | Age: 67
End: 2022-08-10
Payer: MEDICARE

## 2022-08-11 ENCOUNTER — DOCUMENTATION ONLY (OUTPATIENT)
Dept: PRIMARY CARE CLINIC | Facility: CLINIC | Age: 67
End: 2022-08-11
Payer: MEDICARE

## 2022-08-11 NOTE — PROGRESS NOTES
"Individual Psychotherapy (LCSW/PhD)  Adelaida Flores,  8/11/2022    Site:  [facility]         Therapeutic Intervention: Met with patient for individual psychotherapy.    Chief complaint/reason for encounter: anxiety and interpersonal     Interval history and content of current session: Pt reports recent increase in back pain which has caused discomfort. Pt feels that recent pain is being brought on by caring for dog I.e. helping dog in and out of car, bending down. SW and pt discussed ways to improve physical health I.e. basic strength training. Pt states they already engage in strength training 3x's per week. SW and pt used CBT to identify unhelpful thoughts i.e. putting dog in crate during exercise to be able to concentrate. Pt identified feelings of being "cruel" and states they are worried that their  would get "mad" if dog is placed in crate. SW and pt discussed ways to communicate with  using "I" statements to set boundaries.    SW and pt practiced using empathic listening and reflective statements to communicate better with . SW and pt discussed link between exercise and good nutrition. SW and pt discussed pt's anxiety surrounding eating real food I.e. not eating protein shakes. Pt and SW discussed identifying different competing "voices" in head and "talking back" to that voice. SW and pt role-played discussion regarding nutrition intake.     SW and pt discussed incorporating one "whole food" snack into evenings. SW and pt agreed to discuss further next session     Treatment plan:  · Target symptoms: anxiety   · Why chosen therapy is appropriate versus another modality: relevant to diagnosis, evidence based practice  · Outcome monitoring methods: self-report, checklist/rating scale  · Therapeutic intervention type: behavior modifying psychotherapy, supportive psychotherapy    Risk parameters:  Patient reports no suicidal ideation  Patient reports no homicidal ideation  Patient reports no " self-injurious behavior  Patient reports no violent behavior    Verbal deficits: None    Patient's response to intervention:  The patient's response to intervention is motivated.    Progress toward goals and other mental status changes:  The patient's progress toward goals is fair .    Diagnosis:   Anxiety, eating behavior    Plan: Pt plans to continue individual psychotherapy    Return to clinic: 1 week    Length of Service (minutes): 30

## 2022-08-17 ENCOUNTER — PATIENT MESSAGE (OUTPATIENT)
Dept: PRIMARY CARE CLINIC | Facility: CLINIC | Age: 67
End: 2022-08-17
Payer: MEDICARE

## 2022-08-18 ENCOUNTER — DOCUMENTATION ONLY (OUTPATIENT)
Dept: PRIMARY CARE CLINIC | Facility: CLINIC | Age: 67
End: 2022-08-18
Payer: MEDICARE

## 2022-08-18 ENCOUNTER — INFUSION (OUTPATIENT)
Dept: INFECTIOUS DISEASES | Facility: HOSPITAL | Age: 67
End: 2022-08-18
Attending: INTERNAL MEDICINE
Payer: MEDICARE

## 2022-08-18 ENCOUNTER — TELEPHONE (OUTPATIENT)
Dept: SLEEP MEDICINE | Facility: OTHER | Age: 67
End: 2022-08-18
Payer: MEDICARE

## 2022-08-18 VITALS
SYSTOLIC BLOOD PRESSURE: 98 MMHG | TEMPERATURE: 98 F | RESPIRATION RATE: 16 BRPM | WEIGHT: 109.25 LBS | DIASTOLIC BLOOD PRESSURE: 50 MMHG | BODY MASS INDEX: 17.63 KG/M2 | HEART RATE: 59 BPM | OXYGEN SATURATION: 99 %

## 2022-08-18 DIAGNOSIS — M81.0 SENILE OSTEOPOROSIS: Primary | ICD-10-CM

## 2022-08-18 PROCEDURE — 63600175 PHARM REV CODE 636 W HCPCS: Mod: JG | Performed by: NURSE PRACTITIONER

## 2022-08-18 PROCEDURE — 96372 THER/PROPH/DIAG INJ SC/IM: CPT

## 2022-08-18 RX ADMIN — DENOSUMAB 60 MG: 60 INJECTION SUBCUTANEOUS at 08:08

## 2022-08-18 NOTE — PROGRESS NOTES
Individual Psychotherapy (LCSW/PhD)  Adelaida Flores,  8/18/2022    Site:  Tele[facility]         Therapeutic Intervention: Met with patient for individual psychotherapy.    Chief complaint/reason for encounter: anxiety and behavior     Interval history and content of current session: Pt reports increased anxiety regarding eating. Pt recited journal entries reflecting anxious and thoughts and beliefs related to increasing. SW supported pt in processing feelings of guilt and shame and identifying triggering thoughts. SW and pt processed past experiences with teasing and ridicule that have impacted pt's self-worth. SW guided pt in connecting these thoughts and ideas for increased insight.    SW and identified helpful vs. Un-helpful thoughts and characterized thoughts as a fictional committee. SW and pt practiced externalizing thoughts and feelings. SW and pt discussed need for more emotional support form pt's  in helping pt cope. SW and pt discussed pt's knowledged of nutrition and it's ability to empower pt.        Treatment plan:  · Target symptoms: anxiety   · Why chosen therapy is appropriate versus another modality: relevant to diagnosis, evidence based practice  · Outcome monitoring methods: self-report, checklist/rating scale  · Therapeutic intervention type: supportive psychotherapy    Risk parameters:  Patient reports no suicidal ideation  Patient reports no homicidal ideation  Patient reports no self-injurious behavior  Patient reports no violent behavior    Verbal deficits: None    Patient's response to intervention:  The patient's response to intervention is accepting.    Progress toward goals and other mental status changes:  The patient's progress toward goals is limited.    Diagnosis:   No diagnosis found.    Plan: Pt plans to continue individual psychotherapy    Return to clinic: 2 weeks    Length of Service (minutes): 30

## 2022-08-18 NOTE — PROGRESS NOTES
Received Prolia 60 mg injection sub Q in left arm. Patient stated she is taking vitamin D supplements.  Pt denies dental surgery < 3 months, Tolerated well and left in NAD

## 2022-08-19 ENCOUNTER — HOSPITAL ENCOUNTER (OUTPATIENT)
Dept: SLEEP MEDICINE | Facility: OTHER | Age: 67
Discharge: HOME | End: 2022-08-19
Attending: INTERNAL MEDICINE
Payer: MEDICARE

## 2022-08-19 ENCOUNTER — OFFICE VISIT (OUTPATIENT)
Dept: PSYCHIATRY | Facility: CLINIC | Age: 67
End: 2022-08-19
Payer: COMMERCIAL

## 2022-08-19 DIAGNOSIS — F10.21 ALCOHOL DEPENDENCE IN SUSTAINED FULL REMISSION: ICD-10-CM

## 2022-08-19 DIAGNOSIS — F41.1 GAD (GENERALIZED ANXIETY DISORDER): ICD-10-CM

## 2022-08-19 DIAGNOSIS — F33.1 MDD (MAJOR DEPRESSIVE DISORDER), RECURRENT EPISODE, MODERATE: Primary | ICD-10-CM

## 2022-08-19 DIAGNOSIS — G47.19 EXCESSIVE DAYTIME SLEEPINESS: ICD-10-CM

## 2022-08-19 DIAGNOSIS — R06.83 SNORING: ICD-10-CM

## 2022-08-19 PROCEDURE — 95800 SLP STDY UNATTENDED: CPT

## 2022-08-19 PROCEDURE — 99213 PR OFFICE/OUTPT VISIT, EST, LEVL III, 20-29 MIN: ICD-10-PCS | Mod: 95,,, | Performed by: PHYSICIAN ASSISTANT

## 2022-08-19 PROCEDURE — 95806 PR SLEEP STUDY, UNATTENDED, SIMUL RECORD HR/O2 SAT/RESP FLOW/RESP EFFT: ICD-10-PCS | Mod: 26,,, | Performed by: INTERNAL MEDICINE

## 2022-08-19 PROCEDURE — 95806 SLEEP STUDY UNATT&RESP EFFT: CPT | Mod: 26,,, | Performed by: INTERNAL MEDICINE

## 2022-08-19 PROCEDURE — 99213 OFFICE O/P EST LOW 20 MIN: CPT | Mod: 95,,, | Performed by: PHYSICIAN ASSISTANT

## 2022-08-19 NOTE — PROGRESS NOTES
"The patient location is: [place]  The chief complaint leading to consultation is: depression f/u    Visit type: audiovisual    Face to Face time with patient: 12  23 minutes of total time spent on the encounter, which includes face to face time and non-face to face time preparing to see the patient (eg, review of tests), Obtaining and/or reviewing separately obtained history, Documenting clinical information in the electronic or other health record, Independently interpreting results (not separately reported) and communicating results to the patient/family/caregiver, or Care coordination (not separately reported).         Each patient to whom he or she provides medical services by telemedicine is:  (1) informed of the relationship between the physician and patient and the respective role of any other health care provider with respect to management of the patient; and (2) notified that he or she may decline to receive medical services by telemedicine and may withdraw from such care at any time.    Notes:       Outpatient Psychiatry Follow-Up Visit (MD/AMBER)    8/19/2022    Clinical Status of Patient:  Outpatient (Ambulatory)    Chief Complaint:  Adelaida Flores is a 66 y.o. female who presents today for follow-up of depression and anxiety.  Met with patient.      Interval History and Content of Current Session:  Interim Events/Subjective Report/Content of Current Session:    Pt reports today: "doing ok. Feel a little depressed but ok overall". States feeling better physically, improved energy    States that SAMANTHA has working with her, she finds, it challenging. States having increased anxiety due to work she is doing in therapy.    States getting sleep study done tonight due to significant fatigue, pcp was concerned due to sleep study.    Patients mood is steady, affect appears mood congruent. Linear and logical, friendly and cooperative, good eye contact.    Denies SI/HI/AVH. Pt reports sleeping well and normal " "appetite. Denies side effects of medications.    Pt reports taking medications as prescribed and behaving appropriately during interview today.      Prior visit 6/20/22:  Pt reports today: "feeling more tired lately, I think it's dealing with the puppy all the time"     States increased lamictal 100mg daily has improved mood, depression significantly improved. "the anxiety and depression are definitely better."     "depression is ok, I just feel tired all the time." Recommended pt go back to PCP who she saw 2 weeks ago to get blood work up.     Patients mood is steady, mildly depressive, affect appears mood congruent. Linear and logical, friendly and cooperative, good eye contact.     Denies SI/HI/AVH. Pt reports sleeping well on vistaril and normal appetite. Denies side effects of medications.     Pt reports taking medications as prescribed and behaving appropriately during interview today.      Review of Systems       Psychiatric Review Of Systems - Is patient experiencing or having changes in:  sleep: no  appetite: no  weight: no  energy/anergy: no  interest/pleasure/anhedonia: no  somatic symptoms: no  libido: no  anxiety/panic: no  guilty/hopelessness: no  concentration: no  S.I.B.s/risky behavior: no  Irritability: no  Racing thoughts: no  Impulsive behaviors: no  Paranoia: no  AVH: no      Past Medical, Family and Social History: The patient's past medical, family and social history have been reviewed and updated as appropriate within the electronic medical record - see encounter notes.      Current Medications:   Medication List with Changes/Refills   Current Medications    AZASITE 1 % DROP    Place 1 drop into both eyes 2 (two) times daily.    CHOLESTYRAMINE (QUESTRAN) 4 GRAM PACKET    Take 1 packet (4 g total) by mouth every evening.    DENOSUMAB (PROLIA) 60 MG/ML SYRG        FEXOFENADINE (ALLEGRA) 180 MG TABLET    Take 180 mg by mouth once daily.    FLUOROMETHOLONE 0.1% (FML) 0.1 % DRPS    Place into both " eyes.    FLUOXETINE 40 MG CAPSULE    Take 2 capsules (80 mg total) by mouth once daily.    HYDROXYZINE PAMOATE (VISTARIL) 25 MG CAP    Take 1 capsule (25 mg total) by mouth nightly as needed (insomnia).    KRILL OIL ORAL        LAMOTRIGINE (LAMICTAL) 100 MG TABLET    Take 1 tablet (100 mg total) by mouth once daily.    LEVOCETIRIZINE (XYZAL) 5 MG TABLET        MULTIVITAMIN CAPSULE    Take 1 capsule by mouth once daily.    OMEPRAZOLE (PRILOSEC) 40 MG CAPSULE    TAKE 1 CAPSULE BY MOUTH BEFORE BREAKFAST.    ONDANSETRON (ZOFRAN-ODT) 4 MG TBDL    Take 1 tablet (4 mg total) by mouth every 8 (eight) hours as needed (nausea).         Allergies:   Review of patient's allergies indicates:   Allergen Reactions    Cefdinir Diarrhea    Grass pollen-red top, standard     House dust Itching         Vitals   There were no vitals filed for this visit.       Labs/Imaging/Studies:   No results found for this or any previous visit (from the past 48 hour(s)).   No results found for: PHENYTOIN, PHENOBARB, VALPROATE, CBMZ    Compliance: yes    Side effects: None    Risk Parameters:  Patient reports no suicidal ideation  Patient reports no homicidal ideation  Patient reports no self-injurious behavior  Patient reports no violent behavior    Exam (detailed: at least 9 elements; comprehensive: all 15 elements)   Constitutional  Vitals:  Most recent vital signs, dated less than 90 days prior to this appointment, were reviewed.   There were no vitals filed for this visit.     General:  unremarkable, age appropriate     Musculoskeletal  Muscle Strength/Tone:  not examined   Gait & Station:  non-ataxic     Psychiatric  Speech:  no latency; no press   Mood & Affect:  steady  congruent and appropriate   Thought Process:  normal and logical   Associations:  intact   Thought Content:  normal, no suicidality, no homicidality, delusions, or paranoia   Insight:  intact, has awareness of illness   Judgement: behavior is adequate to circumstances    Orientation:  grossly intact   Memory: intact for content of interview   Language: grossly intact   Attention Span & Concentration:  able to focus   Fund of Knowledge:  intact and appropriate to age and level of education     Assessment and Diagnosis   Status/Progress: Based on the examination today, the patient's problem(s) is/are adequately but not ideally controlled.  New problems have not been presented today.   Co-morbidities, Diagnostic uncertainty and Lack of compliance are not complicating management of the primary condition.  There are no active rule-out diagnoses for this patient at this time.     General Impression:      ICD-10-CM ICD-9-CM   1. MDD (major depressive disorder), recurrent episode, moderate  F33.1 296.32   2. MISTY (generalized anxiety disorder)  F41.1 300.02   3. Alcohol dependence in sustained full remission  F10.21 303.93       Intervention/Counseling/Treatment Plan   · Medication Management: Continue current medications. The risks and benefits of medication were discussed with the patient.    -continue prozac 80mg daily  -continue lamictal 100mg daily  -vistaril prn insomnia    Return to Clinic: 3 months        Last Manrique PA-C      Total face to face time: 12 min  Total time (chart review, patient contact, documentation): 23 min      *This note has been prepared using a combination of a dictation device and typing.  It has been checked for errors but some errors may still exist within the note as a result of speech recognition errors and/or typographical errors.

## 2022-08-23 ENCOUNTER — PATIENT MESSAGE (OUTPATIENT)
Dept: PRIMARY CARE CLINIC | Facility: CLINIC | Age: 67
End: 2022-08-23
Payer: MEDICARE

## 2022-08-23 ENCOUNTER — TELEPHONE (OUTPATIENT)
Dept: PRIMARY CARE CLINIC | Facility: CLINIC | Age: 67
End: 2022-08-23
Payer: MEDICARE

## 2022-08-23 DIAGNOSIS — G47.19 EXCESSIVE DAYTIME SLEEPINESS: Primary | ICD-10-CM

## 2022-08-23 NOTE — TELEPHONE ENCOUNTER
Please call and notify pt:    Your home sleep study does show mild to moderate obstructive sleep apnea (you stop breathing in your sleep due to obstruction). This may be the cause of your daytime sleepiness or snoring and if left untreated, can also worsen your anxiety/depression. At this time, I recommend treatment with a machine called a CPAP to keep the airways open. There are several types of masks that can be used to deliver the pressure to keep the airway open - full face mask vs nasal mask/pillow. Do you have a preference to which one? Once I know this information, I can put in the order and have my staff fax it to the medical supply company. If you do not hear from them in two weeks, please let us know.

## 2022-08-24 ENCOUNTER — TELEPHONE (OUTPATIENT)
Dept: PRIMARY CARE CLINIC | Facility: CLINIC | Age: 67
End: 2022-08-24
Payer: MEDICARE

## 2022-08-24 DIAGNOSIS — Z11.1 TUBERCULOSIS SCREENING: Primary | ICD-10-CM

## 2022-08-29 ENCOUNTER — PATIENT MESSAGE (OUTPATIENT)
Dept: PRIMARY CARE CLINIC | Facility: CLINIC | Age: 67
End: 2022-08-29
Payer: MEDICARE

## 2022-08-31 ENCOUNTER — DOCUMENTATION ONLY (OUTPATIENT)
Dept: PRIMARY CARE CLINIC | Facility: CLINIC | Age: 67
End: 2022-08-31
Payer: MEDICARE

## 2022-08-31 ENCOUNTER — PATIENT MESSAGE (OUTPATIENT)
Dept: PRIMARY CARE CLINIC | Facility: CLINIC | Age: 67
End: 2022-08-31
Payer: MEDICARE

## 2022-08-31 NOTE — PROGRESS NOTES
"Individual Psychotherapy (LCSW/PhD)  Adelaida Flores,  8/31/2022    Site:  [facility]         Therapeutic Intervention: Met with patient for individual psychotherapy.    Chief complaint/reason for encounter: anxiety, appetite, and behavior     Interval history and content of current session: Pt recently returned from trip and states it was "good". Pt has been having automatic thoughts that they need to punish themself for losing an item recently. Pt sates they feel that they need to "punish" theirs self for losing the item. Pt states that they don't always get so upset when they make mistakes but do so when money is involved. Pt has been reading an eating disorder book and looking at ideas to help create meal plan. SW encouraged pt to utilize deep breathing and CBT thought journal to identify helpful vs. Harmful thoughts. SW and pt discussed ways to acknowledge their "Wise Mind" vs. Critical mind    Treatment plan:  Target symptoms: anxiety , eating  Why chosen therapy is appropriate versus another modality: relevant to diagnosis, evidence based practice  Outcome monitoring methods: self-report, checklist/rating scale  Therapeutic intervention type: behavior modifying psychotherapy, supportive psychotherapy    Risk parameters:  Patient reports no suicidal ideation  Patient reports no homicidal ideation  Patient reports no self-injurious behavior  Patient reports no violent behavior    Verbal deficits: None    Patient's response to intervention:  The patient's response to intervention is accepting.    Progress toward goals and other mental status changes:  The patient's progress toward goals is fair .    Diagnosis:   No diagnosis found.    Plan: Pt plans to continue individual psychotherapy    Return to clinic: 1 week    Length of Service (minutes): 30        "

## 2022-09-08 ENCOUNTER — DOCUMENTATION ONLY (OUTPATIENT)
Dept: PRIMARY CARE CLINIC | Facility: CLINIC | Age: 67
End: 2022-09-08
Payer: MEDICARE

## 2022-09-08 NOTE — PROGRESS NOTES
"Individual Psychotherapy (LCSW/PhD)  Adelaida Flores,  9/8/2022    Site:  [facility]         Therapeutic Intervention: Met with patient for individual psychotherapy.    Chief complaint/reason for encounter: anxiety and appetite     Interval history and content of current session: Pt states they feel that there negative interactions with  trigger their poor eating behaviors and make it difficult for them to cope with anxiety. Pt states they shared their feelings of lack of support with  and told  "I feel like I am in senior living". Pt expressed their internal difficulties to  I.e. anxiety, difficulty eating etc. to . Pt reports  partly understood and feels that  is "trying". Pt states they want to return to bike riding to help with anxiety and depression. SW supported pt in identifying a realistic/moderate amount of exercise given nutritional status.     SW and pt discussed establishing basal meal plan. Pt and SW discussed current level of intake I.e 3-4 protein shakes, 2 scoops of ice cream/day and agreed to increase intake via additional bowl of soup or cup of greek yogurt daily. SW and pt discussed recognizing "wise mind vs. Evil mind" voices to better identify helpful pro-health vs. Restrictive thoughts. SW and pt reviewed safety plan I.e contact PCP and SW if increased weight loss over next few days.    Treatment plan:  Target symptoms: anxiety , eating   Why chosen therapy is appropriate versus another modality: relevant to diagnosis, evidence based practice  Outcome monitoring methods: self-report, lab data, checklist/rating scale  Therapeutic intervention type: behavior modifying psychotherapy, supportive psychotherapy    Risk parameters:  Patient reports no suicidal ideation  Patient reports no homicidal ideation  Patient reports no self-injurious behavior  Patient reports no violent behavior    Verbal deficits: None    Patient's response to intervention:  The patient's " response to intervention is motivated.    Progress toward goals and other mental status changes:  The patient's progress toward goals is fair .    Diagnosis:   No diagnosis found.    Plan: Pt plans to continue individual psychotherapy    Return to clinic: 1 week    Length of Service (minutes): 30

## 2022-09-15 ENCOUNTER — DOCUMENTATION ONLY (OUTPATIENT)
Dept: PRIMARY CARE CLINIC | Facility: CLINIC | Age: 67
End: 2022-09-15

## 2022-09-15 NOTE — PROGRESS NOTES
"Individual Psychotherapy (LCSW/PhD)  Adelaida Flores,  9/15/2022    Site:  [facility]         Therapeutic Intervention: Met with patient for individual psychotherapy.    Chief complaint/reason for encounter: anxiety and behavior     Interval history and content of current session: Pt reports increased stress as mother is in hospital due to fracture. Pt has been coordinating with sister for health care decisions and support. Pt and SW discussed pt decision to not travel to visit. SW supported pt in processing feelings of guilt related to decision to not travel. SW and pt utilized CBT thought journal to examine and reinforce helpful vs. Unhelpful thoughts. SW and pt discussed "wise mind vs. Evil mind" and identifying thoughts of self-care vs. Self-restriction and harm. SW and pt utilized deep breathing to reduce feelings of anxiety during session.    Pt states they have been able to increase food intake I.e. added bowl of soup or cup of greek yogurt to daily intake. Pt has jd weighing self- twice a week and weight was stable at 108 lbs. down to 106 today. SW and pt discussed strategies for decreasing daily workload I.e. asking  and other AA members to take on some duties. SW and pt  discussed negotiable vs. Non-negotiable goals for treatment. SW and pt agreed that non-negotiable goals included incremental increase in weekly caloric intake with continued weight monitoring. Pt stated they are aware that any significant increase in weight loss may require inpatient stabilization. Pt states their gaol for this week is to add a piece of fruit to daily intake. Pt is concerned that they will restrict day after intake of fruit. SW and pt discussed pt attempting 1 piece of fruit daily, 2 days next week and pt recording thoughts and feelings.    SW emphasized importance of pt contacting PCP and/or Sw if increased weight loss or feelings of self-harm.    Treatment plan:  Target symptoms: anxiety   Why chosen therapy is " appropriate versus another modality: relevant to diagnosis, evidence based practice  Outcome monitoring methods: self-report, lab data, checklist/rating scale  Therapeutic intervention type: behavior modifying psychotherapy, supportive psychotherapy    Risk parameters:  Patient reports no suicidal ideation  Patient reports no homicidal ideation  Patient reports no self-injurious behavior  Patient reports no violent behavior    Verbal deficits: None    Patient's response to intervention:  The patient's response to intervention is accepting.    Progress toward goals and other mental status changes:  The patient's progress toward goals is fair .    Diagnosis:   No diagnosis found.    Plan: Pt plans to continue individual psychotherapy    Return to clinic: 1 week    Length of Service (minutes): 45

## 2022-09-22 ENCOUNTER — DOCUMENTATION ONLY (OUTPATIENT)
Dept: PRIMARY CARE CLINIC | Facility: CLINIC | Age: 67
End: 2022-09-22
Payer: MEDICARE

## 2022-09-22 NOTE — PROGRESS NOTES
"Individual Psychotherapy (LCSW/PhD)  Adelaida Flores,  9/22/2022    Site:  [place]         Therapeutic Intervention: Met with patient for individual psychotherapy.    Chief complaint/reason for encounter: anxiety     Interval history and content of current session: Pt reports increased stress from work. Pt states they are experiencing increased anxiety at balancing work and life. Pt states they are concerned that they do not "know how" to communicate needs to . Pt and SW discussed importance of taking time to list their values and reflect on what a "balanced" life looks like.  SW provided pt worksheets to assist with reflection on values. SW encouraged pt to complete CBT thought journal to challenge unhelpful thoughts.     SW inquired about patients nutrition. Pt states they have been consuming 2-3 protein/nutritional shakes per day with a cup of yogurt and ice cream. Pt was able to eat solid foods last week that included 2-3 pieces of fruit and broccoli. SW and pt discussed "wise mind vs. Evil mind" thoughts. Pt was able to identify thoughts "I will not be allowed to eat tomorrow" and " I don't deserve to eat fresh fruit" as unhelpful thoughts. Goal is for patient to incoportate solid foods again this week during at least 1 meal. Pt states they are gong out with friends tomorrow and look forward to enjoying some boiled crabs.    Treatment plan:  Target symptoms: anxiety   Why chosen therapy is appropriate versus another modality: relevant to diagnosis, evidence based practice  Outcome monitoring methods: self-report, checklist/rating scale  Therapeutic intervention type: supportive psychotherapy    Risk parameters:  Patient reports no suicidal ideation  Patient reports no homicidal ideation  Patient reports no self-injurious behavior  Patient reports no violent behavior    Verbal deficits: None    Patient's response to intervention:  The patient's response to intervention is accepting.    Progress toward " goals and other mental status changes:  The patient's progress toward goals is fair .    Diagnosis:   No diagnosis found.    Plan: Pt plans to continue individual psychotherapy    Return to clinic: 2 weeks

## 2022-09-27 ENCOUNTER — OFFICE VISIT (OUTPATIENT)
Dept: PRIMARY CARE CLINIC | Facility: CLINIC | Age: 67
End: 2022-09-27
Payer: MEDICARE

## 2022-09-27 VITALS
DIASTOLIC BLOOD PRESSURE: 86 MMHG | SYSTOLIC BLOOD PRESSURE: 114 MMHG | HEART RATE: 57 BPM | WEIGHT: 109.13 LBS | BODY MASS INDEX: 18.18 KG/M2 | OXYGEN SATURATION: 94 % | HEIGHT: 65 IN

## 2022-09-27 DIAGNOSIS — F41.1 GAD (GENERALIZED ANXIETY DISORDER): ICD-10-CM

## 2022-09-27 DIAGNOSIS — G47.19 EXCESSIVE DAYTIME SLEEPINESS: ICD-10-CM

## 2022-09-27 DIAGNOSIS — M81.0 SENILE OSTEOPOROSIS: ICD-10-CM

## 2022-09-27 DIAGNOSIS — Z12.31 ENCOUNTER FOR SCREENING MAMMOGRAM FOR BREAST CANCER: ICD-10-CM

## 2022-09-27 DIAGNOSIS — E46 PROTEIN-CALORIE MALNUTRITION, UNSPECIFIED SEVERITY: ICD-10-CM

## 2022-09-27 DIAGNOSIS — F33.1 MODERATE EPISODE OF RECURRENT MAJOR DEPRESSIVE DISORDER: Primary | ICD-10-CM

## 2022-09-27 DIAGNOSIS — Z12.4 CERVICAL CANCER SCREENING: ICD-10-CM

## 2022-09-27 PROCEDURE — 3288F FALL RISK ASSESSMENT DOCD: CPT | Mod: CPTII,S$GLB,, | Performed by: INTERNAL MEDICINE

## 2022-09-27 PROCEDURE — 1159F PR MEDICATION LIST DOCUMENTED IN MEDICAL RECORD: ICD-10-PCS | Mod: CPTII,S$GLB,, | Performed by: INTERNAL MEDICINE

## 2022-09-27 PROCEDURE — 1160F RVW MEDS BY RX/DR IN RCRD: CPT | Mod: CPTII,S$GLB,, | Performed by: INTERNAL MEDICINE

## 2022-09-27 PROCEDURE — 99214 OFFICE O/P EST MOD 30 MIN: CPT | Mod: S$GLB,,, | Performed by: INTERNAL MEDICINE

## 2022-09-27 PROCEDURE — 99214 PR OFFICE/OUTPT VISIT, EST, LEVL IV, 30-39 MIN: ICD-10-PCS | Mod: S$GLB,,, | Performed by: INTERNAL MEDICINE

## 2022-09-27 PROCEDURE — 3008F PR BODY MASS INDEX (BMI) DOCUMENTED: ICD-10-PCS | Mod: CPTII,S$GLB,, | Performed by: INTERNAL MEDICINE

## 2022-09-27 PROCEDURE — 3008F BODY MASS INDEX DOCD: CPT | Mod: CPTII,S$GLB,, | Performed by: INTERNAL MEDICINE

## 2022-09-27 PROCEDURE — 99499 UNLISTED E&M SERVICE: CPT | Mod: S$GLB,,, | Performed by: INTERNAL MEDICINE

## 2022-09-27 PROCEDURE — 1159F MED LIST DOCD IN RCRD: CPT | Mod: CPTII,S$GLB,, | Performed by: INTERNAL MEDICINE

## 2022-09-27 PROCEDURE — 3288F PR FALLS RISK ASSESSMENT DOCUMENTED: ICD-10-PCS | Mod: CPTII,S$GLB,, | Performed by: INTERNAL MEDICINE

## 2022-09-27 PROCEDURE — 1126F PR PAIN SEVERITY QUANTIFIED, NO PAIN PRESENT: ICD-10-PCS | Mod: CPTII,S$GLB,, | Performed by: INTERNAL MEDICINE

## 2022-09-27 PROCEDURE — 3074F PR MOST RECENT SYSTOLIC BLOOD PRESSURE < 130 MM HG: ICD-10-PCS | Mod: CPTII,S$GLB,, | Performed by: INTERNAL MEDICINE

## 2022-09-27 PROCEDURE — 3079F DIAST BP 80-89 MM HG: CPT | Mod: CPTII,S$GLB,, | Performed by: INTERNAL MEDICINE

## 2022-09-27 PROCEDURE — 99999 PR PBB SHADOW E&M-EST. PATIENT-LVL V: ICD-10-PCS | Mod: PBBFAC,,, | Performed by: INTERNAL MEDICINE

## 2022-09-27 PROCEDURE — 1126F AMNT PAIN NOTED NONE PRSNT: CPT | Mod: CPTII,S$GLB,, | Performed by: INTERNAL MEDICINE

## 2022-09-27 PROCEDURE — 99499 RISK ADDL DX/OHS AUDIT: ICD-10-PCS | Mod: S$GLB,,, | Performed by: INTERNAL MEDICINE

## 2022-09-27 PROCEDURE — 1101F PR PT FALLS ASSESS DOC 0-1 FALLS W/OUT INJ PAST YR: ICD-10-PCS | Mod: CPTII,S$GLB,, | Performed by: INTERNAL MEDICINE

## 2022-09-27 PROCEDURE — 99999 PR PBB SHADOW E&M-EST. PATIENT-LVL V: CPT | Mod: PBBFAC,,, | Performed by: INTERNAL MEDICINE

## 2022-09-27 PROCEDURE — 1101F PT FALLS ASSESS-DOCD LE1/YR: CPT | Mod: CPTII,S$GLB,, | Performed by: INTERNAL MEDICINE

## 2022-09-27 PROCEDURE — 1160F PR REVIEW ALL MEDS BY PRESCRIBER/CLIN PHARMACIST DOCUMENTED: ICD-10-PCS | Mod: CPTII,S$GLB,, | Performed by: INTERNAL MEDICINE

## 2022-09-27 PROCEDURE — 3079F PR MOST RECENT DIASTOLIC BLOOD PRESSURE 80-89 MM HG: ICD-10-PCS | Mod: CPTII,S$GLB,, | Performed by: INTERNAL MEDICINE

## 2022-09-27 PROCEDURE — 3074F SYST BP LT 130 MM HG: CPT | Mod: CPTII,S$GLB,, | Performed by: INTERNAL MEDICINE

## 2022-09-27 NOTE — PROGRESS NOTES
"Subjective:       Patient ID: Adelaida Flores is a 66 y.o. female.    Chief Complaint: fatigue f/u    HPI  MDD/MISTY - prozac 80mg qd, lamictal 100mg qd, vistaril prn  Follows w/ [facility] - LOV 8/19/22 in psychiatry. Also follows w/ Seth Jauregui LMSW.   Pt was having some issues w/ weight loss. It is now stable.     Excessive daytime sleepiness. Home sleep study w/ mild/mod NADIA. However pt is not sure about the validity of the home sleep study and would rather follow up w/ sleep MD so she can get an in lab sleep eval.   Has appt w/ Adia Weeks NP 11/4/22.   Reports her fatigue is much better although still getting tired. Mom in [place] s/p tib fx and in rehab. 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 May end up having to go to [place] to help w/ mom.     1 mo ago, had a fall - boots got caught on each other. Fell onto the L side - L hand, L hip and L ribs. Does have osteoporosis and on prolia injection - next injection 2/20/23. No issues now. No pain.     Still w/ diarrhea thinks due to stress. No blood in stool.     Review of Systems  Comprehensive review of systems otherwise negative. See history/subjective section for more details.    Objective:      Physical Exam    /86 (BP Location: Left arm, Patient Position: Sitting, BP Method: Medium (Manual))   Pulse (!) 57   Ht 5' 5" (1.651 m)   Wt 49.5 kg (109 lb 2 oz)   SpO2 (!) 94%   BMI 18.16 kg/m²     Gen - A+OX4, NAD, thin.  HEENT - PERRL, OP clear.   Neck - no LAD  CV - RRR, no m/r  Chest - CTAB, no wheezing/rhonchi  Abd - S/NT/ND/+BS  Ext - 2+ B radial and 1+ DP pulses. No LE edema.   Msk - No spinal tenderness to palpation. Normal gait. Good hand . FROM of wrists and elbows.     Previous labs reviewed.     Assessment/Plan     Adelaida was seen today for fatigue.    Diagnoses and all orders for this visit:    Moderate episode of recurrent major depressive disorder/MISTY (generalized anxiety disorder) - cont paxil and lamictal. F/u w/ LMSW and psychiatry PA. "     Excessive daytime sleepiness - has appt w/ sleep d/o clinic.     Senile osteoporosis - cont prolia q 6 mo.     Protein-calorie malnutrition, unspecified severity - working on adding soups and yogurt to the diet but still mostly consist of shakes. Cont working w/ LMSW.     Encounter for screening mammogram for breast cancer  -     Mammo Digital Screening Bilat w/ [facility]; Future    Cervical cancer screening  -     Ambulatory referral/consult to Obstetrics / Gynecology; Future    Declines cscope at this time as working hard to add some solid foods into her diet and does not want to drink the prep to reverse progress at this time.     Follow up in about 4 months (around 1/27/2023).      Catie Portillo MD  Department of Internal Medicine - [facility]  7:40 AM

## 2022-09-28 ENCOUNTER — PATIENT MESSAGE (OUTPATIENT)
Dept: PRIMARY CARE CLINIC | Facility: CLINIC | Age: 67
End: 2022-09-28
Payer: MEDICARE

## 2022-09-29 ENCOUNTER — DOCUMENTATION ONLY (OUTPATIENT)
Dept: PRIMARY CARE CLINIC | Facility: CLINIC | Age: 67
End: 2022-09-29
Payer: MEDICARE

## 2022-09-29 NOTE — PROGRESS NOTES
"Individual Psychotherapy (LCSW/PhD)  Adelaida Flores,  9/29/2022    Site:  [facility]         Therapeutic Intervention: Met with patient for individual psychotherapy.    Chief complaint/reason for encounter: anxiety     Interval history and content of current session: Sw and pt reviewed current progress in managing anxiety. Pt states they were able to use CBT tools to aid in making a decision about their work/life balance. Pt was able to discuss values with  and make decision to change employment.     Pt and SW utilized CBT thought journal to analyze pt's thoughts towards being unable to care for mother directly. Pt states they felt ashamed and their thought centered around being a "selfish" child. Pt's mother is il in [place] and  states they feel they are "not doing enough" to support their mother and siblings. Sw and pt examined these thoughts and identified several cognitive distortions. SW and pt discussed strategies for engaging with siblings to communicate concerns. Pt will also speak to  about heir travel schedule to create time to visit with heir mother.    Pt and SW reviewed nutritional goals. Pt's goal is to maintain current nutrition regimen and revisit eating solid foods next week.    Treatment plan:  Target symptoms: anxiety   Why chosen therapy is appropriate versus another modality: relevant to diagnosis, evidence based practice  Outcome monitoring methods: self-report, checklist/rating scale  Therapeutic intervention type: supportive psychotherapy    Risk parameters:  Patient reports no suicidal ideation  Patient reports no homicidal ideation  Patient reports no self-injurious behavior  Patient reports no violent behavior    Verbal deficits: None    Patient's response to intervention:  The patient's response to intervention is accepting.    Progress toward goals and other mental status changes:  The patient's progress toward goals is fair .    Diagnosis:   No diagnosis found.    Plan: Pt " plans to continue individual psychotherapy    Return to clinic: 1 week

## 2022-10-06 ENCOUNTER — DOCUMENTATION ONLY (OUTPATIENT)
Dept: PRIMARY CARE CLINIC | Facility: CLINIC | Age: 67
End: 2022-10-06
Payer: MEDICARE

## 2022-10-06 NOTE — PROGRESS NOTES
"Individual Psychotherapy (LCSW/PhD)  Adelaida Flores,  10/6/2022    Site:  [facility]         Therapeutic Intervention: Met with patient for individual psychotherapy.    Chief complaint/reason for encounter: anxiety     Interval history and content of current session: Pt reports ongoing stress from managing mother's care and work transitions. Pt states they have been able to maintain current nutritional intake but that they feel "overwhelmed". Sw supported pt in processing feelings of anxiety and guided pt through deep breathing exercises. SW and pt reviewed last sessions assignment which included pt defining their values. Pt recited values on their lists and discussed definitions of values with pt.     SW and pt discussed ways pt is currently exercising their values I.e. nutritional intake, setting boundaries with family and areas to increase exercise of values. Pt states they noticed their values were "self centered" and self focused. SW and pt explored meaning behind these values and explored hierarchy of values. Pt identified pattern of feeling more self-care and support within value list. SW challenged pt to begin daily journal of value based decisions to increase self-confidence and awareness of values. SW reviewed safety plan for pt including calling 911 if any thoughts for an concern of self-harm. Pt denied suicidal ideation but acknowledged.    Treatment plan:  Target symptoms: anxiety   Why chosen therapy is appropriate versus another modality: relevant to diagnosis, evidence based practice  Outcome monitoring methods: self-report, checklist/rating scale  Therapeutic intervention type: behavior modifying psychotherapy, supportive psychotherapy    Risk parameters:  Patient reports no suicidal ideation  Patient reports no homicidal ideation  Patient reports no self-injurious behavior  Patient reports no violent behavior    Verbal deficits: None    Patient's response to intervention:  The patient's response to " intervention is accepting.    Progress toward goals and other mental status changes:  The patient's progress toward goals is fair .    Diagnosis:   No diagnosis found.    Plan: Pt plans to continue individual psychotherapy    Return to clinic: 1 week    Length of Service (minutes): 30

## 2022-10-12 ENCOUNTER — PATIENT MESSAGE (OUTPATIENT)
Dept: PRIMARY CARE CLINIC | Facility: CLINIC | Age: 67
End: 2022-10-12
Payer: MEDICARE

## 2022-10-17 ENCOUNTER — PATIENT MESSAGE (OUTPATIENT)
Dept: PRIMARY CARE CLINIC | Facility: CLINIC | Age: 67
End: 2022-10-17
Payer: MEDICARE

## 2022-10-20 ENCOUNTER — DOCUMENTATION ONLY (OUTPATIENT)
Dept: PRIMARY CARE CLINIC | Facility: CLINIC | Age: 67
End: 2022-10-20
Payer: MEDICARE

## 2022-10-20 NOTE — PROGRESS NOTES
"Individual Psychotherapy (LCSW/PhD)  Adelaida Flores,  10/20/2022    Site:  [place]         Therapeutic Intervention: Met with patient for individual psychotherapy.    Chief complaint/reason for encounter: anxiety and behavior     Interval history and content of current session: Pt recent illness and feelings of abdominal pain and nausea that has resolved. Pt was concerned that they were experiencing another volvulus. Pt reports consistent weight approx. 110 lbs. but was concerned about weight loss due to nausea. Pt was able to re continue eating plan day after. Pt reports increased stress due to new job. SW supported pt in procession feelings of anxiety at new job. SW and pt discussed utilizing "wise mind" and  concerns into small tasks.    SW encouraged pt to separate out controllable and uncontrollable factors and areas where more support is needed. SW and pt discussed utilizing CBT Thought Journal to identify helpful vs. Un-helpful thoughts. SW and pt began identifying areas where more support is needed from new employer and healthy ways of communication. Pt and SW reviewed CBT thought journal from previous entries. Pt reports increased confidence in their resiliency and self-determination. Pt and SW reviewed identifying values exercise from previous session. SW and pt discussed role of journaling in changing attitudes towards food especially food rules. SW and pt discussed enrolling pt into peer group for eating.     Treatment plan:  Target symptoms: anxiety   Why chosen therapy is appropriate versus another modality: relevant to diagnosis, evidence based practice  Outcome monitoring methods: self-report, checklist/rating scale  Therapeutic intervention type: supportive psychotherapy    Risk parameters:  Patient reports no suicidal ideation  Patient reports no homicidal ideation  Patient reports no self-injurious behavior  Patient reports no violent behavior    Verbal deficits: None    Patient's " response to intervention:  The patient's response to intervention is accepting.    Progress toward goals and other mental status changes:  The patient's progress toward goals is fair .    Diagnosis:   No diagnosis found.    Plan: Pt plans to continue individual psychotherapy    Return to clinic: 1 week    Length of Service (minutes): 30

## 2022-10-24 ENCOUNTER — PATIENT MESSAGE (OUTPATIENT)
Dept: PRIMARY CARE CLINIC | Facility: CLINIC | Age: 67
End: 2022-10-24
Payer: MEDICARE

## 2022-10-24 DIAGNOSIS — Z11.1 TUBERCULOSIS SCREENING: Primary | ICD-10-CM

## 2022-10-24 NOTE — TELEPHONE ENCOUNTER
Can you put her for a nurse's visit for PPD? Cannot do a Thursday since we won't read it within 48 hours.

## 2022-10-25 ENCOUNTER — PATIENT MESSAGE (OUTPATIENT)
Dept: PRIMARY CARE CLINIC | Facility: CLINIC | Age: 67
End: 2022-10-25
Payer: MEDICARE

## 2022-10-25 DIAGNOSIS — E53.8 B12 DEFICIENCY: Primary | ICD-10-CM

## 2022-10-26 ENCOUNTER — PATIENT MESSAGE (OUTPATIENT)
Dept: PRIMARY CARE CLINIC | Facility: CLINIC | Age: 67
End: 2022-10-26
Payer: MEDICARE

## 2022-10-27 ENCOUNTER — PATIENT MESSAGE (OUTPATIENT)
Dept: PRIMARY CARE CLINIC | Facility: CLINIC | Age: 67
End: 2022-10-27
Payer: MEDICARE

## 2022-10-27 ENCOUNTER — DOCUMENTATION ONLY (OUTPATIENT)
Dept: PRIMARY CARE CLINIC | Facility: CLINIC | Age: 67
End: 2022-10-27
Payer: MEDICARE

## 2022-10-27 NOTE — PROGRESS NOTES
"Individual Psychotherapy (LCSW/PhD)  Adelaida Flores,  10/27/2022    Site:  [place]         Therapeutic Intervention: Met with patient for individual psychotherapy.    Chief complaint/reason for encounter: anxiety     Interval history and content of current session: Pt reports ongoing stress with work/life balance. Pt feels they continue to struggle with making decisions in their own interest. SW and pt analyzed current situation/dilemma for pt utilizing pt's value checklist and CBT thought journal. SW and pt were able to identify 3 values and corresponding behaviors that could guide pt's choices and behaviors. Pt states they have been "working on" establishing better boundaries with  based on their value of "self-respect". SW and pt explored ways to communicate needs and boundaries to  to improve support and feelings of self-confidence in pt.    Treatment plan:  Target symptoms: anxiety   Why chosen therapy is appropriate versus another modality: relevant to diagnosis, evidence based practice  Outcome monitoring methods: self-report, checklist/rating scale  Therapeutic intervention type: behavior modifying psychotherapy, supportive psychotherapy    Risk parameters:  Patient reports no suicidal ideation  Patient reports no homicidal ideation  Patient reports no self-injurious behavior  Patient reports no violent behavior    Verbal deficits: None    Patient's response to intervention:  The patient's response to intervention is motivated.    Progress toward goals and other mental status changes:  The patient's progress toward goals is fair .    Diagnosis:   No diagnosis found.    Plan: Pt plans to continue individual psychotherapy    Return to clinic: 1 week    Length of Service (minutes): 30        "

## 2022-10-28 ENCOUNTER — LAB VISIT (OUTPATIENT)
Dept: LAB | Facility: HOSPITAL | Age: 67
End: 2022-10-28
Attending: INTERNAL MEDICINE
Payer: MEDICARE

## 2022-10-28 DIAGNOSIS — E53.8 B12 DEFICIENCY: ICD-10-CM

## 2022-10-28 LAB — VIT B12 SERPL-MCNC: 1139 PG/ML (ref 210–950)

## 2022-10-28 PROCEDURE — 82607 VITAMIN B-12: CPT | Performed by: INTERNAL MEDICINE

## 2022-10-28 PROCEDURE — 36415 COLL VENOUS BLD VENIPUNCTURE: CPT | Performed by: INTERNAL MEDICINE

## 2022-11-01 ENCOUNTER — PATIENT MESSAGE (OUTPATIENT)
Dept: PRIMARY CARE CLINIC | Facility: CLINIC | Age: 67
End: 2022-11-01
Payer: MEDICARE

## 2022-11-02 ENCOUNTER — PATIENT MESSAGE (OUTPATIENT)
Dept: PRIMARY CARE CLINIC | Facility: CLINIC | Age: 67
End: 2022-11-02
Payer: MEDICARE

## 2022-11-03 ENCOUNTER — PATIENT MESSAGE (OUTPATIENT)
Dept: PRIMARY CARE CLINIC | Facility: CLINIC | Age: 67
End: 2022-11-03
Payer: MEDICARE

## 2022-11-07 ENCOUNTER — OFFICE VISIT (OUTPATIENT)
Dept: PSYCHIATRY | Facility: CLINIC | Age: 67
End: 2022-11-07
Payer: COMMERCIAL

## 2022-11-07 DIAGNOSIS — F50.9 EATING DISORDER, UNSPECIFIED TYPE: ICD-10-CM

## 2022-11-07 DIAGNOSIS — F10.21 ALCOHOL DEPENDENCE IN SUSTAINED FULL REMISSION: ICD-10-CM

## 2022-11-07 DIAGNOSIS — F33.1 MDD (MAJOR DEPRESSIVE DISORDER), RECURRENT EPISODE, MODERATE: Primary | ICD-10-CM

## 2022-11-07 DIAGNOSIS — F41.1 GAD (GENERALIZED ANXIETY DISORDER): ICD-10-CM

## 2022-11-07 PROCEDURE — 99999 PR PBB SHADOW E&M-EST. PATIENT-LVL III: ICD-10-PCS | Mod: PBBFAC,,, | Performed by: PHYSICIAN ASSISTANT

## 2022-11-07 PROCEDURE — 1160F PR REVIEW ALL MEDS BY PRESCRIBER/CLIN PHARMACIST DOCUMENTED: ICD-10-PCS | Mod: CPTII,95,, | Performed by: PHYSICIAN ASSISTANT

## 2022-11-07 PROCEDURE — 99214 OFFICE O/P EST MOD 30 MIN: CPT | Mod: 95,,, | Performed by: PHYSICIAN ASSISTANT

## 2022-11-07 PROCEDURE — 99214 PR OFFICE/OUTPT VISIT, EST, LEVL IV, 30-39 MIN: ICD-10-PCS | Mod: 95,,, | Performed by: PHYSICIAN ASSISTANT

## 2022-11-07 PROCEDURE — 1159F MED LIST DOCD IN RCRD: CPT | Mod: CPTII,95,, | Performed by: PHYSICIAN ASSISTANT

## 2022-11-07 PROCEDURE — 1160F RVW MEDS BY RX/DR IN RCRD: CPT | Mod: CPTII,95,, | Performed by: PHYSICIAN ASSISTANT

## 2022-11-07 PROCEDURE — 99999 PR PBB SHADOW E&M-EST. PATIENT-LVL III: CPT | Mod: PBBFAC,,, | Performed by: PHYSICIAN ASSISTANT

## 2022-11-07 PROCEDURE — 1159F PR MEDICATION LIST DOCUMENTED IN MEDICAL RECORD: ICD-10-PCS | Mod: CPTII,95,, | Performed by: PHYSICIAN ASSISTANT

## 2022-11-07 RX ORDER — FLUOXETINE HYDROCHLORIDE 40 MG/1
80 CAPSULE ORAL DAILY
Qty: 180 CAPSULE | Refills: 2 | Status: SHIPPED | OUTPATIENT
Start: 2022-11-07 | End: 2023-11-15 | Stop reason: SDUPTHER

## 2022-11-07 RX ORDER — LAMOTRIGINE 100 MG/1
100 TABLET ORAL DAILY
Qty: 90 TABLET | Refills: 3 | Status: SHIPPED | OUTPATIENT
Start: 2022-11-07 | End: 2023-11-15 | Stop reason: SDUPTHER

## 2022-11-07 NOTE — PROGRESS NOTES
"The patient location is: [place]  The chief complaint leading to consultation is: depression f/u    Visit type: audiovisual    Face to Face time with patient: 12  20 minutes of total time spent on the encounter, which includes face to face time and non-face to face time preparing to see the patient (eg, review of tests), Obtaining and/or reviewing separately obtained history, Documenting clinical information in the electronic or other health record, Independently interpreting results (not separately reported) and communicating results to the patient/family/caregiver, or Care coordination (not separately reported).         Each patient to whom he or she provides medical services by telemedicine is:  (1) informed of the relationship between the physician and patient and the respective role of any other health care provider with respect to management of the patient; and (2) notified that he or she may decline to receive medical services by telemedicine and may withdraw from such care at any time.    Notes:       Outpatient Psychiatry Follow-Up Visit (MD/AMBER)    11/7/2022    Clinical Status of Patient:  Outpatient (Ambulatory)    Chief Complaint:  Adelaida Flores is a 66 y.o. female who presents today for follow-up of depression and anxiety.  Met with patient.      Interval History and Content of Current Session:  Interim Events/Subjective Report/Content of Current Session:    Pt reports today: "feeling better. My mood is better and not as fatigued. I think I had [place] virus"    Mood is "steady", reports mother having health problems after tibial fx and dealing with mothers health problems.    States that has been doing some avoidance behaviors of food. Hx eating disorder, states "sometimes I dont feel like I deserve to eat"    Patients mood is steady and euthymic, affect appears mood congruent. Linear and logical, friendly and cooperative, good eye contact.    Denies SI/HI/AVH. Pt reports sleeping well and normal " "appetite. Denies side effects of medications.    Pt reports taking medications as prescribed and behaving appropriately during interview today.        Prior visit 8/19/22:  Pt reports today: "doing ok. Feel a little depressed but ok overall". States feeling better physically, improved energy    States that SW has working with her, she finds, it challenging. States having increased anxiety due to work she is doing in therapy.    States getting sleep study done tonight due to significant fatigue, pcp was concerned due to sleep study.    Patients mood is steady, affect appears mood congruent. Linear and logical, friendly and cooperative, good eye contact.    Denies SI/HI/AVH. Pt reports sleeping well and normal appetite. Denies side effects of medications.    Pt reports taking medications as prescribed and behaving appropriately during interview today.    Review of Systems       Psychiatric Review Of Systems - Is patient experiencing or having changes in:  sleep: no  appetite: yes  weight: yes  energy/anergy: no  interest/pleasure/anhedonia: no  somatic symptoms: no  libido: no  anxiety/panic: no  guilty/hopelessness: no  concentration: no  S.I.B.s/risky behavior: no  Irritability: no  Racing thoughts: no  Impulsive behaviors: no  Paranoia: no  AVH: no      Past Medical, Family and Social History: The patient's past medical, family and social history have been reviewed and updated as appropriate within the electronic medical record - see encounter notes.      Current Medications:   Medication List with Changes/Refills   Current Medications    CHOLESTYRAMINE (QUESTRAN) 4 GRAM PACKET    Take 1 packet (4 g total) by mouth every evening.    DENOSUMAB (PROLIA) 60 MG/ML SYRG        FLUOROMETHOLONE 0.1% (FML) 0.1 % DRPS    Place into both eyes.    FLUOXETINE 40 MG CAPSULE    Take 2 capsules (80 mg total) by mouth once daily.    HYDROXYZINE PAMOATE (VISTARIL) 25 MG CAP    Take 1 capsule (25 mg total) by mouth nightly as needed " (insomnia).    KRILL OIL ORAL        LAMOTRIGINE (LAMICTAL) 100 MG TABLET    Take 1 tablet (100 mg total) by mouth once daily.    LEVOCETIRIZINE (XYZAL) 5 MG TABLET        MULTIVITAMIN CAPSULE    Take 1 capsule by mouth once daily.    OMEPRAZOLE (PRILOSEC) 40 MG CAPSULE    TAKE 1 CAPSULE BY MOUTH BEFORE BREAKFAST.    ONDANSETRON (ZOFRAN-ODT) 4 MG TBDL    Take 1 tablet (4 mg total) by mouth every 8 (eight) hours as needed (nausea).         Allergies:   Review of patient's allergies indicates:   Allergen Reactions    Cefdinir Diarrhea    Grass pollen-red top, standard     House dust Itching         Vitals   There were no vitals filed for this visit.       Labs/Imaging/Studies:   No results found for this or any previous visit (from the past 48 hour(s)).   No results found for: PHENYTOIN, PHENOBARB, VALPROATE, CBMZ    Compliance: yes    Side effects: None    Risk Parameters:  Patient reports no suicidal ideation  Patient reports no homicidal ideation  Patient reports no self-injurious behavior  Patient reports no violent behavior    Exam (detailed: at least 9 elements; comprehensive: all 15 elements)   Constitutional  Vitals:  Most recent vital signs, dated less than 90 days prior to this appointment, were reviewed.   There were no vitals filed for this visit.     General:  unremarkable, age appropriate     Musculoskeletal  Muscle Strength/Tone:  not examined   Gait & Station:  non-ataxic     Psychiatric  Speech:  no latency; no press   Mood & Affect:  steady  congruent and appropriate   Thought Process:  normal and logical   Associations:  intact   Thought Content:  normal, no suicidality, no homicidality, delusions, or paranoia   Insight:  intact, has awareness of illness   Judgement: behavior is adequate to circumstances   Orientation:  grossly intact   Memory: intact for content of interview   Language: grossly intact   Attention Span & Concentration:  able to focus   Fund of Knowledge:  intact and appropriate to  age and level of education     Assessment and Diagnosis   Status/Progress: Based on the examination today, the patient's problem(s) is/are adequately but not ideally controlled.  New problems have not been presented today.   Co-morbidities, Diagnostic uncertainty and Lack of compliance are not complicating management of the primary condition.  There are no active rule-out diagnoses for this patient at this time.     General Impression:      ICD-10-CM ICD-9-CM   1. MDD (major depressive disorder), recurrent episode, moderate  F33.1 296.32   2. MISTY (generalized anxiety disorder)  F41.1 300.02   3. Alcohol dependence in sustained full remission  F10.21 303.93   4. Eating disorder, unspecified type  F50.9 307.50         Intervention/Counseling/Treatment Plan   Medication Management: Continue current medications. The risks and benefits of medication were discussed with the patient.    -continue prozac 80mg daily  -continue lamictal 100mg daily    -vistaril prn insomnia    Return to Clinic: 3 months        Last Manrique PA-C      Total face to face time: 12 min  Total time (chart review, patient contact, documentation): 20 min      *This note has been prepared using a combination of a dictation device and typing.  It has been checked for errors but some errors may still exist within the note as a result of speech recognition errors and/or typographical errors.

## 2022-11-09 ENCOUNTER — PATIENT MESSAGE (OUTPATIENT)
Dept: PRIMARY CARE CLINIC | Facility: CLINIC | Age: 67
End: 2022-11-09
Payer: MEDICARE

## 2022-11-10 ENCOUNTER — DOCUMENTATION ONLY (OUTPATIENT)
Dept: PRIMARY CARE CLINIC | Facility: CLINIC | Age: 67
End: 2022-11-10
Payer: MEDICARE

## 2022-11-10 NOTE — PROGRESS NOTES
"Individual Psychotherapy (LCSW/PhD)  Adelaida Flores,  11/10/2022    Site:  [facility]         Therapeutic Intervention: Met with patient for individual psychotherapy.    Chief complaint/reason for encounter: anxiety and interpersonal     Interval history and content of current session: Pt reports positive experience on previous trip to visit ill mother. Pt states it "felt good" to be with family and that their mother was doing well. Pt reports ongoing anxiety balancing work and personal life. SW an pt explored pt's value checklist as a tool for guiding decision making. Pt states they continue to struggle with finding support from . Pt and  have argued more recently. Pt feels that  "does not get it". Sw and pt explored ways of addressing concerns with  including letter writing. SW supported pt in processing feelings of frustration. Pt inquired about mental beni services for . Sw offered to assist in speaking with  to discuss role of services and goals.     Pt reports stable weight but has struggled to meet food intake goals for each day. SW an pt reviewed safety plan for any significant weight loss.    Treatment plan:  Target symptoms: anxiety   Why chosen therapy is appropriate versus another modality: relevant to diagnosis, evidence based practice  Outcome monitoring methods: self-report, lab data, checklist/rating scale  Therapeutic intervention type: insight oriented psychotherapy, supportive psychotherapy    Risk parameters:  Patient reports no suicidal ideation  Patient reports no homicidal ideation  Patient reports no self-injurious behavior  Patient reports no violent behavior    Verbal deficits: None    Patient's response to intervention:  The patient's response to intervention is accepting.    Progress toward goals and other mental status changes:  The patient's progress toward goals is fair .    Diagnosis:   No diagnosis found.    Plan: Pt plans to continue " individual psychotherapy    Return to clinic: 1 week    Length of Service (minutes): 30

## 2022-11-16 ENCOUNTER — PATIENT MESSAGE (OUTPATIENT)
Dept: PRIMARY CARE CLINIC | Facility: CLINIC | Age: 67
End: 2022-11-16
Payer: MEDICARE

## 2022-11-17 ENCOUNTER — DOCUMENTATION ONLY (OUTPATIENT)
Dept: PRIMARY CARE CLINIC | Facility: CLINIC | Age: 67
End: 2022-11-17
Payer: MEDICARE

## 2022-11-17 NOTE — PROGRESS NOTES
"Individual Psychotherapy (LCSW/PhD)  Adelaida Flores,  11/17/2022    Site:  [facility]         Therapeutic Intervention: Met with patient for individual psychotherapy.    Chief complaint/reason for encounter: anxiety and behavior     Interval history and content of current session: Pt reports stress related to mother's illness and support needs. Pt states they feel "bad" that they are not able to more directly support mother (mother lives in [place]). SW and pt utilized CBT approached to help pt identify unhelpful thoughts. SW supported pt in processing negative feelings and feelings of guilt. Pt was able to identify areas of improvement in setting boundaries. Pt and SW reviewed pt's previous use of  positive behaviors to help improve anxiety.    Pt states they feel overwhelmed due to hosting family this upcoming holiday. Pt is concerned they will not be able to practice self-care I.e. exercise, eating plan. SW and pt reviewed pt's value list and discussed importance of using values to guide decisions. SW and pt discussed need for increased support from pt's . Pt and  are currently discussing couples therapy for improved support.     Treatment plan:  Target symptoms: anxiety   Why chosen therapy is appropriate versus another modality: relevant to diagnosis, evidence based practice  Outcome monitoring methods: self-report, checklist/rating scale  Therapeutic intervention type: behavior modifying psychotherapy, supportive psychotherapy    Risk parameters:  Patient reports no suicidal ideation  Patient reports no homicidal ideation  Patient reports no self-injurious behavior  Patient reports no violent behavior    Verbal deficits: None    Patient's response to intervention:  The patient's response to intervention is accepting.    Progress toward goals and other mental status changes:  The patient's progress toward goals is fair .    Diagnosis:   No diagnosis found.    Plan: Pt plans to continue " individual psychotherapy    Return to clinic: 2 weeks    Length of Service (minutes): 30

## 2022-11-30 ENCOUNTER — PATIENT MESSAGE (OUTPATIENT)
Dept: PRIMARY CARE CLINIC | Facility: CLINIC | Age: 67
End: 2022-11-30
Payer: MEDICARE

## 2022-12-01 ENCOUNTER — DOCUMENTATION ONLY (OUTPATIENT)
Dept: PRIMARY CARE CLINIC | Facility: CLINIC | Age: 67
End: 2022-12-01
Payer: MEDICARE

## 2022-12-01 NOTE — PROGRESS NOTES
"Individual Psychotherapy (LCSW/PhD)  Adelaida Flores,  12/1/2022    Site:  Telemed         Therapeutic Intervention: Met with patient for individual psychotherapy.    Chief complaint/reason for encounter: anxiety     Interval history and content of current session: Pt reports ongoing stress from lack of support from . Pt feels that they need more support and better communication. Pt recently used values list to help resolve internal conflict related to providing care for mother. Pt states they feel like they are "not doing enough" for mother. SW and pt analyzed thoughts and made efforts to correct thoughts to more helpful thoughts.    SW and pt discussed importance of pt maintaining self-care during current situation with mother. SW and pt explored ways pt can still support mother but maintain boundaries. SW and pt also discussed strategies for communicating needs to  and enforcing boundaries. SW and pt role played conversation with . Pt plans an writing letter to  as a means to express anger and frustration. Pt states they performed strength training to help alleviate frustration. Pt states they have been maintain normal eating pattern I.e. multiple protein shake, yogurt. Pt expressed frustration at not being able to enjoy holiday food due to anxiety. SW and pt discussed importance of pt continuing to monitor intake for essential nutrition needs.    Treatment plan:  Target symptoms: anxiety   Why chosen therapy is appropriate versus another modality: relevant to diagnosis, evidence based practice  Outcome monitoring methods: self-report, checklist/rating scale  Therapeutic intervention type: supportive psychotherapy    Risk parameters:  Patient reports no suicidal ideation  Patient reports no homicidal ideation  Patient reports no self-injurious behavior  Patient reports no violent behavior    Verbal deficits: None    Patient's response to intervention:  The patient's response to " intervention is accepting.    Progress toward goals and other mental status changes:  The patient's progress toward goals is limited.    Diagnosis:   No diagnosis found.    Plan: Pt plans to continue individual psychotherapy    Return to clinic: 1 week    Length of Service (minutes): 45

## 2022-12-09 ENCOUNTER — PES CALL (OUTPATIENT)
Dept: ADMINISTRATIVE | Facility: CLINIC | Age: 67
End: 2022-12-09
Payer: MEDICARE

## 2022-12-14 ENCOUNTER — PATIENT MESSAGE (OUTPATIENT)
Dept: PRIMARY CARE CLINIC | Facility: CLINIC | Age: 67
End: 2022-12-14
Payer: MEDICARE

## 2022-12-14 ENCOUNTER — PATIENT MESSAGE (OUTPATIENT)
Dept: ENDOCRINOLOGY | Facility: CLINIC | Age: 67
End: 2022-12-14
Payer: MEDICARE

## 2022-12-15 ENCOUNTER — TELEPHONE (OUTPATIENT)
Dept: FAMILY MEDICINE | Facility: CLINIC | Age: 67
End: 2022-12-15
Payer: MEDICARE

## 2022-12-19 ENCOUNTER — PATIENT MESSAGE (OUTPATIENT)
Dept: PRIMARY CARE CLINIC | Facility: CLINIC | Age: 67
End: 2022-12-19
Payer: MEDICARE

## 2022-12-28 ENCOUNTER — PATIENT MESSAGE (OUTPATIENT)
Dept: PRIMARY CARE CLINIC | Facility: CLINIC | Age: 67
End: 2022-12-28
Payer: MEDICARE

## 2022-12-29 ENCOUNTER — DOCUMENTATION ONLY (OUTPATIENT)
Dept: PRIMARY CARE CLINIC | Facility: CLINIC | Age: 67
End: 2022-12-29
Payer: MEDICARE

## 2022-12-29 ENCOUNTER — PATIENT MESSAGE (OUTPATIENT)
Dept: PRIMARY CARE CLINIC | Facility: CLINIC | Age: 67
End: 2022-12-29
Payer: MEDICARE

## 2022-12-29 NOTE — PROGRESS NOTES
"Individual Psychotherapy (LCSW/PhD)  Adelaida Flores,  12/29/2022    Site:  Telemed         Therapeutic Intervention: Met with patient for individual psychotherapy.    Chief complaint/reason for encounter: anxiety and behavior     Interval history and content of current session: Pt reports increased concern in ability to sustain weight. Pt reports current weight of 108 lbs. Pt reports binging previous day " on over 3.000 calories of chocolate". Pt expressed concern about being able to eat regularly over rest of week. SW and pt discussed need for more intensive services. Pt states they are currently looking at several programs in area. SW provided support in helping pt process feelings of shame and guilt related to need for additional treatment. SW offered assistance in securing treatment for pt. SW and pt also discussed having pt's  meet with SW for further support and help in providing more effective support to pt.    SW to send pt resources for eating disorder treatment and follow-up with pt for updates.    Treatment plan:  Target symptoms: anxiety , eating disorder  Why chosen therapy is appropriate versus another modality: relevant to diagnosis, evidence based practice  Outcome monitoring methods: self-report, lab data, checklist/rating scale  Therapeutic intervention type: insight oriented psychotherapy, behavior modifying psychotherapy, supportive psychotherapy    Risk parameters:  Patient reports no suicidal ideation  Patient reports no homicidal ideation  Patient reports no self-injurious behavior  Patient reports no violent behavior    Verbal deficits: None    Patient's response to intervention:  The patient's response to intervention is accepting.    Progress toward goals and other mental status changes:  The patient's progress toward goals is limited.    Diagnosis:   No diagnosis found.    Plan: Pt plans to continue individual psychotherapy    Return to clinic: as scheduled    Length of Service " (minutes): 30

## 2023-01-04 ENCOUNTER — OFFICE VISIT (OUTPATIENT)
Dept: URGENT CARE | Facility: CLINIC | Age: 68
End: 2023-01-04
Payer: MEDICARE

## 2023-01-04 ENCOUNTER — PATIENT MESSAGE (OUTPATIENT)
Dept: PRIMARY CARE CLINIC | Facility: CLINIC | Age: 68
End: 2023-01-04
Payer: MEDICARE

## 2023-01-04 VITALS
HEIGHT: 65 IN | RESPIRATION RATE: 18 BRPM | SYSTOLIC BLOOD PRESSURE: 133 MMHG | DIASTOLIC BLOOD PRESSURE: 63 MMHG | TEMPERATURE: 99 F | OXYGEN SATURATION: 97 % | WEIGHT: 109 LBS | BODY MASS INDEX: 18.16 KG/M2 | HEART RATE: 67 BPM

## 2023-01-04 DIAGNOSIS — S72.114A NONDISPLACED FRACTURE OF GREATER TROCHANTER OF RIGHT FEMUR, INITIAL ENCOUNTER FOR CLOSED FRACTURE: Primary | ICD-10-CM

## 2023-01-04 DIAGNOSIS — S09.90XA INJURY OF HEAD, INITIAL ENCOUNTER: ICD-10-CM

## 2023-01-04 PROCEDURE — 3075F SYST BP GE 130 - 139MM HG: CPT | Mod: CPTII,S$GLB,, | Performed by: NURSE PRACTITIONER

## 2023-01-04 PROCEDURE — 73502 XR HIP WITH PELVIS WHEN PERFORMED, 2 OR 3  VIEWS RIGHT: ICD-10-PCS | Mod: FY,RT,S$GLB, | Performed by: RADIOLOGY

## 2023-01-04 PROCEDURE — 3008F BODY MASS INDEX DOCD: CPT | Mod: CPTII,S$GLB,, | Performed by: NURSE PRACTITIONER

## 2023-01-04 PROCEDURE — 96372 THER/PROPH/DIAG INJ SC/IM: CPT | Mod: S$GLB,,, | Performed by: NURSE PRACTITIONER

## 2023-01-04 PROCEDURE — 3008F PR BODY MASS INDEX (BMI) DOCUMENTED: ICD-10-PCS | Mod: CPTII,S$GLB,, | Performed by: NURSE PRACTITIONER

## 2023-01-04 PROCEDURE — 1160F RVW MEDS BY RX/DR IN RCRD: CPT | Mod: CPTII,S$GLB,, | Performed by: NURSE PRACTITIONER

## 2023-01-04 PROCEDURE — 1125F PR PAIN SEVERITY QUANTIFIED, PAIN PRESENT: ICD-10-PCS | Mod: CPTII,S$GLB,, | Performed by: NURSE PRACTITIONER

## 2023-01-04 PROCEDURE — 1160F PR REVIEW ALL MEDS BY PRESCRIBER/CLIN PHARMACIST DOCUMENTED: ICD-10-PCS | Mod: CPTII,S$GLB,, | Performed by: NURSE PRACTITIONER

## 2023-01-04 PROCEDURE — 1125F AMNT PAIN NOTED PAIN PRSNT: CPT | Mod: CPTII,S$GLB,, | Performed by: NURSE PRACTITIONER

## 2023-01-04 PROCEDURE — 3078F PR MOST RECENT DIASTOLIC BLOOD PRESSURE < 80 MM HG: ICD-10-PCS | Mod: CPTII,S$GLB,, | Performed by: NURSE PRACTITIONER

## 2023-01-04 PROCEDURE — 1159F MED LIST DOCD IN RCRD: CPT | Mod: CPTII,S$GLB,, | Performed by: NURSE PRACTITIONER

## 2023-01-04 PROCEDURE — 3075F PR MOST RECENT SYSTOLIC BLOOD PRESS GE 130-139MM HG: ICD-10-PCS | Mod: CPTII,S$GLB,, | Performed by: NURSE PRACTITIONER

## 2023-01-04 PROCEDURE — 73502 X-RAY EXAM HIP UNI 2-3 VIEWS: CPT | Mod: FY,RT,S$GLB, | Performed by: RADIOLOGY

## 2023-01-04 PROCEDURE — 99214 PR OFFICE/OUTPT VISIT, EST, LEVL IV, 30-39 MIN: ICD-10-PCS | Mod: 25,S$GLB,, | Performed by: NURSE PRACTITIONER

## 2023-01-04 PROCEDURE — 99214 OFFICE O/P EST MOD 30 MIN: CPT | Mod: 25,S$GLB,, | Performed by: NURSE PRACTITIONER

## 2023-01-04 PROCEDURE — 1159F PR MEDICATION LIST DOCUMENTED IN MEDICAL RECORD: ICD-10-PCS | Mod: CPTII,S$GLB,, | Performed by: NURSE PRACTITIONER

## 2023-01-04 PROCEDURE — 96372 PR INJECTION,THERAP/PROPH/DIAG2ST, IM OR SUBCUT: ICD-10-PCS | Mod: S$GLB,,, | Performed by: NURSE PRACTITIONER

## 2023-01-04 PROCEDURE — 3078F DIAST BP <80 MM HG: CPT | Mod: CPTII,S$GLB,, | Performed by: NURSE PRACTITIONER

## 2023-01-04 RX ORDER — KETOROLAC TROMETHAMINE 30 MG/ML
30 INJECTION, SOLUTION INTRAMUSCULAR; INTRAVENOUS
Status: COMPLETED | OUTPATIENT
Start: 2023-01-04 | End: 2023-01-04

## 2023-01-04 RX ORDER — HYDROCODONE BITARTRATE AND ACETAMINOPHEN 5; 325 MG/1; MG/1
1 TABLET ORAL EVERY 8 HOURS PRN
Qty: 20 TABLET | Refills: 0 | Status: SHIPPED | OUTPATIENT
Start: 2023-01-04 | End: 2023-01-26

## 2023-01-04 RX ORDER — IBUPROFEN 600 MG/1
600 TABLET ORAL EVERY 8 HOURS PRN
Qty: 30 TABLET | Refills: 0 | Status: SHIPPED | OUTPATIENT
Start: 2023-01-04 | End: 2023-08-08

## 2023-01-04 RX ADMIN — KETOROLAC TROMETHAMINE 30 MG: 30 INJECTION, SOLUTION INTRAMUSCULAR; INTRAVENOUS at 06:01

## 2023-01-04 NOTE — PROGRESS NOTES
"Subjective:       Patient ID: Adelaida Flores is a 67 y.o. female.    Vitals:  height is 5' 5" (1.651 m) and weight is 49.4 kg (109 lb). Her temperature is 98.6 °F (37 °C). Her blood pressure is 133/63 and her pulse is 67. Her respiration is 18 and oxygen saturation is 97%.     Chief Complaint: Fall    68 y/o female presents to  today with c/o fall today, while walking dog. Pt fell onto concrete, making impact to her right hip area, and left forehead. Denies loss of consciousness. Denies emesis. Very painful to walk.     Fall  The accident occurred 6 to 12 hours ago. The fall occurred while walking. She landed on [place]. The point of impact was the right hip and head. The pain is present in the right hip. The pain is at a severity of 10/10. The pain is moderate. The symptoms are aggravated by pressure on injury, ambulation, standing, use of injured limb, flexion, rotation and movement. Pertinent negatives include no loss of consciousness, nausea or vomiting.     Gastrointestinal:  Negative for nausea and vomiting.   Musculoskeletal:  Positive for pain and trauma.   Skin:  Positive for bruising.   Neurological:  Negative for dizziness and loss of consciousness.   Psychiatric/Behavioral:  Negative for confusion.      Objective:      Physical Exam   Constitutional: She is oriented to person, place, and time.  Non-toxic appearance. She does not appear ill. No distress.      Comments:AAOx4. Sitting in wheelchair.      HENT:   Head: Normocephalic.      Comments: Bruising noted to left forehead, above forehead  Ears:   Right Ear: External ear normal.   Left Ear: External ear normal.   Nose: Nose normal.   Mouth/Throat: Mucous membranes are moist. Oropharynx is clear.   Eyes: Right eye exhibits no discharge. Left eye exhibits no discharge.   Neck: Neck supple. No neck rigidity present.   Cardiovascular: Normal rate.   Pulmonary/Chest: Effort normal.   Abdominal: Normal appearance. She exhibits no distension. There is no " abdominal tenderness.   Musculoskeletal:         General: Swelling, tenderness and signs of injury present. No deformity.      Right lower leg: No edema.      Left lower leg: No edema.   Neurological: She is alert and oriented to person, place, and time.   Skin: Skin is warm, dry and not diaphoretic.         Comments: Bruising noted to right hip/upper thigh area. Tender to touch.    Psychiatric: Her behavior is normal. Mood normal.   Nursing note and vitals reviewed.    X-Ray Hip 2 or 3 views Right (with Pelvis when performed)    Result Date: 1/4/2023  EXAMINATION: XR HIP WITH PELVIS WHEN PERFORMED, 2 OR 3  VIEWS RIGHT CLINICAL HISTORY: Pain, unspecified TECHNIQUE: AP view of the pelvis and frog leg lateral view of the right hip were performed. COMPARISON: Abdominal radiograph 07/23/2011 FINDINGS: Three views right hip. There is osteopenia.  There are degenerative changes of the bilateral sacroiliac joints as well as of the femoroacetabular joints.  The right femoroacetabular joint is intact.  On the oblique view, there is linear lucency along the greater trochanter laterally, may reflect vascular channel however fracture is not excluded in this setting, correlation with any focal tenderness in the region is recommended.  Further evaluation as warranted.  There is gaseous distention of the large bowel.     1. Linear lucency involving the greater trochanter on the right, correlation with any focal tenderness recommended as recent fracture not excluded. Electronically signed by: Luiz Lincoln MD Date:    01/04/2023 Time:    17:29    Assessment:       1. Nondisplaced fracture of greater trochanter of right femur, initial encounter for closed fracture    2. Injury of head, initial encounter          Plan:         Nondisplaced fracture of greater trochanter of right femur, initial encounter for closed fracture  -     X-Ray Hip 2 or 3 views Right (with Pelvis when performed); Future; Expected date: 01/04/2023  -      HYDROcodone-acetaminophen (NORCO) 5-325 mg per tablet; Take 1 tablet by mouth every 8 (eight) hours as needed for Pain.  Dispense: 20 tablet; Refill: 0  -     ibuprofen (ADVIL,MOTRIN) 600 MG tablet; Take 1 tablet (600 mg total) by mouth every 8 (eight) hours as needed for Pain.  Dispense: 30 tablet; Refill: 0  -     Ambulatory referral/consult to Orthopedics  -     CRUTCHES FOR HOME USE  -     ketorolac injection 30 mg    Injury of head, initial encounter      Patient Instructions   Rest   Use crutches to ambulate  Avoid pressure to right leg/foot  Ice packs   Follow up with ortho-referral has been placed and someone from [facility] should call you to set up appt. If you do not hear from them by tomorrow afternoon, please call [phone]

## 2023-01-05 ENCOUNTER — DOCUMENTATION ONLY (OUTPATIENT)
Dept: PRIMARY CARE CLINIC | Facility: CLINIC | Age: 68
End: 2023-01-05
Payer: MEDICARE

## 2023-01-05 ENCOUNTER — OFFICE VISIT (OUTPATIENT)
Dept: ORTHOPEDICS | Facility: CLINIC | Age: 68
End: 2023-01-05
Payer: MEDICARE

## 2023-01-05 ENCOUNTER — PATIENT MESSAGE (OUTPATIENT)
Dept: PRIMARY CARE CLINIC | Facility: CLINIC | Age: 68
End: 2023-01-05
Payer: MEDICARE

## 2023-01-05 VITALS — BODY MASS INDEX: 18.15 KG/M2 | HEIGHT: 65 IN | WEIGHT: 108.94 LBS

## 2023-01-05 DIAGNOSIS — S72.114A CLOSED NONDISPLACED FRACTURE OF GREATER TROCHANTER OF RIGHT FEMUR, INITIAL ENCOUNTER: Primary | ICD-10-CM

## 2023-01-05 PROCEDURE — 1125F PR PAIN SEVERITY QUANTIFIED, PAIN PRESENT: ICD-10-PCS | Mod: CPTII,S$GLB,, | Performed by: ORTHOPAEDIC SURGERY

## 2023-01-05 PROCEDURE — 1160F RVW MEDS BY RX/DR IN RCRD: CPT | Mod: CPTII,S$GLB,, | Performed by: ORTHOPAEDIC SURGERY

## 2023-01-05 PROCEDURE — 3008F BODY MASS INDEX DOCD: CPT | Mod: CPTII,S$GLB,, | Performed by: ORTHOPAEDIC SURGERY

## 2023-01-05 PROCEDURE — 99999 PR PBB SHADOW E&M-EST. PATIENT-LVL III: CPT | Mod: PBBFAC,,, | Performed by: ORTHOPAEDIC SURGERY

## 2023-01-05 PROCEDURE — 99202 OFFICE O/P NEW SF 15 MIN: CPT | Mod: S$GLB,,, | Performed by: ORTHOPAEDIC SURGERY

## 2023-01-05 PROCEDURE — 3008F PR BODY MASS INDEX (BMI) DOCUMENTED: ICD-10-PCS | Mod: CPTII,S$GLB,, | Performed by: ORTHOPAEDIC SURGERY

## 2023-01-05 PROCEDURE — 1100F PTFALLS ASSESS-DOCD GE2>/YR: CPT | Mod: CPTII,S$GLB,, | Performed by: ORTHOPAEDIC SURGERY

## 2023-01-05 PROCEDURE — 99999 PR PBB SHADOW E&M-EST. PATIENT-LVL III: ICD-10-PCS | Mod: PBBFAC,,, | Performed by: ORTHOPAEDIC SURGERY

## 2023-01-05 PROCEDURE — 1100F PR PT FALLS ASSESS DOC 2+ FALLS/FALL W/INJURY/YR: ICD-10-PCS | Mod: CPTII,S$GLB,, | Performed by: ORTHOPAEDIC SURGERY

## 2023-01-05 PROCEDURE — 99202 PR OFFICE/OUTPT VISIT, NEW, LEVL II, 15-29 MIN: ICD-10-PCS | Mod: S$GLB,,, | Performed by: ORTHOPAEDIC SURGERY

## 2023-01-05 PROCEDURE — 1125F AMNT PAIN NOTED PAIN PRSNT: CPT | Mod: CPTII,S$GLB,, | Performed by: ORTHOPAEDIC SURGERY

## 2023-01-05 PROCEDURE — 1159F PR MEDICATION LIST DOCUMENTED IN MEDICAL RECORD: ICD-10-PCS | Mod: CPTII,S$GLB,, | Performed by: ORTHOPAEDIC SURGERY

## 2023-01-05 PROCEDURE — 1160F PR REVIEW ALL MEDS BY PRESCRIBER/CLIN PHARMACIST DOCUMENTED: ICD-10-PCS | Mod: CPTII,S$GLB,, | Performed by: ORTHOPAEDIC SURGERY

## 2023-01-05 PROCEDURE — 1159F MED LIST DOCD IN RCRD: CPT | Mod: CPTII,S$GLB,, | Performed by: ORTHOPAEDIC SURGERY

## 2023-01-05 PROCEDURE — 3288F PR FALLS RISK ASSESSMENT DOCUMENTED: ICD-10-PCS | Mod: CPTII,S$GLB,, | Performed by: ORTHOPAEDIC SURGERY

## 2023-01-05 PROCEDURE — 3288F FALL RISK ASSESSMENT DOCD: CPT | Mod: CPTII,S$GLB,, | Performed by: ORTHOPAEDIC SURGERY

## 2023-01-05 NOTE — PATIENT INSTRUCTIONS
Rest   Use crutches to ambulate  Avoid pressure to right leg/foot  Ice packs   Follow up with ortho-referral has been placed and someone from [facility] should call you to set up appt. If you do not hear from them by tomorrow afternoon, please call [phone]

## 2023-01-05 NOTE — PROGRESS NOTES
"Individual Psychotherapy (LCSW/PhD)  Adelaida Flores,  1/5/2023    Site:  Telemed         Therapeutic Intervention: Met with patient for individual psychotherapy.    Chief complaint/reason for encounter: anxiety and behavior     Interval history and content of current session: Pt feels stressed due to recent injury from accident with dog where pt fell. SW supported pt in processing feelings with lack of support from . SW and pt discussed ways to enforce boundaries I.e. expressing empathetic feelings, using 'I" statements. SW and pt discussed potential couples therapy session in future to improve couple functioning. Pt will speak to  and is also planning on joining peers support group for eating concerns.    Treatment plan:  Target symptoms: anxiety   Why chosen therapy is appropriate versus another modality: relevant to diagnosis, evidence based practice  Outcome monitoring methods: self-report, lab data, checklist/rating scale  Therapeutic intervention type: insight oriented psychotherapy, behavior modifying psychotherapy, supportive psychotherapy    Risk parameters:  Patient reports no suicidal ideation  Patient reports no homicidal ideation  Patient reports no self-injurious behavior  Patient reports no violent behavior    Verbal deficits: None    Patient's response to intervention:  The patient's response to intervention is accepting.    Progress toward goals and other mental status changes:  The patient's progress toward goals is limited.    Diagnosis:   No diagnosis found.    Plan: Pt plans to continue individual psychotherapy    Return to clinic: 1 week    Length of Service (minutes): 30      "

## 2023-01-05 NOTE — LETTER
01/05/2023    To whom it may concern:    Adelaida Flores is under my medical care. She was seen today.    The patient may not return to work for 1 month.    Yours truly,        Rolando Obrien MD

## 2023-01-05 NOTE — PROGRESS NOTES
Subjective:      Patient ID: Adelaida Flores is a 67 y.o. female.    Chief Complaint: Consult and Hip Injury (rt hip - pt fell outside (01/04/023) walking her dog )    HPI      Adelaida Flores is seen for evaluation and treatment of hip pain.  They have experienced problems with their right hip over the past 1 day.  Patient reportedly fell while walking her dog.  Pain is located laterally. They have been treated with  crutches and hydrocodone .   Symptoms have recently stayed the same. Ambulation reportedly has been impaired. Self care ADLs are not painful.     Review of Systems   Constitutional: Negative for fever and weight loss.   HENT:  Negative for congestion.    Eyes:  Negative for visual disturbance.   Cardiovascular:  Negative for chest pain.   Respiratory:  Negative for shortness of breath.    Hematologic/Lymphatic: Negative for bleeding problem. Does not bruise/bleed easily.   Skin:  Negative for poor wound healing.   Musculoskeletal:  Positive for joint pain.   Gastrointestinal:  Negative for abdominal pain.   Genitourinary:  Negative for dysuria.   Neurological:  Negative for seizures.   Psychiatric/Behavioral:  Negative for altered mental status.    Allergic/Immunologic: Negative for persistent infections.       Objective:            Ortho/SPM Exam    Right hip    The patient is not in acute distress.   Body habitus is:normal.   Sclerae normal  The patient walks with a limp.   Respiratory distress:  none  The skin over the hip is:intact.   There is no moderate lateral tenderness  Range of motion- Flexion full, External rotation full, internal rotation full.  Pain with rotation negative  Sciatic tension findings negative.  Shortening/lengthening compared to the contralateral side absent.  Pulses DP present, PT present.  Motor normal 5/5 strength in all tested muscle groups.   Sensory normal.    I reviewed the relevant imaging for the patient's condition:  Right hip radiographs show a nondisplaced fracture  of the tip of the greater trochanter            Assessment:       Encounter Diagnosis   Name Primary?    Closed nondisplaced fracture of greater trochanter of right femur, initial encounter Yes            This fracture is intrinsically stable.  It typically heals in 4-6 weeks with protected weight-bearing.        Plan:       Adelaida was seen today for consult and hip injury.    Diagnoses and all orders for this visit:    Closed nondisplaced fracture of greater trochanter of right femur, initial encounter          I explained my diagnostic impression and the reasoning behind it in detail, using layman's terms.  Models and/or pictures were used to help in the explanation.    Weight-bearing as tolerated with crutches recommended.  Proper crutch use was demonstrated.    Appropriate activity modification was discussed    Out of work 1 month    X-ray next visit

## 2023-01-09 ENCOUNTER — PATIENT MESSAGE (OUTPATIENT)
Dept: ORTHOPEDICS | Facility: CLINIC | Age: 68
End: 2023-01-09
Payer: MEDICARE

## 2023-01-12 ENCOUNTER — DOCUMENTATION ONLY (OUTPATIENT)
Dept: PRIMARY CARE CLINIC | Facility: CLINIC | Age: 68
End: 2023-01-12
Payer: MEDICARE

## 2023-01-12 NOTE — PROGRESS NOTES
"Individual Psychotherapy (LCSW/PhD)  Adelaida Flores,  1/12/2023    Site:  Telemed         Therapeutic Intervention: Met with patient for individual psychotherapy.    Chief complaint/reason for encounter: anxiety and interpersonal     Interval history and content of current session: Pt has concern about upcoming trip with . Pt feels like she and  are "not in good enough" shape to make the trip. Pt and SW discussed pt's concerns and ways to communicate concerns to . Pt feels like lack of support from  has increased her anxiety and ability to cope with stress.  SW supported pt in processing frustration and identifying concerns and values to drive their conversations and behaviors.    SW and pt reviewed CBT "thinking fallacies" and ways to avoid catastrophizing. SW and pt discussed ways boundaries can increase self-efficacy and feelings of self-worth. Pt has been eating daily 2-3 nutritional shakes, soup, glass of milk  and avoiding exercise. Pt registered for online eating peer support group. SW and pt reviewed safety guidelines I.e. further weight loss etc. And possible need for IOP if needed.    Treatment plan:  Target symptoms: anxiety , disordered eating  Why chosen therapy is appropriate versus another modality: relevant to diagnosis, evidence based practice  Outcome monitoring methods: self-report, lab data, checklist/rating scale  Therapeutic intervention type: insight oriented psychotherapy, behavior modifying psychotherapy, supportive psychotherapy    Risk parameters:  Patient reports no suicidal ideation  Patient reports no homicidal ideation  Patient reports no self-injurious behavior  Patient reports no violent behavior    Verbal deficits: None    Patient's response to intervention:  The patient's response to intervention is accepting.    Progress toward goals and other mental status changes:  The patient's progress toward goals is fair .    Diagnosis:   No diagnosis " found.    Plan: Pt plans to continue individual psychotherapy    Return to clinic: 1 week    Length of Service (minutes): 30

## 2023-01-16 NOTE — PROGRESS NOTES
"Subjective:       Patient ID: Adelaida Flores is a 63 y.o. female.    Vitals:  height is 5' 7" (1.702 m) and weight is 54 kg (119 lb). Her temperature is 98.5 °F (36.9 °C). Her blood pressure is 121/79 and her pulse is 72.     Chief Complaint: Cough    Cough   This is a new problem. The current episode started in the past 7 days. The problem has been gradually worsening. The problem occurs constantly. The cough is productive of sputum. Associated symptoms include nasal congestion and a sore throat. Pertinent negatives include no chest pain, chills, ear congestion, ear pain, eye redness, fever, headaches, heartburn, hemoptysis, myalgias, postnasal drip, rash, rhinorrhea, shortness of breath, sweats, weight loss or wheezing. She has tried OTC cough suppressant for the symptoms. The treatment provided mild relief.       Constitution: Negative for chills, sweating, fatigue and fever.   HENT: Positive for congestion, sinus pain, sinus pressure and sore throat. Negative for ear pain, mouth sores, nosebleeds, foreign body in nose, postnasal drip, trouble swallowing and voice change.    Neck: Negative for neck pain, neck stiffness, painful lymph nodes and neck swelling.   Cardiovascular: Negative for chest pain, leg swelling, palpitations, sob on exertion and passing out.   Eyes: Negative for eye itching, eye pain, eye redness and eyelid swelling.   Respiratory: Positive for cough and sputum production. Negative for chest tightness, bloody sputum, COPD, shortness of breath, stridor, wheezing and asthma.    Gastrointestinal: Negative for abdominal pain, abdominal bloating, nausea, vomiting, constipation, diarrhea and heartburn.   Musculoskeletal: Negative for joint pain, joint swelling, back pain, muscle cramps and muscle ache.   Skin: Negative for rash.   Allergic/Immunologic: Positive for seasonal allergies. Negative for asthma, itching and sneezing.   Neurological: Negative for headaches and altered mental status. "   Hematologic/Lymphatic: Negative for swollen lymph nodes.   Psychiatric/Behavioral: Negative for altered mental status and nervous/anxious. The patient is not nervous/anxious.        Objective:      Physical Exam   Constitutional: She is oriented to person, place, and time. She appears well-developed and well-nourished. She is cooperative.  Non-toxic appearance. She does not appear ill. No distress.   HENT:   Head: Normocephalic and atraumatic.   Right Ear: Hearing, tympanic membrane, external ear and ear canal normal.   Left Ear: Hearing, tympanic membrane, external ear and ear canal normal.   Nose: Mucosal edema and rhinorrhea present. No nasal deformity. No epistaxis. Right sinus exhibits maxillary sinus tenderness and frontal sinus tenderness. Left sinus exhibits maxillary sinus tenderness and frontal sinus tenderness.   Mouth/Throat: Uvula is midline and mucous membranes are normal. No trismus in the jaw. Normal dentition. No uvula swelling. Posterior oropharyngeal erythema present. No oropharyngeal exudate or posterior oropharyngeal edema.   Eyes: Conjunctivae and lids are normal. No scleral icterus.   Sclera clear bilat   Neck: Trachea normal, normal range of motion, full passive range of motion without pain and phonation normal. Neck supple.   Cardiovascular: Normal rate, regular rhythm, normal heart sounds, intact distal pulses and normal pulses.   Pulmonary/Chest: Effort normal and breath sounds normal. No accessory muscle usage or stridor. No respiratory distress. She has no decreased breath sounds. She has no wheezes. She has no rhonchi. She has no rales.   Abdominal: Soft. Normal appearance and bowel sounds are normal. She exhibits no distension. There is no tenderness.   Musculoskeletal: Normal range of motion. She exhibits no edema or deformity.   Lymphadenopathy:     She has cervical adenopathy.        Right cervical: Superficial cervical adenopathy present.        Left cervical: Superficial  cervical adenopathy present.   Neurological: She is alert and oriented to person, place, and time. She exhibits normal muscle tone. Coordination normal.   Skin: Skin is warm, dry and intact. Capillary refill takes less than 2 seconds. No rash noted. She is not diaphoretic. No pallor.   Psychiatric: She has a normal mood and affect. Her speech is normal and behavior is normal. Judgment and thought content normal. Cognition and memory are normal.   Nursing note and vitals reviewed.      Assessment:       1. Acute bacterial sinusitis    2. Cough        Plan:         Acute bacterial sinusitis  -     amoxicillin-clavulanate 875-125mg (AUGMENTIN) 875-125 mg per tablet; Take 1 tablet by mouth 2 (two) times daily. for 10 days  Dispense: 20 tablet; Refill: 0    Cough    Other orders  -     dexamethasone injection 6 mg      Patient Instructions     You must understand that you've received an Urgent Care treatment only and that you may be released before all your medical problems are known or treated. You, the patient, will arrange for follow up care as instructed.  Follow up with your PCP or specialty clinic as directed if not improved or as needed. You can call [phone] to schedule an appointment with the appropriate provider.  If your condition worsens we recommend that you receive another evaluation at the Emergency Department for any concerns or worsening of condition.  Patient aware and verbalized understanding.    You received a steroid shot today - this can elevate your blood pressure, elevate your blood sugar, water weight gain, nervous energy, redness to the face and dimpling of the skin where the shot goes in.   Patient aware and verbalized understanding.    Increase fluids and rest is important.  Avoid contact with sick individuals.  Humidifier use at home.  Augmentin RX as prescribed for sinusitis.  OTC Claritin or Zyrtec daily as directed.  Flonase Nasal [place] as directed for nasal congestion.  OTC Tylenol  or Motrin every 4 - 6 hours as needed for fever or pain.  Follow-up with your PCP in the next 48hrs or sooner for re-evaluation especially if no improvement in symptoms.  ER precautions given to patient.  Patient aware and verbalized understanding.    Acute Bacterial Rhinosinusitis (ABRS)    Acute bacterial rhinosinusitis (ABRS) is an infection of your nasal cavity and sinuses. Its caused by bacteria. Acute means that youve had symptoms for less than 12 weeks.  Understanding your sinuses  The nasal cavity is the large air-filled space behind your nose. The sinuses are a group of spaces formed by the bones of your face. They connect with your nasal cavity. ABRS causes the tissue lining these spaces to become inflamed. Mucus may not drain normally. This leads to facial pain and other symptoms.  What causes ABRS?  ABRS most often follows an upper respiratory infection caused by a virus. Bacteria then infect the lining of your nasal cavity and sinuses. But you can also get ABRS if you have:  · Nasal allergies  · Long-term nasal swelling and congestion not caused by allergies  · Blockage in the nose  Symptoms of ABRS  The symptoms of ABRS may be different for each person, and can include:  · Nasal congestion  · Runny nose  · Fluid draining from the nose down the throat (postnasal drip)  · Headache  · Cough  · Pain in the sinuses  · Thick, colored fluid from the nose (mucus)  · Fever  Diagnosing ABRS  ABRS may be diagnosed if youve had an upper respiratory infection like a cold and cough for longer than 10 to 14 days. Your health care provider will ask about your symptoms and your medical history. The provider will check your vital signs, including your temperature. Youll have a physical exam. The health care provider will check your ears, nose, and throat. You likely wont need any tests. If ABRS comes back, you may have a culture or other tests.  Treatment for ABRS  Treatment may include:  · Antibiotic medicine.  This is for symptoms that last for at least 10 to 14 days.  · Nasal corticosteroid medicine. Drops or spray used in the nose can lessen swelling and congestion.  · Over-the-counter pain medicine. This is to lessen sinus pain and pressure.  · Nasal decongestant medicine. Spray or drops may help to lessen congestion. Do not use them for more than a few days.  · Salt wash (saline irrigation). This can help to loosen mucus.  Possible complications of ABRS  ABRS may come back or become long-term (chronic).  In rare cases, ABRS may cause complications such as:   · Inflamed tissue around the brain and spinal cord (meningitis)  · Inflamed tissue around the eyes (orbital cellulitis)  · Inflamed bones around the sinuses (osteitis)  These problems may need to be treated in a hospital with intravenous (IV) antibiotic medicine or surgery.  When to call the health care provider  Call your health care provider if you have any of the following:  · Symptoms that dont get better, or get worse  · Symptoms that dont get better after 3 to 5 days on antibiotics  · Trouble seeing  · Swelling around your eyes  · Confusion or trouble staying awake   Date Last Reviewed: 3/3/2015  © 8823-1972 [employer]. [address] [place] [address], [place] [address]. All rights reserved. This information is not intended as a substitute for professional medical care. Always follow your healthcare professional's instructions.          .

## 2023-01-19 ENCOUNTER — DOCUMENTATION ONLY (OUTPATIENT)
Dept: PRIMARY CARE CLINIC | Facility: CLINIC | Age: 68
End: 2023-01-19
Payer: MEDICARE

## 2023-01-19 DIAGNOSIS — M25.561 ACUTE PAIN OF RIGHT KNEE: Primary | ICD-10-CM

## 2023-01-19 DIAGNOSIS — S72.114A CLOSED NONDISPLACED FRACTURE OF GREATER TROCHANTER OF RIGHT FEMUR, INITIAL ENCOUNTER: ICD-10-CM

## 2023-01-19 NOTE — PROGRESS NOTES
"Individual Psychotherapy (LCSW/PhD)  Adelaida Flores,  1/19/2023    Site:  Telemed         Therapeutic Intervention: Met with patient for individual psychotherapy.    Chief complaint/reason for encounter: anxiety and behavior     Interval history and content of current session: Pt reports ongoing stress related to relationship with . Pt recently wrote a letter to  expressing anxiety about an upcoming trip. Pt reports that  was "mad" about the letter and accused pt of collaborating with SW to attack tem. SW supported pt in processing feelings of anxiety and reiterated importance of pt maintaining open communication with  and SW to avoid misunderstandings.    Pt and SW used CBT Thought Journal  to identify helpful thoughts related to pt's current stressors. Pt feels they are "stretched too thin" as they are participating in AA and a eating disorder peer support group. SW supported pt in identify behaviors and decisions that align with pt's values. Pt and SW also discussed pt using food restriction as a method of feeling in control. SW and pt agreed to goals of current nutrition regimen I.e. soups, shakes etc. Pl;us 1 "solid food meal" per next week. SW and pt discussed how using eating as a control mechanism violates pt's values of self-car and harm. Pt expressed understanding and optimism at meeting weekly goal. Pt to report back on CBT journaling and eating goals next session.    Treatment plan:  Target symptoms: anxiety   Why chosen therapy is appropriate versus another modality: relevant to diagnosis, evidence based practice  Outcome monitoring methods: self-report, lab data, checklist/rating scale  Therapeutic intervention type: insight oriented psychotherapy, behavior modifying psychotherapy, supportive psychotherapy    Risk parameters:  Patient reports no suicidal ideation  Patient reports no homicidal ideation  Patient reports no self-injurious behavior  Patient reports no violent " behavior    Verbal deficits: None    Patient's response to intervention:  The patient's response to intervention is accepting.    Progress toward goals and other mental status changes:  The patient's progress toward goals is fair .    Diagnosis:   No diagnosis found.    Plan: Pt plans to continue individual psychotherapy    Return to clinic: 1 week    Length of Service (minutes): 30

## 2023-01-20 ENCOUNTER — HOSPITAL ENCOUNTER (OUTPATIENT)
Dept: RADIOLOGY | Facility: HOSPITAL | Age: 68
Discharge: HOME | End: 2023-01-20
Attending: ORTHOPAEDIC SURGERY
Payer: MEDICARE

## 2023-01-20 ENCOUNTER — OFFICE VISIT (OUTPATIENT)
Dept: PSYCHIATRY | Facility: CLINIC | Age: 68
End: 2023-01-20
Payer: COMMERCIAL

## 2023-01-20 ENCOUNTER — OFFICE VISIT (OUTPATIENT)
Dept: ORTHOPEDICS | Facility: CLINIC | Age: 68
End: 2023-01-20
Payer: MEDICARE

## 2023-01-20 VITALS — WEIGHT: 108.94 LBS | HEIGHT: 65 IN | BODY MASS INDEX: 18.15 KG/M2

## 2023-01-20 DIAGNOSIS — F33.1 MDD (MAJOR DEPRESSIVE DISORDER), RECURRENT EPISODE, MODERATE: Primary | ICD-10-CM

## 2023-01-20 DIAGNOSIS — S72.114A CLOSED NONDISPLACED FRACTURE OF GREATER TROCHANTER OF RIGHT FEMUR, INITIAL ENCOUNTER: Primary | ICD-10-CM

## 2023-01-20 DIAGNOSIS — M17.11 PRIMARY OSTEOARTHRITIS OF RIGHT KNEE: ICD-10-CM

## 2023-01-20 DIAGNOSIS — F41.1 GAD (GENERALIZED ANXIETY DISORDER): ICD-10-CM

## 2023-01-20 DIAGNOSIS — M25.561 ACUTE PAIN OF RIGHT KNEE: ICD-10-CM

## 2023-01-20 DIAGNOSIS — F50.9 EATING DISORDER, UNSPECIFIED TYPE: ICD-10-CM

## 2023-01-20 DIAGNOSIS — F10.21 ALCOHOL DEPENDENCE IN SUSTAINED FULL REMISSION: ICD-10-CM

## 2023-01-20 PROCEDURE — 1100F PTFALLS ASSESS-DOCD GE2>/YR: CPT | Mod: CPTII,S$GLB,, | Performed by: ORTHOPAEDIC SURGERY

## 2023-01-20 PROCEDURE — 1159F MED LIST DOCD IN RCRD: CPT | Mod: CPTII,S$GLB,, | Performed by: ORTHOPAEDIC SURGERY

## 2023-01-20 PROCEDURE — 1125F PR PAIN SEVERITY QUANTIFIED, PAIN PRESENT: ICD-10-PCS | Mod: CPTII,S$GLB,, | Performed by: ORTHOPAEDIC SURGERY

## 2023-01-20 PROCEDURE — 3008F PR BODY MASS INDEX (BMI) DOCUMENTED: ICD-10-PCS | Mod: CPTII,S$GLB,, | Performed by: ORTHOPAEDIC SURGERY

## 2023-01-20 PROCEDURE — 3288F FALL RISK ASSESSMENT DOCD: CPT | Mod: CPTII,S$GLB,, | Performed by: ORTHOPAEDIC SURGERY

## 2023-01-20 PROCEDURE — 1160F RVW MEDS BY RX/DR IN RCRD: CPT | Mod: CPTII,95,, | Performed by: PHYSICIAN ASSISTANT

## 2023-01-20 PROCEDURE — 1159F PR MEDICATION LIST DOCUMENTED IN MEDICAL RECORD: ICD-10-PCS | Mod: CPTII,S$GLB,, | Performed by: ORTHOPAEDIC SURGERY

## 2023-01-20 PROCEDURE — 3288F PR FALLS RISK ASSESSMENT DOCUMENTED: ICD-10-PCS | Mod: CPTII,S$GLB,, | Performed by: ORTHOPAEDIC SURGERY

## 2023-01-20 PROCEDURE — 1160F PR REVIEW ALL MEDS BY PRESCRIBER/CLIN PHARMACIST DOCUMENTED: ICD-10-PCS | Mod: CPTII,S$GLB,, | Performed by: ORTHOPAEDIC SURGERY

## 2023-01-20 PROCEDURE — 73564 XR KNEE ORTHO RIGHT WITH FLEXION: ICD-10-PCS | Mod: 26,RT,, | Performed by: RADIOLOGY

## 2023-01-20 PROCEDURE — 1160F RVW MEDS BY RX/DR IN RCRD: CPT | Mod: CPTII,S$GLB,, | Performed by: ORTHOPAEDIC SURGERY

## 2023-01-20 PROCEDURE — 1159F PR MEDICATION LIST DOCUMENTED IN MEDICAL RECORD: ICD-10-PCS | Mod: CPTII,95,, | Performed by: PHYSICIAN ASSISTANT

## 2023-01-20 PROCEDURE — 99999 PR PBB SHADOW E&M-EST. PATIENT-LVL III: ICD-10-PCS | Mod: PBBFAC,,, | Performed by: ORTHOPAEDIC SURGERY

## 2023-01-20 PROCEDURE — 99213 OFFICE O/P EST LOW 20 MIN: CPT | Mod: S$GLB,,, | Performed by: ORTHOPAEDIC SURGERY

## 2023-01-20 PROCEDURE — 1100F PR PT FALLS ASSESS DOC 2+ FALLS/FALL W/INJURY/YR: ICD-10-PCS | Mod: CPTII,S$GLB,, | Performed by: ORTHOPAEDIC SURGERY

## 2023-01-20 PROCEDURE — 99999 PR PBB SHADOW E&M-EST. PATIENT-LVL III: CPT | Mod: PBBFAC,,, | Performed by: ORTHOPAEDIC SURGERY

## 2023-01-20 PROCEDURE — 73562 X-RAY EXAM OF KNEE 3: CPT | Mod: 26,LT,, | Performed by: RADIOLOGY

## 2023-01-20 PROCEDURE — 73562 XR KNEE ORTHO RIGHT WITH FLEXION: ICD-10-PCS | Mod: 26,LT,, | Performed by: RADIOLOGY

## 2023-01-20 PROCEDURE — 3008F BODY MASS INDEX DOCD: CPT | Mod: CPTII,S$GLB,, | Performed by: ORTHOPAEDIC SURGERY

## 2023-01-20 PROCEDURE — 99213 OFFICE O/P EST LOW 20 MIN: CPT | Mod: 95,,, | Performed by: PHYSICIAN ASSISTANT

## 2023-01-20 PROCEDURE — 1125F AMNT PAIN NOTED PAIN PRSNT: CPT | Mod: CPTII,S$GLB,, | Performed by: ORTHOPAEDIC SURGERY

## 2023-01-20 PROCEDURE — 1159F MED LIST DOCD IN RCRD: CPT | Mod: CPTII,95,, | Performed by: PHYSICIAN ASSISTANT

## 2023-01-20 PROCEDURE — 73564 X-RAY EXAM KNEE 4 OR MORE: CPT | Mod: 26,RT,, | Performed by: RADIOLOGY

## 2023-01-20 PROCEDURE — 73564 X-RAY EXAM KNEE 4 OR MORE: CPT | Mod: TC,PN,RT

## 2023-01-20 PROCEDURE — 99213 PR OFFICE/OUTPT VISIT, EST, LEVL III, 20-29 MIN: ICD-10-PCS | Mod: S$GLB,,, | Performed by: ORTHOPAEDIC SURGERY

## 2023-01-20 PROCEDURE — 99213 PR OFFICE/OUTPT VISIT, EST, LEVL III, 20-29 MIN: ICD-10-PCS | Mod: 95,,, | Performed by: PHYSICIAN ASSISTANT

## 2023-01-20 PROCEDURE — 1160F PR REVIEW ALL MEDS BY PRESCRIBER/CLIN PHARMACIST DOCUMENTED: ICD-10-PCS | Mod: CPTII,95,, | Performed by: PHYSICIAN ASSISTANT

## 2023-01-20 NOTE — PROGRESS NOTES
"The patient location is: [place]  The chief complaint leading to consultation is: depression f/u    Visit type: audiovisual      Each patient to whom he or she provides medical services by telemedicine is:  (1) informed of the relationship between the physician and patient and the respective role of any other health care provider with respect to management of the patient; and (2) notified that he or she may decline to receive medical services by telemedicine and may withdraw from such care at any time.    Notes:       Outpatient Psychiatry Follow-Up Visit (MD/AMBER)    1/20/2023    Clinical Status of Patient:  Outpatient (Ambulatory)    Chief Complaint:  Adelaida Flores is a 67 y.o. female who presents today for follow-up of depression and anxiety.  Met with patient.      Interval History and Content of Current Session:  Interim Events/Subjective Report/Content of Current Session:    Pt reports today: "not good". Currently on prozac 80mg and lamictal 100mg daily, vistaril prn insomnia.    Took 2 falls recently due to dog pulling her and now has a hip fx. Havent been able to go to the gym, been "feeling more depressed"    Patients mood is depressed, affect appears mood congruent. Linear and logical, friendly and cooperative, good eye contact.    Denies SI/HI/AVH. Pt reports sleeping less 4-5 hr per night and decreased appetite from pain. Denies side effects of medications.    Pt reports taking medications as prescribed and behaving appropriately during interview today.      Prior visit    Pt reports today: "feeling better. My mood is better and not as fatigued. I think I had [place] virus"    Mood is "steady", reports mother having health problems after tibial fx and dealing with mothers health problems.    States that has been doing some avoidance behaviors of food. Hx eating disorder, states "sometimes I dont feel like I deserve to eat"    Patients mood is steady and euthymic, affect appears mood congruent. Linear " and logical, friendly and cooperative, good eye contact.    Denies SI/HI/AVH. Pt reports sleeping well and normal appetite. Denies side effects of medications.    Pt reports taking medications as prescribed and behaving appropriately during interview today.    Review of Systems       Psychiatric Review Of Systems - Is patient experiencing or having changes in:  sleep: yes  appetite: yes  weight: yes  energy/anergy: no  interest/pleasure/anhedonia: no  somatic symptoms: no  libido: no  anxiety/panic: no  guilty/hopelessness: no  concentration: no  S.I.B.s/risky behavior: no  Irritability: no  Racing thoughts: no  Impulsive behaviors: no  Paranoia: no  AVH: no      Past Medical, Family and Social History: The patient's past medical, family and social history have been reviewed and updated as appropriate within the electronic medical record - see encounter notes.      Current Medications:   Medication List with Changes/Refills   Current Medications    CHOLESTYRAMINE (QUESTRAN) 4 GRAM PACKET    Take 1 packet (4 g total) by mouth every evening.    DENOSUMAB (PROLIA) 60 MG/ML SYRG        FLUOROMETHOLONE 0.1% (FML) 0.1 % DRPS    Place into both eyes.    FLUOXETINE 40 MG CAPSULE    Take 2 capsules (80 mg total) by mouth once daily.    HYDROCODONE-ACETAMINOPHEN (NORCO) 5-325 MG PER TABLET    Take 1 tablet by mouth every 8 (eight) hours as needed for Pain.    HYDROXYZINE PAMOATE (VISTARIL) 25 MG CAP    Take 1 capsule (25 mg total) by mouth nightly as needed (insomnia).    IBUPROFEN (ADVIL,MOTRIN) 600 MG TABLET    Take 1 tablet (600 mg total) by mouth every 8 (eight) hours as needed for Pain.    KRILL OIL ORAL        LAMOTRIGINE (LAMICTAL) 100 MG TABLET    Take 1 tablet (100 mg total) by mouth once daily.    LEVOCETIRIZINE (XYZAL) 5 MG TABLET        MULTIVITAMIN CAPSULE    Take 1 capsule by mouth once daily.    OMEPRAZOLE (PRILOSEC) 40 MG CAPSULE    TAKE 1 CAPSULE BY MOUTH BEFORE BREAKFAST.    ONDANSETRON (ZOFRAN-ODT) 4 MG  TBDL    Take 1 tablet (4 mg total) by mouth every 8 (eight) hours as needed (nausea).         Allergies:   Review of patient's allergies indicates:   Allergen Reactions    Cefdinir Diarrhea    Grass pollen-red top, standard     House dust Itching         Vitals   There were no vitals filed for this visit.       Labs/Imaging/Studies:   No results found for this or any previous visit (from the past 48 hour(s)).   No results found for: PHENYTOIN, PHENOBARB, VALPROATE, CBMZ    Compliance: yes    Side effects: None    Risk Parameters:  Patient reports no suicidal ideation  Patient reports no homicidal ideation  Patient reports no self-injurious behavior  Patient reports no violent behavior    Exam (detailed: at least 9 elements; comprehensive: all 15 elements)   Constitutional  Vitals:  Most recent vital signs, dated less than 90 days prior to this appointment, were reviewed.   There were no vitals filed for this visit.     General:  unremarkable, age appropriate     Musculoskeletal  Muscle Strength/Tone:  not examined   Gait & Station:  non-ataxic     Psychiatric  Speech:  no latency; no press   Mood & Affect:  steady  congruent and appropriate   Thought Process:  normal and logical   Associations:  intact   Thought Content:  normal, no suicidality, no homicidality, delusions, or paranoia   Insight:  intact, has awareness of illness   Judgement: behavior is adequate to circumstances   Orientation:  grossly intact   Memory: intact for content of interview   Language: grossly intact   Attention Span & Concentration:  able to focus   Fund of Knowledge:  intact and appropriate to age and level of education     Assessment and Diagnosis   Status/Progress: Based on the examination today, the patient's problem(s) is/are adequately but not ideally controlled.  New problems have not been presented today.   Co-morbidities, Diagnostic uncertainty and Lack of compliance are not complicating management of the primary condition.   There are no active rule-out diagnoses for this patient at this time.     General Impression:      ICD-10-CM ICD-9-CM   1. MDD (major depressive disorder), recurrent episode, moderate  F33.1 296.32   2. MISTY (generalized anxiety disorder)  F41.1 300.02   3. Alcohol dependence in sustained full remission  F10.21 303.93   4. Eating disorder, unspecified type  F50.9 307.50           Intervention/Counseling/Treatment Plan   Medication Management: Continue current medications. The risks and benefits of medication were discussed with the patient.    -continue prozac 80mg daily  -continue lamictal 100mg daily    -vistaril prn insomnia    -no med changes to be made now as likely situational change in mood due to hip fx. Pt agrees and does not want to make changes at this time.    Return to Clinic: 1 month        Last Manrique PA-C      Total face to face time: 14 min  Total time (chart review, patient contact, documentation): 21 min      *This note has been prepared using a combination of a dictation device and typing.  It has been checked for errors but some errors may still exist within the note as a result of speech recognition errors and/or typographical errors.

## 2023-01-20 NOTE — PROGRESS NOTES
Subjective:      Patient ID: Adelaida Flores is a 67 y.o. female.    Chief Complaint: Hip Pain (right ) and Knee Pain (right )    HPI    Follow-up for nondisplaced right greater trochanteric fracture.  The patient reports that she still has pain in the right lower extremity including the hip and knee.  She is using crutches and taking prescription ibuprofen.  Her fracture is about 3-week-old.          Review of Systems   Constitutional: Negative for fever and weight loss.   HENT:  Negative for congestion.    Eyes:  Negative for visual disturbance.   Cardiovascular:  Negative for chest pain.   Respiratory:  Negative for shortness of breath.    Hematologic/Lymphatic: Negative for bleeding problem. Does not bruise/bleed easily.   Skin:  Negative for poor wound healing.   Musculoskeletal:  Positive for joint pain.   Gastrointestinal:  Negative for abdominal pain.   Genitourinary:  Negative for dysuria.   Neurological:  Negative for seizures.   Psychiatric/Behavioral:  Negative for altered mental status.    Allergic/Immunologic: Negative for persistent infections.       Objective:      Ortho/SPM Exam      Right hip     The patient is not in acute distress.   Body habitus is:normal.   Sclerae normal  The patient walks with a limp.   Respiratory distress:  none  The skin over the hip is:intact.   There is no moderate lateral tenderness  Range of motion- Flexion full, External rotation full, internal rotation full.  Pain with rotation negative  Sciatic tension findings negative.  Shortening/lengthening compared to the contralateral side absent.  Pulses DP present, PT present.  Motor normal 5/5 strength in all tested muscle groups.   Sensory normal    Right knee  Nontender  No effusion  Full range of motion  Stable valgus and varus stress    I reviewed the relevant imaging for the patient's condition:  Right knee radiographs show no fracture or dislocation.  No localized bone lesion.  There is mild joint space narrowing and  small osteophytes            Assessment:       Encounter Diagnoses   Name Primary?    Closed nondisplaced fracture of greater trochanter of right femur, initial encounter Yes    Primary osteoarthritis of right knee         The right hip fracture has not had enough time to heal yet.  It probably is responsible for some referred pain to the right knee.  The arthritic process in the right knee is structurally mild.          Plan:       Adelaida was seen today for hip pain and knee pain.    Diagnoses and all orders for this visit:    Closed nondisplaced fracture of greater trochanter of right femur, initial encounter    Primary osteoarthritis of right knee          I explained my diagnostic impression and the reasoning behind it in detail, using layman's terms.  Models and/or pictures were used to help in the explanation.    I reviewed the natural history of nondisplaced greater trochanteric fractures with respect to the amount of time taken for symptom relief and union.    Continue protected weight-bearing with crutches    I explained appropriate synergistic use of ibuprofen and Tylenol    X-ray right hip next visit

## 2023-01-24 ENCOUNTER — OFFICE VISIT (OUTPATIENT)
Dept: ENDOCRINOLOGY | Facility: CLINIC | Age: 68
End: 2023-01-24
Payer: MEDICARE

## 2023-01-24 DIAGNOSIS — M81.0 OSTEOPOROSIS, POST-MENOPAUSAL: ICD-10-CM

## 2023-01-24 PROCEDURE — 1159F PR MEDICATION LIST DOCUMENTED IN MEDICAL RECORD: ICD-10-PCS | Mod: CPTII,95,, | Performed by: INTERNAL MEDICINE

## 2023-01-24 PROCEDURE — 3288F PR FALLS RISK ASSESSMENT DOCUMENTED: ICD-10-PCS | Mod: CPTII,95,, | Performed by: INTERNAL MEDICINE

## 2023-01-24 PROCEDURE — 99214 OFFICE O/P EST MOD 30 MIN: CPT | Mod: 95,,, | Performed by: INTERNAL MEDICINE

## 2023-01-24 PROCEDURE — 1125F PR PAIN SEVERITY QUANTIFIED, PAIN PRESENT: ICD-10-PCS | Mod: CPTII,95,, | Performed by: INTERNAL MEDICINE

## 2023-01-24 PROCEDURE — 1100F PR PT FALLS ASSESS DOC 2+ FALLS/FALL W/INJURY/YR: ICD-10-PCS | Mod: CPTII,95,, | Performed by: INTERNAL MEDICINE

## 2023-01-24 PROCEDURE — 1160F PR REVIEW ALL MEDS BY PRESCRIBER/CLIN PHARMACIST DOCUMENTED: ICD-10-PCS | Mod: CPTII,95,, | Performed by: INTERNAL MEDICINE

## 2023-01-24 PROCEDURE — 99214 PR OFFICE/OUTPT VISIT, EST, LEVL IV, 30-39 MIN: ICD-10-PCS | Mod: 95,,, | Performed by: INTERNAL MEDICINE

## 2023-01-24 PROCEDURE — 1160F RVW MEDS BY RX/DR IN RCRD: CPT | Mod: CPTII,95,, | Performed by: INTERNAL MEDICINE

## 2023-01-24 PROCEDURE — 1100F PTFALLS ASSESS-DOCD GE2>/YR: CPT | Mod: CPTII,95,, | Performed by: INTERNAL MEDICINE

## 2023-01-24 PROCEDURE — 1159F MED LIST DOCD IN RCRD: CPT | Mod: CPTII,95,, | Performed by: INTERNAL MEDICINE

## 2023-01-24 PROCEDURE — 3288F FALL RISK ASSESSMENT DOCD: CPT | Mod: CPTII,95,, | Performed by: INTERNAL MEDICINE

## 2023-01-24 PROCEDURE — 1125F AMNT PAIN NOTED PAIN PRSNT: CPT | Mod: CPTII,95,, | Performed by: INTERNAL MEDICINE

## 2023-01-24 NOTE — ASSESSMENT & PLAN NOTE
She has fracture while on Prolia therapy.  Again reviewed that fall precautions is the best thing for her.  Discuss trans transitioning to an anabolic either Evenity for a year or Forteo for 2 years and then switch back to an anti resorptive.  She will read about it and continue.  In the interim she will continue Prolia.    Return to clinic in 1 year

## 2023-01-24 NOTE — PROGRESS NOTES
Subjective:      Patient ID: Adelaida Flores is a 67 y.o.    Chief Complaint:  Osteoporosis      History of Present Illness  With regards to her  postmenopausal osteoporosis.  Since her last visit she had   nondisplaced right greater trochanteric fracture- after a fall while walking dog- now using crutches and using a walker     Had multiple falls prior to this fracture     She has now decided not to walk her dog - 11 month puppy      She used alendronate 2010- 2014 - had GERD and fracture while on therapy    was changed to prolia 2014 - last 11/22          Prone to falls - poor balance    she has soft falls with hiking - no fractures         Strong FH of OP     Fracture hx:  Right wrist 2009  T12 fx  2013      Menarche 11 y/o  Menopause 37 y/o   no hrt use       last bmd  1/21/22  FRAX:     14% risk of a major osteoporotic fracture in the next 10 years.     2.2% risk of hip fracture in the next 10 years.     Impression:     *Osteoporosis on treatment with Prolia  *Compared with previous DXA, BMD at the lumbar spine has increased and the BMD at the total hip has remained stable.     IBS with abd pain and diarrhea      +DJD pain      Is disappointed that she cant exercise     With regards to secondary evaluation in reviewing her labs she does not have anemia, kidney function is normal, calcium is normal, vitamin-D was normal TSH was 4.5, celiac screen was negative    ROS:   As above    Objective:     There were no vitals taken for this visit.    There is no height or weight on file to calculate BMI.      Physical Exam  Constitutional:       Appearance: Normal appearance.   Psychiatric:         Attention and Perception: Attention normal.         Mood and Affect: Mood normal.         Speech: Speech normal.         Behavior: Behavior normal.         Thought Content: Thought content normal.         Judgment: Judgment normal.              Lab Review:   Lab Results   Component Value Date    HGBA1C 5.2 10/22/2021     Lab  Results   Component Value Date    CHOL 196 10/22/2021    HDL 52 10/22/2021    LDLCALC 126.2 10/22/2021    TRIG 89 10/22/2021    CHOLHDL 26.5 10/22/2021     Lab Results   Component Value Date     (L) 07/08/2022    K 4.2 07/08/2022     07/08/2022    CO2 25 07/08/2022    GLU 79 07/08/2022    BUN 11 07/08/2022    CREATININE 0.7 07/08/2022    CALCIUM 9.9 07/08/2022    PROT 7.1 07/08/2022    ALBUMIN 4.0 07/08/2022    BILITOT 0.3 07/08/2022    ALKPHOS 108 07/08/2022    AST 39 07/08/2022    ALT 49 (H) 07/08/2022    ANIONGAP 6 (L) 07/08/2022    ESTGFRAFRICA >60.0 07/08/2022    EGFRNONAA >60.0 07/08/2022    TSH 4.574 (H) 07/08/2022     Vit D, 25-Hydroxy   Date Value Ref Range Status   07/08/2022 47 30 - 96 ng/mL Final     Comment:     Vitamin D deficiency.........<10 ng/mL                              Vitamin D insufficiency......10-29 ng/mL       Vitamin D sufficiency........> or equal to 30 ng/mL  Vitamin D toxicity............>100 ng/mL         Assessment and Plan     Osteoporosis, post-menopausal  She has fracture while on Prolia therapy.  Again reviewed that fall precautions is the best thing for her.  Discuss trans transitioning to an anabolic either Evenity for a year or Forteo for 2 years and then switch back to an anti resorptive.  She will read about it and continue.  In the interim she will continue Prolia.    Return to clinic in 1 year      Fracture while on therapy.  See above.  No reversible contributing causes identified at this time    The patient location is: home  The chief complaint leading to consultation is: above     Visit type: audiovisual    Face to Face time with patient: 20 minutes of total time spent on the encounter, which includes face to face time and non-face to face time preparing to see the patient (eg, review of tests), Obtaining and/or reviewing separately obtained history, Documenting clinical information in the electronic or other health record, Independently interpreting  results (not separately reported) and communicating results to the patient/family/caregiver, or Care coordination (not separately reported).         Each patient to whom he or she provides medical services by telemedicine is:  (1) informed of the relationship between the physician and patient and the respective role of any other health care provider with respect to management of the patient; and (2) notified that he or she may decline to receive medical services by telemedicine and may withdraw from such care at any time.

## 2023-01-24 NOTE — PATIENT INSTRUCTIONS
Thank you for completing a virtual visit with me!     Per our conversation, please look into changing the Prolia to evenity or Forteo.  These medicines are called anabolic ....to build bone.  These medicines would be used for 1-2 years and then we would restart Prolia or Reclast after.    Please look at [employer] website.  They have very valuable information for patients regarding calcium and vitamin-D intake, preventing fractures, medication, and safe exercises.    [email or web address]         We will plan a telemedicine or an in-clinic visit in 12 months with labs prior to that appointment.    My staff will contact you to schedule the above.     Please let me know if you have any other questions.    Thank you,  Adriana Ybarra MD

## 2023-01-25 ENCOUNTER — PATIENT MESSAGE (OUTPATIENT)
Dept: PRIMARY CARE CLINIC | Facility: CLINIC | Age: 68
End: 2023-01-25
Payer: MEDICARE

## 2023-01-26 ENCOUNTER — OFFICE VISIT (OUTPATIENT)
Dept: PRIMARY CARE CLINIC | Facility: CLINIC | Age: 68
End: 2023-01-26
Payer: MEDICARE

## 2023-01-26 ENCOUNTER — LAB VISIT (OUTPATIENT)
Dept: LAB | Facility: HOSPITAL | Age: 68
End: 2023-01-26
Attending: INTERNAL MEDICINE
Payer: MEDICARE

## 2023-01-26 VITALS
DIASTOLIC BLOOD PRESSURE: 82 MMHG | HEIGHT: 65 IN | SYSTOLIC BLOOD PRESSURE: 120 MMHG | OXYGEN SATURATION: 98 % | WEIGHT: 109.81 LBS | BODY MASS INDEX: 18.3 KG/M2 | HEART RATE: 74 BPM

## 2023-01-26 DIAGNOSIS — F41.1 GAD (GENERALIZED ANXIETY DISORDER): ICD-10-CM

## 2023-01-26 DIAGNOSIS — M79.89 SWELLING OF BOTH HANDS: ICD-10-CM

## 2023-01-26 DIAGNOSIS — M81.0 SENILE OSTEOPOROSIS: ICD-10-CM

## 2023-01-26 DIAGNOSIS — E53.8 B12 DEFICIENCY: ICD-10-CM

## 2023-01-26 DIAGNOSIS — E44.1 MILD PROTEIN-CALORIE MALNUTRITION: ICD-10-CM

## 2023-01-26 DIAGNOSIS — E55.9 VITAMIN D DEFICIENCY: ICD-10-CM

## 2023-01-26 DIAGNOSIS — F33.1 MODERATE EPISODE OF RECURRENT MAJOR DEPRESSIVE DISORDER: ICD-10-CM

## 2023-01-26 DIAGNOSIS — F33.1 MODERATE EPISODE OF RECURRENT MAJOR DEPRESSIVE DISORDER: Primary | ICD-10-CM

## 2023-01-26 LAB
25(OH)D3+25(OH)D2 SERPL-MCNC: 32 NG/ML (ref 30–96)
ALBUMIN SERPL BCP-MCNC: 3.9 G/DL (ref 3.5–5.2)
ALP SERPL-CCNC: 127 U/L (ref 55–135)
ALT SERPL W/O P-5'-P-CCNC: 30 U/L (ref 10–44)
ANION GAP SERPL CALC-SCNC: 10 MMOL/L (ref 8–16)
AST SERPL-CCNC: 27 U/L (ref 10–40)
BASOPHILS # BLD AUTO: 0.01 K/UL (ref 0–0.2)
BASOPHILS NFR BLD: 0.2 % (ref 0–1.9)
BILIRUB SERPL-MCNC: 0.4 MG/DL (ref 0.1–1)
BUN SERPL-MCNC: 20 MG/DL (ref 8–23)
CALCIUM SERPL-MCNC: 10.3 MG/DL (ref 8.7–10.5)
CCP AB SER IA-ACNC: 0.5 U/ML
CHLORIDE SERPL-SCNC: 99 MMOL/L (ref 95–110)
CO2 SERPL-SCNC: 27 MMOL/L (ref 23–29)
CREAT SERPL-MCNC: 0.8 MG/DL (ref 0.5–1.4)
CRP SERPL-MCNC: 1.6 MG/L (ref 0–8.2)
DIFFERENTIAL METHOD: ABNORMAL
EOSINOPHIL # BLD AUTO: 0.2 K/UL (ref 0–0.5)
EOSINOPHIL NFR BLD: 3.9 % (ref 0–8)
ERYTHROCYTE [DISTWIDTH] IN BLOOD BY AUTOMATED COUNT: 14.1 % (ref 11.5–14.5)
ERYTHROCYTE [SEDIMENTATION RATE] IN BLOOD BY PHOTOMETRIC METHOD: <2 MM/HR (ref 0–36)
EST. GFR  (NO RACE VARIABLE): >60 ML/MIN/1.73 M^2
GLUCOSE SERPL-MCNC: 79 MG/DL (ref 70–110)
HCT VFR BLD AUTO: 39.6 % (ref 37–48.5)
HGB BLD-MCNC: 12.4 G/DL (ref 12–16)
IMM GRANULOCYTES # BLD AUTO: 0.01 K/UL (ref 0–0.04)
IMM GRANULOCYTES NFR BLD AUTO: 0.2 % (ref 0–0.5)
LYMPHOCYTES # BLD AUTO: 1.3 K/UL (ref 1–4.8)
LYMPHOCYTES NFR BLD: 29.8 % (ref 18–48)
MCH RBC QN AUTO: 29.2 PG (ref 27–31)
MCHC RBC AUTO-ENTMCNC: 31.3 G/DL (ref 32–36)
MCV RBC AUTO: 93 FL (ref 82–98)
MONOCYTES # BLD AUTO: 0.4 K/UL (ref 0.3–1)
MONOCYTES NFR BLD: 9 % (ref 4–15)
NEUTROPHILS # BLD AUTO: 2.5 K/UL (ref 1.8–7.7)
NEUTROPHILS NFR BLD: 56.9 % (ref 38–73)
NRBC BLD-RTO: 0 /100 WBC
PLATELET # BLD AUTO: 326 K/UL (ref 150–450)
PMV BLD AUTO: 11.2 FL (ref 9.2–12.9)
POTASSIUM SERPL-SCNC: 4.7 MMOL/L (ref 3.5–5.1)
PREALB SERPL-MCNC: 21 MG/DL (ref 20–43)
PROT SERPL-MCNC: 6.9 G/DL (ref 6–8.4)
PTH-INTACT SERPL-MCNC: 53.5 PG/ML (ref 9–77)
RBC # BLD AUTO: 4.24 M/UL (ref 4–5.4)
RHEUMATOID FACT SERPL-ACNC: <13 IU/ML (ref 0–15)
SODIUM SERPL-SCNC: 136 MMOL/L (ref 136–145)
VIT B12 SERPL-MCNC: 926 PG/ML (ref 210–950)
WBC # BLD AUTO: 4.33 K/UL (ref 3.9–12.7)

## 2023-01-26 PROCEDURE — 3074F PR MOST RECENT SYSTOLIC BLOOD PRESSURE < 130 MM HG: ICD-10-PCS | Mod: CPTII,S$GLB,, | Performed by: INTERNAL MEDICINE

## 2023-01-26 PROCEDURE — 86038 ANTINUCLEAR ANTIBODIES: CPT | Performed by: INTERNAL MEDICINE

## 2023-01-26 PROCEDURE — 1158F ADVNC CARE PLAN TLK DOCD: CPT | Mod: CPTII,S$GLB,, | Performed by: INTERNAL MEDICINE

## 2023-01-26 PROCEDURE — 99999 PR PBB SHADOW E&M-EST. PATIENT-LVL IV: ICD-10-PCS | Mod: PBBFAC,,, | Performed by: INTERNAL MEDICINE

## 2023-01-26 PROCEDURE — 3074F SYST BP LT 130 MM HG: CPT | Mod: CPTII,S$GLB,, | Performed by: INTERNAL MEDICINE

## 2023-01-26 PROCEDURE — 1160F RVW MEDS BY RX/DR IN RCRD: CPT | Mod: CPTII,S$GLB,, | Performed by: INTERNAL MEDICINE

## 2023-01-26 PROCEDURE — 82306 VITAMIN D 25 HYDROXY: CPT | Performed by: INTERNAL MEDICINE

## 2023-01-26 PROCEDURE — 1125F PR PAIN SEVERITY QUANTIFIED, PAIN PRESENT: ICD-10-PCS | Mod: CPTII,S$GLB,, | Performed by: INTERNAL MEDICINE

## 2023-01-26 PROCEDURE — 3079F DIAST BP 80-89 MM HG: CPT | Mod: CPTII,S$GLB,, | Performed by: INTERNAL MEDICINE

## 2023-01-26 PROCEDURE — 3008F PR BODY MASS INDEX (BMI) DOCUMENTED: ICD-10-PCS | Mod: CPTII,S$GLB,, | Performed by: INTERNAL MEDICINE

## 2023-01-26 PROCEDURE — 99999 PR PBB SHADOW E&M-EST. PATIENT-LVL IV: CPT | Mod: PBBFAC,,, | Performed by: INTERNAL MEDICINE

## 2023-01-26 PROCEDURE — 99214 OFFICE O/P EST MOD 30 MIN: CPT | Mod: S$GLB,,, | Performed by: INTERNAL MEDICINE

## 2023-01-26 PROCEDURE — 1159F PR MEDICATION LIST DOCUMENTED IN MEDICAL RECORD: ICD-10-PCS | Mod: CPTII,S$GLB,, | Performed by: INTERNAL MEDICINE

## 2023-01-26 PROCEDURE — 84134 ASSAY OF PREALBUMIN: CPT | Performed by: INTERNAL MEDICINE

## 2023-01-26 PROCEDURE — 80053 COMPREHEN METABOLIC PANEL: CPT | Performed by: INTERNAL MEDICINE

## 2023-01-26 PROCEDURE — 86431 RHEUMATOID FACTOR QUANT: CPT | Performed by: INTERNAL MEDICINE

## 2023-01-26 PROCEDURE — 82607 VITAMIN B-12: CPT | Performed by: INTERNAL MEDICINE

## 2023-01-26 PROCEDURE — 3288F PR FALLS RISK ASSESSMENT DOCUMENTED: ICD-10-PCS | Mod: CPTII,S$GLB,, | Performed by: INTERNAL MEDICINE

## 2023-01-26 PROCEDURE — 1158F PR ADVANCE CARE PLANNING DISCUSS DOCUMENTED IN MEDICAL RECORD: ICD-10-PCS | Mod: CPTII,S$GLB,, | Performed by: INTERNAL MEDICINE

## 2023-01-26 PROCEDURE — 36415 COLL VENOUS BLD VENIPUNCTURE: CPT | Mod: PN | Performed by: INTERNAL MEDICINE

## 2023-01-26 PROCEDURE — 86200 CCP ANTIBODY: CPT | Performed by: INTERNAL MEDICINE

## 2023-01-26 PROCEDURE — 3008F BODY MASS INDEX DOCD: CPT | Mod: CPTII,S$GLB,, | Performed by: INTERNAL MEDICINE

## 2023-01-26 PROCEDURE — 1160F PR REVIEW ALL MEDS BY PRESCRIBER/CLIN PHARMACIST DOCUMENTED: ICD-10-PCS | Mod: CPTII,S$GLB,, | Performed by: INTERNAL MEDICINE

## 2023-01-26 PROCEDURE — 3079F PR MOST RECENT DIASTOLIC BLOOD PRESSURE 80-89 MM HG: ICD-10-PCS | Mod: CPTII,S$GLB,, | Performed by: INTERNAL MEDICINE

## 2023-01-26 PROCEDURE — 1101F PR PT FALLS ASSESS DOC 0-1 FALLS W/OUT INJ PAST YR: ICD-10-PCS | Mod: CPTII,S$GLB,, | Performed by: INTERNAL MEDICINE

## 2023-01-26 PROCEDURE — 99214 PR OFFICE/OUTPT VISIT, EST, LEVL IV, 30-39 MIN: ICD-10-PCS | Mod: S$GLB,,, | Performed by: INTERNAL MEDICINE

## 2023-01-26 PROCEDURE — 86140 C-REACTIVE PROTEIN: CPT | Performed by: INTERNAL MEDICINE

## 2023-01-26 PROCEDURE — 3288F FALL RISK ASSESSMENT DOCD: CPT | Mod: CPTII,S$GLB,, | Performed by: INTERNAL MEDICINE

## 2023-01-26 PROCEDURE — 83970 ASSAY OF PARATHORMONE: CPT | Performed by: INTERNAL MEDICINE

## 2023-01-26 PROCEDURE — 1101F PT FALLS ASSESS-DOCD LE1/YR: CPT | Mod: CPTII,S$GLB,, | Performed by: INTERNAL MEDICINE

## 2023-01-26 PROCEDURE — 1159F MED LIST DOCD IN RCRD: CPT | Mod: CPTII,S$GLB,, | Performed by: INTERNAL MEDICINE

## 2023-01-26 PROCEDURE — 85025 COMPLETE CBC W/AUTO DIFF WBC: CPT | Performed by: INTERNAL MEDICINE

## 2023-01-26 PROCEDURE — 1125F AMNT PAIN NOTED PAIN PRSNT: CPT | Mod: CPTII,S$GLB,, | Performed by: INTERNAL MEDICINE

## 2023-01-26 PROCEDURE — 85652 RBC SED RATE AUTOMATED: CPT | Performed by: INTERNAL MEDICINE

## 2023-01-26 NOTE — Clinical Note
Mr. Manrique,  I saw Ms. Son today and I think she's supposed to follow up with you in a month but I didn't see any upcoming appointments. Just wanted to make sure she has an appt since she's not doing quite well. She's working w/ our LMSW. I wasn't sure if you were aware but she also has eating disorder issues, but luckily weight stable currently.  Catie

## 2023-01-26 NOTE — PROGRESS NOTES
"Subjective:       Patient ID: Adelaida Flores is a 67 y.o. female.    Chief Complaint: Hip Pain    HPI  MDD/MISTY - prozac 80mg daily and lamictal 100mg qd.   Insomnia - hydroxyzine nightly.   Follows w/ [facility]EM[facility] Waldrop in psychiatry - LOV 1/20/23. F/u in 1 mo.    Fell 1/4/23 walking dog and had R femur fx.  Saw Dr. Obrien 1/5/23. Weight bearing as tolerated w/ crutches. Cannot work for at least a mo. Followed up 1/20/23. Has f/u 2/9/23.   Alternates between lytenol and ibuprofen for pain. May take a norco at night.     Osteoporosis. On prolia q 6 mo currently. Saw Dr. Ybarra 1/24/23. Recommended transitioning to Evenity for a yr or Forteo for 2 yrs than back to prolia. However pt wanted to think about it first.  Don't think she can do daily injections for herself at [facility] [place]. Not sure if evenity is cost effective for her.   Drinks 3-4 glasses of soy milk w/ Ca.     Excessive daytime sleepiness. Home sleep study w/ mild/mod NADIA. However pt is not sure about the validity of the home sleep study and would rather follow up w/ sleep MD so she can get an in lab sleep eval.   Had appt w/ Adia Weeks NP 11/4/22 but missed it.    Review of Systems  Comprehensive review of systems otherwise negative. See history/subjective section for more details.    Objective:      Physical Exam    /82 (BP Location: Left arm, Patient Position: Sitting, BP Method: Large (Manual))   Pulse 74   Ht 5' 5" (1.651 m)   Wt 49.8 kg (109 lb 12.6 oz)   SpO2 98%   BMI 18.27 kg/m²     GEN - A+OX4, NAD, thin.  HEENT - PERRL, EOMI, OP clear. MMM.   Neck - No thyromegaly or cervical LAD. No thyroid masses felt.  CV - RRR, no m/r   Chest - CTAB, no wheezing or rhonchi  Abd - S/NT/ND/+BS.   Ext - 2+BDP and radial pulses. No C/C/E.  MSK - no spinal tenderness to palpation. Pain on palpation of the R hip. Antalgic gait. Walks w/ walker. B hand swelling w/ some discoloration of the fingers. FROM of fingers. No pain.   Skin - No rash.    Previous labs " reviewed.    Assessment/Plan     Adelaida was seen today for hip pain.    Diagnoses and all orders for this visit:    Moderate episode of recurrent major depressive disorder - a big part of this stems from her hip fx and unable to do some of the things she used to do. Working w/ LMSW. Sent message to Minor Manrique NP to make sure pt has f/u w/ him. Cont current meds for now. Will see pt again in 6 weeks.   -     Comprehensive Metabolic Panel; Future    MISTY (generalized anxiety disorder)  -     Comprehensive Metabolic Panel; Future    Mild protein-calorie malnutrition - despite worsened depression/anxiety, luckily weight is still the same. Hasn't been able to exercise and that increases her anxiety about her weight. Working w/ LMSW. Once fx/hip pain improves, can discuss trying solids for food again.  -     Comprehensive Metabolic Panel; Future  -     CBC Auto Differential; Future  -     PREALBUMIN; Future  -     C-Reactive Protein; Future    Vitamin D deficiency  -     Vitamin D; Future    B12 deficiency  -     Vitamin B12; Future    Senile osteoporosis - on prolia. Will ask about evenity w/ PHN.   -     PTH, intact; Future    Swelling of both hands - not painful. No rash.   -     LENORA Screen w/Reflex; Future  -     RHEUMATOID FACTOR; Future  -     CYCLIC CITRUL PEPTIDE ANTIBODY, IGG; Future  -     Sedimentation rate; Future    Advance Care Planning     Date: 01/26/2023    Living Will  Reviewed living will and HCPOA on file. Pt wishes no changes to be made.      Power of   I initiated the process of advance care planning today and explained the importance of this process to the patient.  I introduced the concept of advance directives to the patient, as well. Then the patient received detailed information about the importance of designating a Health Care Power of  (HCPOA). She was also instructed to communicate with this person about their wishes for future healthcare, should she become sick and lose  decision-making capacity. The patient has previously appointed a HCPOA. After our discussion, the patient has decided to complete a HCPOA and has appointed her significant other, health care agent:  Trevin Calderon  & health care agent number:  [phone]  I spent a total time of 3 minutes discussing this issue with the patient.       Follow up in about 6 weeks (around 3/9/2023).      Catie Portillo MD  Department of Internal Medicine - [facility]  7:31 AM

## 2023-01-26 NOTE — PROGRESS NOTES
"Individual Psychotherapy (LCSW/PhD)  Adelaida Flores,  9/15/2022    Site:  [place]         Therapeutic Intervention: Met with patient for individual psychotherapy.    Chief complaint/reason for encounter: anxiety and behavior     Interval history and content of current session: Pt reports ongoing difficulty communicating with spouse. Pt states that spouse was not agreeable to pt' request to change upcoming trip to [facility]. Pt had communicated to spouse they were concerned about "being so isolated" after their recent injury. Pt also expressed thoughts of food restriction related to frustration with . SW and pt discussed importance of pt maintaining nutrition and used CBT principles to identify helpful and adaptive thoughts.     SW and pt discussed barriers to improved anxiety and eating behaviors. SW and pt discussed pt continuing discussion with  about couples therapy or individual therapy with . Pt stated they will express concerns shared by Dr. Portillo for upcoming trip safety with . SW and pt discussed importance of pt maintain current nutrition regimen and continuing to introduce solid foods as able. Pt states they recently cooked"split pea soup" and expressed confidence that they will be able to introduce more solid foods into diet.    SW requested pt contact SW for any further needs and requested pt attempt to use CBT Thought Journal to help with "all or nothing" thinking.    Treatment plan:  Target symptoms: anxiety   Why chosen therapy is appropriate versus another modality: relevant to diagnosis, evidence based practice  Outcome monitoring methods: self-report, checklist/rating scale  Therapeutic intervention type: insight oriented psychotherapy, behavior modifying psychotherapy, supportive psychotherapy    Risk parameters:  Patient reports no suicidal ideation  Patient reports no homicidal ideation  Patient reports no self-injurious behavior  Patient reports no violent " behavior    Verbal deficits: None    Patient's response to intervention:  The patient's response to intervention is accepting.    Progress toward goals and other mental status changes:  The patient's progress toward goals is fair .    Diagnosis:   No diagnosis found.    Plan: Pt plans to continue individual psychotherapy    Return to clinic: 1 week    Length of Service (minutes): 30

## 2023-01-27 LAB — ANA SER QL IF: NORMAL

## 2023-02-01 ENCOUNTER — PATIENT MESSAGE (OUTPATIENT)
Dept: PRIMARY CARE CLINIC | Facility: CLINIC | Age: 68
End: 2023-02-01
Payer: MEDICARE

## 2023-02-09 ENCOUNTER — HOSPITAL ENCOUNTER (OUTPATIENT)
Dept: RADIOLOGY | Facility: HOSPITAL | Age: 68
Discharge: HOME | End: 2023-02-09
Attending: INTERNAL MEDICINE
Payer: MEDICARE

## 2023-02-09 ENCOUNTER — PATIENT MESSAGE (OUTPATIENT)
Dept: PRIMARY CARE CLINIC | Facility: CLINIC | Age: 68
End: 2023-02-09
Payer: MEDICARE

## 2023-02-09 ENCOUNTER — DOCUMENTATION ONLY (OUTPATIENT)
Dept: PRIMARY CARE CLINIC | Facility: CLINIC | Age: 68
End: 2023-02-09
Payer: MEDICARE

## 2023-02-09 ENCOUNTER — OFFICE VISIT (OUTPATIENT)
Dept: ORTHOPEDICS | Facility: CLINIC | Age: 68
End: 2023-02-09
Payer: MEDICARE

## 2023-02-09 ENCOUNTER — HOSPITAL ENCOUNTER (OUTPATIENT)
Dept: RADIOLOGY | Facility: HOSPITAL | Age: 68
Discharge: HOME | End: 2023-02-09
Attending: ORTHOPAEDIC SURGERY
Payer: MEDICARE

## 2023-02-09 VITALS — WEIGHT: 108 LBS | HEIGHT: 65 IN | BODY MASS INDEX: 17.99 KG/M2

## 2023-02-09 DIAGNOSIS — S72.114D CLOSED NONDISPLACED FRACTURE OF GREATER TROCHANTER OF RIGHT FEMUR WITH ROUTINE HEALING, SUBSEQUENT ENCOUNTER: Primary | ICD-10-CM

## 2023-02-09 DIAGNOSIS — S72.114A CLOSED NONDISPLACED FRACTURE OF GREATER TROCHANTER OF RIGHT FEMUR, INITIAL ENCOUNTER: ICD-10-CM

## 2023-02-09 DIAGNOSIS — Z12.31 ENCOUNTER FOR SCREENING MAMMOGRAM FOR BREAST CANCER: ICD-10-CM

## 2023-02-09 PROCEDURE — 73502 X-RAY EXAM HIP UNI 2-3 VIEWS: CPT | Mod: TC,PN,RT

## 2023-02-09 PROCEDURE — 1101F PT FALLS ASSESS-DOCD LE1/YR: CPT | Mod: CPTII,S$GLB,, | Performed by: ORTHOPAEDIC SURGERY

## 2023-02-09 PROCEDURE — 77063 BREAST TOMOSYNTHESIS BI: CPT | Mod: 26,,, | Performed by: RADIOLOGY

## 2023-02-09 PROCEDURE — 73502 X-RAY EXAM HIP UNI 2-3 VIEWS: CPT | Mod: 26,RT,, | Performed by: RADIOLOGY

## 2023-02-09 PROCEDURE — 1160F PR REVIEW ALL MEDS BY PRESCRIBER/CLIN PHARMACIST DOCUMENTED: ICD-10-PCS | Mod: CPTII,S$GLB,, | Performed by: ORTHOPAEDIC SURGERY

## 2023-02-09 PROCEDURE — 99213 OFFICE O/P EST LOW 20 MIN: CPT | Mod: S$GLB,,, | Performed by: ORTHOPAEDIC SURGERY

## 2023-02-09 PROCEDURE — 3008F PR BODY MASS INDEX (BMI) DOCUMENTED: ICD-10-PCS | Mod: CPTII,S$GLB,, | Performed by: ORTHOPAEDIC SURGERY

## 2023-02-09 PROCEDURE — 99213 PR OFFICE/OUTPT VISIT, EST, LEVL III, 20-29 MIN: ICD-10-PCS | Mod: S$GLB,,, | Performed by: ORTHOPAEDIC SURGERY

## 2023-02-09 PROCEDURE — 77063 MAMMO DIGITAL SCREENING BILAT WITH TOMO: ICD-10-PCS | Mod: 26,,, | Performed by: RADIOLOGY

## 2023-02-09 PROCEDURE — 1125F AMNT PAIN NOTED PAIN PRSNT: CPT | Mod: CPTII,S$GLB,, | Performed by: ORTHOPAEDIC SURGERY

## 2023-02-09 PROCEDURE — 1159F PR MEDICATION LIST DOCUMENTED IN MEDICAL RECORD: ICD-10-PCS | Mod: CPTII,S$GLB,, | Performed by: ORTHOPAEDIC SURGERY

## 2023-02-09 PROCEDURE — 3288F PR FALLS RISK ASSESSMENT DOCUMENTED: ICD-10-PCS | Mod: CPTII,S$GLB,, | Performed by: ORTHOPAEDIC SURGERY

## 2023-02-09 PROCEDURE — 3008F BODY MASS INDEX DOCD: CPT | Mod: CPTII,S$GLB,, | Performed by: ORTHOPAEDIC SURGERY

## 2023-02-09 PROCEDURE — 1160F RVW MEDS BY RX/DR IN RCRD: CPT | Mod: CPTII,S$GLB,, | Performed by: ORTHOPAEDIC SURGERY

## 2023-02-09 PROCEDURE — 77067 SCR MAMMO BI INCL CAD: CPT | Mod: TC

## 2023-02-09 PROCEDURE — 1101F PR PT FALLS ASSESS DOC 0-1 FALLS W/OUT INJ PAST YR: ICD-10-PCS | Mod: CPTII,S$GLB,, | Performed by: ORTHOPAEDIC SURGERY

## 2023-02-09 PROCEDURE — 99999 PR PBB SHADOW E&M-EST. PATIENT-LVL III: CPT | Mod: PBBFAC,,, | Performed by: ORTHOPAEDIC SURGERY

## 2023-02-09 PROCEDURE — 3288F FALL RISK ASSESSMENT DOCD: CPT | Mod: CPTII,S$GLB,, | Performed by: ORTHOPAEDIC SURGERY

## 2023-02-09 PROCEDURE — 77067 SCR MAMMO BI INCL CAD: CPT | Mod: 26,,, | Performed by: RADIOLOGY

## 2023-02-09 PROCEDURE — 73502 XR HIP WITH PELVIS WHEN PERFORMED, 2 OR 3  VIEWS RIGHT: ICD-10-PCS | Mod: 26,RT,, | Performed by: RADIOLOGY

## 2023-02-09 PROCEDURE — 99999 PR PBB SHADOW E&M-EST. PATIENT-LVL III: ICD-10-PCS | Mod: PBBFAC,,, | Performed by: ORTHOPAEDIC SURGERY

## 2023-02-09 PROCEDURE — 1125F PR PAIN SEVERITY QUANTIFIED, PAIN PRESENT: ICD-10-PCS | Mod: CPTII,S$GLB,, | Performed by: ORTHOPAEDIC SURGERY

## 2023-02-09 PROCEDURE — 77067 MAMMO DIGITAL SCREENING BILAT WITH TOMO: ICD-10-PCS | Mod: 26,,, | Performed by: RADIOLOGY

## 2023-02-09 PROCEDURE — 1159F MED LIST DOCD IN RCRD: CPT | Mod: CPTII,S$GLB,, | Performed by: ORTHOPAEDIC SURGERY

## 2023-02-09 NOTE — PROGRESS NOTES
Subjective:      Patient ID: Adelaida Flores is a 67 y.o. female.    Chief Complaint: No chief complaint on file.    HPI    Follow-up for right greater trochanter fracture.  The patient reports that her pain is subsiding.  She walks without ambulatory aids at times now.  She notes slight lateral discomfort that is not functionally limiting.          Review of Systems   Constitutional: Negative for fever and weight loss.   HENT:  Negative for congestion.    Eyes:  Negative for visual disturbance.   Cardiovascular:  Negative for chest pain.   Respiratory:  Negative for shortness of breath.    Hematologic/Lymphatic: Negative for bleeding problem. Does not bruise/bleed easily.   Skin:  Negative for poor wound healing.   Gastrointestinal:  Negative for abdominal pain.   Genitourinary:  Negative for dysuria.   Neurological:  Negative for seizures.   Psychiatric/Behavioral:  Negative for altered mental status.    Allergic/Immunologic: Negative for persistent infections.       Objective:      Ortho/SPM Exam      Right hip    The patient is not in acute distress.   Body habitus is:normal.   Sclerae normal  The patient walks without a limp.   Respiratory distress:  none  The skin over the hip is:intact.   There is slight lateral tenderness  Range of motion- Flexion full, External rotation full, internal rotation full.  Resisted SLR negative.  Pain with rotation negative  Sciatic tension findings negative.  Shortening/lengthening compared to the contralateral side absent.  Pulses DP present, PT present.  Motor normal 5/5 strength in all tested muscle groups.   Sensory normal.      I reviewed the relevant imaging for the patient's condition:  Right hip radiographs show early callus formation at the site of a minimally displaced fracture of the tip of the greater trochanter          Assessment:       No diagnosis found.     The fracture is healing as expected.  The patient is making good progress with self-directed  rehabilitation.          Plan:       There are no diagnoses linked to this encounter.      I explained my assessment    I reviewed the natural history of this injury with regard to recovery.    Appropriate exercise and activity levels were reviewed along with progression from 2 crutches to 1 to none.

## 2023-02-09 NOTE — PROGRESS NOTES
Individual Psychotherapy (LCSW/PhD)  Adelaida Flores,  2/9/2023    Site:  [facility]         Therapeutic Intervention: Met with patient for individual psychotherapy.    Chief complaint/reason for encounter: anxiety     Interval history and content of current session: Pt completed CBT thought journal for session. Pt has been attending outpatient virtual eating disorders group.Pt reports feeling positive of group but feeling anxious. SW supported pt in processing feelings of anxiety and ways for pt to engage more in group. SW supported pt in identifying areas of growth and progress in eating and self-care. Pt states they want to continue to engage with group but need support in engaging with others without anxiety.     SW provided feedback for pt and encouraged pt to share thoughts and feelings with pt. Pt has ate solid foods I.e. raw veggies, cheese, casserole.    Treatment plan:  Target symptoms: anxiety   Why chosen therapy is appropriate versus another modality: relevant to diagnosis, evidence based practice  Outcome monitoring methods: self-report, lab data, checklist/rating scale  Therapeutic intervention type: behavior modifying psychotherapy, supportive psychotherapy    Risk parameters:  Patient reports no suicidal ideation  Patient reports no homicidal ideation  Patient reports no self-injurious behavior  Patient reports no violent behavior    Verbal deficits: None    Patient's response to intervention:  The patient's response to intervention is accepting.    Progress toward goals and other mental status changes:  The patient's progress toward goals is fair .    Diagnosis:   No diagnosis found.    Plan: Pt plans to continue individual psychotherapy    Return to clinic: 1 week    Length of Service (minutes): 30

## 2023-02-16 ENCOUNTER — DOCUMENTATION ONLY (OUTPATIENT)
Dept: PRIMARY CARE CLINIC | Facility: CLINIC | Age: 68
End: 2023-02-16
Payer: MEDICARE

## 2023-02-16 NOTE — PROGRESS NOTES
Individual Psychotherapy (LCSW/PhD)  Adelaida Flores,  2/16/2023    Site:  Tele[facility]         Therapeutic Intervention: Met with patient for individual psychotherapy.    Chief complaint/reason for encounter: anxiety     Interval history and content of current session: Pt reports some anxiety regarding upcoming camping trip. Pt is worried that they may get sick on trip if they eat solid food. Pt reviewed previous trip where they had GI issues after eating were stuck on trip in pain. SW and pt discussed previous experience. SW and pt discussed impact of previous volvulus pt's feelings of safety.    SW and pt used CBT techniques to help pt identify helpful vs. Unhelpful thoughts. SW supported pt in processing feelings of fear and anxiety. SW and pt reviewed pt's values including self-care and discussed how eating solid foods fits into pt's self-care plan.     Pt agreed to discuss concerns with  and select solid food meals that pt feel more comfortable with for this trip. Pt states they will speak with  about their plan to enjoy solid food when visiting sister during trip. Pt will also discuss safety concerns with  and formulate a plan. SW provided encouragement to pt and encouraged pt to utilize CBT thought journal to reflect on challenging thoughts and fears.     Treatment plan:  Target symptoms: anxiety   Why chosen therapy is appropriate versus another modality: relevant to diagnosis, evidence based practice  Outcome monitoring methods: self-report, lab data, checklist/rating scale  Therapeutic intervention type: insight oriented psychotherapy, supportive psychotherapy    Risk parameters:  Patient reports no suicidal ideation  Patient reports no homicidal ideation  Patient reports no self-injurious behavior  Patient reports no violent behavior    Verbal deficits: None    Patient's response to intervention:  The patient's response to intervention is accepting.    Progress toward goals and other  mental status changes:  The patient's progress toward goals is fair .    Diagnosis:   No diagnosis found.    Plan: Pt plans to continue individual psychotherapy    Return to clinic: 1 week    Length of Service (minutes): 30

## 2023-02-20 ENCOUNTER — INFUSION (OUTPATIENT)
Dept: INFECTIOUS DISEASES | Facility: HOSPITAL | Age: 68
End: 2023-02-20
Attending: INTERNAL MEDICINE
Payer: MEDICARE

## 2023-02-20 VITALS
WEIGHT: 113.63 LBS | DIASTOLIC BLOOD PRESSURE: 67 MMHG | HEART RATE: 65 BPM | TEMPERATURE: 98 F | SYSTOLIC BLOOD PRESSURE: 105 MMHG | BODY MASS INDEX: 18.91 KG/M2 | RESPIRATION RATE: 18 BRPM | OXYGEN SATURATION: 98 %

## 2023-02-20 DIAGNOSIS — M81.0 SENILE OSTEOPOROSIS: Primary | ICD-10-CM

## 2023-02-20 PROCEDURE — 63600175 PHARM REV CODE 636 W HCPCS: Mod: JG | Performed by: NURSE PRACTITIONER

## 2023-02-20 PROCEDURE — 96372 THER/PROPH/DIAG INJ SC/IM: CPT

## 2023-02-20 RX ADMIN — DENOSUMAB 60 MG: 60 INJECTION SUBCUTANEOUS at 10:02

## 2023-02-22 ENCOUNTER — PATIENT MESSAGE (OUTPATIENT)
Dept: PRIMARY CARE CLINIC | Facility: CLINIC | Age: 68
End: 2023-02-22
Payer: MEDICARE

## 2023-02-23 ENCOUNTER — DOCUMENTATION ONLY (OUTPATIENT)
Dept: PRIMARY CARE CLINIC | Facility: CLINIC | Age: 68
End: 2023-02-23
Payer: MEDICARE

## 2023-02-23 NOTE — PROGRESS NOTES
Individual Psychotherapy (LCSW/PhD)  Adelaida Flores,  2/23/2023    Site:  Tele[facility]         Therapeutic Intervention: Met with patient for individual psychotherapy.    Chief complaint/reason for encounter: anxiety     Interval history and content of current session: Pt reports reaching out to eating disorder outpatient group. Pt did not enroll because of Covid concerns at facility. Pt and SW discussed importance of pt setting limits on worrying I.e. different times per topic. Pt reports anxiety about eating solid food and is having thoughts about avoidance.     Pt did report being able to eat eat some solid food at friend's home which they were proud of. SW and pt used techniques to externalize eating disorder thoughts. Pt reports feeling motivated to eat once per week. Pt participated in online group for ED and was able to speak and share their feelings. SW supported pt's feelings and encouraged pt to continue attending groups for additional support.    SW and pt reviewed pt's progress and discussed pt's growing coping skills I.e. communicating feelings, boundaries, recognizing negative self-talk. SW and pt agreed pt using CBT journal to challenge ongoing thoughts would be helpful.    Treatment plan:  Target symptoms: anxiety   Why chosen therapy is appropriate versus another modality: relevant to diagnosis, evidence based practice  Outcome monitoring methods: self-report, checklist/rating scale  Therapeutic intervention type: behavior modifying psychotherapy, supportive psychotherapy    Risk parameters:  Patient reports no suicidal ideation  Patient reports no homicidal ideation  Patient reports no self-injurious behavior  Patient reports no violent behavior    Verbal deficits: None    Patient's response to intervention:  The patient's response to intervention is accepting.    Progress toward goals and other mental status changes:  The patient's progress toward goals is fair .    Diagnosis:   No diagnosis  found.    Plan: Pt plans to continue individual psychotherapy    Return to clinic: 1 week    Length of Service (minutes): 30

## 2023-02-28 ENCOUNTER — PATIENT MESSAGE (OUTPATIENT)
Dept: PRIMARY CARE CLINIC | Facility: CLINIC | Age: 68
End: 2023-02-28
Payer: MEDICARE

## 2023-03-07 ENCOUNTER — PATIENT MESSAGE (OUTPATIENT)
Dept: PRIMARY CARE CLINIC | Facility: CLINIC | Age: 68
End: 2023-03-07
Payer: MEDICARE

## 2023-03-08 ENCOUNTER — PATIENT MESSAGE (OUTPATIENT)
Dept: ENDOCRINOLOGY | Facility: CLINIC | Age: 68
End: 2023-03-08
Payer: MEDICARE

## 2023-03-10 ENCOUNTER — PATIENT MESSAGE (OUTPATIENT)
Dept: ENDOCRINOLOGY | Facility: CLINIC | Age: 68
End: 2023-03-10
Payer: MEDICARE

## 2023-03-14 ENCOUNTER — TELEPHONE (OUTPATIENT)
Dept: ENDOCRINOLOGY | Facility: CLINIC | Age: 68
End: 2023-03-14
Payer: MEDICARE

## 2023-03-14 NOTE — TELEPHONE ENCOUNTER
----- Message from Wendy Painting RN sent at 3/14/2023  3:02 PM CDT -----  Regarding: Bety Ybarra,  Ms Flores received her last Prolia injection on 2/20/23, I see you've written new orders for Bety.  When would you like us to schedule her for her first injection? authorization is complete.   Also can you discontinue the Prolia injections? thanks

## 2023-03-16 ENCOUNTER — CLINICAL SUPPORT (OUTPATIENT)
Dept: PRIMARY CARE CLINIC | Facility: CLINIC | Age: 68
End: 2023-03-16
Payer: MEDICARE

## 2023-03-16 DIAGNOSIS — F50.81 BINGE EATING DISORDER: Primary | ICD-10-CM

## 2023-03-16 NOTE — PROGRESS NOTES
"Individual Psychotherapy (LCSW/PhD)  Adelaida Flores,  3/16/2023    Site:  Tele[facility]         Therapeutic Intervention: Met with patient for individual psychotherapy.    Chief complaint/reason for encounter: anxiety, eating disorder     Interval history and content of current session: Pt reports things have been "good". Pt is currently going to work to help complete some projects. 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 LCSW and pt used CBT approaches to help pt identify helpful vs. Unhelpful thoughts.    Pt states they do not want to come across to staff as "too harsh" or forget something. Pt identified helpful thoughts such as"my job is to improve care for patients" and " my feedback can help a facility increase their quality". LCSW and pt discussed ways pt could advocate for their selves and pt's.     LCSW encouraged pt to reflect on past  successes and avoid judging themselves. Pt states they have lost some weight but did eat solid food on trip I.e. crawfish bisque. Pt states they were also to have a solid food meal when returning home. Pt to utilize CBT thought journal to challenge anxious thoughts about job and eating.    Treatment plan:  Target symptoms: anxiety   Why chosen therapy is appropriate versus another modality: relevant to diagnosis, evidence based practice  Outcome monitoring methods: self-report, checklist/rating scale  Therapeutic intervention type: behavior modifying psychotherapy, supportive psychotherapy    Risk parameters:  Patient reports no suicidal ideation  Patient reports no homicidal ideation  Patient reports no self-injurious behavior  Patient reports no violent behavior    Verbal deficits: None    Patient's response to intervention:  The patient's response to intervention is accepting.    Progress toward goals and other mental status changes:  The patient's progress toward goals is excellent.    Diagnosis:   No diagnosis found.    Plan: Pt plans to " continue individual psychotherapy    Return to clinic: 1 week    Length of Service (minutes): 30

## 2023-03-23 ENCOUNTER — PATIENT MESSAGE (OUTPATIENT)
Dept: PRIMARY CARE CLINIC | Facility: CLINIC | Age: 68
End: 2023-03-23
Payer: MEDICARE

## 2023-03-23 ENCOUNTER — TELEPHONE (OUTPATIENT)
Dept: PRIMARY CARE CLINIC | Facility: CLINIC | Age: 68
End: 2023-03-23
Payer: MEDICARE

## 2023-03-23 NOTE — TELEPHONE ENCOUNTER
Can you call to check on pt? I see she hasn't seen Seth since 3/16 and has no future upcoming appt.   Calm/Appropriate

## 2023-03-24 ENCOUNTER — DOCUMENTATION ONLY (OUTPATIENT)
Dept: PRIMARY CARE CLINIC | Facility: CLINIC | Age: 68
End: 2023-03-24
Payer: MEDICARE

## 2023-03-24 NOTE — PROGRESS NOTES
"  Individual Psychotherapy (LCSW/PhD)  Adelaida Flores,  3/24/2023     Site:  Telemed          Therapeutic Intervention: Met with patient for individual psychotherapy.     Chief complaint/reason for encounter: anxiety and behavior            Interval history and content of current session: Pt reports anxiety related to job stress and demands of job. LCSW and pt used CBT thought journal to analyze thoughts and support pt in identifying helpful thoughts and behaviors. Pt will contact supervisor to have discussion of concerns and needs to secure more support.      LCSW and pt discussed pt's SI and feelings of depression. Pt states they are "tired" of worrying and feel like they are not making progress. LCSW and pt discussed areas of pt progress including pt's increased self-awareness and ability to sustain a healthier weight. LCSW and pt discussed securing peer support for pt for eating disorder. Pt has researched groups and programs and will e-mail LCSW for additional support in enrolling in program.      Goals:  1) initiate and complete phone call with supervisor  2) share information for peer support with LCSW for support and further discussion  3) use CBT thought journal to identify helpful vs un-helpful thoughts.        Treatment plan:  Target symptoms: anxiety   Why chosen therapy is appropriate versus another modality: relevant to diagnosis, evidence based practice  Outcome monitoring methods: self-report, checklist/rating scale  Therapeutic intervention type: supportive psychotherapy     Risk parameters:  Patient reports suicidal ideation: thoughts of not wanting to "deal with" life anymore. Pt denies any suicide plan or thoughts of self-harm. LCSW and pt reviewed safety plan for further SI.  Patient reports no homicidal ideation  Patient reports no self-injurious behavior  Patient reports no violent behavior     Verbal deficits: None     Patient's response to intervention:  The patient's response to " intervention is accepting.     Progress toward goals and other mental status changes:  The patient's progress toward goals is fair .     Diagnosis:   No diagnosis found.     Plan: Pt plans to continue individual psychotherapy     Return to clinic: 1 week     Length of Service (minutes): 30

## 2023-03-24 NOTE — TELEPHONE ENCOUNTER
Had session with Seth today. Let me know if you want me to call her, or if you rather get an update from Seth

## 2023-03-29 ENCOUNTER — PATIENT MESSAGE (OUTPATIENT)
Dept: PRIMARY CARE CLINIC | Facility: CLINIC | Age: 68
End: 2023-03-29
Payer: MEDICARE

## 2023-03-30 ENCOUNTER — CLINICAL SUPPORT (OUTPATIENT)
Dept: PRIMARY CARE CLINIC | Facility: CLINIC | Age: 68
End: 2023-03-30
Payer: MEDICARE

## 2023-03-30 ENCOUNTER — OFFICE VISIT (OUTPATIENT)
Dept: PSYCHIATRY | Facility: CLINIC | Age: 68
End: 2023-03-30
Payer: MEDICARE

## 2023-03-30 ENCOUNTER — DOCUMENTATION ONLY (OUTPATIENT)
Dept: PRIMARY CARE CLINIC | Facility: CLINIC | Age: 68
End: 2023-03-30
Payer: MEDICARE

## 2023-03-30 DIAGNOSIS — F33.1 MDD (MAJOR DEPRESSIVE DISORDER), RECURRENT EPISODE, MODERATE: Primary | ICD-10-CM

## 2023-03-30 DIAGNOSIS — F10.21 ALCOHOL DEPENDENCE IN SUSTAINED FULL REMISSION: ICD-10-CM

## 2023-03-30 DIAGNOSIS — F50.81 BINGE EATING DISORDER: Primary | ICD-10-CM

## 2023-03-30 DIAGNOSIS — F50.9 EATING DISORDER, UNSPECIFIED TYPE: ICD-10-CM

## 2023-03-30 DIAGNOSIS — F32.A MODERATELY SEVERE DEPRESSION: ICD-10-CM

## 2023-03-30 DIAGNOSIS — F41.1 GAD (GENERALIZED ANXIETY DISORDER): ICD-10-CM

## 2023-03-30 PROCEDURE — 1160F RVW MEDS BY RX/DR IN RCRD: CPT | Mod: CPTII,95,, | Performed by: PHYSICIAN ASSISTANT

## 2023-03-30 PROCEDURE — 99214 OFFICE O/P EST MOD 30 MIN: CPT | Mod: 95,,, | Performed by: PHYSICIAN ASSISTANT

## 2023-03-30 PROCEDURE — 1159F MED LIST DOCD IN RCRD: CPT | Mod: CPTII,95,, | Performed by: PHYSICIAN ASSISTANT

## 2023-03-30 PROCEDURE — 90833 PSYTX W PT W E/M 30 MIN: CPT | Mod: 95,,, | Performed by: PHYSICIAN ASSISTANT

## 2023-03-30 PROCEDURE — 90833 PR PSYCHOTHERAPY W/PATIENT W/E&M, 30 MIN (ADD ON): ICD-10-PCS | Mod: 95,,, | Performed by: PHYSICIAN ASSISTANT

## 2023-03-30 PROCEDURE — 1160F PR REVIEW ALL MEDS BY PRESCRIBER/CLIN PHARMACIST DOCUMENTED: ICD-10-PCS | Mod: CPTII,95,, | Performed by: PHYSICIAN ASSISTANT

## 2023-03-30 PROCEDURE — 1159F PR MEDICATION LIST DOCUMENTED IN MEDICAL RECORD: ICD-10-PCS | Mod: CPTII,95,, | Performed by: PHYSICIAN ASSISTANT

## 2023-03-30 PROCEDURE — 99499 NO LOS: ICD-10-PCS | Mod: 95,,, | Performed by: SOCIAL WORKER

## 2023-03-30 PROCEDURE — 99499 UNLISTED E&M SERVICE: CPT | Mod: 95,,, | Performed by: SOCIAL WORKER

## 2023-03-30 PROCEDURE — 99214 PR OFFICE/OUTPT VISIT, EST, LEVL IV, 30-39 MIN: ICD-10-PCS | Mod: 95,,, | Performed by: PHYSICIAN ASSISTANT

## 2023-03-30 RX ORDER — ARIPIPRAZOLE 2 MG/1
2 TABLET ORAL DAILY
Qty: 30 TABLET | Refills: 2 | Status: SHIPPED | OUTPATIENT
Start: 2023-03-30 | End: 2023-05-22

## 2023-03-30 NOTE — PROGRESS NOTES
"The patient location is: [place]  The chief complaint leading to consultation is: depression f/u    Visit type: audiovisual      Each patient to whom he or she provides medical services by telemedicine is:  (1) informed of the relationship between the physician and patient and the respective role of any other health care provider with respect to management of the patient; and (2) notified that he or she may decline to receive medical services by telemedicine and may withdraw from such care at any time.    Notes:       Outpatient Psychiatry Follow-Up Visit (MD/AMBER)    3/30/2023    Clinical Status of Patient:  Outpatient (Ambulatory)    Chief Complaint:  dAelaida Flores is a 67 y.o. female who presents today for follow-up of depression and anxiety.  Met with patient.      Interval History and Content of Current Session:  Interim Events/Subjective Report/Content of Current Session:    Pt reports today: "i've been struggling with depression, eating disorder has been tough to deal with in my head." "I feel like I have a constant fear of eating and feeling drained all the time."    Pt reports constantly avoiding food and eating. "I havent been allowing myself to eat solid foods". Discussed at length doing exposure therapy to eat both solid and liquid foods at same time which has been an intense fear.    Put in referral for psychologist for eating disorder, is currently seeing LCSW but states having limited progress with eating disorder.    Discussed adding low dose abilify for mood/depression and also possible benefit of improving obsessive thoughts.    Patients mood is anxious, affect appears mood congruent. Linear and logical, friendly and cooperative, good eye contact.    Denies SI/HI/AVH. Pt reports sleeping well and normal appetite. Denies side effects of medications.    Pt reports taking medications as prescribed and behaving appropriately during interview today.    Psychotherapy:  Target symptoms: recurrent " "depression, anxiety   Why chosen therapy is appropriate versus another modality: relevant to diagnosis  Outcome monitoring methods: self-report, observation  Therapeutic intervention type: insight oriented psychotherapy, behavior modifying psychotherapy, supportive psychotherapy  Topics discussed/themes: building skills sets for symptom management, symptom recognition  The patient's response to the intervention is accepting. The patient's progress toward treatment goals is fair.   Duration of intervention: 20 minutes.      Prior visit    Pt reports today: "not good". Currently on prozac 80mg and lamictal 100mg daily, vistaril prn insomnia.    Took 2 falls recently due to dog pulling her and now has a hip fx. Havent been able to go to the gym, been "feeling more depressed"    Patients mood is depressed, affect appears mood congruent. Linear and logical, friendly and cooperative, good eye contact.    Denies SI/HI/AVH. Pt reports sleeping less 4-5 hr per night and decreased appetite from pain. Denies side effects of medications.    Pt reports taking medications as prescribed and behaving appropriately during interview today.    Review of Systems       Psychiatric Review Of Systems - Is patient experiencing or having changes in:  sleep: yes  appetite: yes  weight: yes  energy/anergy: yes  interest/pleasure/anhedonia: yes  somatic symptoms: no  libido: no  anxiety/panic: yes  guilty/hopelessness: no  concentration: yes  S.I.B.s/risky behavior: no  Irritability: no  Racing thoughts: yes  Impulsive behaviors: no  Paranoia: no  AVH: no      Past Medical, Family and Social History: The patient's past medical, family and social history have been reviewed and updated as appropriate within the electronic medical record - see encounter notes.      Current Medications:   Medication List with Changes/Refills   Current Medications    DENOSUMAB (PROLIA) 60 MG/ML SYRG        FLUOROMETHOLONE 0.1% (FML) 0.1 % DRPS    Place into both " eyes.    FLUOXETINE 40 MG CAPSULE    Take 2 capsules (80 mg total) by mouth once daily.    HYDROXYZINE PAMOATE (VISTARIL) 25 MG CAP    Take 1 capsule (25 mg total) by mouth nightly as needed (insomnia).    IBUPROFEN (ADVIL,MOTRIN) 600 MG TABLET    Take 1 tablet (600 mg total) by mouth every 8 (eight) hours as needed for Pain.    KRILL OIL ORAL        LAMOTRIGINE (LAMICTAL) 100 MG TABLET    Take 1 tablet (100 mg total) by mouth once daily.    LEVOCETIRIZINE (XYZAL) 5 MG TABLET        MULTIVITAMIN CAPSULE    Take 1 capsule by mouth once daily.    OMEPRAZOLE (PRILOSEC) 40 MG CAPSULE    TAKE 1 CAPSULE BY MOUTH BEFORE BREAKFAST.    ONDANSETRON (ZOFRAN-ODT) 4 MG TBDL    Take 1 tablet (4 mg total) by mouth every 8 (eight) hours as needed (nausea).         Allergies:   Review of patient's allergies indicates:   Allergen Reactions    Cefdinir Diarrhea    Grass pollen-red top, standard     House dust Itching         Vitals   There were no vitals filed for this visit.       Labs/Imaging/Studies:   No results found for this or any previous visit (from the past 48 hour(s)).   No results found for: PHENYTOIN, PHENOBARB, VALPROATE, CBMZ    Compliance: yes    Side effects: None    Risk Parameters:  Patient reports no suicidal ideation  Patient reports no homicidal ideation  Patient reports no self-injurious behavior  Patient reports no violent behavior    Exam (detailed: at least 9 elements; comprehensive: all 15 elements)   Constitutional  Vitals:  Most recent vital signs, dated less than 90 days prior to this appointment, were reviewed.   There were no vitals filed for this visit.     General:  unremarkable, age appropriate     Musculoskeletal  Muscle Strength/Tone:  not examined   Gait & Station:  non-ataxic     Psychiatric  Speech:  no latency; no press   Mood & Affect:  Anxious, depressive  congruent and appropriate   Thought Process:  normal and logical   Associations:  intact   Thought Content:  normal, no suicidality, no  homicidality, delusions, or paranoia   Insight:  intact, has awareness of illness   Judgement: behavior is adequate to circumstances   Orientation:  grossly intact   Memory: intact for content of interview   Language: grossly intact   Attention Span & Concentration:  able to focus   Fund of Knowledge:  intact and appropriate to age and level of education     Assessment and Diagnosis   Status/Progress: Based on the examination today, the patient's problem(s) is/are adequately but not ideally controlled.  New problems have not been presented today.   Co-morbidities, Diagnostic uncertainty and Lack of compliance are not complicating management of the primary condition.  There are no active rule-out diagnoses for this patient at this time.     General Impression:      ICD-10-CM ICD-9-CM   1. MDD (major depressive disorder), recurrent episode, moderate  F33.1 296.32   2. MISTY (generalized anxiety disorder)  F41.1 300.02   3. Alcohol dependence in sustained full remission  F10.21 303.93   4. Eating disorder, unspecified type  F50.9 307.50             Intervention/Counseling/Treatment Plan   Medication Management: Continue current medications. The risks and benefits of medication were discussed with the patient.    -continue prozac 80mg daily  -continue lamictal 100mg daily    -start abilify 2mg daily    -vistaril prn insomnia    -referral placed for psychology dept for therapy for eating d/o      Return to Clinic: 1 month        Last Manrique PA-C      Total face to face time: 35 min  Total time (chart review, patient contact, documentation): 42 min      *This note has been prepared using a combination of a dictation device and typing.  It has been checked for errors but some errors may still exist within the note as a result of speech recognition errors and/or typographical errors.

## 2023-03-30 NOTE — PROGRESS NOTES
"Individual Psychotherapy (LCSW/PhD)  Adelaida Flores,  3/30/2023    Site:  Telemed         Therapeutic Intervention: Met with patient for individual psychotherapy.    Chief complaint/reason for encounter: Eating disorder     Interval history and content of current session: Pt is contact with [employer] representative for assistance with online group support. LCSW and pt discussed potential options for online support and next steps.    LCSW and pt reviewed pt's CBT Thought Journal for analysis of helpful vs. Un-helpful thoughts. Pt focused on work and family issues in journal. Pt was able to identify helpful thoughts such as "his fmaily has their own challenges and needs" and 's family has dysfunctional issues due to his alcoholism and past Hx. LCSW supported pt in processing feelings of anxiety and frustration at poor relationship with in-laws and step-children. LCSW and pt discussed importance of maintaining boundaries with  and family I.e. being mindful of not calling or trying to represent  to their children/not rescuing . LCSW and pt discussed pt's own experience with substance use and ways to channel wisdom and experience to improve their boundaries and self-care.    LCSW and pt reviewed pt's self-care plan. Pt will continue to eat solid foods 2 meals per week and will continue to use supplemental shakes for rest of calories. LCSW to support pt in securing ED online support group help by sending list of online groups to pt for review with [employer] representative.    Treatment plan:  Target symptoms:  Eating disorder, anxiety  Why chosen therapy is appropriate versus another modality: relevant to diagnosis, evidence based practice  Outcome monitoring methods: self-report, lab data, checklist/rating scale  Therapeutic intervention type: insight oriented psychotherapy, supportive psychotherapy    Risk parameters:  Patient reports no suicidal ideation  Patient reports no homicidal " ideation  Patient reports no self-injurious behavior  Patient reports no violent behavior    Verbal deficits: None    Patient's response to intervention:  The patient's response to intervention is accepting.    Progress toward goals and other mental status changes:  The patient's progress toward goals is fair .    Diagnosis:     ICD-10-CM ICD-9-CM   1. Binge eating disorder  F50.81 307.50   2. Moderately severe depression  F32.A 311       Plan: Pt plans to continue individual psychotherapy    Return to clinic: 1 week    Length of Service (minutes): 30  Established Patient - Audio Only Telehealth Visit     The patient location is: home  The chief complaint leading to consultation is: anxiety, eating disorder  Visit type: Virtual visit with audio only (telephone)  Total time spent with patient: 30 mins.       The reason for the audio only service rather than synchronous audio and video virtual visit was related to technical difficulties or patient preference/necessity.     Each patient to whom I provide medical services by telemedicine is:  (1) informed of the relationship between the physician and patient and the respective role of any other health care provider with respect to management of the patient; and (2) notified that they may decline to receive medical services by telemedicine and may withdraw from such care at any time. Patient verbally consented to receive this service via voice-only telephone call.       HPI:      Assessment and plan:                          This service was not originating from a related E/M service provided within the previous 7 days nor will  to an E/M service or procedure within the next 24 hours or my soonest available appointment.  Prevailing standard of care was able to be met in this audio-only visit.

## 2023-03-30 NOTE — PROGRESS NOTES
"ndividual Psychotherapy (LCSW/PhD)  Adelaida Flores,  3/30/2023    Site:  Telemed         Therapeutic Intervention: Met with patient for individual psychotherapy.    Chief complaint/reason for encounter: Eating disorder     Interval history and content of current session: Pt is contact with [employer] representative for assistance with online group support. LCSW and pt discussed potential options for online support and next steps.    LCSW and pt reviewed pt's CBT Thought Journal for analysis of helpful vs. Un-helpful thoughts. Pt focused on work and family issues in journal. Pt was able to identify helpful thoughts such as "his fmaily has their own challenges and needs" and 's family has dysfunctional issues due to his alcoholism and past Hx. LCSW supported pt in processing feelings of anxiety and frustration at poor relationship with in-laws and step-children. LCSW and pt discussed importance of maintaining boundaries with  and family I.e. being mindful of not calling or trying to represent  to their children/not rescuing . LCSW and pt discussed pt's own experience with substance use and ways to channel wisdom and experience to improve their boundaries and self-care.    LCSW and pt reviewed pt's self-care plan. Pt will continue to eat solid foods 2 meals per week and will continue to use supplemental shakes for rest of calories. LCSW to support pt in securing ED online support group help by sending list of online groups to pt for review with [employer] representative.    Treatment plan:  Target symptoms: Eating disorder, anxiety  Why chosen therapy is appropriate versus another modality: relevant to diagnosis, evidence based practice  Outcome monitoring methods: self-report, lab data, checklist/rating scale  Therapeutic intervention type: insight oriented psychotherapy, supportive psychotherapy    Risk parameters:  Patient reports no suicidal ideation  Patient reports no homicidal ideation  Patient " reports no self-injurious behavior  Patient reports no violent behavior    Verbal deficits: None    Patient's response to intervention:  The patient's response to intervention is accepting.    Progress toward goals and other mental status changes:  The patient's progress toward goals is fair .    Diagnosis:     ICD-10-CM ICD-9-CM   1. Binge eating disorder  F50.81 307.50   2. Moderately severe depression  F32.A 311       Plan: Pt plans to continue individual psychotherapy    Return to clinic: 1 week    Length of Service (minutes): 30  Established Patient - Audio Only Telehealth Visit     The patient location is: home  The chief complaint leading to consultation is: anxiety, eating disorder  Visit type: Virtual visit with audio only (telephone)  Total time spent with patient: 30 mins.       The reason for the audio only service rather than synchronous audio and video virtual visit was related to technical difficulties or patient preference/necessity.     Each patient to whom I provide medical services by telemedicine is:  (1) informed of the relationship between the physician and patient and the respective role of any other health care provider with respect to management of the patient; and (2) notified that they may decline to receive medical services by telemedicine and may withdraw from such care at any time. Patient verbally consented to receive this service via voice-only telephone call.       HPI:      Assessment and plan:                          This service was not originating from a related E/M service provided within the previous 7 days nor will  to an E/M service or procedure within the next 24 hours or my soonest available appointment.  Prevailing standard of care was able to be met in this audio-only visit.

## 2023-03-30 NOTE — PROGRESS NOTES
"Individual Psychotherapy (LCSW/PhD)  Adelaida Flores,  3/30/2023    Site:  Telemed         Therapeutic Intervention: Met with patient for individual psychotherapy.    Chief complaint/reason for encounter: Eating disorder     Interval history and content of current session: Pt is contact with [employer] representative for assistance with online group support. LCSW and pt discussed potential options for online support and next steps.    LCSW and pt reviewed pt's CBT Thought Journal for analysis of helpful vs. Un-helpful thoughts. Pt focused on work and family issues in journal. Pt was able to identify helpful thoughts such as "his fmaily has their own challenges and needs" and 's family has dysfunctional issues due to his alcoholism and past Hx. LCSW supported pt in processing feelings of anxiety and frustration at poor relationship with in-laws and step-children. LCSW and pt discussed importance of maintaining boundaries with  and family I.e. being mindful of not calling or trying to represent  to their children/not rescuing . LCSW and pt discussed pt's own experience with substance use and ways to channel wisdom and experience to improve their boundaries and self-care.    LCSW and pt reviewed pt's self-care plan. Pt will continue to eat solid foods 2 meals per week and will continue to use supplemental shakes for rest of calories. LCSW to support pt in securing ED online support group help by sending list of online groups to pt for review with [employer] representative.    Treatment plan:  Target symptoms:  Eating disorder, anxiety  Why chosen therapy is appropriate versus another modality: relevant to diagnosis, evidence based practice  Outcome monitoring methods: self-report, lab data, checklist/rating scale  Therapeutic intervention type: insight oriented psychotherapy, supportive psychotherapy    Risk parameters:  Patient reports no suicidal ideation  Patient reports no homicidal " ideation  Patient reports no self-injurious behavior  Patient reports no violent behavior    Verbal deficits: None    Patient's response to intervention:  The patient's response to intervention is accepting.    Progress toward goals and other mental status changes:  The patient's progress toward goals is fair .    Diagnosis:   No diagnosis found.    Plan: Pt plans to continue individual psychotherapy    Return to clinic: 1 week    Length of Service (minutes): 30  Established Patient - Audio Only Telehealth Visit     The patient location is: home  The chief complaint leading to consultation is: anxiety, eating disorder  Visit type: Virtual visit with audio only (telephone)  Total time spent with patient: 30 mins.       The reason for the audio only service rather than synchronous audio and video virtual visit was related to technical difficulties or patient preference/necessity.     Each patient to whom I provide medical services by telemedicine is:  (1) informed of the relationship between the physician and patient and the respective role of any other health care provider with respect to management of the patient; and (2) notified that they may decline to receive medical services by telemedicine and may withdraw from such care at any time. Patient verbally consented to receive this service via voice-only telephone call.       HPI:      Assessment and plan:                          This service was not originating from a related E/M service provided within the previous 7 days nor will  to an E/M service or procedure within the next 24 hours or my soonest available appointment.  Prevailing standard of care was able to be met in this audio-only visit.

## 2023-03-31 ENCOUNTER — PATIENT MESSAGE (OUTPATIENT)
Dept: PSYCHIATRY | Facility: CLINIC | Age: 68
End: 2023-03-31
Payer: MEDICARE

## 2023-04-04 ENCOUNTER — PATIENT MESSAGE (OUTPATIENT)
Dept: HEPATOLOGY | Facility: CLINIC | Age: 68
End: 2023-04-04
Payer: MEDICARE

## 2023-04-04 ENCOUNTER — PATIENT MESSAGE (OUTPATIENT)
Dept: PRIMARY CARE CLINIC | Facility: CLINIC | Age: 68
End: 2023-04-04
Payer: MEDICARE

## 2023-04-05 ENCOUNTER — PATIENT MESSAGE (OUTPATIENT)
Dept: PRIMARY CARE CLINIC | Facility: CLINIC | Age: 68
End: 2023-04-05
Payer: MEDICARE

## 2023-04-05 ENCOUNTER — TELEPHONE (OUTPATIENT)
Dept: HEPATOLOGY | Facility: CLINIC | Age: 68
End: 2023-04-05
Payer: MEDICARE

## 2023-04-05 DIAGNOSIS — K76.0 HEPATIC STEATOSIS: ICD-10-CM

## 2023-04-05 DIAGNOSIS — R74.8 ELEVATED LIVER ENZYMES: Primary | ICD-10-CM

## 2023-04-05 NOTE — TELEPHONE ENCOUNTER
Please contact pt to schedule the following  -- f/u with me a few days after her scheduled US  -- labs the same day as her US  -- fibroscan same day as her f/u with me     Thanks !

## 2023-04-05 NOTE — TELEPHONE ENCOUNTER
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  Is this being administered in the clinic? If so, you may be able to  submit [employer] under her medical benefit. If it hasn't been tried already you can reach out to Lisa Maurer in [employer] for guidance.     Myra

## 2023-04-06 ENCOUNTER — DOCUMENTATION ONLY (OUTPATIENT)
Dept: PRIMARY CARE CLINIC | Facility: CLINIC | Age: 68
End: 2023-04-06
Payer: MEDICARE

## 2023-04-06 NOTE — TELEPHONE ENCOUNTER
B, that must be what it is. Evenity is through infusion center. Looks like the orders are in through therapy plan. She'll have to figure it out through insurance/w/ infusion center. We don't give it in office here.

## 2023-04-06 NOTE — PROGRESS NOTES
"Individual Psychotherapy (LCSW/PhD)  Adelaida Flores,  4/6/2023    Site:  Telemed         Therapeutic Intervention: Met with patient for individual psychotherapy.    Chief complaint/reason for encounter: anxiety, behavior, and interpersonal     Interval history and content of current session: Pt has been doing "ok" but states they have been stressed because of dog being ill. Pt took hydroxyzine for sleep last night which they state helped but make them groggy.     LCSW and pt discussed rebound anxiety and how types of medications can affect mood and energy levels. Pt and LCSW discussed pt's mood and recognizing "good mood" and "bad mood". LCSW and pt discussed balanced thinking and importance of avoiding catastrophic thinking.    Pt states they take pride in their efforts to change their behavior and efforts to "get better". Pt participated in an ED online group which they felt were helpful and that they plan on attending again. Pt was able to connect with some members of the group and found this therapeutic.    PT will attend online ED counseling session with recovering ED counselors/patients and specialists. LCSW encouraged pt to explore these options sand will follow-up with pt next week for continued planning and discussion.    Treatment plan:  Target symptoms: anxiety   Why chosen therapy is appropriate versus another modality: relevant to diagnosis, evidence based practice  Outcome monitoring methods: self-report, lab data, checklist/rating scale  Therapeutic intervention type: insight oriented psychotherapy, behavior modifying psychotherapy, supportive psychotherapy    Risk parameters:  Patient reports no suicidal ideation  Patient reports no homicidal ideation  Patient reports no self-injurious behavior  Patient reports no violent behavior    Verbal deficits: None    Patient's response to intervention:  The patient's response to intervention is accepting.    Progress toward goals and other mental status " changes:  The patient's progress toward goals is fair .    Diagnosis:   No diagnosis found.    Plan: Pt plans to continue individual psychotherapy    Return to clinic: 1 week    Length of Service (minutes): 30

## 2023-04-10 ENCOUNTER — PATIENT MESSAGE (OUTPATIENT)
Dept: PRIMARY CARE CLINIC | Facility: CLINIC | Age: 68
End: 2023-04-10
Payer: MEDICARE

## 2023-04-11 ENCOUNTER — PATIENT MESSAGE (OUTPATIENT)
Dept: PRIMARY CARE CLINIC | Facility: CLINIC | Age: 68
End: 2023-04-11
Payer: MEDICARE

## 2023-04-12 ENCOUNTER — PATIENT MESSAGE (OUTPATIENT)
Dept: PRIMARY CARE CLINIC | Facility: CLINIC | Age: 68
End: 2023-04-12
Payer: MEDICARE

## 2023-04-13 ENCOUNTER — DOCUMENTATION ONLY (OUTPATIENT)
Dept: PRIMARY CARE CLINIC | Facility: CLINIC | Age: 68
End: 2023-04-13
Payer: MEDICARE

## 2023-04-13 ENCOUNTER — PATIENT MESSAGE (OUTPATIENT)
Dept: PRIMARY CARE CLINIC | Facility: CLINIC | Age: 68
End: 2023-04-13

## 2023-04-13 ENCOUNTER — OFFICE VISIT (OUTPATIENT)
Dept: SLEEP MEDICINE | Facility: CLINIC | Age: 68
End: 2023-04-13
Payer: MEDICARE

## 2023-04-13 VITALS
WEIGHT: 111.38 LBS | SYSTOLIC BLOOD PRESSURE: 107 MMHG | DIASTOLIC BLOOD PRESSURE: 56 MMHG | BODY MASS INDEX: 18.54 KG/M2 | HEART RATE: 65 BPM

## 2023-04-13 DIAGNOSIS — G47.33 OSA (OBSTRUCTIVE SLEEP APNEA): ICD-10-CM

## 2023-04-13 DIAGNOSIS — F41.1 GAD (GENERALIZED ANXIETY DISORDER): Primary | ICD-10-CM

## 2023-04-13 PROCEDURE — 3074F PR MOST RECENT SYSTOLIC BLOOD PRESSURE < 130 MM HG: ICD-10-PCS | Mod: CPTII,S$GLB,, | Performed by: NURSE PRACTITIONER

## 2023-04-13 PROCEDURE — 1101F PR PT FALLS ASSESS DOC 0-1 FALLS W/OUT INJ PAST YR: ICD-10-PCS | Mod: CPTII,S$GLB,, | Performed by: NURSE PRACTITIONER

## 2023-04-13 PROCEDURE — 3008F BODY MASS INDEX DOCD: CPT | Mod: CPTII,S$GLB,, | Performed by: NURSE PRACTITIONER

## 2023-04-13 PROCEDURE — 3078F PR MOST RECENT DIASTOLIC BLOOD PRESSURE < 80 MM HG: ICD-10-PCS | Mod: CPTII,S$GLB,, | Performed by: NURSE PRACTITIONER

## 2023-04-13 PROCEDURE — 1159F PR MEDICATION LIST DOCUMENTED IN MEDICAL RECORD: ICD-10-PCS | Mod: CPTII,S$GLB,, | Performed by: NURSE PRACTITIONER

## 2023-04-13 PROCEDURE — 3288F FALL RISK ASSESSMENT DOCD: CPT | Mod: CPTII,S$GLB,, | Performed by: NURSE PRACTITIONER

## 2023-04-13 PROCEDURE — 99999 PR PBB SHADOW E&M-EST. PATIENT-LVL III: ICD-10-PCS | Mod: PBBFAC,,, | Performed by: NURSE PRACTITIONER

## 2023-04-13 PROCEDURE — 1101F PT FALLS ASSESS-DOCD LE1/YR: CPT | Mod: CPTII,S$GLB,, | Performed by: NURSE PRACTITIONER

## 2023-04-13 PROCEDURE — 1159F MED LIST DOCD IN RCRD: CPT | Mod: CPTII,S$GLB,, | Performed by: NURSE PRACTITIONER

## 2023-04-13 PROCEDURE — 1126F PR PAIN SEVERITY QUANTIFIED, NO PAIN PRESENT: ICD-10-PCS | Mod: CPTII,S$GLB,, | Performed by: NURSE PRACTITIONER

## 2023-04-13 PROCEDURE — 99204 OFFICE O/P NEW MOD 45 MIN: CPT | Mod: S$GLB,,, | Performed by: NURSE PRACTITIONER

## 2023-04-13 PROCEDURE — 1126F AMNT PAIN NOTED NONE PRSNT: CPT | Mod: CPTII,S$GLB,, | Performed by: NURSE PRACTITIONER

## 2023-04-13 PROCEDURE — 3074F SYST BP LT 130 MM HG: CPT | Mod: CPTII,S$GLB,, | Performed by: NURSE PRACTITIONER

## 2023-04-13 PROCEDURE — 99999 PR PBB SHADOW E&M-EST. PATIENT-LVL III: CPT | Mod: PBBFAC,,, | Performed by: NURSE PRACTITIONER

## 2023-04-13 PROCEDURE — 3008F PR BODY MASS INDEX (BMI) DOCUMENTED: ICD-10-PCS | Mod: CPTII,S$GLB,, | Performed by: NURSE PRACTITIONER

## 2023-04-13 PROCEDURE — 3078F DIAST BP <80 MM HG: CPT | Mod: CPTII,S$GLB,, | Performed by: NURSE PRACTITIONER

## 2023-04-13 PROCEDURE — 99204 PR OFFICE/OUTPT VISIT, NEW, LEVL IV, 45-59 MIN: ICD-10-PCS | Mod: S$GLB,,, | Performed by: NURSE PRACTITIONER

## 2023-04-13 PROCEDURE — 3288F PR FALLS RISK ASSESSMENT DOCUMENTED: ICD-10-PCS | Mod: CPTII,S$GLB,, | Performed by: NURSE PRACTITIONER

## 2023-04-13 NOTE — PROGRESS NOTES
"Individual Psychotherapy (LCSW/PhD)  Adelaida Flores,  4/13/2023    Site:  Telemed         Therapeutic Intervention: Met with patient for individual psychotherapy.    Chief complaint/reason for encounter: anxiety     Interval history and content of current session: Pt feels overwhelmed and states they are "stressed out" by relationship with . Pt states they have been trying to communicate with family members for support but that is has not been going well.     Pt has been trying to engage with mother-in-law but has been met with frustration  and "blame". LCSW supported pt in processing feelings of anger and frustration at 's/'s family's lack of support. Pt is ruminating on their feelings of being hurt and discussed ways to express their feelings in a helpful way. LCSW and pt discussed 's fmaily dynamics and difficulty of interacting with fmaily.    Pt and  are considering couples therapy with therapist and pt requested support in entering online peer support program. LCSW awaiting update on application needs and need for couples therapy.    Treatment plan:  Target symptoms: anxiety , bulimia  Why chosen therapy is appropriate versus another modality: relevant to diagnosis, evidence based practice  Outcome monitoring methods: self-report, lab data, checklist/rating scale  Therapeutic intervention type: behavior modifying psychotherapy, supportive psychotherapy    Risk parameters:  Patient reports no suicidal ideation  Patient reports no homicidal ideation  Patient reports no self-injurious behavior  Patient reports no violent behavior    Verbal deficits: None    Patient's response to intervention:  The patient's response to intervention is accepting.    Progress toward goals and other mental status changes:  The patient's progress toward goals is fair .    Diagnosis:   No diagnosis found.    Plan: Pt plans to continue individual psychotherapy    Return to clinic: 1 week    Length of " Service (minutes): 30

## 2023-04-14 NOTE — TELEPHONE ENCOUNTER
Should I give her mine?   
Normal vision: sees adequately in most situations; can see medication labels, newsprint

## 2023-04-25 ENCOUNTER — PATIENT MESSAGE (OUTPATIENT)
Dept: PRIMARY CARE CLINIC | Facility: CLINIC | Age: 68
End: 2023-04-25
Payer: MEDICARE

## 2023-04-27 ENCOUNTER — TELEPHONE (OUTPATIENT)
Dept: PRIMARY CARE CLINIC | Facility: CLINIC | Age: 68
End: 2023-04-27
Payer: MEDICARE

## 2023-04-27 NOTE — TELEPHONE ENCOUNTER
LCSW and pt spoke about LCSW providing referral letter for outpatient ED services. LCSW and pt discussed expectations of pt and LCSW role's moving forward as well as future sessions with pt and  to increase support.

## 2023-04-28 ENCOUNTER — OFFICE VISIT (OUTPATIENT)
Dept: PSYCHIATRY | Facility: CLINIC | Age: 68
End: 2023-04-28
Payer: MEDICARE

## 2023-04-28 DIAGNOSIS — F33.1 MDD (MAJOR DEPRESSIVE DISORDER), RECURRENT EPISODE, MODERATE: Primary | ICD-10-CM

## 2023-04-28 DIAGNOSIS — F41.1 GAD (GENERALIZED ANXIETY DISORDER): ICD-10-CM

## 2023-04-28 DIAGNOSIS — F50.9 EATING DISORDER, UNSPECIFIED TYPE: ICD-10-CM

## 2023-04-28 DIAGNOSIS — F10.21 ALCOHOL DEPENDENCE IN SUSTAINED FULL REMISSION: ICD-10-CM

## 2023-04-28 PROCEDURE — 99214 PR OFFICE/OUTPT VISIT, EST, LEVL IV, 30-39 MIN: ICD-10-PCS | Mod: 95,,, | Performed by: PHYSICIAN ASSISTANT

## 2023-04-28 PROCEDURE — 99214 OFFICE O/P EST MOD 30 MIN: CPT | Mod: 95,,, | Performed by: PHYSICIAN ASSISTANT

## 2023-04-28 PROCEDURE — 1160F RVW MEDS BY RX/DR IN RCRD: CPT | Mod: CPTII,95,, | Performed by: PHYSICIAN ASSISTANT

## 2023-04-28 PROCEDURE — 1160F PR REVIEW ALL MEDS BY PRESCRIBER/CLIN PHARMACIST DOCUMENTED: ICD-10-PCS | Mod: CPTII,95,, | Performed by: PHYSICIAN ASSISTANT

## 2023-04-28 PROCEDURE — 1159F MED LIST DOCD IN RCRD: CPT | Mod: CPTII,95,, | Performed by: PHYSICIAN ASSISTANT

## 2023-04-28 PROCEDURE — 1159F PR MEDICATION LIST DOCUMENTED IN MEDICAL RECORD: ICD-10-PCS | Mod: CPTII,95,, | Performed by: PHYSICIAN ASSISTANT

## 2023-04-28 NOTE — PROGRESS NOTES
"The patient location is: [place]  The chief complaint leading to consultation is: depression f/u    Visit type: audiovisual      Each patient to whom he or she provides medical services by telemedicine is:  (1) informed of the relationship between the physician and patient and the respective role of any other health care provider with respect to management of the patient; and (2) notified that he or she may decline to receive medical services by telemedicine and may withdraw from such care at any time.    Notes:       Outpatient Psychiatry Follow-Up Visit (MD/AMBER)    4/28/2023    Clinical Status of Patient:  Outpatient (Ambulatory)    Chief Complaint:  Adelaida Flores is a 67 y.o. female who presents today for follow-up of depression and anxiety.  Met with patient.      Interval History and Content of Current Session:  Interim Events/Subjective Report/Content of Current Session:    Pt reports today: "feeling kind of anxious right now, I usually exercise to help with my anxiety and havent done that to do".     Continues to have avoidance of eating solid foods. "I'm scared ill get a volvulus again if I eat too much solid food". States she is trying to challenge     Pt did not start abilify after last visit due to fear of possible side effects.    Reports depression today as 7/10, and anxiety as 7/10.    States she is trying to find other treatment therapies such as "eating disorder "    States wants to start exercising more to improve mood.    Patients mood is steady, affect appears mood congruent. Linear and logical, friendly and cooperative, good eye contact.    Denies SI/HI/AVH. Pt reports sleeping well and decreased appetite-not eating solid meals everyday but drinking ensure supplement shakes-states that she allows eating herself a solid meal 1x per week.    Discussed adding buspar and abilify. Pt hesisitant at this point regarding further medications.    Denies side effects of medications.    Pt reports " "taking medications as prescribed and behaving appropriately during interview today.      Prior visit    Pt reports today: "i've been struggling with depression, eating disorder has been tough to deal with in my head." "I feel like I have a constant fear of eating and feeling drained all the time."    Pt reports constantly avoiding food and eating. "I havent been allowing myself to eat solid foods". Discussed at length doing exposure therapy to eat both solid and liquid foods at same time which has been an intense fear.    Put in referral for psychologist for eating disorder, is currently seeing LCSW but states having limited progress with eating disorder.    Discussed adding low dose abilify for mood/depression and also possible benefit of improving obsessive thoughts.    Patients mood is anxious, affect appears mood congruent. Linear and logical, friendly and cooperative, good eye contact.    Denies SI/HI/AVH. Pt reports sleeping well and normal appetite. Denies side effects of medications.    Pt reports taking medications as prescribed and behaving appropriately during interview today.    Review of Systems       Psychiatric Review Of Systems - Is patient experiencing or having changes in:  sleep: no  appetite: yes  weight: yes  energy/anergy: yes  interest/pleasure/anhedonia: yes  somatic symptoms: no  libido: no  anxiety/panic: yes  guilty/hopelessness: no  concentration: yes  S.I.B.s/risky behavior: no  Irritability: no  Racing thoughts: no  Impulsive behaviors: no  Paranoia: no  AVH: no      Past Medical, Family and Social History: The patient's past medical, family and social history have been reviewed and updated as appropriate within the electronic medical record - see encounter notes.      Current Medications:   Medication List with Changes/Refills   Current Medications    ARIPIPRAZOLE (ABILIFY) 2 MG TAB    Take 1 tablet (2 mg total) by mouth once daily.    DENOSUMAB (PROLIA) 60 MG/ML SYRG        " FLUOROMETHOLONE 0.1% (FML) 0.1 % DRPS    Place into both eyes.    FLUOXETINE 40 MG CAPSULE    Take 2 capsules (80 mg total) by mouth once daily.    HYDROXYZINE PAMOATE (VISTARIL) 25 MG CAP    Take 1 capsule (25 mg total) by mouth nightly as needed (insomnia).    IBUPROFEN (ADVIL,MOTRIN) 600 MG TABLET    Take 1 tablet (600 mg total) by mouth every 8 (eight) hours as needed for Pain.    KRILL OIL ORAL        LAMOTRIGINE (LAMICTAL) 100 MG TABLET    Take 1 tablet (100 mg total) by mouth once daily.    LEVOCETIRIZINE (XYZAL) 5 MG TABLET        MULTIVITAMIN CAPSULE    Take 1 capsule by mouth once daily.    OMEPRAZOLE (PRILOSEC) 40 MG CAPSULE    TAKE 1 CAPSULE BY MOUTH BEFORE BREAKFAST.    ONDANSETRON (ZOFRAN-ODT) 4 MG TBDL    Take 1 tablet (4 mg total) by mouth every 8 (eight) hours as needed (nausea).         Allergies:   Review of patient's allergies indicates:   Allergen Reactions    Cefdinir Diarrhea    Grass pollen-red top, standard     House dust Itching         Vitals   There were no vitals filed for this visit.       Labs/Imaging/Studies:   No results found for this or any previous visit (from the past 48 hour(s)).   No results found for: PHENYTOIN, PHENOBARB, VALPROATE, CBMZ    Compliance: yes    Side effects: None    Risk Parameters:  Patient reports no suicidal ideation  Patient reports no homicidal ideation  Patient reports no self-injurious behavior  Patient reports no violent behavior    Exam (detailed: at least 9 elements; comprehensive: all 15 elements)   Constitutional  Vitals:  Most recent vital signs, dated less than 90 days prior to this appointment, were reviewed.   There were no vitals filed for this visit.     General:  unremarkable, age appropriate     Musculoskeletal  Muscle Strength/Tone:  not examined   Gait & Station:  non-ataxic     Psychiatric  Speech:  no latency; no press   Mood & Affect:  Anxious, depressive  congruent and appropriate   Thought Process:  normal and logical   Associations:   intact   Thought Content:  normal, no suicidality, no homicidality, delusions, or paranoia   Insight:  intact, has awareness of illness   Judgement: behavior is adequate to circumstances   Orientation:  grossly intact   Memory: intact for content of interview   Language: grossly intact   Attention Span & Concentration:  able to focus   Fund of Knowledge:  intact and appropriate to age and level of education     Assessment and Diagnosis   Status/Progress: Based on the examination today, the patient's problem(s) is/are improved and well controlled. New problems have not been presented today.   Co-morbidities, Diagnostic uncertainty and Lack of compliance are not complicating management of the primary condition.  There are no active rule-out diagnoses for this patient at this time.     General Impression:      ICD-10-CM ICD-9-CM   1. MDD (major depressive disorder), recurrent episode, moderate  F33.1 296.32   2. MISTY (generalized anxiety disorder)  F41.1 300.02   3. Alcohol dependence in sustained full remission  F10.21 303.93   4. Eating disorder, unspecified type  F50.9 307.50               Intervention/Counseling/Treatment Plan   Medication Management: Continue current medications. The risks and benefits of medication were discussed with the patient.    -continue prozac 80mg daily  -continue lamictal 100mg daily    -stopped abilify prior tot this visit    -vistaril prn insomnia    -referral placed previously for psychology dept for therapy for eating d/o      Return to Clinic: 1 month        Last Manrique PA-C      Total face to face time: 26 min  Total time (chart review, patient contact, documentation): 30 min      *This note has been prepared using a combination of a dictation device and typing.  It has been checked for errors but some errors may still exist within the note as a result of speech recognition errors and/or typographical errors.

## 2023-05-04 ENCOUNTER — PATIENT MESSAGE (OUTPATIENT)
Dept: PRIMARY CARE CLINIC | Facility: CLINIC | Age: 68
End: 2023-05-04
Payer: MEDICARE

## 2023-05-04 NOTE — LETTER
[facility] - [place]  [address] [place], [address]  [place] [address]  Phone: [phone]  Fax: [phone]     May 5, 2023    To Whom It May Concern:    Ms. Adelaida Flores ( 51) has been a patient of min for years. In the last year, she has been referred to see our therapist Seth Jauregui LMSW. She struggles with anorexia nervosa most of her life and even though she has made progress, she still struggles with gaining weight and her anxiety. She would be a great candidate for [facility]. Please call if you have any questions.     Sincerely,          Catie Portillo MD  Department of Internal Medicine - [facility] 65+

## 2023-05-05 ENCOUNTER — PATIENT MESSAGE (OUTPATIENT)
Dept: PRIMARY CARE CLINIC | Facility: CLINIC | Age: 68
End: 2023-05-05
Payer: MEDICARE

## 2023-05-08 ENCOUNTER — PATIENT MESSAGE (OUTPATIENT)
Dept: PRIMARY CARE CLINIC | Facility: CLINIC | Age: 68
End: 2023-05-08
Payer: MEDICARE

## 2023-05-11 ENCOUNTER — PATIENT MESSAGE (OUTPATIENT)
Dept: PRIMARY CARE CLINIC | Facility: CLINIC | Age: 68
End: 2023-05-11

## 2023-05-15 ENCOUNTER — PATIENT MESSAGE (OUTPATIENT)
Dept: HEPATOLOGY | Facility: CLINIC | Age: 68
End: 2023-05-15
Payer: MEDICARE

## 2023-05-17 ENCOUNTER — PATIENT MESSAGE (OUTPATIENT)
Dept: PRIMARY CARE CLINIC | Facility: CLINIC | Age: 68
End: 2023-05-17
Payer: MEDICARE

## 2023-05-17 ENCOUNTER — HOSPITAL ENCOUNTER (OUTPATIENT)
Dept: RADIOLOGY | Facility: HOSPITAL | Age: 68
Discharge: HOME | End: 2023-05-17
Attending: NURSE PRACTITIONER
Payer: MEDICARE

## 2023-05-17 DIAGNOSIS — R74.8 ELEVATED LIVER ENZYMES: ICD-10-CM

## 2023-05-17 DIAGNOSIS — F50.00 ANOREXIA NERVOSA: Primary | ICD-10-CM

## 2023-05-17 DIAGNOSIS — N18.1 CHRONIC KIDNEY DISEASE, STAGE 1: ICD-10-CM

## 2023-05-17 PROCEDURE — 76700 US ABDOMEN COMPLETE: ICD-10-PCS | Mod: 26,,, | Performed by: RADIOLOGY

## 2023-05-17 PROCEDURE — 76700 US EXAM ABDOM COMPLETE: CPT | Mod: 26,,, | Performed by: RADIOLOGY

## 2023-05-17 PROCEDURE — 76700 US EXAM ABDOM COMPLETE: CPT | Mod: TC

## 2023-05-17 NOTE — TELEPHONE ENCOUNTER
Please fax to [facility]. Can also fax [facility]'s laurenceal FEMI Waldrop's last note. Let her know when done please.

## 2023-05-17 NOTE — LETTER
[facility] - [place]  [address], [address]  [place] [address]  Phone: [phone]  Fax: [phone]     2023    To Whom It May Concern:    I am Ms. Adelaida Mark's ( 51) Primary Care Physician. She suffers from anorexia nervosa. Currently [facility] does not have a dietician that specializes in eating disorder, which is the reason why we sent in a request for an external referral. As eating disorders are very difficult to treat, it is imperative that she has a psychologist and dietician that specifically deals with that.   If you have any questions, please feel free to contact me.     Sincerely,           Catie Portillo MD  Department of Internal Medicine - [facility] 65+

## 2023-05-19 ENCOUNTER — TELEPHONE (OUTPATIENT)
Dept: PRIMARY CARE CLINIC | Facility: CLINIC | Age: 68
End: 2023-05-19
Payer: MEDICARE

## 2023-05-19 ENCOUNTER — PATIENT MESSAGE (OUTPATIENT)
Dept: PRIMARY CARE CLINIC | Facility: CLINIC | Age: 68
End: 2023-05-19
Payer: MEDICARE

## 2023-05-19 NOTE — TELEPHONE ENCOUNTER
----- Message from Keeley Cardenas sent at 5/18/2023  6:29 PM CDT -----  Contact: Jelly/[phone]     ----- Message -----  From: Edelmira Steve  Sent: 5/18/2023   3:55 PM CDT  To: Lindsey Haddad Staff     Jelly starr/ [employer] called and would like a call back      Thank you

## 2023-05-19 NOTE — TELEPHONE ENCOUNTER
----- Message from Keeley Cardenas sent at 5/18/2023  6:29 PM CDT -----  Contact: Jelly/[phone]    ----- Message -----  From: Edelmira Steve  Sent: 5/18/2023   3:55 PM CDT  To: Lindsey Haddad Staff    Jelly starr/ [employer] called and would like a call back     Thank you

## 2023-05-19 NOTE — TELEPHONE ENCOUNTER
B, maybe you can help me with this. I spoke with Jelly, she explained to be that the referrals that were sent over were very complex for her to get approved. She will have to send this over to the medical director to review. She asked that we call [employer] online to request a psychiatrist in network with her insurance (states she cannot call for the patient, and the patient also cannot call?) the number should be on the back of the insurers card. She does not think that the out of network dietician will be approved, she will have to call [facility] to see if our dieticians handle eating disorders and if they tell her they do (being in network with her insurance) they will deny the referral to the out of network provider that was requested.

## 2023-05-22 ENCOUNTER — PATIENT MESSAGE (OUTPATIENT)
Dept: ORTHOPEDICS | Facility: CLINIC | Age: 68
End: 2023-05-22
Payer: MEDICARE

## 2023-05-22 ENCOUNTER — OFFICE VISIT (OUTPATIENT)
Dept: URGENT CARE | Facility: CLINIC | Age: 68
End: 2023-05-22
Payer: MEDICARE

## 2023-05-22 VITALS
WEIGHT: 111 LBS | HEART RATE: 69 BPM | HEIGHT: 65 IN | TEMPERATURE: 97 F | BODY MASS INDEX: 18.49 KG/M2 | OXYGEN SATURATION: 99 % | RESPIRATION RATE: 18 BRPM | DIASTOLIC BLOOD PRESSURE: 69 MMHG | SYSTOLIC BLOOD PRESSURE: 106 MMHG

## 2023-05-22 DIAGNOSIS — M25.511 ACUTE PAIN OF RIGHT SHOULDER: Primary | ICD-10-CM

## 2023-05-22 PROCEDURE — 99214 PR OFFICE/OUTPT VISIT, EST, LEVL IV, 30-39 MIN: ICD-10-PCS | Mod: S$GLB,,, | Performed by: NURSE PRACTITIONER

## 2023-05-22 PROCEDURE — 73030 X-RAY EXAM OF SHOULDER: CPT | Mod: FY,RT,S$GLB, | Performed by: RADIOLOGY

## 2023-05-22 PROCEDURE — 73030 XR SHOULDER COMPLETE 2 OR MORE VIEWS RIGHT: ICD-10-PCS | Mod: FY,RT,S$GLB, | Performed by: RADIOLOGY

## 2023-05-22 PROCEDURE — 99214 OFFICE O/P EST MOD 30 MIN: CPT | Mod: S$GLB,,, | Performed by: NURSE PRACTITIONER

## 2023-05-22 NOTE — TELEPHONE ENCOUNTER
Yogesh w/ Jelly @ [facility]. Pt is currently seeing [facility] and also  / therapist for help with eating disorder.   If there are specific notes from either SW or psych stating that outside specialized assistance is needed or advised, please send that to her, so that she can put that with info to help her case to get coverage for [facility] treatment.   Please let me know if there is any specific note that states outside assistance with her eating disorder would be of benefit to help with insurance coverage.   Is there a specific dietician or group within [facility] that handles this?  Thanks!

## 2023-05-22 NOTE — PROGRESS NOTES
"Subjective:      Patient ID: Adelaida Flores is a 67 y.o. female.    Vitals:  height is 5' 5" (1.651 m) and weight is 50.3 kg (111 lb). Her temperature is 97.4 °F (36.3 °C). Her blood pressure is 106/69 and her pulse is 69. Her respiration is 18 and oxygen saturation is 99%.     Chief Complaint: Fall    67 year old female presents today with a fall that occurred on 05/17/2023. Direct impact was right shoulder. States she was ran over by some dogs at the park.  Treatments at home include Tylenol. Motrin, and Ice . Normal ROM but would like to get it checked out.  Pain scale 08/10 while moving.  Denies any head trauma injury, denies loss of consciousness, denies any pain to right upper arm,, denies fever, body aches or chills, denies cough, wheezing or shortness of breath, denies nausea, vomiting, diarrhea or abdominal pain, denies chest pain or dizziness positional lightheadedness, denies sore throat or trouble swallowing, denies loss of taste or smell, or any other symptoms        Fall  Incident onset: 05/17/23. There was no blood loss. Point of impact: right shoulder. The pain is at a severity of 7/10. The symptoms are aggravated by movement. Pertinent negatives include no abdominal pain, bowel incontinence, fever, headaches, hearing loss, hematuria, loss of consciousness, nausea, numbness, tingling, visual change or vomiting. She has tried NSAID, acetaminophen and ice for the symptoms.     Constitution: Negative for fever.   Gastrointestinal:  Negative for abdominal pain, nausea, vomiting and bowel incontinence.   Genitourinary:  Negative for hematuria.   Musculoskeletal:  Positive for joint pain.   Neurological:  Negative for headaches, loss of consciousness and numbness.    Objective:     Physical Exam   Constitutional: She is oriented to person, place, and time. She appears well-developed. She is cooperative.  Non-toxic appearance. She does not appear ill. No distress.   HENT:   Head: Normocephalic and " atraumatic. Head is without abrasion, without contusion and without laceration.   Ears:   Right Ear: Hearing, tympanic membrane, external ear and ear canal normal. No hemotympanum.   Left Ear: Hearing, tympanic membrane, external ear and ear canal normal. No hemotympanum.   Nose: Nose normal. No mucosal edema, rhinorrhea or nasal deformity. No epistaxis. Right sinus exhibits no maxillary sinus tenderness and no frontal sinus tenderness. Left sinus exhibits no maxillary sinus tenderness and no frontal sinus tenderness.   Mouth/Throat: Uvula is midline, oropharynx is clear and moist and mucous membranes are normal. No trismus in the jaw. Normal dentition. No uvula swelling. No posterior oropharyngeal erythema.   Eyes: Conjunctivae, EOM and lids are normal. Pupils are equal, round, and reactive to light. Right eye exhibits no discharge. Left eye exhibits no discharge. No scleral icterus. Extraocular movement intact   Neck: Trachea normal and phonation normal. Neck supple. No tracheal deviation present. No neck rigidity present. No spinous process tenderness present. No muscular tenderness present.   Cardiovascular: Normal rate, regular rhythm, normal heart sounds and normal pulses.   Pulmonary/Chest: Effort normal and breath sounds normal. No respiratory distress.   Abdominal: Normal appearance and bowel sounds are normal. She exhibits no distension and no mass. Soft. There is no abdominal tenderness.   Musculoskeletal: Normal range of motion.         General: No deformity. Normal range of motion.      Right upper arm: Normal.      Comments: Full range of motion intact of right shoulder.  Able to perform overhead movement of right shoulder with minimal pain, able to perform flexion, extension, no tenderness noted to right AC joint, no erythema or swelling noted  Cap refill 2 seconds, right radial pulse 2+, sensation intact       Neurological: She is alert and oriented to person, place, and time. She has normal  strength. No cranial nerve deficit or sensory deficit. She exhibits normal muscle tone. She displays no seizure activity. Coordination normal. GCS eye subscore is 4. GCS verbal subscore is 5. GCS motor subscore is 6.   Skin: Skin is warm, dry, intact, not diaphoretic and not pale. Capillary refill takes less than 2 seconds. No abrasion, No burn, No bruising and No ecchymosis   Psychiatric: Her speech is normal and behavior is normal. Judgment and thought content normal.   Nursing note and vitals reviewed.  XR SHOULDER COMPLETE 2 OR MORE VIEWS RIGHT    Result Date: 5/22/2023  EXAMINATION: XR SHOULDER COMPLETE 2 OR MORE VIEWS RIGHT CLINICAL HISTORY: Pain in unspecified shoulder TECHNIQUE: Two or three views of the right shoulder were performed. COMPARISON: None FINDINGS: The bone mineralization is within normal limits.  There is chronic appearing cortical irregularity involving the lesser tuberosity of the humerus.  There is no evidence of periostitis. The glenohumeral articulation is maintained.  There is arthropathy of the acromioclavicular joint.  The coracoclavicular interval is within normal limits. The visualized right hemithorax is unremarkable.  There is no evidence of a pneumothorax or pulmonary contusion. There are partially visualized postoperative changes in the thoracolumbar spine.     No evidence of acute fracture or dislocation of the right shoulder. Chronic appearing deformity involving the lesser tuberosity of the right humerus.  The findings may relate to chronic injury. Electronically signed by: Romel Blandon MD Date:    05/22/2023 Time:    17:38    Patient in no acute distress.  Vitals reassuring.  Discussed results/diagnosis/plan in depth with patient in clinic. Strict precautions given to patient to monitor for worsening signs and symptoms. Advised to follow up with primary.All questions answered. Strict ER precautions given. If your symptoms worsens or fail to improve you should go to the  Emergency Room. Discharge and follow-up instructions given verbally/printed. Discharge and follow-up instructions discussed with the patient who expressed understanding and willingness to comply with my recommendations.Patient voiced understanding and in agreement with current treatment plan.     Please be advised this text was dictated with [employer] software and may contain errors due to translation.    Assessment:     1. Acute pain of right shoulder        Plan:       Acute pain of right shoulder  -     XR SHOULDER COMPLETE 2 OR MORE VIEWS RIGHT; Future; Expected date: 05/22/2023          Medical Decision Making:   Clinical Tests:   Radiological Study: Ordered and Reviewed  Urgent Care Management:  Patient in no acute distress.  Vitals reassuring.  On exam, patient is nontoxic appearing and afebrile.  Lungs CTA.  Full range of motion intact of right shoulder.  No tenderness noted to right AC joint.  Able to perform overhead movement and flexion and extension of right shoulder.  Negative x-ray results discussed with patient in detail.   over-the-counter medication discussed with patient at length.  Proper hydration advised.  I reiterated the importance of further evaluation if no improvement symptoms and follow-up with primary.Patient voiced understanding and in agreement with current treatment plan.           Patient Instructions   If your condition worsens or fails to improve we recommend that you receive another evaluation at the ER immediately or contact your PCP to discuss your concerns or return here. You must understand that you've received an urgent care treatment only and that you may be released before all your medical problems are known or treated. You the patient will arrange for followup care as instructed.    If you were prescribed antibiotics, please take them to completion.  If you were prescribed a narcotic medication, do not drive or operate heavy equipment or machinery while taking these  medications.  Please follow up with your primary care doctor or specialist as needed.  If you  smoke, please stop smoking.

## 2023-05-23 ENCOUNTER — CLINICAL SUPPORT (OUTPATIENT)
Dept: PRIMARY CARE CLINIC | Facility: CLINIC | Age: 68
End: 2023-05-23
Payer: MEDICARE

## 2023-05-23 DIAGNOSIS — F50.00 ANOREXIA NERVOSA: Primary | ICD-10-CM

## 2023-05-23 PROCEDURE — 99499 NO LOS: ICD-10-PCS | Mod: S$GLB,,, | Performed by: INTERNAL MEDICINE

## 2023-05-23 PROCEDURE — 99499 UNLISTED E&M SERVICE: CPT | Mod: S$GLB,,, | Performed by: INTERNAL MEDICINE

## 2023-05-23 NOTE — PROGRESS NOTES
"Individual Psychotherapy (LCSW/PhD)  Adelaida Flores,  5/23/2023    Site:  [facility]         Therapeutic Intervention: Met with patient for individual psychotherapy.    Chief complaint/reason for encounter: eating disorder     Interval history and content of current session: LCSW met with pt and  to address eating disorder concerns and barriers to better support.  reports frustration at pt's refusal to eat solid foods and states they feel angry when pt "doesn't try".  states they often cook meals at home for they and pt but pt instead eats candy and shakes. LCSW supported  in processing feelings of frustration.    Pt states they "are trying" but struggle to accept that they deserve whole foods. PT expressed anxiety at eating per health  recommendations I.e. soup with solid food, full fat ice cream. Pt states they feel that family "spends too much" money eating out and on food. LCSW inquired about [facility]'s budget and cooperation with finances.  Pt and  report not using a shared budget and that pt is fully responsible for monitoring finances. LCSW encouraged pt and  to create a monthly budget so that pt and  can review numbers together for further discussion. Pt and  agreed. LCSW encouraged pt and  to avoid blaming language and to adopt a team approach to pt's ED challenges. LCSW provided emotional support to  regarding their frustration and feelings of fear. LCSW supported pt in explaining challenges of ED and power of ED thinking and emotions on pt's behavior.    Treatment plan:  Target symptoms:  interpersonal, eating disorder  Why chosen therapy is appropriate versus another modality: relevant to diagnosis, evidence based practice  Outcome monitoring methods: self-report, feedback from family  Therapeutic intervention type: insight oriented psychotherapy, behavior modifying psychotherapy, supportive psychotherapy, interactive " psychotherapy    Risk parameters:  Patient reports no suicidal ideation  Patient reports no homicidal ideation  Patient reports no self-injurious behavior  Patient reports no violent behavior    Verbal deficits: None    Patient's response to intervention:  The patient's response to intervention is guarded.    Progress toward goals and other mental status changes:  The patient's progress toward goals is limited.    Diagnosis:   No diagnosis found.    Plan: Pt plans to continue family psychotherapy    Return to clinic: 2 weeks    Length of Service (minutes): 60

## 2023-05-24 ENCOUNTER — OFFICE VISIT (OUTPATIENT)
Dept: HEPATOLOGY | Facility: CLINIC | Age: 68
End: 2023-05-24
Payer: MEDICARE

## 2023-05-24 ENCOUNTER — PROCEDURE VISIT (OUTPATIENT)
Dept: HEPATOLOGY | Facility: CLINIC | Age: 68
End: 2023-05-24
Payer: MEDICARE

## 2023-05-24 ENCOUNTER — INFUSION (OUTPATIENT)
Dept: INFECTIOUS DISEASES | Facility: HOSPITAL | Age: 68
End: 2023-05-24
Payer: MEDICARE

## 2023-05-24 VITALS
SYSTOLIC BLOOD PRESSURE: 120 MMHG | WEIGHT: 112.19 LBS | HEART RATE: 58 BPM | TEMPERATURE: 99 F | DIASTOLIC BLOOD PRESSURE: 59 MMHG | OXYGEN SATURATION: 99 % | BODY MASS INDEX: 18.67 KG/M2 | RESPIRATION RATE: 18 BRPM

## 2023-05-24 VITALS — BODY MASS INDEX: 18.95 KG/M2 | HEIGHT: 65 IN | WEIGHT: 113.75 LBS

## 2023-05-24 DIAGNOSIS — K76.0 HEPATIC STEATOSIS: ICD-10-CM

## 2023-05-24 DIAGNOSIS — M81.0 OSTEOPOROSIS, POST-MENOPAUSAL: Primary | ICD-10-CM

## 2023-05-24 DIAGNOSIS — R74.8 ELEVATED LIVER ENZYMES: Primary | ICD-10-CM

## 2023-05-24 DIAGNOSIS — R74.8 ELEVATED LIVER ENZYMES: ICD-10-CM

## 2023-05-24 PROCEDURE — 99999 PR PBB SHADOW E&M-EST. PATIENT-LVL III: CPT | Mod: PBBFAC,,, | Performed by: NURSE PRACTITIONER

## 2023-05-24 PROCEDURE — 99214 PR OFFICE/OUTPT VISIT, EST, LEVL IV, 30-39 MIN: ICD-10-PCS | Mod: S$GLB,,, | Performed by: NURSE PRACTITIONER

## 2023-05-24 PROCEDURE — 1160F RVW MEDS BY RX/DR IN RCRD: CPT | Mod: CPTII,S$GLB,, | Performed by: NURSE PRACTITIONER

## 2023-05-24 PROCEDURE — 1159F PR MEDICATION LIST DOCUMENTED IN MEDICAL RECORD: ICD-10-PCS | Mod: CPTII,S$GLB,, | Performed by: NURSE PRACTITIONER

## 2023-05-24 PROCEDURE — 99214 OFFICE O/P EST MOD 30 MIN: CPT | Mod: S$GLB,,, | Performed by: NURSE PRACTITIONER

## 2023-05-24 PROCEDURE — 3288F FALL RISK ASSESSMENT DOCD: CPT | Mod: CPTII,S$GLB,, | Performed by: NURSE PRACTITIONER

## 2023-05-24 PROCEDURE — 96372 THER/PROPH/DIAG INJ SC/IM: CPT

## 2023-05-24 PROCEDURE — 1160F PR REVIEW ALL MEDS BY PRESCRIBER/CLIN PHARMACIST DOCUMENTED: ICD-10-PCS | Mod: CPTII,S$GLB,, | Performed by: NURSE PRACTITIONER

## 2023-05-24 PROCEDURE — 1101F PR PT FALLS ASSESS DOC 0-1 FALLS W/OUT INJ PAST YR: ICD-10-PCS | Mod: CPTII,S$GLB,, | Performed by: NURSE PRACTITIONER

## 2023-05-24 PROCEDURE — 3008F PR BODY MASS INDEX (BMI) DOCUMENTED: ICD-10-PCS | Mod: CPTII,S$GLB,, | Performed by: NURSE PRACTITIONER

## 2023-05-24 PROCEDURE — 76981 USE PARENCHYMA: CPT | Mod: S$GLB,,, | Performed by: NURSE PRACTITIONER

## 2023-05-24 PROCEDURE — 3008F BODY MASS INDEX DOCD: CPT | Mod: CPTII,S$GLB,, | Performed by: NURSE PRACTITIONER

## 2023-05-24 PROCEDURE — 1101F PT FALLS ASSESS-DOCD LE1/YR: CPT | Mod: CPTII,S$GLB,, | Performed by: NURSE PRACTITIONER

## 2023-05-24 PROCEDURE — 63600175 PHARM REV CODE 636 W HCPCS: Mod: JZ,JG | Performed by: INTERNAL MEDICINE

## 2023-05-24 PROCEDURE — 76981 FIBROSCAN NEW ORLEANS (VIBRATION CONTROLLED TRANSIENT ELASTOGRAPHY): ICD-10-PCS | Mod: S$GLB,,, | Performed by: NURSE PRACTITIONER

## 2023-05-24 PROCEDURE — 3288F PR FALLS RISK ASSESSMENT DOCUMENTED: ICD-10-PCS | Mod: CPTII,S$GLB,, | Performed by: NURSE PRACTITIONER

## 2023-05-24 PROCEDURE — 1159F MED LIST DOCD IN RCRD: CPT | Mod: CPTII,S$GLB,, | Performed by: NURSE PRACTITIONER

## 2023-05-24 PROCEDURE — 99999 PR PBB SHADOW E&M-EST. PATIENT-LVL III: ICD-10-PCS | Mod: PBBFAC,,, | Performed by: NURSE PRACTITIONER

## 2023-05-24 RX ADMIN — ROMOSOZUMAB-AQQG 210 MG: 105 INJECTION, SOLUTION SUBCUTANEOUS at 10:05

## 2023-05-24 NOTE — PROCEDURES
FibroScan [place] (Vibration Controlled Transient Elastography)    Date/Time: 5/24/2023 8:45 AM  Performed by: Milana Lucas NP  Authorized by: Milana Lucas NP     Diagnosis:  Other    Probe:  M    Universal Protocol: Patient's identity, procedure and site were verified, confirmatory pause was performed.  Discussed procedure including risks and potential complications.  Questions answered.  Patient verbalizes understanding and wishes to proceed with VCTE.     Procedure: After providing explanations of the procedure, patient was placed in the supine position with right arm in maximum abduction to allow optimal exposure of right lateral abdomen.  Patient was briefly assessed, Testing was performed in the mid-axillary location, 50Hz Shear Wave pulses were applied and the resulting Shear Wave and Propagation Speed detected with a 3.5 MHz ultrasonic signal, using the FibroScan probe, Skin to liver capsule distance and liver parenchyma were accessed during the entire examination with the FibroScan probe, Patient was instructed to breathe normally and to abstain from sudden movements during the procedure, allowing for random measurements of liver stiffness. At least 10 Shear Waves were produced, Individual measurements of each Shear Wave were calculated.  Patient tolerated the procedure well with no complications.  Meets discharge criteria as was dismissed.  Rates pain 0 out of 10.  Patient will follow up with ordering provider to review results.    Findings  Median liver stiffness score:  4.9  CAP Reading: dB/m:  204    IQR/med %:  10  Interpretation  Fibrosis interpretation is based on medial liver stiffness - Kilopascal (kPa).    Fibrosis Stage:  F 0-1  Steatosis interpretation is based on controlled attenuation parameter - (dB/m).    Steatosis Grade:  <S1

## 2023-05-24 NOTE — PATIENT INSTRUCTIONS
Given normal lab values, no fibrosis on fibroscan - no need for hepatology f/u. Recommend f/u yearly with PCP with liver labs and to schedule f/u appt if liver enzymes increase above normal in the future

## 2023-05-24 NOTE — PROGRESS NOTES
"Pt arrives to clinic for 1/12 Evenity. Pt denies dental sx in last 3 months; Makes sure to get enough Ca+ through diet, takes a multivitamin, takes Vit D "when she remembers"; Given subq in bilateral arms. Pt tolerated well. D/c'd from clinic in NAD, pt given next appt.      Pt monitored 15 mins post injection; left in NAD;  "

## 2023-05-24 NOTE — PROGRESS NOTES
[facility] VISIT FOLLOW UP NOTE    PCP: Catie Portillo MD     CHIEF COMPLAINT: previously elevated liver enzymes     HPI: This is a 67 y.o. White female with PMH noted below, presenting for follow up of above    Previous serologic w/u negative for Hiren's, alpha-1 antitrypsin deficiency, hemochromatosis, autoimmune etiology (except mildly positive LENORA 1:80 and ASMA 1:40), and viral hepatitis A, B and C  Mildly low IgG, IgM normal    Prior serologic workup:   Lab Results   Component Value Date    SMOOTHMUSCAB Negative 1:40 05/17/2023    AMAIFA Negative 1:40 10/28/2021    IGGSERUM 678 05/17/2023    ANASCREEN Negative <1:80 05/17/2023    FERRITIN 53 11/30/2017    FESATURATED 17 (L) 11/30/2017    CERULOPLSM 39.0 10/28/2021    HEPBSAG Negative 10/28/2021    HEPCAB Negative 10/28/2021    HEPAIGM Negative 07/28/2010       Liver fibrosis staging:  -- fibroscan 11/2021 noted F0, S1 (kPA 6.3, )  -- fibroscan 5/2023 noted normal F0, S0    Minimal risk factors for fatty liver    Interval HPI: Presents today alone. On Fluoxetine and Lamictal for years (likely not culprit)  Prolia (low risk) x 5 years   Liver enzymes now normal  Fibroscan normal, US normal     Labs done 5/2023 show normal transaminase levels   Platelets WNL, alk phos WNL  Synthetic liver functioning WNL    Lab Results   Component Value Date    ALT 22 05/17/2023    AST 22 05/17/2023    ALKPHOS 82 05/17/2023    BILITOT 0.3 05/17/2023    ALBUMIN 3.9 05/17/2023    INR 1.0 05/10/2022     01/26/2023     Abd U/S done 5/2023 showed normal liver and spleen     Denies family history of liver disease . Denies alcohol consumption currently or in the past    +Immunity to Hep A/B       Allergy and medication list reviewed and updated     PMHX:  has a past medical history of Alcohol dependence in sustained full remission (03/21/2022), Allergy, Anxiety (02/22/2019), Broken hip, Colon polyps, Depression, Hyperlipidemia (02/22/2019), IBS (irritable bowel  "syndrome), Osteoporosis, T12 compression fracture s/p corpectomy (07/13/2013), and Volvulus.    PSHX:  has a past surgical history that includes Fracture surgery; Tonsillectomy; Colonoscopy (N/A, 8/28/2017); Colon surgery; Spine surgery; Eye surgery (2012); Small intestine surgery (2011); and Hernia repair (2012).    FAMILY HISTORY: Updated and reviewed in [facility]    SOCIAL HISTORY:   Social History     Substance and Sexual Activity   Alcohol Use No       Social History     Substance and Sexual Activity   Drug Use No       ROS:   GENERAL: Denies fatigue  CARDIOVASCULAR: Denies edema  GI: + intermittent abdominal pain/fullness (followed by GI)  SKIN: Denies rash, itching   NEURO: Denies confusion, memory loss, or mood changes    PHYSICAL EXAM:   Friendly White female, in no acute distress; alert and oriented to person, place and time  VITALS: Ht 5' 5" (1.651 m)   Wt 51.6 kg (113 lb 12.1 oz)   BMI 18.93 kg/m²   EYES: Sclerae anicteric  GI: Soft, non-tender, non-distended. No ascites.  EXTREMITIES:  No edema.  SKIN: Warm and dry. No jaundice. No telangectasias noted. No palmar erythema.  NEURO:  No asterixis.  PSYCH:  Thought and speech pattern appropriate. Behavior normal      EDUCATION:  See instructions discussed with patient in Instructions section of the After Visit Summary     ASSESSMENT & PLAN:  67 y.o. White female with:  1. Previously elevated liver enzymes   -- now normal. Do not suspect any medications were contributing to elevation in the past since they have now normalized   --- synthetic liver function WNL  --- Abd U/S noting normal liver and spleen   --- Previous serologic : see HPI  -- fibroscan normal           Follow up for if liver enzymes increase in the future. Given normal lab values, no fibrosis on fibroscan - no need for hepatology f/u. Recommend f/u yearly with PCP with liver labs and to schedule f/u appt if liver enzymes increase above normal in the future    No orders of the defined types were " placed in this encounter.       Thank you for allowing me to participate in the care of Adelaida KARL AmandaC    I spent a total of 30 minutes on the day of the visit.This includes face to face time and non-face to face time preparing to see the patient (eg, review of tests), obtaining and/or reviewing separately obtained history, documenting clinical information in the electronic or other health record, independently interpreting results and communicating results to the patient/family/caregiver, and coordinating care.         CC'ed note to:

## 2023-05-31 ENCOUNTER — PATIENT MESSAGE (OUTPATIENT)
Dept: PSYCHIATRY | Facility: CLINIC | Age: 68
End: 2023-05-31
Payer: MEDICARE

## 2023-06-01 ENCOUNTER — DOCUMENTATION ONLY (OUTPATIENT)
Dept: PRIMARY CARE CLINIC | Facility: CLINIC | Age: 68
End: 2023-06-01
Payer: MEDICARE

## 2023-06-01 NOTE — PROGRESS NOTES
"Individual Psychotherapy (LCSW/PhD)  Adelaida Flores,  6/1/2023    Site:  [facility]         Therapeutic Intervention: Met with patient for individual psychotherapy.    Chief complaint/reason for encounter: Eating Disorder     Interval history and content of current session: Pt states they are "feeling better" today vs. Earlier in the week. Pt states they were "thinking about suicide" earlier in the week. Pt reports suicidal thoughts, no specific plan but thoughts. Pt does not currently endorse suicidal thoughts but states they are "tired" of dealing with their anxiety.     LCSW and pt discussed suicide safety plan I.e. call 911, call family members for immediate help.  Pt states they do not want to commit suicide because of their niece and mother I.e. niece's father committed suicide and"I don't want to do that to her". LCSW emphasized importance of pt communicating feelings of self-harm to other immediately, pt agreed.    LCSW and pt reviewed last couples session. Pt and LCSW discussed new realization that finances and spending is a trigger for restrictive eating. LCSW and pt discussed challenges of engaging with  regarding finances. Pt and LCSW agreed to continue engaging via couples/individual therapy to improve support.    LCSW and pt discussed role of health  and importance of pt continuing sessions with ED Health  to support pt. Pt expressed interest in continuing with health .LCSW and pt discussed importance of maintaining a team approach moving forward including PCP, health  and LCSW. LCSW and pt discussed securing future health  if pt unable to afford sessions with current I.e. possibly via [employer] etc., pt agreed.      LCSW and pt reviewed suicide safety plan and signs of increasing suicidal thoughts.    Treatment plan:  Target symptoms: eating disorder  Why chosen therapy is appropriate versus another modality: relevant to diagnosis, evidence based practice  Outcome monitoring " methods: self-report, checklist/rating scale  Therapeutic intervention type: insight oriented psychotherapy, behavior modifying psychotherapy, supportive psychotherapy    Risk parameters:  Patient reports no suicidal ideation  Patient reports no homicidal ideation  Patient reports no self-injurious behavior  Patient reports no violent behavior    Verbal deficits: None    Patient's response to intervention:  The patient's response to intervention is accepting.    Progress toward goals and other mental status changes:  The patient's progress toward goals is limited.    Diagnosis:   No diagnosis found.    Plan: Pt plans to continue individual psychotherapy    Return to clinic: 1 week    Length of Service (minutes): 30

## 2023-06-02 ENCOUNTER — OFFICE VISIT (OUTPATIENT)
Dept: OBSTETRICS AND GYNECOLOGY | Facility: CLINIC | Age: 68
End: 2023-06-02
Attending: STUDENT IN AN ORGANIZED HEALTH CARE EDUCATION/TRAINING PROGRAM
Payer: MEDICARE

## 2023-06-02 VITALS
BODY MASS INDEX: 18.95 KG/M2 | WEIGHT: 113.75 LBS | HEIGHT: 65 IN | DIASTOLIC BLOOD PRESSURE: 58 MMHG | SYSTOLIC BLOOD PRESSURE: 112 MMHG

## 2023-06-02 DIAGNOSIS — N95.2 VAGINAL ATROPHY: ICD-10-CM

## 2023-06-02 DIAGNOSIS — Z01.419 WELL WOMAN EXAM: Primary | ICD-10-CM

## 2023-06-02 PROCEDURE — 1126F PR PAIN SEVERITY QUANTIFIED, NO PAIN PRESENT: ICD-10-PCS | Mod: CPTII,S$GLB,, | Performed by: STUDENT IN AN ORGANIZED HEALTH CARE EDUCATION/TRAINING PROGRAM

## 2023-06-02 PROCEDURE — 1126F AMNT PAIN NOTED NONE PRSNT: CPT | Mod: CPTII,S$GLB,, | Performed by: STUDENT IN AN ORGANIZED HEALTH CARE EDUCATION/TRAINING PROGRAM

## 2023-06-02 PROCEDURE — 3008F BODY MASS INDEX DOCD: CPT | Mod: CPTII,S$GLB,, | Performed by: STUDENT IN AN ORGANIZED HEALTH CARE EDUCATION/TRAINING PROGRAM

## 2023-06-02 PROCEDURE — 3074F PR MOST RECENT SYSTOLIC BLOOD PRESSURE < 130 MM HG: ICD-10-PCS | Mod: CPTII,S$GLB,, | Performed by: STUDENT IN AN ORGANIZED HEALTH CARE EDUCATION/TRAINING PROGRAM

## 2023-06-02 PROCEDURE — 99999 PR PBB SHADOW E&M-EST. PATIENT-LVL III: CPT | Mod: PBBFAC,,, | Performed by: STUDENT IN AN ORGANIZED HEALTH CARE EDUCATION/TRAINING PROGRAM

## 2023-06-02 PROCEDURE — 3288F PR FALLS RISK ASSESSMENT DOCUMENTED: ICD-10-PCS | Mod: CPTII,S$GLB,, | Performed by: STUDENT IN AN ORGANIZED HEALTH CARE EDUCATION/TRAINING PROGRAM

## 2023-06-02 PROCEDURE — 3074F SYST BP LT 130 MM HG: CPT | Mod: CPTII,S$GLB,, | Performed by: STUDENT IN AN ORGANIZED HEALTH CARE EDUCATION/TRAINING PROGRAM

## 2023-06-02 PROCEDURE — 1101F PT FALLS ASSESS-DOCD LE1/YR: CPT | Mod: CPTII,S$GLB,, | Performed by: STUDENT IN AN ORGANIZED HEALTH CARE EDUCATION/TRAINING PROGRAM

## 2023-06-02 PROCEDURE — 3008F PR BODY MASS INDEX (BMI) DOCUMENTED: ICD-10-PCS | Mod: CPTII,S$GLB,, | Performed by: STUDENT IN AN ORGANIZED HEALTH CARE EDUCATION/TRAINING PROGRAM

## 2023-06-02 PROCEDURE — G0101 PR CA SCREEN;PELVIC/BREAST EXAM: ICD-10-PCS | Mod: S$GLB,,, | Performed by: STUDENT IN AN ORGANIZED HEALTH CARE EDUCATION/TRAINING PROGRAM

## 2023-06-02 PROCEDURE — 3078F DIAST BP <80 MM HG: CPT | Mod: CPTII,S$GLB,, | Performed by: STUDENT IN AN ORGANIZED HEALTH CARE EDUCATION/TRAINING PROGRAM

## 2023-06-02 PROCEDURE — 1101F PR PT FALLS ASSESS DOC 0-1 FALLS W/OUT INJ PAST YR: ICD-10-PCS | Mod: CPTII,S$GLB,, | Performed by: STUDENT IN AN ORGANIZED HEALTH CARE EDUCATION/TRAINING PROGRAM

## 2023-06-02 PROCEDURE — G0101 CA SCREEN;PELVIC/BREAST EXAM: HCPCS | Mod: S$GLB,,, | Performed by: STUDENT IN AN ORGANIZED HEALTH CARE EDUCATION/TRAINING PROGRAM

## 2023-06-02 PROCEDURE — 3288F FALL RISK ASSESSMENT DOCD: CPT | Mod: CPTII,S$GLB,, | Performed by: STUDENT IN AN ORGANIZED HEALTH CARE EDUCATION/TRAINING PROGRAM

## 2023-06-02 PROCEDURE — 99999 PR PBB SHADOW E&M-EST. PATIENT-LVL III: ICD-10-PCS | Mod: PBBFAC,,, | Performed by: STUDENT IN AN ORGANIZED HEALTH CARE EDUCATION/TRAINING PROGRAM

## 2023-06-02 PROCEDURE — 3078F PR MOST RECENT DIASTOLIC BLOOD PRESSURE < 80 MM HG: ICD-10-PCS | Mod: CPTII,S$GLB,, | Performed by: STUDENT IN AN ORGANIZED HEALTH CARE EDUCATION/TRAINING PROGRAM

## 2023-06-02 RX ORDER — CHOLECALCIFEROL (VITAMIN D3) 25 MCG
1000 TABLET ORAL DAILY
COMMUNITY

## 2023-06-02 NOTE — PROGRESS NOTES
Chief Complaint: Well Woman Exam     HPI:      Adelaida Flores is a 67 y.o. No obstetric history on file. who presents today for well woman exam.  LMP: No LMP recorded. Patient is postmenopausal.  No issues, problems, or complaints. Specifically, patient denies vaginal bleeding, discharge, pelvic pain, urinary problems, or changes in appetite.   She denies hot flushes, vaginal atrophy, mood changes.      Previous Pap: NEM, priors negative  Previous Mammogram:  Negative  Most Recent Dexa:  osteoporosis on evenity  Colonoscopy: utd  OB History               Para        Term   0            AB        Living             SAB        IAB        Ectopic        Multiple        Live Births                   Past Medical History:   Diagnosis Date    Alcohol dependence in sustained full remission 2022    Allergy     Anxiety 2019    Broken hip     Colon polyps     Depression     Hyperlipidemia 2019    IBS (irritable bowel syndrome)     Osteoporosis     T12 compression fracture s/p corpectomy 2013    Volvulus     s/p colectomy     Past Surgical History:   Procedure Laterality Date    COLON SURGERY      due to volvulus    COLONOSCOPY N/A 2017    Procedure: COLONOSCOPY miralax;  Surgeon: Trey Haas Jr., MD;  Location: [facility];  Service: Endoscopy;  Laterality: N/A;    EYE SURGERY  2012    Cataract/torc    FRACTURE SURGERY      right broken wrist and had surgery    HERNIA REPAIR  2012    SMALL INTESTINE SURGERY  2011    volvulus x 2    SPINE SURGERY      due to fall while on a boat.    TONSILLECTOMY       Social History     Socioeconomic History    Marital status:    Occupational History    Occupation: Dietary Consultant      Employer: [employer]   Tobacco Use    Smoking status: Former     Packs/day: 1.00     Years: 10.00     Pack years: 10.00     Types: Cigarettes     Quit date: 1993     Years since quittin.4    Smokeless tobacco: Never    Tobacco  comments:     Quit 28 years ago   Substance and Sexual Activity    Alcohol use: No    Drug use: No    Sexual activity: Yes     Partners: Male     Birth control/protection: None   Social History Narrative    Got  to Kristopher Calderon in 1/2019.     Does some physical activity daily previously. But since getting  less active. Still does weights 3x/week.     Social Determinants of Health     Financial Resource Strain: Low Risk     Difficulty of Paying Living Expenses: Not very hard   Food Insecurity: Food Insecurity Present    Worried About Running Out of Food in the Last Year: Sometimes true    Ran Out of Food in the Last Year: Never true   Transportation Needs: No Transportation Needs    Lack of Transportation (Medical): No    Lack of Transportation (Non-Medical): No   Physical Activity: Sufficiently Active    Days of Exercise per Week: 5 days    Minutes of Exercise per Session: 30 min   Stress: Stress Concern Present    Feeling of Stress : Very much   Social Connections: Unknown    Frequency of Communication with Friends and Family: Once a week    Frequency of Social Gatherings with Friends and Family: Once a week    Active Member of Clubs or Organizations: Yes    Attends Club or Organization Meetings: More than 4 times per year    Marital Status:    Housing Stability: Low Risk     Unable to Pay for Housing in the Last Year: No    Number of Places Lived in the Last Year: 1    Unstable Housing in the Last Year: No     Family History   Problem Relation Age of Onset    Colon polyps Mother     Cancer Mother         skin    Osteoarthritis Mother     Colon polyps Father     Cancer Father         prostate    Heart disease Father     Osteoarthritis Maternal Aunt     Osteoarthritis Maternal Grandmother     Cancer Sister         skin    Asthma Brother     Diabetes Neg Hx     Cirrhosis Neg Hx        Current Outpatient Medications:     denosumab (PROLIA) 60 mg/mL Syrg, , Disp: , Rfl:     fluorometholone 0.1%  "(FML) 0.1 % DrpS, Place into both eyes., Disp: , Rfl:     FLUoxetine 40 MG capsule, Take 2 capsules (80 mg total) by mouth once daily., Disp: 180 capsule, Rfl: 2    KRILL OIL ORAL, , Disp: , Rfl:     lamoTRIgine (LAMICTAL) 100 MG tablet, Take 1 tablet (100 mg total) by mouth once daily., Disp: 90 tablet, Rfl: 3    levocetirizine (XYZAL) 5 MG tablet, , Disp: , Rfl:     multivitamin capsule, Take 1 capsule by mouth once daily., Disp: , Rfl:     vitamin D (VITAMIN D3) 1000 units Tab, Take 1,000 Units by mouth once daily., Disp: , Rfl:     hydrOXYzine pamoate (VISTARIL) 25 MG Cap, Take 1 capsule (25 mg total) by mouth nightly as needed (insomnia). (Patient not taking: Reported on 6/2/2023), Disp: 90 capsule, Rfl: 2    ibuprofen (ADVIL,MOTRIN) 600 MG tablet, Take 1 tablet (600 mg total) by mouth every 8 (eight) hours as needed for Pain. (Patient not taking: Reported on 6/2/2023), Disp: 30 tablet, Rfl: 0    ondansetron (ZOFRAN-ODT) 4 MG TbDL, Take 1 tablet (4 mg total) by mouth every 8 (eight) hours as needed (nausea). (Patient not taking: Reported on 6/2/2023), Disp: 30 tablet, Rfl: 0    ROS:     GENERAL: Denies unintentional weight gain or weight loss. Feeling well overall.   SKIN: Denies rash or lesions.   HEENT: Denies headaches, or vision changes.   CARDIOVASCULAR: Denies palpitations or chest pain.   RESPIRATORY: Denies shortness of breath or dyspnea on exertion.  BREASTS: Denies pain, lumps, or nipple discharge.   ABDOMEN: Denies abdominal pain, constipation, diarrhea, nausea, vomiting, change in appetite.  URINARY: Denies frequency, dysuria, hematuria.  NEUROLOGIC: Denies syncope or weakness.   PSYCHIATRIC: Denies depression, anxiety or mood swings.    Physical Exam:      PHYSICAL EXAM:  BP (!) 112/58   Ht 5' 5" (1.651 m)   Wt 51.6 kg (113 lb 12.1 oz)   BMI 18.93 kg/m²   Body mass index is 18.93 kg/m².     APPEARANCE: Well nourished, well developed, in no acute distress.  PSYCH: Appropriate mood and " affect.  SKIN: No acne or hirsutism.  NECK: Neck symmetric without masses or thyromegaly.  NODES: No inguinal, axillary, or supraclavicular lymph node enlargement.  CHEST: Normal respiratory effort.    BREASTS: Symmetrical, no skin changes or visible lesions.  No palpable masses or nipple discharge bilaterally.  ABDOMEN: Soft.  No tenderness or masses.    PELVIC: Normal external genitalia without lesions.  Normal hair distribution.  Adequate perineal body, normal urethral meatus.  Vagina atrophic without lesions or discharge.  Cervix pink, without lesions, discharge or tenderness.  No significant cystocele or rectocele.  Bimanual exam shows uterus to be normal size, regular, mobile and nontender.  Adnexa without masses or tenderness.    EXTREMITIES: No edema.  No tenderness to palpation.    Assessment/Plan:     67 y.o. No obstetric history on file.    Well woman exam    Vaginal atrophy          Counselin.  Annual exam performed today without difficulty.  Patient was counseled today on current ASCCP pap guidelines, the recommendation for yearly pelvic exams, healthy diet and exercise routines, annual mammograms.  Mammogram utd.  Pap smear no longer indicated.  Patient in agreement with plan.  All questions answered.    3. Vaginal atrophy:  Discussed vaginal moisturizer and lubrication.  Also discussed vaginal estrogen with patient if needed.  R/B/A reviewed and all questions answered.  Also discussed how to take medication, possible side effects, and contraindications.  All questions answered.  Would like to start with OTC lubrication first.  Will call if no improvement.   3.  Follow up with PCP for other health maintenance.  4.  Follow up in about 1 year (around 2024).     Use of the [facility] Patient Portal discussed and encouraged during today's visit.

## 2023-06-07 ENCOUNTER — PATIENT MESSAGE (OUTPATIENT)
Dept: PRIMARY CARE CLINIC | Facility: CLINIC | Age: 68
End: 2023-06-07
Payer: MEDICARE

## 2023-06-09 ENCOUNTER — PATIENT MESSAGE (OUTPATIENT)
Dept: ENDOCRINOLOGY | Facility: CLINIC | Age: 68
End: 2023-06-09
Payer: MEDICARE

## 2023-06-09 ENCOUNTER — PATIENT MESSAGE (OUTPATIENT)
Dept: PRIMARY CARE CLINIC | Facility: CLINIC | Age: 68
End: 2023-06-09
Payer: MEDICARE

## 2023-06-09 NOTE — TELEPHONE ENCOUNTER
Adelaida Haddad Staff (supporting Catie Portillo MD) 40 minutes ago (7:10 AM)       Good morning Dr. Portillo,  I got a letter from [employer] that you dismissed the request for an ED dietitian which I don't think occurred but before I challenge it I wanted to double check with you. Thank you and have a good day.

## 2023-06-09 NOTE — TELEPHONE ENCOUNTER
Jelly w/ [facility] stated that we fax letter to [facility] as new request from the dietician part. Request is also w/ [facility] due to also needing special therapist / psych for eating disorder.  Letter faxed to [facility] at [phone].

## 2023-06-09 NOTE — TELEPHONE ENCOUNTER
Basically, according to insurance ( Jelly starr/ NATALIE)   If [facility] has dietician services available, then they do not cover.   Medical and or SW notes need to say specifically that outside services are needed, or it is not covered by [facility]

## 2023-06-10 ENCOUNTER — PATIENT MESSAGE (OUTPATIENT)
Dept: PRIMARY CARE CLINIC | Facility: CLINIC | Age: 68
End: 2023-06-10
Payer: MEDICARE

## 2023-06-12 ENCOUNTER — PATIENT MESSAGE (OUTPATIENT)
Dept: PRIMARY CARE CLINIC | Facility: CLINIC | Age: 68
End: 2023-06-12
Payer: MEDICARE

## 2023-06-12 ENCOUNTER — OFFICE VISIT (OUTPATIENT)
Dept: PSYCHIATRY | Facility: CLINIC | Age: 68
End: 2023-06-12
Payer: MEDICARE

## 2023-06-12 DIAGNOSIS — R63.0 ANOREXIA: Primary | ICD-10-CM

## 2023-06-12 DIAGNOSIS — F10.21 ALCOHOL DEPENDENCE IN SUSTAINED FULL REMISSION: ICD-10-CM

## 2023-06-12 DIAGNOSIS — F41.1 GAD (GENERALIZED ANXIETY DISORDER): ICD-10-CM

## 2023-06-12 DIAGNOSIS — F33.1 MDD (MAJOR DEPRESSIVE DISORDER), RECURRENT EPISODE, MODERATE: ICD-10-CM

## 2023-06-12 PROCEDURE — 99214 OFFICE O/P EST MOD 30 MIN: CPT | Mod: 95,,, | Performed by: PHYSICIAN ASSISTANT

## 2023-06-12 PROCEDURE — 1159F PR MEDICATION LIST DOCUMENTED IN MEDICAL RECORD: ICD-10-PCS | Mod: CPTII,95,, | Performed by: PHYSICIAN ASSISTANT

## 2023-06-12 PROCEDURE — 90833 PR PSYCHOTHERAPY W/PATIENT W/E&M, 30 MIN (ADD ON): ICD-10-PCS | Mod: 95,,, | Performed by: PHYSICIAN ASSISTANT

## 2023-06-12 PROCEDURE — 1159F MED LIST DOCD IN RCRD: CPT | Mod: CPTII,95,, | Performed by: PHYSICIAN ASSISTANT

## 2023-06-12 PROCEDURE — 1160F PR REVIEW ALL MEDS BY PRESCRIBER/CLIN PHARMACIST DOCUMENTED: ICD-10-PCS | Mod: CPTII,95,, | Performed by: PHYSICIAN ASSISTANT

## 2023-06-12 PROCEDURE — 90833 PSYTX W PT W E/M 30 MIN: CPT | Mod: 95,,, | Performed by: PHYSICIAN ASSISTANT

## 2023-06-12 PROCEDURE — 1160F RVW MEDS BY RX/DR IN RCRD: CPT | Mod: CPTII,95,, | Performed by: PHYSICIAN ASSISTANT

## 2023-06-12 PROCEDURE — 99214 PR OFFICE/OUTPT VISIT, EST, LEVL IV, 30-39 MIN: ICD-10-PCS | Mod: 95,,, | Performed by: PHYSICIAN ASSISTANT

## 2023-06-12 RX ORDER — OLANZAPINE 2.5 MG/1
2.5 TABLET ORAL NIGHTLY
Qty: 30 TABLET | Refills: 2
Start: 2023-06-12 | End: 2023-08-18

## 2023-06-12 RX ORDER — OLANZAPINE 2.5 MG/1
2.5 TABLET ORAL NIGHTLY
Qty: 30 TABLET | Refills: 2 | Status: CANCELLED | OUTPATIENT
Start: 2023-06-12 | End: 2023-09-10

## 2023-06-12 NOTE — TELEPHONE ENCOUNTER
Cassi SOSA Lindsey Haddad Staff  [facility] / (Today, 11:31 AM)  Zaida said that she is calling in regards to needing to let the doctor know that the Psychologist and Dietician request  needs to go through [employer] since it is for mental health she stated that  the information can be faxed to [employer] at:[phone]. Please advise

## 2023-06-12 NOTE — PROGRESS NOTES
"The patient location is: [place]  The chief complaint leading to consultation is: depression f/u    Visit type: audiovisual      Each patient to whom he or she provides medical services by telemedicine is:  (1) informed of the relationship between the physician and patient and the respective role of any other health care provider with respect to management of the patient; and (2) notified that he or she may decline to receive medical services by telemedicine and may withdraw from such care at any time.    Notes:       Outpatient Psychiatry Follow-Up Visit (MD/AMBER)    6/12/2023    Clinical Status of Patient:  Outpatient (Ambulatory)    Chief Complaint:  Adelaida Flores is a 67 y.o. female who presents today for follow-up of depression and anxiety.  Met with patient.      Interval History and Content of Current Session:  Interim Events/Subjective Report/Content of Current Session:    "I think of my anxiety that flared up is with my . We started couple therapy, realized we disagreed on a budget which got me very upset because I realized how much we were spending."    States went to [place] to visit mother for her birthday and "felt great, wasn't depressed or anything." States that got back home and started getting upset again about money issues.    Pt reports has been doing a lot of work with therapist. Pt states that she is eating solid food meal only once per week and eats liquids normally for other meals. States when visiting her mother.     States she is currently working with an eating disorder specialist  for several sessions. States only has 1 session left.    Pt reports today: "mood is getting better, was feeling really down"    Discussed starting zyprexa for depression augmentation and anorexia tx. Discussed risks, benefits, side effects of zyprexa. Pt agreeable to plan to start low dose 2.5mg zyprexa qhs.    Patients mood is anxious, affect appears mood congruent. Linear and logical, friendly " "and cooperative, good eye contact.    Denies SI/HI/AVH. Pt reports sleeping well and decreased appetite. Denies side effects of medications.    Pt reports taking medications as prescribed and behaving appropriately during interview today.    Psychotherapy:  Target symptoms: depression, anxiety , eating d/o  Why chosen therapy is appropriate versus another modality: relevant to diagnosis, evidence based practice  Outcome monitoring methods: self-report, observation  Therapeutic intervention type: insight oriented psychotherapy, supportive psychotherapy  Topics discussed/themes: relationships difficulties, building skills sets for symptom management, symptom recognition  The patient's response to the intervention is accepting. The patient's progress toward treatment goals is fair.   Duration of intervention: 17 minutes.      Prior visit    Pt reports today: "feeling kind of anxious right now, I usually exercise to help with my anxiety and havent done that to do".     Continues to have avoidance of eating solid foods. "I'm scared ill get a volvulus again if I eat too much solid food". States she is trying to challenge     Pt did not start abilify after last visit due to fear of possible side effects.    Reports depression today as 7/10, and anxiety as 7/10.    States she is trying to find other treatment therapies such as "eating disorder "    States wants to start exercising more to improve mood.    Patients mood is steady, affect appears mood congruent. Linear and logical, friendly and cooperative, good eye contact.    Denies SI/HI/AVH. Pt reports sleeping well and decreased appetite-not eating solid meals everyday but drinking ensure supplement shakes-states that she allows eating herself a solid meal 1x per week.    Discussed adding buspar and abilify. Pt hesisitant at this point regarding further medications.    Denies side effects of medications.    Pt reports taking medications as prescribed and behaving " appropriately during interview today.    Review of Systems     Review of Systems   Constitutional:  Positive for weight loss. Negative for fever.   HENT:  Negative for sore throat.    Eyes:  Negative for photophobia.   Respiratory:  Negative for cough.    Cardiovascular:  Negative for chest pain and palpitations.   Gastrointestinal:  Negative for abdominal pain.   Genitourinary:  Negative for dysuria.   Musculoskeletal:  Negative for myalgias.   Skin:  Negative for rash.   Neurological:  Negative for dizziness.   Endo/Heme/Allergies:  Does not bruise/bleed easily.   Psychiatric/Behavioral:  Positive for depression. Negative for hallucinations, substance abuse and suicidal ideas. The patient is nervous/anxious. The patient does not have insomnia.        Psychiatric Review Of Systems - Is patient experiencing or having changes in:  sleep: no  appetite: yes  weight: yes  energy/anergy: yes  interest/pleasure/anhedonia: no  somatic symptoms: no  libido: no  anxiety/panic: yes  guilty/hopelessness: no  concentration: yes  S.I.B.s/risky behavior: no  Irritability: no  Racing thoughts: no  Impulsive behaviors: yes-occasional binging  Paranoia: no  AVH: no      Past Medical, Family and Social History: The patient's past medical, family and social history have been reviewed and updated as appropriate within the electronic medical record - see encounter notes.      Current Medications:   Medication List with Changes/Refills   New Medications    OLANZAPINE (ZYPREXA) 2.5 MG TABLET    Take 1 tablet (2.5 mg total) by mouth every evening.   Current Medications    DENOSUMAB (PROLIA) 60 MG/ML SYRG        FLUOROMETHOLONE 0.1% (FML) 0.1 % DRPS    Place into both eyes.    FLUOXETINE 40 MG CAPSULE    Take 2 capsules (80 mg total) by mouth once daily.    HYDROXYZINE PAMOATE (VISTARIL) 25 MG CAP    Take 1 capsule (25 mg total) by mouth nightly as needed (insomnia).    IBUPROFEN (ADVIL,MOTRIN) 600 MG TABLET    Take 1 tablet (600 mg total)  by mouth every 8 (eight) hours as needed for Pain.    KRILL OIL ORAL        LAMOTRIGINE (LAMICTAL) 100 MG TABLET    Take 1 tablet (100 mg total) by mouth once daily.    LEVOCETIRIZINE (XYZAL) 5 MG TABLET        MULTIVITAMIN CAPSULE    Take 1 capsule by mouth once daily.    ONDANSETRON (ZOFRAN-ODT) 4 MG TBDL    Take 1 tablet (4 mg total) by mouth every 8 (eight) hours as needed (nausea).    VITAMIN D (VITAMIN D3) 1000 UNITS TAB    Take 1,000 Units by mouth once daily.         Allergies:   Review of patient's allergies indicates:   Allergen Reactions    Cefdinir Diarrhea    Grass pollen-red top, standard     House dust Itching         Vitals   There were no vitals filed for this visit.       Labs/Imaging/Studies:   No results found for this or any previous visit (from the past 48 hour(s)).   No results found for: PHENYTOIN, PHENOBARB, VALPROATE, CBMZ    Compliance: yes    Side effects: None    Risk Parameters:  Patient reports no suicidal ideation  Patient reports no homicidal ideation  Patient reports no self-injurious behavior  Patient reports no violent behavior    Exam (detailed: at least 9 elements; comprehensive: all 15 elements)   Constitutional  Vitals:  Most recent vital signs, dated less than 90 days prior to this appointment, were reviewed.   There were no vitals filed for this visit.     General:  malnourished, thin     Musculoskeletal  Muscle Strength/Tone:  not examined   Gait & Station:  non-ataxic     Psychiatric  Speech:  no latency; no press   Mood & Affect:  Anxious, depressive  congruent and appropriate   Thought Process:  normal and logical   Associations:  intact   Thought Content:  normal, no suicidality, no homicidality, delusions, or paranoia   Insight:  intact, has awareness of illness   Judgement: behavior is adequate to circumstances   Orientation:  grossly intact   Memory: intact for content of interview   Language: grossly intact   Attention Span & Concentration:  able to focus   Fund of  Knowledge:  intact and appropriate to age and level of education     Assessment and Diagnosis   Status/Progress: Based on the examination today, the patient's problem(s) is/are inadequately controlled. New problems have not been presented today.   Co-morbidities, Diagnostic uncertainty and Lack of compliance are not complicating management of the primary condition.  There are no active rule-out diagnoses for this patient at this time.     General Impression:      ICD-10-CM ICD-9-CM   1. Anorexia  R63.0 783.0   2. MDD (major depressive disorder), recurrent episode, moderate  F33.1 296.32   3. MISTY (generalized anxiety disorder)  F41.1 300.02   4. Alcohol dependence in sustained full remission  F10.21 303.93                 Intervention/Counseling/Treatment Plan   Medication Management: Continue current medications. The risks and benefits of medication were discussed with the patient.    -continue prozac 80mg daily    -continue lamictal 100mg daily    -start zyprexa 2.5mg qhs    -vistaril prn insomnia      Return to Clinic: 3 weeks        Last Manrique PA-C      Total face to face time: 33 min  Total time (chart review, patient contact, documentation): 39 min      *This note has been prepared using a combination of a dictation device and typing.  It has been checked for errors but some errors may still exist within the note as a result of speech recognition errors and/or typographical errors.

## 2023-06-12 NOTE — TELEPHONE ENCOUNTER
Portal message sent inquiring about [employer], and the contact information for that company. Will follow up

## 2023-06-15 ENCOUNTER — TELEPHONE (OUTPATIENT)
Dept: PRIMARY CARE CLINIC | Facility: CLINIC | Age: 68
End: 2023-06-15
Payer: MEDICARE

## 2023-06-15 ENCOUNTER — PATIENT MESSAGE (OUTPATIENT)
Dept: PRIMARY CARE CLINIC | Facility: CLINIC | Age: 68
End: 2023-06-15

## 2023-06-15 ENCOUNTER — CLINICAL SUPPORT (OUTPATIENT)
Dept: PRIMARY CARE CLINIC | Facility: CLINIC | Age: 68
End: 2023-06-15
Payer: MEDICARE

## 2023-06-15 DIAGNOSIS — F50.00 ANOREXIA NERVOSA: Primary | ICD-10-CM

## 2023-06-15 PROCEDURE — 99499 UNLISTED E&M SERVICE: CPT | Mod: 95,,, | Performed by: SOCIAL WORKER

## 2023-06-15 PROCEDURE — 99499 NO LOS: ICD-10-PCS | Mod: 95,,, | Performed by: SOCIAL WORKER

## 2023-06-16 ENCOUNTER — PATIENT MESSAGE (OUTPATIENT)
Dept: PRIMARY CARE CLINIC | Facility: CLINIC | Age: 68
End: 2023-06-16
Payer: MEDICARE

## 2023-06-16 ENCOUNTER — DOCUMENTATION ONLY (OUTPATIENT)
Dept: PRIMARY CARE CLINIC | Facility: CLINIC | Age: 68
End: 2023-06-16
Payer: MEDICARE

## 2023-06-16 NOTE — TELEPHONE ENCOUNTER
Your fax has been successfully sent to [phone] at [phone].  ------------------------------------------------------------  From: [id number]  ------------------------------------------------------------  6/16/2023 10:32:08 AM Transmission Record          Sent to [phone] with remote ID "          Result: (0/339;0/0) Success          Page record: 1 - 9          Elapsed time: 03:18 on channel 49

## 2023-06-16 NOTE — PROGRESS NOTES
"Individual Psychotherapy (LCSW/PhD)  Adelaida Flores,  6/16/2023    Site:  Telemed         Therapeutic Intervention: Met with patient for individual psychotherapy.    Chief complaint/reason for encounter: Eating Disorder     Interval history and content of current session: Pt reports having an "good" week. Pt has 1 more session (next week) with health . LCSW and pt disucssed possible referral to Health .    Pt and LCSW discussed pt's apprehension to using Rx medications for mental health. LCSW supported pt in processing feelings of anxiety and fear.    LCSW and pt discussed role of financial challenges as a trigger for anxiety and eating behaviors. Pt expressed frustration at 's apparent lack of support. LCSW and pt discussed importance of compromise and both partners being open and transparent with their thoughts and feelings.     LCSW and pt discussed importance of pt and  continuing therapy (couples and individual). LCSW and pt discussed importance of pt continuing to see health . Pt reports being able to eat 2 days in a row with mother. LCSW and pt discussed how pt was able to accomplish. Pt reports also not using watch with "calorie counter".       Treatment plan:  Target symptoms:  Eating disorder  Why chosen therapy is appropriate versus another modality: relevant to diagnosis, evidence based practice  Outcome monitoring methods: self-report, checklist/rating scale  Therapeutic intervention type: supportive psychotherapy    Risk parameters:  Patient reports no suicidal ideation  Patient reports no homicidal ideation  Patient reports no self-injurious behavior  Patient reports no violent behavior    Verbal deficits: None    Patient's response to intervention:  The patient's response to intervention is accepting.    Progress toward goals and other mental status changes:  The patient's progress toward goals is limited.    Diagnosis:   No diagnosis found.    Plan: Pt plans to continue " individual psychotherapy    Return to clinic: 1 week    Length of Service (minutes): 30

## 2023-06-19 ENCOUNTER — PATIENT MESSAGE (OUTPATIENT)
Dept: ENDOCRINOLOGY | Facility: CLINIC | Age: 68
End: 2023-06-19
Payer: MEDICARE

## 2023-06-21 ENCOUNTER — PATIENT MESSAGE (OUTPATIENT)
Dept: PSYCHIATRY | Facility: CLINIC | Age: 68
End: 2023-06-21
Payer: MEDICARE

## 2023-06-22 ENCOUNTER — DOCUMENTATION ONLY (OUTPATIENT)
Dept: PRIMARY CARE CLINIC | Facility: CLINIC | Age: 68
End: 2023-06-22
Payer: MEDICARE

## 2023-06-22 ENCOUNTER — CLINICAL SUPPORT (OUTPATIENT)
Dept: PRIMARY CARE CLINIC | Facility: CLINIC | Age: 68
End: 2023-06-22
Payer: MEDICARE

## 2023-06-22 DIAGNOSIS — F41.1 GAD (GENERALIZED ANXIETY DISORDER): ICD-10-CM

## 2023-06-22 DIAGNOSIS — F50.00 ANOREXIA NERVOSA: Primary | ICD-10-CM

## 2023-06-22 PROCEDURE — 99499 NO LOS: ICD-10-PCS | Mod: 95,,, | Performed by: SOCIAL WORKER

## 2023-06-22 PROCEDURE — 99499 UNLISTED E&M SERVICE: CPT | Mod: 95,,, | Performed by: SOCIAL WORKER

## 2023-06-22 NOTE — PROGRESS NOTES
"care was able to be met in this audio-only visit.  Established Patient - Audio Only Telehealth Visit     The patient location is: home  The chief complaint leading to consultation is: anxiety, anorexia nervosa, interpersonal  Visit type: Virtual visit with audio only (telephone)  Total time spent with patient: 30       The reason for the audio only service rather than synchronous audio and video virtual visit was related to technical difficulties or patient preference/necessity.     Each patient to whom I provide medical services by telemedicine is:  (1) informed of the relationship between the physician and patient and the respective role of any other health care provider with respect to management of the patient; and (2) notified that they may decline to receive medical services by telemedicine and may withdraw from such care at any time. Patient verbally consented to receive this service via voice-only telephone call.       HPI: Pt reports feeling "good" since last session. Pt reports enjoying recent visit with granddaughter and states they felt good.    Pt has experienced an increase in anxiety since granddaughter left. Pt has been experiencing stress from relationship with son-in-law due to difficult family dynamics. LCSW and pt discussed concerns and ways to set healthy boundaries with son-in-law and  to cope with stress.    LCSW and pt discussed ways pt can utilize skills learned from AA and substance abuse treatment to cope with difficult situations. LCSW supported pt in identifying emotions and thoughts related to son-in-law to help pt process feelings.    LCSW and pt reviewed use of "I" statements  and ways to express boundaries through"I" statements. LCSW encouraged pt to identify areas of growth and acknowledge husbands attempts to communicate and support pt.    Pt reports that [facility] has secured new health  for pt to follow-up with for AN support. LCSW and pt discussed LCSW and pt " follow-up.     Assessment and plan:  return 1 weeks                        This service was not originating from a related E/M service provided within the previous 7 days nor will  to an E/M service or procedure within the next 24 hours or my soonest available appointment.  Prevailing standard of

## 2023-06-22 NOTE — PROGRESS NOTES
" care was able to be met in this audio-only visit.  Established Patient - Audio Only Telehealth Visit     The patient location is: home  The chief complaint leading to consultation is: anxiety, anorexia nervosa, interpersonal  Visit type: Virtual visit with audio only (telephone)  Total time spent with patient: 30       The reason for the audio only service rather than synchronous audio and video virtual visit was related to technical difficulties or patient preference/necessity.     Each patient to whom I provide medical services by telemedicine is:  (1) informed of the relationship between the physician and patient and the respective role of any other health care provider with respect to management of the patient; and (2) notified that they may decline to receive medical services by telemedicine and may withdraw from such care at any time. Patient verbally consented to receive this service via voice-only telephone call.       HPI: Pt reports feeling "good" since last session. Pt reports enjoying recent visit with granddaughter and states they felt good.    Pt has experienced an increase in anxiety since granddaughter left. Pt has been experiencing stress from relationship with son-in-law due to difficult family dynamics. LCSW and pt discussed concerns and ways to set healthy boundaries with son-in-law and  to cope with stress.    LCSW and pt discussed ways pt can utilize skills learned from AA and substance abuse treatment to cope with difficult situations. LCSW supported pt in identifying emotions and thoughts related to son-in-law to help pt process feelings.    LCSW and pt reviewed use of "I" statements  and ways to express boundaries through"I" statements. LCSW encouraged pt to identify areas of growth and acknowledge husbands attempts to communicate and support pt.    Pt reports that [facility] has secured [facility]  for pt to follow-up with for AN support. LCSW and pt discussed LCSW and pt " follow-up.     Assessment and plan:  return 1 weeks                        This service was not originating from a related E/M service provided within the previous 7 days nor will  to an E/M service or procedure within the next 24 hours or my soonest available appointment.  Prevailing standard of

## 2023-06-23 ENCOUNTER — PATIENT MESSAGE (OUTPATIENT)
Dept: PRIMARY CARE CLINIC | Facility: CLINIC | Age: 68
End: 2023-06-23
Payer: MEDICARE

## 2023-06-26 ENCOUNTER — PATIENT MESSAGE (OUTPATIENT)
Dept: ENDOCRINOLOGY | Facility: CLINIC | Age: 68
End: 2023-06-26
Payer: MEDICARE

## 2023-06-28 ENCOUNTER — PATIENT MESSAGE (OUTPATIENT)
Dept: PRIMARY CARE CLINIC | Facility: CLINIC | Age: 68
End: 2023-06-28
Payer: MEDICARE

## 2023-06-29 ENCOUNTER — CLINICAL SUPPORT (OUTPATIENT)
Dept: PRIMARY CARE CLINIC | Facility: CLINIC | Age: 68
End: 2023-06-29
Payer: MEDICARE

## 2023-06-29 DIAGNOSIS — F50.00 ANOREXIA NERVOSA: Primary | ICD-10-CM

## 2023-06-29 NOTE — PROGRESS NOTES
Individual Psychotherapy (LCSW/PhD)  Adelaida JOYCELYN Flores,  6/29/2023    Site:  [facility]         Therapeutic Intervention: Met with patient for individual psychotherapy.    Chief complaint/reason for encounter: behavior     Interval history and content of current session: Pt has recently begun sessions with new health . Pt will also be seeing a therapist for eating disorder needs. LCSW and pt discussed pt concerns and needs as well as role of LCSW in pt's care moving forward.    LCSW encouraged pt to speak to ED therapist about recommendations for team based care for further discussion. LCSW and pt discussed possible role for LCSW in family based care (I.e. couples therapy) to support pt further. LCSW supported pt in processing feelings of anxiety regarding transition and time management.    LCSW and pt discussed pt's progress to date and importance of pt maintaining a growth mindset during treatment.        Treatment plan:  Target symptoms:  behavior  Why chosen therapy is appropriate versus another modality: relevant to diagnosis  Outcome monitoring methods: self-report, checklist/rating scale  Therapeutic intervention type: supportive psychotherapy    Risk parameters:  Patient reports no suicidal ideation  Patient reports no homicidal ideation  Patient reports no self-injurious behavior  Patient reports no violent behavior    Verbal deficits: None    Patient's response to intervention:  The patient's response to intervention is accepting.    Progress toward goals and other mental status changes:  The patient's progress toward goals is limited.    Diagnosis:   No diagnosis found.    Plan: Pt plans to continue individual psychotherapy    Return to clinic: 1 week    Length of Service (minutes): 30    Answers submitted by the patient for this visit:  Review of Systems Questionnaire (Submitted on 6/28/2023)  activity change: No  unexpected weight change: No  neck pain: Yes  hearing loss: No  rhinorrhea: No  trouble  swallowing: No  eye discharge: No  visual disturbance: No  chest tightness: No  wheezing: No  chest pain: No  palpitations: No  blood in stool: No  constipation: No  vomiting: No  diarrhea: Yes  polydipsia: No  polyuria: No  difficulty urinating: No  hematuria: No  menstrual problem: No  dysuria: No  joint swelling: No  arthralgias: Yes  headaches: No  weakness: No  confusion: No  dysphoric mood: Yes

## 2023-06-30 ENCOUNTER — PATIENT MESSAGE (OUTPATIENT)
Dept: PRIMARY CARE CLINIC | Facility: CLINIC | Age: 68
End: 2023-06-30
Payer: MEDICARE

## 2023-06-30 DIAGNOSIS — K29.70 GASTRITIS, PRESENCE OF BLEEDING UNSPECIFIED, UNSPECIFIED CHRONICITY, UNSPECIFIED GASTRITIS TYPE: Primary | ICD-10-CM

## 2023-06-30 DIAGNOSIS — K63.5 POLYP OF COLON, UNSPECIFIED PART OF COLON, UNSPECIFIED TYPE: ICD-10-CM

## 2023-07-06 ENCOUNTER — PATIENT MESSAGE (OUTPATIENT)
Dept: PRIMARY CARE CLINIC | Facility: CLINIC | Age: 68
End: 2023-07-06
Payer: MEDICARE

## 2023-07-06 ENCOUNTER — TELEPHONE (OUTPATIENT)
Dept: ENDOSCOPY | Facility: HOSPITAL | Age: 68
End: 2023-07-06
Payer: MEDICARE

## 2023-07-06 DIAGNOSIS — Z12.11 ENCOUNTER FOR SCREENING COLONOSCOPY FOR NON-HIGH-RISK PATIENT: Primary | ICD-10-CM

## 2023-07-10 ENCOUNTER — CLINICAL SUPPORT (OUTPATIENT)
Dept: PRIMARY CARE CLINIC | Facility: CLINIC | Age: 68
End: 2023-07-10
Payer: MEDICARE

## 2023-07-10 ENCOUNTER — PATIENT MESSAGE (OUTPATIENT)
Dept: PSYCHIATRY | Facility: CLINIC | Age: 68
End: 2023-07-10
Payer: MEDICARE

## 2023-07-10 DIAGNOSIS — F50.00 ANOREXIA NERVOSA: Primary | ICD-10-CM

## 2023-07-10 PROCEDURE — 90837 PSYTX W PT 60 MINUTES: CPT | Mod: S$GLB,ICN,, | Performed by: SOCIAL WORKER

## 2023-07-10 PROCEDURE — 90837 PR PSYCHOTHERAPY W/PATIENT, 60 MIN: ICD-10-PCS | Mod: S$GLB,ICN,, | Performed by: SOCIAL WORKER

## 2023-07-10 NOTE — PROGRESS NOTES
"Individual Psychotherapy (LCSW/PhD)  Adelaida HIRSCH Mark,  7/10/2023    Site:  [facility]         Therapeutic Intervention: Met with patient for family psychotherapy.    Chief complaint/reason for encounter: behavior and interpersonal     Interval history and content of current session: Pt provided update regarding meetings with health  and therapist. LCSW supported couple in processing feelings regarding ED treatment and current progress.     Pt expressed frustration at perceived lack of support from  I.earache  not understanding what progress looks like/not acknowledging progresses. LCSW provided feedback on challenges of ED and importance of good communication and boundaries.  expressed frustration at pt's lack of progress and stated the pt "just needs to eat".    LCSW supported pt in communicating feelings of frustration and needs to . LCSW and couple discussed accountability vs. Control and importance of not trying to control the other.     Plan is for pt to continue seeing ED therapist, health  and attending couples therapy. LCSW to get in contact with with other providers for coordinated care. LCSW expressed empathy for pt and  given and group discussed importance of attending sessions to ensure consistent progress. Couple agreed.    Treatment plan:  Target symptoms: anxiety , behavior  Why chosen therapy is appropriate versus another modality: relevant to diagnosis, evidence based practice  Outcome monitoring methods: self-report, checklist/rating scale  Therapeutic intervention type: supportive psychotherapy    Risk parameters:  Patient reports no suicidal ideation  Patient reports no homicidal ideation  Patient reports no self-injurious behavior  Patient reports no violent behavior    Verbal deficits: None    Patient's response to intervention:  The patient's response to intervention is accepting.    Progress toward goals and other mental status changes:  The " patient's progress toward goals is limited.    Diagnosis:   No diagnosis found.    Plan: Pt plans to continue individual psychotherapy    Return to clinic: 2 weeks    Length of Service (minutes): 60

## 2023-07-12 ENCOUNTER — TELEPHONE (OUTPATIENT)
Dept: PRIMARY CARE CLINIC | Facility: CLINIC | Age: 68
End: 2023-07-12
Payer: MEDICARE

## 2023-07-12 ENCOUNTER — PATIENT MESSAGE (OUTPATIENT)
Dept: PRIMARY CARE CLINIC | Facility: CLINIC | Age: 68
End: 2023-07-12
Payer: MEDICARE

## 2023-07-12 DIAGNOSIS — F50.00 ANOREXIA NERVOSA: ICD-10-CM

## 2023-07-12 DIAGNOSIS — E44.1 MILD PROTEIN-CALORIE MALNUTRITION: Primary | ICD-10-CM

## 2023-07-13 ENCOUNTER — TELEPHONE (OUTPATIENT)
Dept: PRIMARY CARE CLINIC | Facility: CLINIC | Age: 68
End: 2023-07-13
Payer: MEDICARE

## 2023-07-13 ENCOUNTER — PATIENT MESSAGE (OUTPATIENT)
Dept: PRIMARY CARE CLINIC | Facility: CLINIC | Age: 68
End: 2023-07-13
Payer: MEDICARE

## 2023-07-14 ENCOUNTER — PATIENT MESSAGE (OUTPATIENT)
Dept: PRIMARY CARE CLINIC | Facility: CLINIC | Age: 68
End: 2023-07-14
Payer: MEDICARE

## 2023-07-14 ENCOUNTER — PATIENT MESSAGE (OUTPATIENT)
Dept: ENDOCRINOLOGY | Facility: CLINIC | Age: 68
End: 2023-07-14
Payer: MEDICARE

## 2023-07-17 ENCOUNTER — TELEPHONE (OUTPATIENT)
Dept: PRIMARY CARE CLINIC | Facility: CLINIC | Age: 68
End: 2023-07-17
Payer: MEDICARE

## 2023-07-17 DIAGNOSIS — F50.00 ANOREXIA NERVOSA: Primary | ICD-10-CM

## 2023-07-17 NOTE — TELEPHONE ENCOUNTER
Pt sent email regarding [facility] and eating disorder therapist she's seeing to request for CBC, CMP, mg, phos and EKG so that she can be evaluated for inpatient placement for her anorexia. Will order. Please contact pt to schedule. Also requested pt to send future communications via [employer] portal and not email so it's part of her medical records.

## 2023-07-20 ENCOUNTER — PATIENT MESSAGE (OUTPATIENT)
Dept: PRIMARY CARE CLINIC | Facility: CLINIC | Age: 68
End: 2023-07-20
Payer: MEDICARE

## 2023-07-21 ENCOUNTER — HOSPITAL ENCOUNTER (OUTPATIENT)
Dept: CARDIOLOGY | Facility: CLINIC | Age: 68
Discharge: HOME | End: 2023-07-21
Payer: MEDICARE

## 2023-07-21 ENCOUNTER — LAB VISIT (OUTPATIENT)
Dept: LAB | Facility: HOSPITAL | Age: 68
End: 2023-07-21
Payer: MEDICARE

## 2023-07-21 DIAGNOSIS — F50.00 ANOREXIA NERVOSA: ICD-10-CM

## 2023-07-21 LAB
ALBUMIN SERPL BCP-MCNC: 3.6 G/DL (ref 3.5–5.2)
ALP SERPL-CCNC: 95 U/L (ref 55–135)
ALT SERPL W/O P-5'-P-CCNC: 17 U/L (ref 10–44)
ANION GAP SERPL CALC-SCNC: 7 MMOL/L (ref 8–16)
AST SERPL-CCNC: 20 U/L (ref 10–40)
BASOPHILS # BLD AUTO: 0.02 K/UL (ref 0–0.2)
BASOPHILS NFR BLD: 0.3 % (ref 0–1.9)
BILIRUB SERPL-MCNC: 0.2 MG/DL (ref 0.1–1)
BUN SERPL-MCNC: 23 MG/DL (ref 8–23)
CALCIUM SERPL-MCNC: 9.4 MG/DL (ref 8.7–10.5)
CHLORIDE SERPL-SCNC: 104 MMOL/L (ref 95–110)
CO2 SERPL-SCNC: 28 MMOL/L (ref 23–29)
CREAT SERPL-MCNC: 0.7 MG/DL (ref 0.5–1.4)
DIFFERENTIAL METHOD: NORMAL
EOSINOPHIL # BLD AUTO: 0.3 K/UL (ref 0–0.5)
EOSINOPHIL NFR BLD: 4.7 % (ref 0–8)
ERYTHROCYTE [DISTWIDTH] IN BLOOD BY AUTOMATED COUNT: 14.4 % (ref 11.5–14.5)
EST. GFR  (NO RACE VARIABLE): >60 ML/MIN/1.73 M^2
GLUCOSE SERPL-MCNC: 87 MG/DL (ref 70–110)
HCT VFR BLD AUTO: 38.8 % (ref 37–48.5)
HGB BLD-MCNC: 12.4 G/DL (ref 12–16)
IMM GRANULOCYTES # BLD AUTO: 0.02 K/UL (ref 0–0.04)
IMM GRANULOCYTES NFR BLD AUTO: 0.3 % (ref 0–0.5)
LYMPHOCYTES # BLD AUTO: 1.7 K/UL (ref 1–4.8)
LYMPHOCYTES NFR BLD: 29.2 % (ref 18–48)
MAGNESIUM SERPL-MCNC: 2.1 MG/DL (ref 1.6–2.6)
MCH RBC QN AUTO: 29 PG (ref 27–31)
MCHC RBC AUTO-ENTMCNC: 32 G/DL (ref 32–36)
MCV RBC AUTO: 91 FL (ref 82–98)
MONOCYTES # BLD AUTO: 0.6 K/UL (ref 0.3–1)
MONOCYTES NFR BLD: 9.9 % (ref 4–15)
NEUTROPHILS # BLD AUTO: 3.2 K/UL (ref 1.8–7.7)
NEUTROPHILS NFR BLD: 55.6 % (ref 38–73)
NRBC BLD-RTO: 0 /100 WBC
PHOSPHATE SERPL-MCNC: 4 MG/DL (ref 2.7–4.5)
PLATELET # BLD AUTO: 272 K/UL (ref 150–450)
PMV BLD AUTO: 11.1 FL (ref 9.2–12.9)
POTASSIUM SERPL-SCNC: 4.3 MMOL/L (ref 3.5–5.1)
PROT SERPL-MCNC: 6.2 G/DL (ref 6–8.4)
RBC # BLD AUTO: 4.28 M/UL (ref 4–5.4)
SODIUM SERPL-SCNC: 139 MMOL/L (ref 136–145)
WBC # BLD AUTO: 5.78 K/UL (ref 3.9–12.7)

## 2023-07-21 PROCEDURE — 80053 COMPREHEN METABOLIC PANEL: CPT | Performed by: INTERNAL MEDICINE

## 2023-07-21 PROCEDURE — 85025 COMPLETE CBC W/AUTO DIFF WBC: CPT | Performed by: INTERNAL MEDICINE

## 2023-07-21 PROCEDURE — 83735 ASSAY OF MAGNESIUM: CPT | Performed by: INTERNAL MEDICINE

## 2023-07-21 PROCEDURE — 36415 COLL VENOUS BLD VENIPUNCTURE: CPT | Performed by: INTERNAL MEDICINE

## 2023-07-21 PROCEDURE — 84100 ASSAY OF PHOSPHORUS: CPT | Performed by: INTERNAL MEDICINE

## 2023-07-21 PROCEDURE — 93005 ELECTROCARDIOGRAM TRACING: CPT | Mod: S$GLB,,, | Performed by: INTERNAL MEDICINE

## 2023-07-21 PROCEDURE — 93010 ELECTROCARDIOGRAM REPORT: CPT | Mod: S$GLB,,, | Performed by: INTERNAL MEDICINE

## 2023-07-21 PROCEDURE — 93005 EKG 12-LEAD: ICD-10-PCS | Mod: S$GLB,,, | Performed by: INTERNAL MEDICINE

## 2023-07-21 PROCEDURE — 93010 EKG 12-LEAD: ICD-10-PCS | Mod: S$GLB,,, | Performed by: INTERNAL MEDICINE

## 2023-07-28 ENCOUNTER — TELEPHONE (OUTPATIENT)
Dept: GASTROENTEROLOGY | Facility: CLINIC | Age: 68
End: 2023-07-28
Payer: MEDICARE

## 2023-07-28 NOTE — TELEPHONE ENCOUNTER
Attempt to schedule colonoscopy with Dr. Villatoro on 8/3/23 at [facility].  Pt states it is scheduled at [facility].

## 2023-08-02 ENCOUNTER — PATIENT MESSAGE (OUTPATIENT)
Dept: PRIMARY CARE CLINIC | Facility: CLINIC | Age: 68
End: 2023-08-02
Payer: MEDICARE

## 2023-08-03 ENCOUNTER — TELEPHONE (OUTPATIENT)
Dept: ENDOSCOPY | Facility: HOSPITAL | Age: 68
End: 2023-08-03

## 2023-08-03 ENCOUNTER — CLINICAL SUPPORT (OUTPATIENT)
Dept: ENDOSCOPY | Facility: HOSPITAL | Age: 68
End: 2023-08-03
Attending: INTERNAL MEDICINE
Payer: MEDICARE

## 2023-08-03 DIAGNOSIS — K63.5 POLYP OF COLON, UNSPECIFIED PART OF COLON, UNSPECIFIED TYPE: ICD-10-CM

## 2023-08-03 DIAGNOSIS — Z12.11 COLON CANCER SCREENING: Primary | ICD-10-CM

## 2023-08-03 DIAGNOSIS — K29.70 GASTRITIS, PRESENCE OF BLEEDING UNSPECIFIED, UNSPECIFIED CHRONICITY, UNSPECIFIED GASTRITIS TYPE: ICD-10-CM

## 2023-08-03 RX ORDER — SODIUM, POTASSIUM,MAG SULFATES 17.5-3.13G
SOLUTION, RECONSTITUTED, ORAL ORAL
Qty: 1 KIT | Refills: 0 | Status: SHIPPED | OUTPATIENT
Start: 2023-08-03 | End: 2024-01-17

## 2023-08-03 NOTE — TELEPHONE ENCOUNTER
Spoke to patient to schedule procedure(s) Colonoscopy/EGD       Physician to perform procedure(s) Dr. ANUPAMA Rasmussen  Date of Procedure (s) 9/26/23  Arrival Time 7:00 AM  Time of Procedure(s) 8:00 AM   Location of Procedure(s) [facility] 2nd Floor  Type of Rx Prep sent to patient: Suprep  Instructions provided to [facility] via [facility]    Patient was informed on the following information and verbalized understanding. Screening questionnaire reviewed with patient and complete. If procedure requires anesthesia, a responsible adult needs to be present to accompany the patient home, patient cannot drive after receiving anesthesia. Appointment details are tentative, especially check-in time. Patient will receive a prep-op call 4 days prior to confirm check-in time for procedure. If applicable the patient should contact their pharmacy to verify Rx for procedure prep is ready for pick-up. Patient was advised to call the scheduling department at [phone] if pharmacy states no Rx is available. Patient was advised to call the endoscopy scheduling department if any questions or concerns arise.       Endoscopy Scheduling Department         Colonoscopy Procedure Prep Instructions        Date of procedure: 9/26/23 Arrive at: 7:00AM     Location of Department:   [facility] [address] [place] [address]  Take the Elevators to 2nd Floor Endoscopy Procedural Area     As soon as possible:   your prep from pharmacy and over the counter DULCOLAX LAXATIVE TABLETS      On the day before your procedure   What You CAN do:   You may have clear liquids ONLY -see below for list.      Liquids That Are OK to Drink:   Water  Sports drinks (Gatorade, Power-Aid)  Coffee or tea (no cream or nondairy creamer)  Clear juices without pulp (apple, white grape)  Gelatin desserts (no fruit or toppings)  Clear soda (sprite, coke, ginger ale)  Chicken broth (until 12 midnight the night before procedure)        What You  CANNOT do:   Do not EAT solid food, drink milk or anything   colored red.  Do not drink alcohol.  Do not take oral medications within 1 hour of starting   each dose of SUPREP.  No gum chewing or candy morning of procedure.        Note:   (Please disregard the insert instructions from pharmacy).  SUPREP Bowel Prep Kit is indicated for cleansing of the colon as a preparation for colonoscopy in adults.   Be sure to tell your doctor about all the medicines you take, including prescription and non-prescription medicines, vitamins, and herbal supplements. SUPREP Bowel Prep Kit may affect how other medicines work.  Medication taken by mouth may not be absorbed properly when taken within 1 hour before the start of each dose of SUPREP Bowel Prep Kit.     It is not uncommon to experience some abdominal cramping, nausea and/or vomiting when taking the prep. If you have nausea and/or vomiting while taking the prep, stop drinking for 20 to 30 minutes then continue.     How to take prep:     SUPREP Bowel Prep Kit is a (2-day) prep.   Both 6-ounce bottles are required for a complete preparation for colonoscopy. Dilute the solution concentrate as directed prior to use. You must drink water with each dose of SUPREP, and additional water after each dose.     DOSE 1--Day Before Colonoscopy 9/25/23     Drink at least 6 to 8 glasses of clear liquids from time you wake up until you begin your prep and then continue until bedtime to avoid dehydration.      12:00 pm (NOON) Take four (4) Dulcolax (Bisacodyl) tablets with at least 8 ounces or more of clear liquids.       6:00 pm:     You must complete Steps 1 through 4 using one (1) 6-ounce bottle before going to bed as shown below:     Step 1-Pour ONE (1) 6-ounce bottle of SUPREP liquid into the mixing container.  Step 2-Add cool drinking water to the 16-ounce line on the container and mix.  Step 3-Drink ALL the liquid in the container.  Step 4-You must drink two (2) more 16-ounce  containers of water over the next 1 hour.  IMPORTANT: If you experience preparation-related symptoms (for example, nausea, bloating, or cramping), stop, or slow the rate of drinking the additional water until your symptoms decrease.     DOSE 2--Day of the Colonoscopy 9/26/23 at 2-3 AM     For this dose, repeat Steps 1 through 4 shown above using the other 6-ounce bottle.   You may continue drinking water/clear liquids until   4 hours before your colonoscopy or as directed by the scheduling nurse  4:00AM.     For more information about your procedure, please watch this informational video. It is important to watch this animated consent video prior to your arrival. If you haven't watched the video prior to arriving, you are required to watch it during admission which can cause delays.      Options for viewing:  Using a keyboard:  press and hold the control tab (Ctrl) and left mouse click to follow link          Colonoscopy Instructional Video                                                         OR     Type link address into your web browser's address bar:  [email or web address]     Using a mobile phone: tap on web address/link.              IMPORTANT INFORMATION TO KNOW BEFORE YOUR PROCEDURE     [facility] 2nd Floor     If your procedure requires the administration of anesthesia, it is necessary for a responsible adult to drive you home. (Medical Transportation, Uber, Lyft, Taxi, etc. may ONLY be used if a responsible adult is present to accompany you home.  The responsible adult CAN'T be the  of the service).       person must be available to return to pick you up within 30 minutes of being notified of discharge.      Due to the limited socially distant seating in our waiting room, please limit your guest (1) who accompany you for this procedure. If someone accompanies you for this procedure into the facility:     Consider having them proceed to an area  that is socially distant other than the lobby until a member of the medical team contacts them to provide an update after the procedure.      Also, please consider being dropped off and picked up from the facility.        Please bring a picture ID, insurance card, & copayment     Take Medications as directed below:           If you begin taking any blood thinning medications, please contact the  listed below as soon as possible.     If you are diabetic see the attached instruction sheet regarding your medication.      If you take HEART, BLOOD PRESSURE, SEIZURE, PAIN, LUNG (including inhalers/nebulizers), ANTI-REJECTION (transplant patients), or PSYCHIATRIC medications, please take at your regular times with a sip of water or as directed by the scheduling nurse.      Important contact information:     Endoscopy [phone] Hours of operation Monday-Friday 8:00-4:30pm.     Questions about insurance or financial obligations call [phone] or [phone].     If you have questions regarding the prep or need to reschedule, please call [phone]. After hours questions requiring immediate assistance, contact [facility] nurse line at [phone] or [phone].   NOTE:      On occasion, unforeseen circumstances may cause a delay in your procedure start time. We respect your time and appreciate your patience during these circumstances.

## 2023-08-03 NOTE — PLAN OF CARE
Spoke to patient to schedule procedure(s) Colonoscopy/EGD       Physician to perform procedure(s) Dr. ANUPAMA Rasmussen  Date of Procedure (s) 9/26/23  Arrival Time 7:00 AM  Time of Procedure(s) 8:00 AM   Location of Procedure(s) [place] 2nd Floor  Type of Rx Prep sent to patient: Suprep  Instructions provided to patient via [facility]    Patient was informed on the following information and verbalized understanding. Screening questionnaire reviewed with patient and complete. If procedure requires anesthesia, a responsible adult needs to be present to accompany the patient home, patient cannot drive after receiving anesthesia. Appointment details are tentative, especially check-in time. Patient will receive a prep-op call 4 days prior to confirm check-in time for procedure. If applicable the patient should contact their pharmacy to verify Rx for procedure prep is ready for pick-up. Patient was advised to call the scheduling department at [phone] if pharmacy states no Rx is available. Patient was advised to call the endoscopy scheduling department if any questions or concerns arise.      [facility] Endoscopy Scheduling Department

## 2023-08-08 ENCOUNTER — OFFICE VISIT (OUTPATIENT)
Dept: PRIMARY CARE CLINIC | Facility: CLINIC | Age: 68
End: 2023-08-08
Payer: MEDICARE

## 2023-08-08 VITALS
OXYGEN SATURATION: 98 % | WEIGHT: 107.56 LBS | SYSTOLIC BLOOD PRESSURE: 126 MMHG | TEMPERATURE: 98 F | DIASTOLIC BLOOD PRESSURE: 84 MMHG | HEART RATE: 64 BPM | BODY MASS INDEX: 19.06 KG/M2 | HEIGHT: 63 IN

## 2023-08-08 DIAGNOSIS — R26.81 UNSTEADY GAIT: ICD-10-CM

## 2023-08-08 DIAGNOSIS — G89.29 CHRONIC RIGHT SHOULDER PAIN: Primary | ICD-10-CM
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
## 2023-08-08 DIAGNOSIS — M81.0 POSTMENOPAUSAL OSTEOPOROSIS: ICD-10-CM

## 2023-08-08 DIAGNOSIS — R63.0 ANOREXIA: ICD-10-CM

## 2023-08-08 DIAGNOSIS — F41.1 GAD (GENERALIZED ANXIETY DISORDER): ICD-10-CM

## 2023-08-08 DIAGNOSIS — M25.511 CHRONIC RIGHT SHOULDER PAIN: Primary | ICD-10-CM

## 2023-08-08 DIAGNOSIS — F33.1 MDD (MAJOR DEPRESSIVE DISORDER), RECURRENT EPISODE, MODERATE: ICD-10-CM

## 2023-08-08 PROCEDURE — 99999 PR PBB SHADOW E&M-EST. PATIENT-LVL IV: CPT | Mod: PBBFAC,,, | Performed by: INTERNAL MEDICINE

## 2023-08-08 PROCEDURE — 1125F AMNT PAIN NOTED PAIN PRSNT: CPT | Mod: CPTII,S$GLB,, | Performed by: INTERNAL MEDICINE

## 2023-08-08 PROCEDURE — 3288F PR FALLS RISK ASSESSMENT DOCUMENTED: ICD-10-PCS | Mod: CPTII,S$GLB,, | Performed by: INTERNAL MEDICINE

## 2023-08-08 PROCEDURE — 3008F PR BODY MASS INDEX (BMI) DOCUMENTED: ICD-10-PCS | Mod: CPTII,S$GLB,, | Performed by: INTERNAL MEDICINE

## 2023-08-08 PROCEDURE — 99214 OFFICE O/P EST MOD 30 MIN: CPT | Mod: S$GLB,,, | Performed by: INTERNAL MEDICINE

## 2023-08-08 PROCEDURE — 3074F PR MOST RECENT SYSTOLIC BLOOD PRESSURE < 130 MM HG: ICD-10-PCS | Mod: CPTII,S$GLB,, | Performed by: INTERNAL MEDICINE

## 2023-08-08 PROCEDURE — 1160F RVW MEDS BY RX/DR IN RCRD: CPT | Mod: CPTII,S$GLB,, | Performed by: INTERNAL MEDICINE

## 2023-08-08 PROCEDURE — 1125F PR PAIN SEVERITY QUANTIFIED, PAIN PRESENT: ICD-10-PCS | Mod: CPTII,S$GLB,, | Performed by: INTERNAL MEDICINE

## 2023-08-08 PROCEDURE — 99999 PR PBB SHADOW E&M-EST. PATIENT-LVL IV: ICD-10-PCS | Mod: PBBFAC,,, | Performed by: INTERNAL MEDICINE

## 2023-08-08 PROCEDURE — 1159F MED LIST DOCD IN RCRD: CPT | Mod: CPTII,S$GLB,, | Performed by: INTERNAL MEDICINE

## 2023-08-08 PROCEDURE — 99214 PR OFFICE/OUTPT VISIT, EST, LEVL IV, 30-39 MIN: ICD-10-PCS | Mod: S$GLB,,, | Performed by: INTERNAL MEDICINE

## 2023-08-08 PROCEDURE — 3288F FALL RISK ASSESSMENT DOCD: CPT | Mod: CPTII,S$GLB,, | Performed by: INTERNAL MEDICINE

## 2023-08-08 PROCEDURE — 1159F PR MEDICATION LIST DOCUMENTED IN MEDICAL RECORD: ICD-10-PCS | Mod: CPTII,S$GLB,, | Performed by: INTERNAL MEDICINE

## 2023-08-08 PROCEDURE — 3079F PR MOST RECENT DIASTOLIC BLOOD PRESSURE 80-89 MM HG: ICD-10-PCS | Mod: CPTII,S$GLB,, | Performed by: INTERNAL MEDICINE

## 2023-08-08 PROCEDURE — 3008F BODY MASS INDEX DOCD: CPT | Mod: CPTII,S$GLB,, | Performed by: INTERNAL MEDICINE

## 2023-08-08 PROCEDURE — 1160F PR REVIEW ALL MEDS BY PRESCRIBER/CLIN PHARMACIST DOCUMENTED: ICD-10-PCS | Mod: CPTII,S$GLB,, | Performed by: INTERNAL MEDICINE

## 2023-08-08 PROCEDURE — 1101F PR PT FALLS ASSESS DOC 0-1 FALLS W/OUT INJ PAST YR: ICD-10-PCS | Mod: CPTII,S$GLB,, | Performed by: INTERNAL MEDICINE

## 2023-08-08 PROCEDURE — 1101F PT FALLS ASSESS-DOCD LE1/YR: CPT | Mod: CPTII,S$GLB,, | Performed by: INTERNAL MEDICINE

## 2023-08-08 PROCEDURE — 3079F DIAST BP 80-89 MM HG: CPT | Mod: CPTII,S$GLB,, | Performed by: INTERNAL MEDICINE

## 2023-08-08 PROCEDURE — 3074F SYST BP LT 130 MM HG: CPT | Mod: CPTII,S$GLB,, | Performed by: INTERNAL MEDICINE

## 2023-08-08 NOTE — PROGRESS NOTES
"Subjective:       Patient ID: Adelaida Flores is a 67 y.o. female.    Chief Complaint: anorexia follow up    HPI  MDD/MISTY - on prozac 80mg daily and lamictal 100mg daily. Had virtual appt w/ psychiatry PA. Added zyprexa 2.5mg qhs and vistaril prn insomnia. F/u in 3 weeks. Pt reports never started on her zyprexa.   Anorexia - currently part of [facility] who is providing pt w/ a nutritionist (hasn't gotten one yet and still waiting) and therapist that specifically deals w/ eating d/o. Any time she gets more anxious, she'll restrict. There was talk about out of state in rehab for anorexia. [facility] turned her down (took Medicare). Looking into a place in [place] and another place in [place] - waiting for a contract w/ [place].   Scheduled for cscope and EGD in Sept.     H/o elevated liver enzymes - fibroscan normal. Normalized LFTs now.     NADIA on home sleep study but pt's not sure about the validity o fthe test. Saw Adia Weeks NP in sleep med clinic. Pt defers repeat home sleep study at this time.    Osteoporosis - prolia q 6 mo. Last saw Dr. Ybarra 1/24/23.  Took 1 dose of Evenity and initially was $100 and then price went up to $430 so cannot continue w/ Evenity.   Plan for root canal this Thursday. Will get Prolia likely next mo.     Had a fall May 22 on the R shoulder. Went to . XR w/ chronic deformity. Hurting her every day when she's holding something heavy or when she's trying to throw a toy to dog w/ the R arm. Doesn't have good balance anyway.     Review of Systems  Comprehensive review of systems otherwise negative. See history/subjective section for more details.    Objective:      Physical Exam    /84 (BP Location: Left arm, Patient Position: Sitting, BP Method: Large (Manual))   Pulse 64   Temp 98.2 °F (36.8 °C) (Oral)   Ht 5' 3" (1.6 m)   Wt 48.8 kg (107 lb 9.4 oz)   SpO2 98%   BMI 19.06 kg/m²     GEN - A+OX4, NAD, thin.  HEENT - PERRL, EOMI, OP clear. MMM.   Neck - No " thyromegaly or cervical LAD. No thyroid masses felt.  CV - RRR, no m/r   Chest - CTAB, no wheezing or rhonchi  Abd - S/NT/ND/+BS.   Ext - 2+BDP and radial pulses. No C/C/E  MSK - no spinal tenderness to palpation. FROM of R shoulder but some pain on palpation.  Skin - No rash.    Labs reviewed.     Assessment/Plan     Diagnoses and all orders for this visit:    Chronic right shoulder pain  -     Ambulatory referral/consult to Physical/Occupational Therapy; Future    Unsteady gait  -     Ambulatory referral/consult to Physical/Occupational Therapy; Future    Fall, subsequent encounter  -     Ambulatory referral/consult to Physical/Occupational Therapy; Future    Anorexia - cont w/ ED therapist. Plan for nutritionist. Awaiting inpt.    MDD (major depressive disorder), recurrent episode, moderate/MISTY (generalized anxiety disorder) - cont current meds. She's holding off on starting zyprexa. Cont working w/ therapist and f/u w/ psychiatrist PA.     Postmenopausal osteoporosis - scheduled for root canal in 2 days. Plan for Prolia next mo.         Follow up in about 6 months (around 2/8/2024).      Catie Portillo MD  Department [facility]  8:06 AM

## 2023-08-11 ENCOUNTER — PATIENT MESSAGE (OUTPATIENT)
Dept: ENDOCRINOLOGY | Facility: CLINIC | Age: 68
End: 2023-08-11
Payer: MEDICARE

## 2023-08-18 ENCOUNTER — OFFICE VISIT (OUTPATIENT)
Dept: PSYCHIATRY | Facility: CLINIC | Age: 68
End: 2023-08-18
Payer: MEDICARE

## 2023-08-18 DIAGNOSIS — F41.1 GAD (GENERALIZED ANXIETY DISORDER): ICD-10-CM

## 2023-08-18 DIAGNOSIS — R63.0 ANOREXIA: Primary | ICD-10-CM

## 2023-08-18 DIAGNOSIS — F33.1 MDD (MAJOR DEPRESSIVE DISORDER), RECURRENT EPISODE, MODERATE: ICD-10-CM

## 2023-08-18 PROCEDURE — 90833 PSYTX W PT W E/M 30 MIN: CPT | Mod: 95,,, | Performed by: PHYSICIAN ASSISTANT

## 2023-08-18 PROCEDURE — 1159F PR MEDICATION LIST DOCUMENTED IN MEDICAL RECORD: ICD-10-PCS | Mod: CPTII,95,, | Performed by: PHYSICIAN ASSISTANT

## 2023-08-18 PROCEDURE — 1160F PR REVIEW ALL MEDS BY PRESCRIBER/CLIN PHARMACIST DOCUMENTED: ICD-10-PCS | Mod: CPTII,95,, | Performed by: PHYSICIAN ASSISTANT

## 2023-08-18 PROCEDURE — 99214 PR OFFICE/OUTPT VISIT, EST, LEVL IV, 30-39 MIN: ICD-10-PCS | Mod: 95,,, | Performed by: PHYSICIAN ASSISTANT

## 2023-08-18 PROCEDURE — 99214 OFFICE O/P EST MOD 30 MIN: CPT | Mod: 95,,, | Performed by: PHYSICIAN ASSISTANT

## 2023-08-18 PROCEDURE — 90833 PR PSYCHOTHERAPY W/PATIENT W/E&M, 30 MIN (ADD ON): ICD-10-PCS | Mod: 95,,, | Performed by: PHYSICIAN ASSISTANT

## 2023-08-18 PROCEDURE — 1160F RVW MEDS BY RX/DR IN RCRD: CPT | Mod: CPTII,95,, | Performed by: PHYSICIAN ASSISTANT

## 2023-08-18 PROCEDURE — 1159F MED LIST DOCD IN RCRD: CPT | Mod: CPTII,95,, | Performed by: PHYSICIAN ASSISTANT

## 2023-08-18 NOTE — PROGRESS NOTES
"The patient location is: [place]  The chief complaint leading to consultation is: depression, anxiety, anorexia f/u    Visit type: audiovisual      Each patient to whom he or she provides medical services by telemedicine is:  (1) informed of the relationship between the physician and patient and the respective role of any other health care provider with respect to management of the patient; and (2) notified that he or she may decline to receive medical services by telemedicine and may withdraw from such care at any time.    Notes:       Outpatient Psychiatry Follow-Up Visit (MD/AMBER)    8/18/2023    Clinical Status of Patient:  Outpatient (Ambulatory)    Chief Complaint:  Adelaida Flores is a 67 y.o. female who presents today for follow-up of depression and anxiety.  Met with patient.      Interval History and Content of Current Session:  Interim Events/Subjective Report/Content of Current Session:    Pt never started zyprexa since prescribed at last visit.    Pt reports today: "its been rough, really rough"    Has been working with eating disorder therapist and . Had considered going inpatient for eating disorder but has issues with that due to insurance.    Pt hesitant to start zyprexa as her eating disorder therapist told her it might not be better for a mood change. Discussed at length benefits of starting zyprexa and significant amount of studies showing benefits of zyprexa for anorexia, however pt is hesistant at this time, states she wants to discuss it with the eating disorder therapist first.    Reports has felt less depressed recently, "I think its maybe I'm staying busy with the eating disorder and ".    States she is mainly consuming mainly liquid meals. States 1 day per week "I allow myself to eat solid foods". Pt reports that weight has been between 108-110lb. Pt states her normal weight is around 120lb.    Patients mood is anxious, affect appears mood congruent. Linear and logical, friendly " "and cooperative, good eye contact.    Denies SI/HI/AVH. Pt reports sleeping well and decreased appetite. Reports dry mouth from medications, denies other side effects    Pt reports taking medications as prescribed and behaving appropriately during interview today.      Psychotherapy:  Target symptoms: depression, anxiety , eating d/o  Why chosen therapy is appropriate versus another modality: relevant to diagnosis, evidence based practice  Outcome monitoring methods: self-report, observation  Therapeutic intervention type: insight oriented psychotherapy, supportive psychotherapy  Topics discussed/themes: relationships difficulties, building skills sets for symptom management, symptom recognition  The patient's response to the intervention is accepting. The patient's progress toward treatment goals is fair.   Duration of intervention: 18 minutes.      Prior visit    "I think of my anxiety that flared up is with my . We started couple therapy, realized we disagreed on a budget which got me very upset because I realized how much we were spending."    States went to [place] to visit mother for her birthday and "felt great, wasn't depressed or anything." States that got back home and started getting upset again about money issues.    Pt reports has been doing a lot of work with therapist. Pt states that she is eating solid food meal only once per week and eats liquids normally for other meals. States when visiting her mother.     States she is currently working with an eating disorder specialist  for several sessions. States only has 1 session left.    Pt reports today: "mood is getting better, was feeling really down"    Discussed starting zyprexa for depression augmentation and anorexia tx. Discussed risks, benefits, side effects of zyprexa. Pt agreeable to plan to start low dose 2.5mg zyprexa qhs.    Patients mood is anxious, affect appears mood congruent. Linear and logical, friendly and cooperative, " good eye contact.    Denies SI/HI/AVH. Pt reports sleeping well and decreased appetite. Denies side effects of medications.    Pt reports taking medications as prescribed and behaving appropriately during interview today.    Review of Systems     Review of Systems   Constitutional:  Positive for weight loss. Negative for fever.   HENT:  Negative for sore throat.    Eyes:  Negative for photophobia.   Respiratory:  Negative for cough.    Cardiovascular:  Negative for chest pain and palpitations.   Gastrointestinal:  Negative for abdominal pain.   Genitourinary:  Negative for dysuria.   Musculoskeletal:  Negative for myalgias.   Skin:  Negative for rash.   Neurological:  Negative for dizziness.   Endo/Heme/Allergies:  Does not bruise/bleed easily.   Psychiatric/Behavioral:  Positive for depression. Negative for hallucinations, substance abuse and suicidal ideas. The patient is nervous/anxious. The patient does not have insomnia.          Psychiatric Review Of Systems - Is patient experiencing or having changes in:  sleep: no  appetite: yes  weight: yes  energy/anergy: yes  interest/pleasure/anhedonia: no  somatic symptoms: no  libido: no  anxiety/panic: yes  guilty/hopelessness: no  concentration: yes  S.I.B.s/risky behavior: no  Irritability: no  Racing thoughts: no  Impulsive behaviors: yes-occasional binging  Paranoia: no  AVH: no      Past Medical, Family and Social History: The patient's past medical, family and social history have been reviewed and updated as appropriate within the electronic medical record - see encounter notes.      Current Medications:   Medication List with Changes/Refills   Current Medications    DENOSUMAB (PROLIA) 60 MG/ML SYRG        FLUOROMETHOLONE 0.1% (FML) 0.1 % DRPS    Place into both eyes.    FLUOXETINE 40 MG CAPSULE    Take 2 capsules (80 mg total) by mouth once daily.    HYDROXYZINE PAMOATE (VISTARIL) 25 MG CAP    Take 1 capsule (25 mg total) by mouth nightly as needed (insomnia).  "   KRILL OIL ORAL        LAMOTRIGINE (LAMICTAL) 100 MG TABLET    Take 1 tablet (100 mg total) by mouth once daily.    LEVOCETIRIZINE (XYZAL) 5 MG TABLET        MULTIVITAMIN CAPSULE    Take 1 capsule by mouth once daily.    ONDANSETRON (ZOFRAN-ODT) 4 MG TBDL    Take 1 tablet (4 mg total) by mouth every 8 (eight) hours as needed (nausea).    SODIUM,POTASSIUM,MAG SULFATES (SUPREP BOWEL PREP KIT) 17.5-3.13-1.6 GRAM SOLR    Follow instructions given by Endoscopy scheduling nurse    VITAMIN D (VITAMIN D3) 1000 UNITS TAB    Take 1,000 Units by mouth once daily.   Discontinued Medications    OLANZAPINE (ZYPREXA) 2.5 MG TABLET    Take 1 tablet (2.5 mg total) by mouth every evening.         Allergies:   Review of patient's allergies indicates:   Allergen Reactions    Cefdinir Diarrhea    Grass pollen-red top, standard     House dust Itching         Vitals   There were no vitals filed for this visit.       Labs/Imaging/Studies:   No results found for this or any previous visit (from the past 48 hour(s)).   No results found for: "PHENYTOIN", "PHENOBARB", "VALPROATE", "CBMZ"    Compliance: yes    Side effects: None    Risk Parameters:  Patient reports no suicidal ideation  Patient reports no homicidal ideation  Patient reports no self-injurious behavior  Patient reports no violent behavior    Exam (detailed: at least 9 elements; comprehensive: all 15 elements)   Constitutional  Vitals:  Most recent vital signs, dated less than 90 days prior to this appointment, were reviewed.   There were no vitals filed for this visit.     General:  malnourished, thin     Musculoskeletal  Muscle Strength/Tone:  not examined   Gait & Station:  non-ataxic     Psychiatric  Speech:  no latency; no press   Mood & Affect:  Anxious  congruent and appropriate   Thought Process:  normal and logical   Associations:  intact   Thought Content:  normal, no suicidality, no homicidality, delusions, or paranoia   Insight:  intact, has awareness of illness "   Judgement: behavior is adequate to circumstances   Orientation:  grossly intact   Memory: intact for content of interview   Language: grossly intact   Attention Span & Concentration:  able to focus   Fund of Knowledge:  intact and appropriate to age and level of education     Assessment and Diagnosis   Status/Progress: Based on the examination today, the patient's problem(s) is/are inadequately controlled. New problems have not been presented today.   Co-morbidities, Diagnostic uncertainty and Lack of compliance are not complicating management of the primary condition.  There are no active rule-out diagnoses for this patient at this time.     General Impression:      ICD-10-CM ICD-9-CM   1. Anorexia  R63.0 783.0   2. MDD (major depressive disorder), recurrent episode, moderate  F33.1 296.32   3. MISTY (generalized anxiety disorder)  F41.1 300.02       Intervention/Counseling/Treatment Plan   Medication Management: Continue current medications. The risks and benefits of medication were discussed with the patient.    -continue prozac 80mg daily    -continue lamictal 100mg daily    -pt refusing zyprexa at this time    -continue vistaril prn insomnia      Return to Clinic: 2 months        Last Manrique PA-C      Total face to face time: 30 min  Total time (chart review, patient contact, documentation): 35 min      *This note has been prepared using a combination of a dictation device and typing.  It has been checked for errors but some errors may still exist within the note as a result of speech recognition errors and/or typographical errors.

## 2023-08-21 ENCOUNTER — PATIENT MESSAGE (OUTPATIENT)
Dept: PRIMARY CARE CLINIC | Facility: CLINIC | Age: 68
End: 2023-08-21
Payer: MEDICARE

## 2023-08-21 ENCOUNTER — TELEPHONE (OUTPATIENT)
Dept: ENDOSCOPY | Facility: HOSPITAL | Age: 68
End: 2023-08-21
Payer: MEDICARE

## 2023-08-31 ENCOUNTER — CLINICAL SUPPORT (OUTPATIENT)
Dept: REHABILITATION | Facility: HOSPITAL | Age: 68
End: 2023-08-31
Attending: INTERNAL MEDICINE
Payer: MEDICARE

## 2023-08-31 ENCOUNTER — INFUSION (OUTPATIENT)
Dept: INFECTIOUS DISEASES | Facility: HOSPITAL | Age: 68
End: 2023-08-31
Attending: INTERNAL MEDICINE
Payer: MEDICARE

## 2023-08-31 ENCOUNTER — PATIENT MESSAGE (OUTPATIENT)
Dept: REHABILITATION | Facility: HOSPITAL | Age: 68
End: 2023-08-31

## 2023-08-31 VITALS
HEIGHT: 64 IN | OXYGEN SATURATION: 98 % | HEART RATE: 56 BPM | RESPIRATION RATE: 18 BRPM | BODY MASS INDEX: 19.08 KG/M2 | TEMPERATURE: 98 F | WEIGHT: 111.75 LBS | SYSTOLIC BLOOD PRESSURE: 107 MMHG | DIASTOLIC BLOOD PRESSURE: 66 MMHG

## 2023-08-31 DIAGNOSIS — M81.0 SENILE OSTEOPOROSIS: Primary | ICD-10-CM

## 2023-08-31 DIAGNOSIS — M25.511 CHRONIC RIGHT SHOULDER PAIN: ICD-10-CM

## 2023-08-31 DIAGNOSIS — R26.81 UNSTEADY GAIT: ICD-10-CM

## 2023-08-31 DIAGNOSIS — R29.898 WEAKNESS OF SHOULDER: ICD-10-CM
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
## 2023-08-31 DIAGNOSIS — M25.611 DECREASED ROM OF RIGHT SHOULDER: ICD-10-CM

## 2023-08-31 DIAGNOSIS — G89.29 CHRONIC RIGHT SHOULDER PAIN: ICD-10-CM

## 2023-08-31 DIAGNOSIS — M81.0 OSTEOPOROSIS, POST-MENOPAUSAL: ICD-10-CM

## 2023-08-31 PROCEDURE — 97161 PT EVAL LOW COMPLEX 20 MIN: CPT

## 2023-08-31 PROCEDURE — 96372 THER/PROPH/DIAG INJ SC/IM: CPT

## 2023-08-31 PROCEDURE — 63600175 PHARM REV CODE 636 W HCPCS: Mod: JZ,JG | Performed by: INTERNAL MEDICINE

## 2023-08-31 PROCEDURE — 97110 THERAPEUTIC EXERCISES: CPT

## 2023-08-31 RX ADMIN — DENOSUMAB 60 MG: 60 INJECTION SUBCUTANEOUS at 11:08

## 2023-08-31 NOTE — PLAN OF CARE
[facility]   Physical Therapy Initial Evaluation      Date: 8/31/2023   Name: Adelaida Flores  Clinic Number: [id number]    Therapy Diagnosis:   Encounter Diagnoses   Name Primary?    Chronic right shoulder pain     Unsteady gait     Fall, subsequent encounter     Weakness of shoulder     Decreased ROM of right shoulder      Physician: Catie Portillo MD    Physician Orders: PT Eval and Treat   Medical Diagnosis from Referral:   M25.511,G89.29 (ICD-10-CM) - Chronic right shoulder pain   R26.81 (ICD-10-CM) - Unsteady gait   W19.XXXD (ICD-10-CM) - Fall, subsequent encounter   Evaluation Date: 8/31/2023  Authorization Period Expiration: TBD  Plan of Care Expiration: 11/31/2023  Progress Note Due: 9/31/2023  Visit # / Visits authorized: 1/1   FOTO: 1/5    FOTO Initial Evaluation: 53  FOTO 2nd F/U: NA  FOTO Discharge: NA    Precautions: Standard     Time In: 915  Time Out: 1000  Total Appointment Time (timed & untimed codes): 45 minutes      SUBJECTIVE     Date of onset: May 2023    History of current condition - Adelaida reports: she fell at the [employer] in may due to a pack of dogs passing by her and knocking her over. Patient fell on the R shoulder and went to the urgent. Patient reports her imaging did not show and fractures. Patient did not have any shoulder issues prior to this. Patient reports she can lift her arms up over head but the act of lifting her shoulder is what is painful. Patient reports since may she feels that her shoulder pain may be a tiny bit better but not much. Patient reports driving, clothing, throwing a ball, opening a can, holding a leash out in front all bothers her R shoulder pain. Patient cannot lay on her R shoulder when sleeping     Falls: Earlier this week when her dog saw a squirrel and pulled her    Imaging, bone scan films: Impression:     No evidence of acute fracture or dislocation of the right shoulder.     Chronic appearing deformity involving the lesser  tuberosity of the right humerus.  The findings may relate to chronic injury.    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   Prior Level of Function: Independent with ADLs  Current Level of Function: Independent with ADLs but with pain now with certain movements of shoulder     Pain:  Current 2/10, worst 6/10, best 1/10   - most pain when trying to sleep   Location: right shoulder    Description: Aching and Throbbing  Aggravating Factors: see subjective   Easing Factors: Voltaren cream, tylenol    Patients goals: To reduce shoulder pain and improve strength/function      Medical History:   Past Medical History:   Diagnosis Date    Alcohol dependence in sustained full remission 03/21/2022    Allergy     Anxiety 02/22/2019    Broken hip     Colon polyps     Depression     Hyperlipidemia 02/22/2019    IBS (irritable bowel syndrome)     Osteoporosis     T12 compression fracture s/p corpectomy 07/13/2013    Volvulus     s/p colectomy       Surgical History:   Adelaida Flores  has a past surgical history that includes Fracture surgery; Tonsillectomy; Colonoscopy (N/A, 8/28/2017); Colon surgery; Spine surgery; Eye surgery (2012); Small intestine surgery (2011); and Hernia repair (2012).    Medications:   Adelaida has a current medication list which includes the following prescription(s): prolia, fluorometholone 0.1%, fluoxetine, hydroxyzine pamoate, krill oil, lamotrigine, levocetirizine, multivitamin, ondansetron, sodium,potassium,mag sulfates, and vitamin d.    Allergies:   Review of patient's allergies indicates:   Allergen Reactions    Cefdinir Diarrhea    Grass pollen-red top, standard     House dust Itching          OBJECTIVE     Shoulder Right Right Right Left Left Left Pain/Dysfunction with Movement    AROM PROM MMT AROM PROM MMT    Flexion 180 180 4-/5 ! 180 180 4+/5    Extension 50 50 4+/5 50 50 4+/5    Abduction 180 !! 180 4-/5  !! 180 180 4+/5    Adduction 40 ! 45 ! 4+/5 50 55 4+/5    Internal rotation 50 55 4/5 !! 90 90 4+/5    External Rotation 70 75 4/5 ! 90 90 4+/5      ** Less pain with PROM scaption and abduction on R shoulder     Posture Assessment: fwd shoulder posture    Saul: + on R     Painful-Arc: + on R     Infraspinatus Test: + on R     Drop Arm: Negative     Lift Off Belly Test: + on R           FOTO Shoulder Survey    Therapist reviewed FOTO scores for Adelaida Flores on 8/31/2023.   FOTO documents entered into [employer] - see Media section.    Score: 53%         TREATMENT     Total Treatment time (time-based codes) separate from Evaluation: 10 minutes      Adelaida received the treatments listed below:      therapeutic exercises to develop strength and endurance for 10 minutes including:              PATIENT EDUCATION AND HOME EXERCISES     Education provided:   - Purpose of PT  - HEP    Written Home Exercises Provided: yes. Exercises were reviewed and Adelaida was able to demonstrate them prior to the end of the session.  Adelaida demonstrated good  understanding of the education provided. See EMR under Patient Instructions for exercises provided during therapy sessions.    ASSESSMENT     Adelaida is a 67 y.o. female referred to outpatient Physical Therapy with a medical diagnosis of chronic right shoulder pain. Patient presents to PT with R shoulder pain after a fall in May. Patient now presents with R shoulder pain with R shoulder range of motion (abduction and scaption in particular). Patients signs and symptoms presents similarly to a rotator cuff pathology. Patients strength is decreased on the R due to pain. Patients pain is less with PROM. Patient was progressed with shoulder isometrics today. Patient educed to only push 50% max effort with pain not exceeding 2/10.    Patient prognosis is Good.   Patient will benefit from skilled outpatient Physical Therapy to address the deficits stated above and in the chart below,  provide patient /family education, and to maximize patientt's level of independence.     Plan of care discussed with patient: Yes  Patient's spiritual, cultural and educational needs considered and patient is agreeable to the plan of care and goals as stated below:     Anticipated Barriers for therapy: None    Medical Necessity is demonstrated by the following  History  Co-morbidities and personal factors that may impact the plan of care Co-morbidities:   See medical history     Personal Factors:   no deficits     high   Examination  Body Structures and Functions, activity limitations and participation restrictions that may impact the plan of care Body Regions:   upper extremities    Body Systems:    ROM  strength    Participation Restrictions:   None    Activity limitations:   Learning and applying knowledge  no deficits    General Tasks and Commands  no deficits    Communication  no deficits    Mobility  lifting and carrying objects    Self care  no deficits    Domestic Life  no deficits    Interactions/Relationships  no deficits    Life Areas  no deficits    Community and Social Life  recreation and leisure         high   Clinical Presentation stable and uncomplicated low   Decision Making/ Complexity Score: low     Goals:  Short Term Goals (4 Weeks):   1. Pt will be independent with HEP to supplement PT in improving functional use of R UE.  2. Pt will report 2/10 pain at its worst with daily activities   3. Pt will be able to dress without pain  Long Term Goals (8 Weeks):   1. Pt will improve FOTO score to >/=65 to decrease perceived limitation with R shoulder carrying, moving, and handling objects.  2. Pt will increase R shoulder PROM to WNL in all planes to improve functional use of R RUE.  3. Pt will increase R shoulder AROM to WFL in all planes to improve functional use of R RUE.  4. Pt will improve R shoulder MMTs to = L to promote equal use of B UEs in performing functional tasks.  5. Pt will lift 20 lb  objects without pain to promote functional QOL.  6. Pt to report pain </= 0/10 with ADLs and IADLs using R UE to improve functional QOL.      PLAN   Plan of care Certification: 8/31/2023 to 11/31/2023.    Outpatient Physical Therapy 2 times weekly for 8 weeks to include the following interventions: Manual Therapy, Moist Heat/ Ice, Neuromuscular Re-ed, Patient Education, Self Care, Therapeutic Activities, Therapeutic Exercise, and FDN.     Liu Mcmahon, PT      I CERTIFY THE NEED FOR THESE SERVICES FURNISHED UNDER THIS PLAN OF TREATMENT AND WHILE UNDER MY CARE   Physician's comments:     Physician's Signature: ___________________________________________________

## 2023-08-31 NOTE — PROGRESS NOTES
Patient arrives for Prolia injection - confirms use of calcium and vitamin D supplements and denies dental procedures over past 3 months - administered per guidelines    Limited head-to-toe assessment due to privacy issues and visit reason though the opportunity was given for patient to express any concerns

## 2023-09-13 ENCOUNTER — CLINICAL SUPPORT (OUTPATIENT)
Dept: REHABILITATION | Facility: HOSPITAL | Age: 68
End: 2023-09-13
Payer: MEDICARE

## 2023-09-13 DIAGNOSIS — M25.611 DECREASED ROM OF RIGHT SHOULDER: ICD-10-CM

## 2023-09-13 DIAGNOSIS — G89.29 CHRONIC RIGHT SHOULDER PAIN: Primary | ICD-10-CM

## 2023-09-13 DIAGNOSIS — R29.898 WEAKNESS OF SHOULDER: ICD-10-CM

## 2023-09-13 DIAGNOSIS — M25.511 CHRONIC RIGHT SHOULDER PAIN: Primary | ICD-10-CM

## 2023-09-13 PROCEDURE — 97112 NEUROMUSCULAR REEDUCATION: CPT | Mod: CQ

## 2023-09-13 PROCEDURE — 97110 THERAPEUTIC EXERCISES: CPT | Mod: CQ

## 2023-09-13 NOTE — PROGRESS NOTES
"[facility]   Physical Therapy Treatment Note      Name: Adelaida Flores  Clinic Number: [id number]    Therapy Diagnosis:   Encounter Diagnoses   Name Primary?    Chronic right shoulder pain Yes    Weakness of shoulder     Decreased ROM of right shoulder      Physician: Catie Portillo MD    Visit Date: 9/13/2023    Physician: Catie Portillo MD     Physician Orders: PT Eval and Treat   Medical Diagnosis from Referral:   M25.511,G89.29 (ICD-10-CM) - Chronic right shoulder pain   R26.81 (ICD-10-CM) - Unsteady gait   W19.XXXD (ICD-10-CM) - Fall, subsequent encounter   Evaluation Date: 8/31/2023  Authorization Period Expiration: 10/26/23  Plan of Care Expiration: 11/31/2023  Progress Note Due: 9/31/2023  Visit # / Visits authorized: 1/1 1/23  FOTO: 1/5     FOTO Initial Evaluation: 53  FOTO 2nd F/U: NA  FOTO Discharge: NA     Precautions: Standard      Time In: 915  Time Out: 1000  Total Appointment Time (timed & untimed codes): 45 minutes      PTA Visit #: 1/5       Subjective     Patient reports: she had a recent fall onto her L side at the [employer].  She was compliant with home exercise program.  Response to previous treatment: no adverse affect  Functional change: progressing    Pain: 4/10  Location: right shoulder      Objective      Objective Measures updated at progress report unless specified.     Treatment     Adelaida received the treatments listed below:      therapeutic exercises to develop strength, endurance, ROM, flexibility, posture, and core stabilization for 12 minutes including:  UBE 6 min <>  Supine pect stretch 3 min  Serratus punches supine 20x      neuromuscular re-education activities to improve: Balance, Coordination, Kinesthetic, Sense, Proprioception, and Posture for 23 minutes. The following activities were included:  Scapular setting 2x10 3"  Shoulder isometrics (Review)  Shoulder iso add ball squeeze 10x10"    therapeutic activities to improve functional performance for 0  " minutes, including:    MANUAL THERAPY TECHNIQUES including Joint mobilizations and Soft tissue Mobilization were applied to R shoulder for 10 minutes.    Patient Education and Home Exercises       Education provided:   - safety awareness  - ice for pain    Written Home Exercises Provided: Patient instructed to cont prior HEP. Exercises were reviewed and Adelaida was able to demonstrate them prior to the end of the session.  Adelaida demonstrated good  understanding of the education provided. See Electronic Medical Record under Patient Instructions for exercises provided during therapy sessions    Assessment     Pt was able to tolerate first full treatment of therapy without adverse affects. She was cued to maintain 50% force while performing isometrics. Patient reported painful adduction with passive range, post GHJ mobs and PROM patient stated adduction pain was very minimal.    Adelaida Is progressing well towards her goals.   Patient prognosis is Good.     Patient will continue to benefit from skilled outpatient physical therapy to address the deficits listed in the problem list box on initial evaluation, provide pt/family education and to maximize pt's level of independence in the home and community environment.     Patient's spiritual, cultural and educational needs considered and pt agreeable to plan of care and goals.     Anticipated barriers to physical therapy: none    Goals:   Short Term Goals (4 Weeks):   1. Pt will be independent with HEP to supplement PT in improving functional use of R UE.  2. Pt will report 2/10 pain at its worst with daily activities   3. Pt will be able to dress without pain  Long Term Goals (8 Weeks):   1. Pt will improve FOTO score to >/=65 to decrease perceived limitation with R shoulder carrying, moving, and handling objects.  2. Pt will increase R shoulder PROM to WNL in all planes to improve functional use of R RUE.  3. Pt will increase R shoulder AROM to WFL in all planes to  improve functional use of R RUE.  4. Pt will improve R shoulder MMTs to = L to promote equal use of B UEs in performing functional tasks.  5. Pt will lift 20 lb objects without pain to promote functional QOL.  6. Pt to report pain </= 0/10 with ADLs and IADLs using R UE to improve functional QOL.     Plan     Plan of care Certification: 8/31/2023 to 11/31/2023.     Outpatient Physical Therapy 2 times weekly for 8 weeks to include the following interventions: Manual Therapy, Moist Heat/ Ice, Neuromuscular Re-ed, Patient Education, Self Care, Therapeutic Activities, Therapeutic Exercise, and FDN.     Preston Bustillos, PTA

## 2023-09-20 ENCOUNTER — PATIENT MESSAGE (OUTPATIENT)
Dept: ENDOSCOPY | Facility: HOSPITAL | Age: 68
End: 2023-09-20
Payer: MEDICARE

## 2023-09-21 ENCOUNTER — TELEPHONE (OUTPATIENT)
Dept: ENDOSCOPY | Facility: HOSPITAL | Age: 68
End: 2023-09-21
Payer: MEDICARE

## 2023-09-21 NOTE — PROGRESS NOTES
"[facility]   Physical Therapy Treatment Note      Name: Adelaida Flores  Clinic Number: [id number]    Therapy Diagnosis:   Encounter Diagnoses   Name Primary?    Chronic right shoulder pain Yes    Weakness of shoulder     Decreased ROM of right shoulder        Physician: Catie Portillo MD    Visit Date: 9/22/2023    Physician: Catie Portillo MD     Physician Orders: PT Eval and Treat   Medical Diagnosis from Referral:   M25.511,G89.29 (ICD-10-CM) - Chronic right shoulder pain   R26.81 (ICD-10-CM) - Unsteady gait   W19.XXXD (ICD-10-CM) - Fall, subsequent encounter   Evaluation Date: 8/31/2023  Authorization Period Expiration: 10/26/23  Plan of Care Expiration: 11/31/2023  Progress Note Due: 9/31/2023  Visit # / Visits authorized: 1/1, 2/23  FOTO: 3/5     FOTO Initial Evaluation: 53  FOTO 2nd F/U: NA  FOTO Discharge: NA     Precautions: Standard      Time In: 1255  Time Out: 140  Total Appointment Time (timed & untimed codes): 45 minutes      PTA Visit #: 0/5       Subjective     Patient reports: she is feeling good today overall. Patient reports she is feeling better overall. Patient reports she has only done her shoulder isometrics 3-4x this past week.       She was partially compliant with home exercise program.  Response to previous treatment: no adverse affect  Functional change: progressing    Pain: 4/10  Location: right shoulder      Objective      Objective Measures updated at progress report unless specified.     Treatment     Adelaida received the treatments listed below:      therapeutic exercises to develop strength, endurance, ROM, flexibility, posture, and core stabilization for 10 minutes including:    UBE 6 min <>  Wand Flexion 2#  - 3x10   Serratus punches supine 2#  - 3x10      neuromuscular re-education activities to improve: Balance, Coordination, Kinesthetic, Sense, Proprioception, and Posture for 35 minutes. The following activities were included:      Scapular setting 2x10 3"  Rows " "Cables 10lbs   - 3x10   Shoulder isometrics   - IR/ER/Flx/Abd/Ext 10s x 10   ER w/ GTB   - 3x10  ER w/ BTB   - 3x10   Pulldowns BTB   - 3x10   Shoulder iso add ball squeeze 10x10"    therapeutic activities to improve functional performance for 0  minutes, including:    MANUAL THERAPY TECHNIQUES including Joint mobilizations and Soft tissue Mobilization were applied to R shoulder for 0 minutes.    Patient Education and Home Exercises       Education provided:   - safety awareness  - ice for pain    Written Home Exercises Provided: Patient instructed to cont prior HEP. Exercises were reviewed and Adelaida was able to demonstrate them prior to the end of the session.  Adelaida demonstrated good  understanding of the education provided. See Electronic Medical Record under Patient Instructions for exercises provided during therapy sessions    Assessment     Patient did well with progression of therex and neuro re ed to address posture, rotator cuff strength, motor control, and pain. Patient did well with progressions today. Patient will continued HEP and will give more exercises next session for her home program if she responds well.   Adelaida Is progressing well towards her goals.   Patient prognosis is Good.     Patient will continue to benefit from skilled outpatient physical therapy to address the deficits listed in the problem list box on initial evaluation, provide pt/family education and to maximize pt's level of independence in the home and community environment.     Patient's spiritual, cultural and educational needs considered and pt agreeable to plan of care and goals.     Anticipated barriers to physical therapy: none    Goals:   Short Term Goals (4 Weeks):   1. Pt will be independent with HEP to supplement PT in improving functional use of R UE.  2. Pt will report 2/10 pain at its worst with daily activities   3. Pt will be able to dress without pain  Long Term Goals (8 Weeks):   1. Pt will improve FOTO score to " >/=65 to decrease perceived limitation with R shoulder carrying, moving, and handling objects.  2. Pt will increase R shoulder PROM to WNL in all planes to improve functional use of R RUE.  3. Pt will increase R shoulder AROM to WFL in all planes to improve functional use of R RUE.  4. Pt will improve R shoulder MMTs to = L to promote equal use of B UEs in performing functional tasks.  5. Pt will lift 20 lb objects without pain to promote functional QOL.  6. Pt to report pain </= 0/10 with ADLs and IADLs using R UE to improve functional QOL.     Plan     Plan of care Certification: 8/31/2023 to 11/31/2023.     Outpatient Physical Therapy 2 times weekly for 8 weeks to include the following interventions: Manual Therapy, Moist Heat/ Ice, Neuromuscular Re-ed, Patient Education, Self Care, Therapeutic Activities, Therapeutic Exercise, and FDN.     Liu Mcmahon, PT

## 2023-09-21 NOTE — TELEPHONE ENCOUNTER
Return call and spoke with patient. Inform that she will need to get the miralax brand in powder form. Patient is follow the instructions from the office to make sure that her colon is cleared.     Patient verbalized understanding.

## 2023-09-22 ENCOUNTER — CLINICAL SUPPORT (OUTPATIENT)
Dept: REHABILITATION | Facility: HOSPITAL | Age: 68
End: 2023-09-22
Payer: MEDICARE

## 2023-09-22 ENCOUNTER — ANESTHESIA EVENT (OUTPATIENT)
Dept: ENDOSCOPY | Facility: HOSPITAL | Age: 68
End: 2023-09-22
Payer: MEDICARE

## 2023-09-22 DIAGNOSIS — M25.511 CHRONIC RIGHT SHOULDER PAIN: Primary | ICD-10-CM

## 2023-09-22 DIAGNOSIS — M25.611 DECREASED ROM OF RIGHT SHOULDER: ICD-10-CM

## 2023-09-22 DIAGNOSIS — R29.898 WEAKNESS OF SHOULDER: ICD-10-CM

## 2023-09-22 DIAGNOSIS — G89.29 CHRONIC RIGHT SHOULDER PAIN: Primary | ICD-10-CM

## 2023-09-22 PROCEDURE — 97110 THERAPEUTIC EXERCISES: CPT

## 2023-09-22 PROCEDURE — 97112 NEUROMUSCULAR REEDUCATION: CPT

## 2023-09-22 NOTE — ANESTHESIA PREPROCEDURE EVALUATION
09/22/2023  Adelaida Flores is a 67 y.o., female.  Scheduled for EGD and colonoscopy for gastritis and unspecified bleeding.    Pre-op Assessment    I have reviewed the Patient Summary Reports.     I have reviewed the Nursing Notes.    I have reviewed the Medications.     Review of Systems  Anesthesia Hx:  No problems with previous Anesthesia  History of prior surgery of interest to airway management or planning: Previous anesthesia: General   Hematology/Oncology:  Hematology Normal   Oncology Normal     EENT/Dental:EENT/Dental Normal   Cardiovascular:  Cardiovascular Normal  ECG has been reviewed. EKG 7/2023: SB   Pulmonary:   Sleep Apnea    Renal/:  Renal/ Normal     Hepatic/GI:  Hepatic/GI Normal IBS, colon polyps   Musculoskeletal:  Bone Disorders: Osteoporosis    Neurological:  Neurology Normal    Dermatological:  Skin Normal    Psych:   anxiety             Anesthesia Plan  Type of Anesthesia, risks & benefits discussed:    Anesthesia Type: Gen Natural Airway  Intra-op Monitoring Plan: Standard ASA Monitors  Induction:  IV  Informed Consent: Patient consented to blood products? No  ASA Score: 2  Day of Surgery Review of History & Physical: H&P Update referred to the surgeon/provider.    Ready For Surgery From Anesthesia Perspective.     .

## 2023-09-26 ENCOUNTER — HOSPITAL ENCOUNTER (OUTPATIENT)
Facility: HOSPITAL | Age: 68
Discharge: HOME | End: 2023-09-26
Attending: STUDENT IN AN ORGANIZED HEALTH CARE EDUCATION/TRAINING PROGRAM | Admitting: STUDENT IN AN ORGANIZED HEALTH CARE EDUCATION/TRAINING PROGRAM
Payer: MEDICARE

## 2023-09-26 ENCOUNTER — ANESTHESIA (OUTPATIENT)
Dept: ENDOSCOPY | Facility: HOSPITAL | Age: 68
End: 2023-09-26
Payer: MEDICARE

## 2023-09-26 VITALS
HEIGHT: 63 IN | DIASTOLIC BLOOD PRESSURE: 51 MMHG | RESPIRATION RATE: 18 BRPM | WEIGHT: 105 LBS | TEMPERATURE: 98 F | OXYGEN SATURATION: 100 % | SYSTOLIC BLOOD PRESSURE: 100 MMHG | HEART RATE: 67 BPM | BODY MASS INDEX: 18.61 KG/M2

## 2023-09-26 DIAGNOSIS — K63.5 POLYP OF COLON, UNSPECIFIED PART OF COLON, UNSPECIFIED TYPE: Primary | ICD-10-CM

## 2023-09-26 DIAGNOSIS — K63.5 COLON POLYP: ICD-10-CM

## 2023-09-26 PROCEDURE — 63600175 PHARM REV CODE 636 W HCPCS: Performed by: NURSE ANESTHETIST, CERTIFIED REGISTERED

## 2023-09-26 PROCEDURE — D9220A PRA ANESTHESIA: ICD-10-PCS | Mod: ,,, | Performed by: NURSE ANESTHETIST, CERTIFIED REGISTERED

## 2023-09-26 PROCEDURE — G0105 COLORECTAL SCRN; HI RISK IND: ICD-10-PCS | Mod: ,,, | Performed by: STUDENT IN AN ORGANIZED HEALTH CARE EDUCATION/TRAINING PROGRAM

## 2023-09-26 PROCEDURE — 94761 N-INVAS EAR/PLS OXIMETRY MLT: CPT

## 2023-09-26 PROCEDURE — 43235 EGD DIAGNOSTIC BRUSH WASH: CPT | Performed by: STUDENT IN AN ORGANIZED HEALTH CARE EDUCATION/TRAINING PROGRAM

## 2023-09-26 PROCEDURE — 43235 EGD DIAGNOSTIC BRUSH WASH: CPT | Mod: 51,,, | Performed by: STUDENT IN AN ORGANIZED HEALTH CARE EDUCATION/TRAINING PROGRAM

## 2023-09-26 PROCEDURE — G0105 COLORECTAL SCRN; HI RISK IND: HCPCS | Performed by: STUDENT IN AN ORGANIZED HEALTH CARE EDUCATION/TRAINING PROGRAM

## 2023-09-26 PROCEDURE — 37000009 HC ANESTHESIA EA ADD 15 MINS: Performed by: STUDENT IN AN ORGANIZED HEALTH CARE EDUCATION/TRAINING PROGRAM

## 2023-09-26 PROCEDURE — 25000003 PHARM REV CODE 250: Performed by: NURSE ANESTHETIST, CERTIFIED REGISTERED

## 2023-09-26 PROCEDURE — 37000008 HC ANESTHESIA 1ST 15 MINUTES: Performed by: STUDENT IN AN ORGANIZED HEALTH CARE EDUCATION/TRAINING PROGRAM

## 2023-09-26 PROCEDURE — 25000003 PHARM REV CODE 250: Performed by: STUDENT IN AN ORGANIZED HEALTH CARE EDUCATION/TRAINING PROGRAM

## 2023-09-26 PROCEDURE — 99900035 HC TECH TIME PER 15 MIN (STAT)

## 2023-09-26 PROCEDURE — D9220A PRA ANESTHESIA: Mod: ,,, | Performed by: NURSE ANESTHETIST, CERTIFIED REGISTERED

## 2023-09-26 PROCEDURE — G0105 COLORECTAL SCRN; HI RISK IND: HCPCS | Mod: ,,, | Performed by: STUDENT IN AN ORGANIZED HEALTH CARE EDUCATION/TRAINING PROGRAM

## 2023-09-26 PROCEDURE — 43235 PR EGD, FLEX, DIAGNOSTIC: ICD-10-PCS | Mod: 51,,, | Performed by: STUDENT IN AN ORGANIZED HEALTH CARE EDUCATION/TRAINING PROGRAM

## 2023-09-26 RX ORDER — SODIUM CHLORIDE 9 MG/ML
INJECTION, SOLUTION INTRAVENOUS CONTINUOUS
Status: DISCONTINUED | OUTPATIENT
Start: 2023-09-26 | End: 2023-09-26 | Stop reason: HOSPADM

## 2023-09-26 RX ORDER — PROPOFOL 10 MG/ML
VIAL (ML) INTRAVENOUS CONTINUOUS PRN
Status: DISCONTINUED | OUTPATIENT
Start: 2023-09-26 | End: 2023-09-26

## 2023-09-26 RX ORDER — LIDOCAINE HYDROCHLORIDE 20 MG/ML
INJECTION, SOLUTION EPIDURAL; INFILTRATION; INTRACAUDAL; PERINEURAL
Status: DISCONTINUED | OUTPATIENT
Start: 2023-09-26 | End: 2023-09-26

## 2023-09-26 RX ORDER — OMEPRAZOLE 40 MG/1
40 CAPSULE, DELAYED RELEASE ORAL
Qty: 60 CAPSULE | Refills: 5 | Status: SHIPPED | OUTPATIENT
Start: 2023-09-26 | End: 2024-09-25

## 2023-09-26 RX ORDER — PROPOFOL 10 MG/ML
VIAL (ML) INTRAVENOUS
Status: DISCONTINUED | OUTPATIENT
Start: 2023-09-26 | End: 2023-09-26

## 2023-09-26 RX ADMIN — PROPOFOL 175 MCG/KG/MIN: 10 INJECTION, EMULSION INTRAVENOUS at 08:09

## 2023-09-26 RX ADMIN — GLYCOPYRROLATE 0.2 MG: 0.2 INJECTION, SOLUTION INTRAMUSCULAR; INTRAVENOUS at 08:09

## 2023-09-26 RX ADMIN — LIDOCAINE HYDROCHLORIDE 60 MG: 20 INJECTION, SOLUTION EPIDURAL; INFILTRATION; INTRACAUDAL; PERINEURAL at 08:09

## 2023-09-26 RX ADMIN — PROPOFOL 70 MG: 10 INJECTION, EMULSION INTRAVENOUS at 08:09

## 2023-09-26 RX ADMIN — SODIUM CHLORIDE: 0.9 INJECTION, SOLUTION INTRAVENOUS at 08:09

## 2023-09-26 NOTE — PROVATION PATIENT INSTRUCTIONS
Discharge Summary/Instructions after an Endoscopic Procedure  Patient Name: Adelaida Flores  Patient MRN: [id number]  Patient YOB: 1955 Tuesday, September 26, 2023  Graeme Rasmussen MD  Dear patient,  As a result of recent federal legislation (The Federal Cures Act), you may   receive lab or pathology results from your procedure in your [facility]   account before your physician is able to contact you. Your physician or   their representative will relay the results to you with their   recommendations at their soonest availability.  Thank you,  RESTRICTIONS:  During your procedure today, you received medications for sedation.  These   medications may affect your judgment, balance and coordination.  Therefore,   for 24 hours, you have the following restrictions:   - DO NOT drive a car, operate machinery, make legal/financial decisions,   sign important papers or drink alcohol.    ACTIVITY:  Today: no heavy lifting, straining or running due to procedural   sedation/anesthesia.  The following day: return to full activity including work.  DIET:  Eat and drink normally unless instructed otherwise.     TREATMENT FOR COMMON SIDE EFFECTS:  - Mild abdominal pain, nausea, belching, bloating or excessive gas:  rest,   eat lightly and use a heating pad.  - Sore Throat: treat with throat lozenges and/or gargle with warm salt   water.  - Because air was used during the procedure, expelling large amounts of air   from your rectum or belching is normal.  - If a bowel prep was taken, you may not have a bowel movement for 1-3 days.    This is normal.  SYMPTOMS TO WATCH FOR AND REPORT TO YOUR PHYSICIAN:  1. Abdominal pain or bloating, other than gas cramps.  2. Chest pain.  3. Back pain.  4. Signs of infection such as: chills or fever occurring within 24 hours   after the procedure.  5. Rectal bleeding, which would show as bright red, maroon, or black stools.   (A tablespoon of blood from the rectum is not serious,  especially if   hemorrhoids are present.)  6. Vomiting.  7. Weakness or dizziness.  GO DIRECTLY TO THE NEAREST EMERGENCY ROOM IF YOU HAVE ANY OF THE FOLLOWING:      Difficulty breathing              Chills and/or fever over 101 F   Persistent vomiting and/or vomiting blood   Severe abdominal pain   Severe chest pain   Black, tarry stools   Bleeding- more than one tablespoon   Any other symptom or condition that you feel may need urgent attention  Your doctor recommends these additional instructions:  If any biopsies were taken, your doctors clinic will contact you in 1 to 2   weeks with any results.  - Discharge patient to home (ambulatory).   - Patient has a contact number available for emergencies.  The signs and   symptoms of potential delayed complications were discussed with the   patient.  Return to normal activities tomorrow.  Written discharge   instructions were provided to the patient.   - Resume previous diet.   - Continue present medications.   - Return to primary care physician as previously scheduled.   - Repeat colonoscopy in 5 years for surveillance.  For questions, problems or results please call your physician - Graeme Rasmussen MD at Work:  [phone].  [facility], EMERGENCY ROOM PHONE NUMBER: [phone]  IF A COMPLICATION OR EMERGENCY SITUATION ARISES AND YOU ARE UNABLE TO REACH   YOUR PHYSICIAN - GO DIRECTLY TO THE EMERGENCY ROOM.  Graeme Rasmussen MD  9/26/2023 8:35:09 AM  This report has been verified and signed electronically.  Dear patient,  As a result of recent federal legislation (The Federal Cures Act), you may   receive lab or pathology results from your procedure in your [facility]   account before your physician is able to contact you. Your physician or   their representative will relay the results to you with their   recommendations at their soonest availability.  Thank you,  PROVATION

## 2023-09-26 NOTE — ANESTHESIA POSTPROCEDURE EVALUATION
Anesthesia Post Evaluation    Patient: Adelaida Flores    Procedure(s) Performed: Procedure(s) (LRB):  EGD (ESOPHAGOGASTRODUODENOSCOPY) (N/A)  COLONOSCOPY (N/A)    Final Anesthesia Type: general      Patient location during evaluation: PACU  Patient participation: Yes- Able to Participate  Level of consciousness: awake and alert  Post-procedure vital signs: reviewed and stable  Pain management: adequate  Airway patency: patent    PONV status at discharge: No PONV  Anesthetic complications: no      Cardiovascular status: stable  Respiratory status: room air  Hydration status: euvolemic  Follow-up not needed.          Vitals Value Taken Time   /65 09/26/23 0846   Temp 36.7 °C (98.1 °F) 09/26/23 0833   Pulse 70 09/26/23 0856   Resp 25 09/26/23 0856   SpO2 91 % 09/26/23 0856   Vitals shown include unvalidated device data.      Event Time   Out of Recovery 08:48:00         Pain/Pascale Score: Pascale Score: 10 (9/26/2023  8:48 AM)

## 2023-09-26 NOTE — PROVATION PATIENT INSTRUCTIONS
Discharge Summary/Instructions after an Endoscopic Procedure  Patient Name: Adelaida Flores  Patient MRN: [id number]  Patient YOB: 1955 Tuesday, September 26, 2023  Graeme Rasmussen MD  Dear patient,  As a result of recent federal legislation (The Federal Cures Act), you may   receive lab or pathology results from your procedure in your [facility]   account before your physician is able to contact you. Your physician or   their representative will relay the results to you with their   recommendations at their soonest availability.  Thank you,  RESTRICTIONS:  During your procedure today, you received medications for sedation.  These   medications may affect your judgment, balance and coordination.  Therefore,   for 24 hours, you have the following restrictions:   - DO NOT drive a car, operate machinery, make legal/financial decisions,   sign important papers or drink alcohol.    ACTIVITY:  Today: no heavy lifting, straining or running due to procedural   sedation/anesthesia.  The following day: return to full activity including work.  DIET:  Eat and drink normally unless instructed otherwise.     TREATMENT FOR COMMON SIDE EFFECTS:  - Mild abdominal pain, nausea, belching, bloating or excessive gas:  rest,   eat lightly and use a heating pad.  - Sore Throat: treat with throat lozenges and/or gargle with warm salt   water.  - Because air was used during the procedure, expelling large amounts of air   from your rectum or belching is normal.  - If a bowel prep was taken, you may not have a bowel movement for 1-3 days.    This is normal.  SYMPTOMS TO WATCH FOR AND REPORT TO YOUR PHYSICIAN:  1. Abdominal pain or bloating, other than gas cramps.  2. Chest pain.  3. Back pain.  4. Signs of infection such as: chills or fever occurring within 24 hours   after the procedure.  5. Rectal bleeding, which would show as bright red, maroon, or black stools.   (A tablespoon of blood from the rectum is not serious,  especially if   hemorrhoids are present.)  6. Vomiting.  7. Weakness or dizziness.  GO DIRECTLY TO THE NEAREST EMERGENCY ROOM IF YOU HAVE ANY OF THE FOLLOWING:      Difficulty breathing              Chills and/or fever over 101 F   Persistent vomiting and/or vomiting blood   Severe abdominal pain   Severe chest pain   Black, tarry stools   Bleeding- more than one tablespoon   Any other symptom or condition that you feel may need urgent attention  Your doctor recommends these additional instructions:  If any biopsies were taken, your doctors clinic will contact you in 1 to 2   weeks with any results.  - Discharge patient to home.   - The findings and recommendations were discussed with the patient.   - Repeat upper endoscopy in 12 weeks to check healing.   - Use Prilosec (omeprazole) 40 mg PO BID for 12 weeks, then once daily.  For questions, problems or results please call your physician - Graeme Rasmussen MD at Work:  [phone].  [facility], EMERGENCY ROOM PHONE NUMBER: [phone]  IF A COMPLICATION OR EMERGENCY SITUATION ARISES AND YOU ARE UNABLE TO REACH   YOUR PHYSICIAN - GO DIRECTLY TO THE EMERGENCY ROOM.  Graeme Rasmussen MD  9/26/2023 8:33:27 AM  This report has been verified and signed electronically.  Dear patient,  As a result of recent federal legislation (The Federal Cures Act), you may   receive lab or pathology results from your procedure in your [facility]   account before your physician is able to contact you. Your physician or   their representative will relay the results to you with their   recommendations at their soonest availability.  Thank you,  PROVATION

## 2023-09-26 NOTE — TRANSFER OF CARE
"Anesthesia Transfer of Care Note    Patient: Adelaida Flores    Procedure(s) Performed: Procedure(s) (LRB):  EGD (ESOPHAGOGASTRODUODENOSCOPY) (N/A)  COLONOSCOPY (N/A)    Patient location: PACU    Anesthesia Type: MAC    Transport from OR: Transported from OR on room air with adequate spontaneous ventilation    Post pain: adequate analgesia    Post assessment: no apparent anesthetic complications    Post vital signs: stable    Level of consciousness: awake, alert and oriented    Nausea/Vomiting: no nausea/vomiting    Complications: none    Transfer of care protocol was followed      Last vitals:   Visit Vitals  /69   Pulse 60   Temp 37.1 °C (98.8 °F) (Temporal)   Resp 18   Ht 5' 3" (1.6 m)   Wt 47.6 kg (105 lb)   SpO2 98%   Breastfeeding No   BMI 18.60 kg/m²     "

## 2023-09-26 NOTE — H&P
Short Stay Endoscopy History and Physical    PCP - Catie Portillo MD  Referring Physician - PRE-ADMIT, ENDO -[facility]  No address on file    Procedure - EGD and Colonoscopy  ASA - per anesthesia  Mallampati - per anesthesia  History of Anesthesia problems - no  Family history Anesthesia problems -  no   Plan of anesthesia - General    HPI  67 y.o. female    Reason for procedure:   Gastritis, presence of bleeding unspecified, unspecified chronicity, unspecified gastritis type [K29.70]  Polyp of colon, unspecified part of colon, unspecified type [K63.5]      ROS:  Constitutional: No fevers, chills, No weight loss  CV: No chest pain  Pulm: No cough, No shortness of breath  GI: see HPI    Medical History:  has a past medical history of Alcohol dependence in sustained full remission (03/21/2022), Allergy, Anxiety (02/22/2019), Broken hip, Colon polyps, Depression, Hyperlipidemia (02/22/2019), IBS (irritable bowel syndrome), Osteoporosis, T12 compression fracture s/p corpectomy (07/13/2013), and Volvulus.    Surgical History:  has a past surgical history that includes Fracture surgery; Tonsillectomy; Colonoscopy (N/A, 8/28/2017); Colon surgery; Spine surgery; Eye surgery (2012); Small intestine surgery (2011); and Hernia repair (2012).    Family History: family history includes Asthma in her brother; Cancer in her father, mother, and sister; Colon polyps in her father and mother; Heart disease in her father; Osteoarthritis in her maternal aunt, maternal grandmother, and mother.    Social History:  reports that she quit smoking about 30 years ago. Her smoking use included cigarettes. She started smoking about 40 years ago. She has a 10.0 pack-year smoking history. She has never used smokeless tobacco. She reports that she does not drink alcohol and does not use drugs.    Review of patient's allergies indicates:   Allergen Reactions    Cefdinir Diarrhea    Grass pollen-red top, standard     House dust Itching        Medications:   Medications Prior to Admission   Medication Sig Dispense Refill Last Dose    denosumab (PROLIA) 60 mg/mL Syrg        fluorometholone 0.1% (FML) 0.1 % DrpS Place into both eyes.       FLUoxetine 40 MG capsule Take 2 capsules (80 mg total) by mouth once daily. 180 capsule 2     hydrOXYzine pamoate (VISTARIL) 25 MG Cap Take 1 capsule (25 mg total) by mouth nightly as needed (insomnia). (Patient not taking: Reported on 6/2/2023) 90 capsule 2     KRILL OIL ORAL        lamoTRIgine (LAMICTAL) 100 MG tablet Take 1 tablet (100 mg total) by mouth once daily. 90 tablet 3     levocetirizine (XYZAL) 5 MG tablet        multivitamin capsule Take 1 capsule by mouth once daily.       ondansetron (ZOFRAN-ODT) 4 MG TbDL Take 1 tablet (4 mg total) by mouth every 8 (eight) hours as needed (nausea). (Patient not taking: Reported on 6/2/2023) 30 tablet 0     sodium,potassium,mag sulfates (SUPREP BOWEL PREP KIT) 17.5-3.13-1.6 gram SolR Follow instructions given by Endoscopy scheduling nurse (Patient not taking: Reported on 8/8/2023) 1 kit 0     vitamin D (VITAMIN D3) 1000 units Tab Take 1,000 Units by mouth once daily.          Physical Exam:    Vital Signs: There were no vitals filed for this visit.    General Appearance: Well appearing in no acute distress  Abdomen: Soft, non tender, non distended with normal bowel sounds, no masses    Labs:  Lab Results   Component Value Date    WBC 5.78 07/21/2023    HGB 12.4 07/21/2023    HCT 38.8 07/21/2023     07/21/2023    CHOL 196 10/22/2021    TRIG 89 10/22/2021    HDL 52 10/22/2021    ALT 17 07/21/2023    AST 20 07/21/2023     07/21/2023    K 4.3 07/21/2023     07/21/2023    CREATININE 0.7 07/21/2023    BUN 23 07/21/2023    CO2 28 07/21/2023    TSH 4.574 (H) 07/08/2022    INR 1.0 05/10/2022    HGBA1C 5.2 10/22/2021       I have explained the risks and benefits of this endoscopic procedure to the patient including but not limited to bleeding, inflammation,  infection, perforation, and death.      Graeme Rasmussen MD

## 2023-09-27 ENCOUNTER — CLINICAL SUPPORT (OUTPATIENT)
Dept: REHABILITATION | Facility: HOSPITAL | Age: 68
End: 2023-09-27
Payer: MEDICARE

## 2023-09-27 DIAGNOSIS — M25.611 DECREASED ROM OF RIGHT SHOULDER: ICD-10-CM

## 2023-09-27 DIAGNOSIS — G89.29 CHRONIC RIGHT SHOULDER PAIN: Primary | ICD-10-CM

## 2023-09-27 DIAGNOSIS — R29.898 WEAKNESS OF SHOULDER: ICD-10-CM

## 2023-09-27 DIAGNOSIS — M25.511 CHRONIC RIGHT SHOULDER PAIN: Primary | ICD-10-CM

## 2023-09-27 PROCEDURE — 97110 THERAPEUTIC EXERCISES: CPT | Mod: CQ

## 2023-09-27 PROCEDURE — 97112 NEUROMUSCULAR REEDUCATION: CPT | Mod: CQ

## 2023-09-27 NOTE — PROGRESS NOTES
"[facility]   Physical Therapy Treatment Note      Name: Adelaida Flores  Clinic Number: [id number]    Therapy Diagnosis:   Encounter Diagnoses   Name Primary?    Chronic right shoulder pain Yes    Weakness of shoulder     Decreased ROM of right shoulder          Physician: Catie Portillo MD    Visit Date: 9/27/2023    Physician: Catie Portillo MD     Physician Orders: PT Eval and Treat   Medical Diagnosis from Referral:   M25.511,G89.29 (ICD-10-CM) - Chronic right shoulder pain   R26.81 (ICD-10-CM) - Unsteady gait   W19.XXXD (ICD-10-CM) - Fall, subsequent encounter   Evaluation Date: 8/31/2023  Authorization Period Expiration: 10/26/23  Plan of Care Expiration: 11/31/2023  Progress Note Due: 9/31/2023  Visit # / Visits authorized: 1/1, 3/23  FOTO: 3/5     FOTO Initial Evaluation: 53  FOTO 2nd F/U: NA  FOTO Discharge: NA     Precautions: Standard      Time In: 231  Time Out: 318  Total Appointment Time (timed & untimed codes): 47 minutes      PTA Visit #: 1/5       Subjective     Patient reports: she felt really good after the manipulation early on in therapy but since then she has had more bad days with the shoulder then normally.    She was partially compliant with home exercise program.  Response to previous treatment: no adverse affect  Functional change: progressing    Pain: 4/10  Location: right shoulder      Objective      Objective Measures updated at progress report unless specified.     Treatment     Adelaida received the treatments listed below:      therapeutic exercises to develop strength, endurance, ROM, flexibility, posture, and core stabilization for 10 minutes including:    UBE 6 min <>  Wand Flexion 2#  - 3x10   Serratus punches supine 2#  - 3x10      neuromuscular re-education activities to improve: Balance, Coordination, Kinesthetic, Sense, Proprioception, and Posture for 35 minutes. The following activities were included:      Scapular setting 2x10 3"  Rows Cables 10lbs   - 3x10 " "  Shoulder isometrics   - IR/ER/Flx/Abd/Ext 10s x 10   ER w/ GTB   - 3x10  ER w/ BTB   - 3x10   Pulldowns BTB   - 3x10   Shoulder iso add ball squeeze 10x10"    therapeutic activities to improve functional performance for 0  minutes, including:    MANUAL THERAPY TECHNIQUES including Joint mobilizations and Soft tissue Mobilization were applied to R shoulder for 0 minutes.    Patient Education and Home Exercises       Education provided:   - safety awareness  - ice for pain    Written Home Exercises Provided: Patient instructed to cont prior HEP. Exercises were reviewed and Adelaida was able to demonstrate them prior to the end of the session.  Adelaida demonstrated good  understanding of the education provided. See Electronic Medical Record under Patient Instructions for exercises provided during therapy sessions    Assessment     Patient with improved symptoms since starting therapy. She presents with mild pain mostly with adduction. Manual therapy revealed ROM WNL and pain with end range ER and adduction. Plan to monitor closely and progress strengthening further per tolerance.  Adelaida Is progressing well towards her goals.   Patient prognosis is Good.     Patient will continue to benefit from skilled outpatient physical therapy to address the deficits listed in the problem list box on initial evaluation, provide pt/family education and to maximize pt's level of independence in the home and community environment.     Patient's spiritual, cultural and educational needs considered and pt agreeable to plan of care and goals.     Anticipated barriers to physical therapy: none    Goals:   Short Term Goals (4 Weeks):   1. Pt will be independent with HEP to supplement PT in improving functional use of R UE.  2. Pt will report 2/10 pain at its worst with daily activities   3. Pt will be able to dress without pain  Long Term Goals (8 Weeks):   1. Pt will improve FOTO score to >/=65 to decrease perceived limitation with R " shoulder carrying, moving, and handling objects.  2. Pt will increase R shoulder PROM to WNL in all planes to improve functional use of R RUE.  3. Pt will increase R shoulder AROM to WFL in all planes to improve functional use of R RUE.  4. Pt will improve R shoulder MMTs to = L to promote equal use of B UEs in performing functional tasks.  5. Pt will lift 20 lb objects without pain to promote functional QOL.  6. Pt to report pain </= 0/10 with ADLs and IADLs using R UE to improve functional QOL.     Plan     Plan of care Certification: 8/31/2023 to 11/31/2023.     Outpatient Physical Therapy 2 times weekly for 8 weeks to include the following interventions: Manual Therapy, Moist Heat/ Ice, Neuromuscular Re-ed, Patient Education, Self Care, Therapeutic Activities, Therapeutic Exercise, and FDN.     Preston Bustillos, PTA

## 2023-10-03 ENCOUNTER — CLINICAL SUPPORT (OUTPATIENT)
Dept: REHABILITATION | Facility: HOSPITAL | Age: 68
End: 2023-10-03
Payer: MEDICARE

## 2023-10-03 DIAGNOSIS — M25.611 DECREASED ROM OF RIGHT SHOULDER: ICD-10-CM

## 2023-10-03 DIAGNOSIS — G89.29 CHRONIC RIGHT SHOULDER PAIN: Primary | ICD-10-CM

## 2023-10-03 DIAGNOSIS — R29.898 WEAKNESS OF SHOULDER: ICD-10-CM

## 2023-10-03 DIAGNOSIS — M25.511 CHRONIC RIGHT SHOULDER PAIN: Primary | ICD-10-CM

## 2023-10-03 PROCEDURE — 97112 NEUROMUSCULAR REEDUCATION: CPT

## 2023-10-03 PROCEDURE — 97110 THERAPEUTIC EXERCISES: CPT

## 2023-10-04 ENCOUNTER — TELEPHONE (OUTPATIENT)
Dept: ENDOSCOPY | Facility: HOSPITAL | Age: 68
End: 2023-10-04
Payer: MEDICARE

## 2023-10-04 VITALS — WEIGHT: 105 LBS | HEIGHT: 63 IN | BODY MASS INDEX: 18.61 KG/M2

## 2023-10-04 DIAGNOSIS — K20.90 ESOPHAGITIS: Primary | ICD-10-CM

## 2023-10-04 NOTE — TELEPHONE ENCOUNTER
Spoke to Pt to schedule procedure(s) Upper Endoscopy (EGD)       Physician to perform procedure(s) Dr. ANUPAMA Rasmussen  Date of Procedure (s) 1/3/24  Arrival Time 6:30 AM  Time of Procedure(s) 7:30 AM   Location of Procedure(s) [facility] [address] [facility]  Type of Rx Prep sent to patient: N/A  Instructions provided to patient via [facility]    Patient was informed on the following information and verbalized understanding. Screening questionnaire reviewed with patient and complete. If procedure requires anesthesia, a responsible adult needs to be present to accompany the patient home, patient cannot drive after receiving anesthesia. Appointment details are tentative, especially check-in time. Patient will receive a prep-op call 4 days prior to confirm check-in time for procedure. If applicable the patient should contact their pharmacy to verify Rx for procedure prep is ready for pick-up. Patient was advised to call the scheduling department at [phone] if pharmacy states no Rx is available. Patient was advised to call the endoscopy scheduling department if any questions or concerns arise.      [facility] Scheduling Department

## 2023-10-04 NOTE — TELEPHONE ENCOUNTER
"See past procedure report for EGD on  23 Pt to have repeat EGD in 12   weeks per DR. Rasmussen.     ----- Message -----   From: Graeme Rasmussen MD   Sent: 2023   8:35 AM CDT   To: [facility]   Subject: Endo Case Request                                 Procedure: EGD     Diagnosis: Esophagitis     Procedure Timin weeks     *If within 4 weeks selected, please jax as high priority*     *If greater than 12 weeks, please select "5-12 weeks" and delay sending until 2 months prior to requested date*     Provider: Any GI provider     Location: [facility] ([place], [place])     Additional Scheduling Information: No scheduling concerns     Prep Specifications:N/A     Is the patient taking a GLP-1 Agonist:no     Have you attached a patient to this message: yes    "

## 2023-10-10 ENCOUNTER — CLINICAL SUPPORT (OUTPATIENT)
Dept: REHABILITATION | Facility: HOSPITAL | Age: 68
End: 2023-10-10
Payer: MEDICARE

## 2023-10-10 DIAGNOSIS — R29.898 WEAKNESS OF SHOULDER: ICD-10-CM

## 2023-10-10 DIAGNOSIS — M25.511 CHRONIC RIGHT SHOULDER PAIN: Primary | ICD-10-CM

## 2023-10-10 DIAGNOSIS — M25.611 DECREASED ROM OF RIGHT SHOULDER: ICD-10-CM

## 2023-10-10 DIAGNOSIS — G89.29 CHRONIC RIGHT SHOULDER PAIN: Primary | ICD-10-CM

## 2023-10-10 PROCEDURE — 97530 THERAPEUTIC ACTIVITIES: CPT | Mod: CQ

## 2023-10-10 PROCEDURE — 97112 NEUROMUSCULAR REEDUCATION: CPT | Mod: CQ

## 2023-10-10 NOTE — PROGRESS NOTES
[facility]   Physical Therapy Treatment Note      Name: Adelaida Flores  Clinic Number: [id number]    Therapy Diagnosis:   Encounter Diagnoses   Name Primary?    Chronic right shoulder pain Yes    Weakness of shoulder     Decreased ROM of right shoulder            Physician: Catie Portillo MD    Visit Date: 10/10/2023    Physician: Catie Portillo MD     Physician Orders: PT Eval and Treat   Medical Diagnosis from Referral:   M25.511,G89.29 (ICD-10-CM) - Chronic right shoulder pain   R26.81 (ICD-10-CM) - Unsteady gait   W19.XXXD (ICD-10-CM) - Fall, subsequent encounter   Evaluation Date: 8/31/2023  Authorization Period Expiration: 10/26/23  Plan of Care Expiration: 11/31/2023  Progress Note Due: 9/31/2023  Visit # / Visits authorized: 1/1, 5/23  FOTO: 5/5     FOTO Initial Evaluation: 53  FOTO 2nd F/U: NA  FOTO Discharge: NA     Precautions: Standard      Time In: 145 pm  Time Out: 230 pm  Total Appointment Time (timed & untimed codes): 45 minutes      PTA Visit #: 1/5       Subjective     Patient reports: she is is about 75% better since starting therapy.    She was partially compliant with home exercise program.  Response to previous treatment: no adverse affect  Functional change: progressing    Pain: 4/10  Location: right shoulder      Objective      Objective Measures updated at progress report unless specified.     Treatment     Adelaida received the treatments listed below:      therapeutic exercises to develop strength, endurance, ROM, flexibility, posture, and core stabilization for 15 minutes including:    UBE 6 min <>  Wand Flexion 2# NP  - 3x10   Serratus punches supine 2#  - 3x10  Supine diagonal w/ YTB 3x10   Supine flexion w/ YTB 3x10   90* flexion ABC's x 2  Wall slides flex/abd 20x ea.    neuromuscular re-education activities to improve: Balance, Coordination, Kinesthetic, Sense, Proprioception, and Posture for 25 minutes. The following activities were included:      ER w/ BTB   - 3x10  "  IR w/ BTB  - 3x10   Pulldowns BTB   - 3x10   Shoulder iso add ball squeeze 10x10"  Single arm row 7lbs (scap positioning)   - 3x10     therapeutic activities to improve functional performance for 0  minutes, including:    MANUAL THERAPY TECHNIQUES including Joint mobilizations and Soft tissue Mobilization were applied to R shoulder for 5 minutes.    Patient Education and Home Exercises       Education provided:   - safety awareness  - ice for pain    Written Home Exercises Provided: Patient instructed to cont prior HEP. Exercises were reviewed and Adelaida was able to demonstrate them prior to the end of the session.  Adelaida demonstrated good  understanding of the education provided. See Electronic Medical Record under Patient Instructions for exercises provided during therapy sessions    Assessment     Patient with improved symptoms since starting therapy. Patients biggest challenge is reaching overhead. PROM is WNL. Added wall slides for improved overhead tolerance. Patient did well with progression overall with therex and neuro re ed today. Patient is doing well overall today with no adverse effects         Adelaida Is progressing well towards her goals.   Patient prognosis is Good.     Patient will continue to benefit from skilled outpatient physical therapy to address the deficits listed in the problem list box on initial evaluation, provide pt/family education and to maximize pt's level of independence in the home and community environment.     Patient's spiritual, cultural and educational needs considered and pt agreeable to plan of care and goals.     Anticipated barriers to physical therapy: none    Goals:   Short Term Goals (4 Weeks):   1. Pt will be independent with HEP to supplement PT in improving functional use of R UE.  2. Pt will report 2/10 pain at its worst with daily activities   3. Pt will be able to dress without pain  Long Term Goals (8 Weeks):   1. Pt will improve FOTO score to >/=65 to decrease " perceived limitation with R shoulder carrying, moving, and handling objects.  2. Pt will increase R shoulder PROM to WNL in all planes to improve functional use of R RUE.  3. Pt will increase R shoulder AROM to WFL in all planes to improve functional use of R RUE.  4. Pt will improve R shoulder MMTs to = L to promote equal use of B UEs in performing functional tasks.  5. Pt will lift 20 lb objects without pain to promote functional QOL.  6. Pt to report pain </= 0/10 with ADLs and IADLs using R UE to improve functional QOL.     Plan     Plan of care Certification: 8/31/2023 to 11/31/2023.     Outpatient Physical Therapy 2 times weekly for 8 weeks to include the following interventions: Manual Therapy, Moist Heat/ Ice, Neuromuscular Re-ed, Patient Education, Self Care, Therapeutic Activities, Therapeutic Exercise, and FDN.     Preston Bustillos, PTA

## 2023-10-12 ENCOUNTER — OFFICE VISIT (OUTPATIENT)
Dept: OTOLARYNGOLOGY | Facility: CLINIC | Age: 68
End: 2023-10-12
Payer: MEDICARE

## 2023-10-12 ENCOUNTER — CLINICAL SUPPORT (OUTPATIENT)
Dept: OTOLARYNGOLOGY | Facility: CLINIC | Age: 68
End: 2023-10-12
Payer: MEDICARE

## 2023-10-12 VITALS
HEART RATE: 72 BPM | WEIGHT: 114.88 LBS | DIASTOLIC BLOOD PRESSURE: 69 MMHG | BODY MASS INDEX: 20.35 KG/M2 | SYSTOLIC BLOOD PRESSURE: 108 MMHG

## 2023-10-12 DIAGNOSIS — H90.A32 MIXED CONDUCTIVE AND SENSORINEURAL HEARING LOSS OF LEFT EAR WITH RESTRICTED HEARING OF RIGHT EAR: ICD-10-CM

## 2023-10-12 DIAGNOSIS — H61.23 BILATERAL IMPACTED CERUMEN: Primary | ICD-10-CM

## 2023-10-12 DIAGNOSIS — H90.A21 SENSORINEURAL HEARING LOSS (SNHL) OF RIGHT EAR WITH RESTRICTED HEARING OF LEFT EAR: Primary | ICD-10-CM

## 2023-10-12 PROCEDURE — 99999 PR PBB SHADOW E&M-EST. PATIENT-LVL III: ICD-10-PCS | Mod: PBBFAC,,, | Performed by: NURSE PRACTITIONER

## 2023-10-12 PROCEDURE — 3008F BODY MASS INDEX DOCD: CPT | Mod: CPTII,S$GLB,, | Performed by: NURSE PRACTITIONER

## 2023-10-12 PROCEDURE — 92557 PR COMPREHENSIVE HEARING TEST: ICD-10-PCS | Mod: S$GLB,,,

## 2023-10-12 PROCEDURE — 92567 PR TYMPA2METRY: ICD-10-PCS | Mod: S$GLB,,,

## 2023-10-12 PROCEDURE — 99999 PR PBB SHADOW E&M-EST. PATIENT-LVL I: ICD-10-PCS | Mod: PBBFAC,,,

## 2023-10-12 PROCEDURE — 1126F AMNT PAIN NOTED NONE PRSNT: CPT | Mod: CPTII,S$GLB,, | Performed by: NURSE PRACTITIONER

## 2023-10-12 PROCEDURE — 1159F PR MEDICATION LIST DOCUMENTED IN MEDICAL RECORD: ICD-10-PCS | Mod: CPTII,S$GLB,, | Performed by: NURSE PRACTITIONER

## 2023-10-12 PROCEDURE — 1101F PR PT FALLS ASSESS DOC 0-1 FALLS W/OUT INJ PAST YR: ICD-10-PCS | Mod: CPTII,S$GLB,, | Performed by: NURSE PRACTITIONER

## 2023-10-12 PROCEDURE — 69210 EAR CERUMEN REMOVAL: ICD-10-PCS | Mod: S$GLB,,, | Performed by: NURSE PRACTITIONER

## 2023-10-12 PROCEDURE — 92557 COMPREHENSIVE HEARING TEST: CPT | Mod: S$GLB,,,

## 2023-10-12 PROCEDURE — 1160F RVW MEDS BY RX/DR IN RCRD: CPT | Mod: CPTII,S$GLB,, | Performed by: NURSE PRACTITIONER

## 2023-10-12 PROCEDURE — 1101F PT FALLS ASSESS-DOCD LE1/YR: CPT | Mod: CPTII,S$GLB,, | Performed by: NURSE PRACTITIONER

## 2023-10-12 PROCEDURE — 3078F DIAST BP <80 MM HG: CPT | Mod: CPTII,S$GLB,, | Performed by: NURSE PRACTITIONER

## 2023-10-12 PROCEDURE — 99214 PR OFFICE/OUTPT VISIT, EST, LEVL IV, 30-39 MIN: ICD-10-PCS | Mod: 25,S$GLB,, | Performed by: NURSE PRACTITIONER

## 2023-10-12 PROCEDURE — 69210 REMOVE IMPACTED EAR WAX UNI: CPT | Mod: S$GLB,,, | Performed by: NURSE PRACTITIONER

## 2023-10-12 PROCEDURE — 99214 OFFICE O/P EST MOD 30 MIN: CPT | Mod: 25,S$GLB,, | Performed by: NURSE PRACTITIONER

## 2023-10-12 PROCEDURE — 3074F SYST BP LT 130 MM HG: CPT | Mod: CPTII,S$GLB,, | Performed by: NURSE PRACTITIONER

## 2023-10-12 PROCEDURE — 1159F MED LIST DOCD IN RCRD: CPT | Mod: CPTII,S$GLB,, | Performed by: NURSE PRACTITIONER

## 2023-10-12 PROCEDURE — 1160F PR REVIEW ALL MEDS BY PRESCRIBER/CLIN PHARMACIST DOCUMENTED: ICD-10-PCS | Mod: CPTII,S$GLB,, | Performed by: NURSE PRACTITIONER

## 2023-10-12 PROCEDURE — 99999 PR PBB SHADOW E&M-EST. PATIENT-LVL III: CPT | Mod: PBBFAC,,, | Performed by: NURSE PRACTITIONER

## 2023-10-12 PROCEDURE — 92567 TYMPANOMETRY: CPT | Mod: S$GLB,,,

## 2023-10-12 PROCEDURE — 1126F PR PAIN SEVERITY QUANTIFIED, NO PAIN PRESENT: ICD-10-PCS | Mod: CPTII,S$GLB,, | Performed by: NURSE PRACTITIONER

## 2023-10-12 PROCEDURE — 3288F FALL RISK ASSESSMENT DOCD: CPT | Mod: CPTII,S$GLB,, | Performed by: NURSE PRACTITIONER

## 2023-10-12 PROCEDURE — 3078F PR MOST RECENT DIASTOLIC BLOOD PRESSURE < 80 MM HG: ICD-10-PCS | Mod: CPTII,S$GLB,, | Performed by: NURSE PRACTITIONER

## 2023-10-12 PROCEDURE — 3008F PR BODY MASS INDEX (BMI) DOCUMENTED: ICD-10-PCS | Mod: CPTII,S$GLB,, | Performed by: NURSE PRACTITIONER

## 2023-10-12 PROCEDURE — 3288F PR FALLS RISK ASSESSMENT DOCUMENTED: ICD-10-PCS | Mod: CPTII,S$GLB,, | Performed by: NURSE PRACTITIONER

## 2023-10-12 PROCEDURE — 3074F PR MOST RECENT SYSTOLIC BLOOD PRESSURE < 130 MM HG: ICD-10-PCS | Mod: CPTII,S$GLB,, | Performed by: NURSE PRACTITIONER

## 2023-10-12 PROCEDURE — 99999 PR PBB SHADOW E&M-EST. PATIENT-LVL I: CPT | Mod: PBBFAC,,,

## 2023-10-12 NOTE — PROGRESS NOTES
-AF, no abx  -post op leukocytosis   -completed course of unasyn     Patient ID: Adelaida Flores is a 67 y.o. y.o. female    Chief Complaint:   Chief Complaint   Patient presents with    Cerumen Impaction       Patient is self-referred for evaluation of a possible wax impaction in bilateral ears.  she has complaints of hearing loss in the affected ears, but denies pain or drainage.  This has been an issue in the past.  The patient has been using any sort of ear drop to soften the wax.      Review of Systems   Constitutional: Negative for fever, chills, fatigue and unexpected weight change.   HENT: Positive for ear blockage. Negative for hearing loss, nosebleeds, congestion, sore throat, facial swelling, rhinorrhea, sneezing, trouble swallowing, dental problem, voice change, postnasal drip, sinus pressure, tinnitus and ear discharge.    Eyes: Negative for redness, itching and visual disturbance.   Respiratory: Negative for cough, choking and wheezing.    Cardiovascular: Negative for chest pain and palpitations.   Gastrointestinal: Negative for abdominal pain.        No reflux.   Musculoskeletal: Negative for gait problem.   Skin: Negative for rash.   Neurological: Negative for dizziness, light-headedness and headaches.     Past Medical History:   Diagnosis Date    Alcohol dependence in sustained full remission 03/21/2022    Allergy     Anxiety 02/22/2019    Broken hip     Colon polyps     Depression     Hyperlipidemia 02/22/2019    IBS (irritable bowel syndrome)     Osteoporosis     T12 compression fracture s/p corpectomy 07/13/2013    Volvulus     s/p colectomy     Past Surgical History:   Procedure Laterality Date    COLON SURGERY      due to volvulus    COLONOSCOPY N/A 8/28/2017    Procedure: COLONOSCOPY miralax;  Surgeon: Trey Haas Jr., MD;  Location: [facility];  Service: Endoscopy;  Laterality: N/A;    COLONOSCOPY N/A 9/26/2023    Procedure: COLONOSCOPY;  Surgeon: Graeme Rasmussen MD;  Location: [facility];  Service: Endoscopy;  Laterality: N/A;     ESOPHAGOGASTRODUODENOSCOPY N/A 2023    Procedure: EGD (ESOPHAGOGASTRODUODENOSCOPY);  Surgeon: Graeme Rasmussen MD;  Location: [facility];  Service: Endoscopy;  Laterality: N/A;  ref by / [facility]-    EYE SURGERY  2012    Cataract/torc    FRACTURE SURGERY      right broken wrist and had surgery    HERNIA REPAIR  2012    SMALL INTESTINE SURGERY  2011    volvulus x 2    SPINE SURGERY      due to fall while on a boat.    TONSILLECTOMY       Social History     Socioeconomic History    Marital status:    Occupational History    Occupation: Dietary Consultant      Employer: [employer]   Tobacco Use    Smoking status: Former     Current packs/day: 0.00     Average packs/day: 1 pack/day for 10.0 years (10.0 ttl pk-yrs)     Types: Cigarettes     Start date: 1983     Quit date: 1993     Years since quittin.7    Smokeless tobacco: Never    Tobacco comments:     Quit 28 years ago   Substance and Sexual Activity    Alcohol use: No    Drug use: No    Sexual activity: Yes     Partners: Male     Birth control/protection: None   Social History Narrative    Got  to Kristopher Calderon in 2019.     Does some physical activity daily previously. But since getting  less active. Still does weights 3x/week.     Social Determinants of Health     Financial Resource Strain: Medium Risk (2023)    Overall Financial Resource Strain (CARDIA)     Difficulty of Paying Living Expenses: Somewhat hard   Food Insecurity: No Food Insecurity (2023)    Hunger Vital Sign     Worried About Running Out of Food in the Last Year: Never true     Ran Out of Food in the Last Year: Never true   Recent Concern: Food Insecurity - Food Insecurity Present (8/3/2023)    Hunger Vital Sign     Worried About Running Out of Food in the Last Year: Sometimes true     Ran Out of Food in the Last Year: Never true   Transportation Needs: No Transportation Needs (2023)    PRAPARE - Transportation      Lack of Transportation (Medical): No     Lack of Transportation (Non-Medical): No   Physical Activity: Insufficiently Active (8/18/2023)    Exercise Vital Sign     Days of Exercise per Week: 1 day     Minutes of Exercise per Session: 30 min   Stress: Stress Concern Present (8/18/2023)    [employer] - Occupational Stress Questionnaire     Feeling of Stress : Very much   Social Connections: Unknown (8/18/2023)    Social Connection and Isolation Panel [NHANES]     Frequency of Communication with Friends and Family: Once a week     Frequency of Social Gatherings with Friends and Family: Once a week     Active Member of Clubs or Organizations: Yes     Attends Club or Organization Meetings: More than 4 times per year     Marital Status:    Housing Stability: Low Risk  (8/18/2023)    Housing Stability Vital Sign     Unable to Pay for Housing in the Last Year: No     Number of Places Lived in the Last Year: 1     Unstable Housing in the Last Year: No     Family History   Problem Relation Age of Onset    Colon polyps Mother     Cancer Mother         skin    Osteoarthritis Mother     Colon polyps Father     Cancer Father         prostate    Heart disease Father     Osteoarthritis Maternal Aunt     Osteoarthritis Maternal Grandmother     Cancer Sister         skin    Asthma Brother     Diabetes Neg Hx     Cirrhosis Neg Hx        Objective:      Vitals:    10/12/23 1304   BP: 108/69   Pulse: 72       Physical Exam   Constitutional: Well appearing / communicating without difficutly.  NAD.  Eyes: EOM I Bilaterally  Head/Face: Normocephalic. Negative paranasal sinus pressure/tenderness.  Salivary glands WNL.  House Brackmann I Bilaterally.     Right Ear: Auricle normal appearance. External Auditory Canal with cerumen impaction. EAC with no lesions or masses,TM w/o masses/lesions/perforations. TM mobility noted.   Left Ear: Auricle normal appearance. External Auditory Canal with cerumen  impaction. EAC with no lesions or masses,TM w/o masses/lesions/perforations. TM mobility noted.  Nose: No gross nasal septal deviation. Inferior Turbinates 3+ bilaterally. No septal perforation. No masses/lesions. External nasal skin appears normal without masses/lesions.   Oral Cavity: Gingiva/lips within normal limits.  Dentition/gingiva healthy appearing. Mucus membranes moist. Floor of mouth soft, no masses palpated. Oral Tongue mobile. Hard Palate appears normal.    Oropharynx: Base of tongue appears normal. No masses/lesions noted. Tonsillar fossa/pharyngeal wall without lesions. Posterior oropharynx WNL.  Soft palate without masses. Midline uvula.   Neck/Lymphatic: No LAD I-VI bilaterally.  No thyromegaly.  No masses noted on exam.     Mirror laryngoscopy/nasopharyngoscopy: Active gag reflex.  Unable to perform.     Neuro/Psychiatric: AOx3.  Normal mood and affect.   Cardiovascular: Normal carotid pulses bilaterally, no increasing jugular venous distention noted at cervical region bilaterally.    Respiratory: Normal respiratory effort, no stridor, no retractions noted.    Ear Cerumen Removal     Date/Time: 10/12/2023 1:40 PM     Performed by: Nikki Dow NP  Authorized by: Nikki Dow NP    Consent Done?:  Yes (Verbal)  Location details:  Both ears  Procedure type: curette    Procedure type comment:  Suction  Cerumen  Removal Results:  Cerumen completely removed  Patient tolerance:  Patient tolerated the procedure well with no immediate complications    Audiogram interpreted personally by me and discussed in detail with the patient today.   Pure tone testing revealed borderline normal sloping to moderate sensorineural hearing loss in the right ear and borderline normal sloping to severe mixed hearing loss in the left ear. Speech reception thresholds were obtained at 20 dBHL in the right ear and 25 dBHL in the left ear. Speech discrimination scores were 84% in the right ear and 88% in the left ear.  Tympanometry revealed Type Ad tympanograms in both ears (compliance AD 2.05 ml  AS 2.06 ml).        Assessment:         ICD-10-CM ICD-9-CM    1. Bilateral impacted cerumen  H61.23 380.4 Ear Cerumen Removal      2. Mixed conductive and sensorineural hearing loss of left ear with restricted hearing of right ear  H90.A32 389.22            Plan:       1.   Cerumen impaction:  Removed today without difficulty.  I would recommend the use of a wax softening drop, either over the counter Debrox or mineral oil, on a weekly basis.  I also instructed the patient to avoid Qtips.  2. Bilateral EACs and tympanic membranes are within normal limits after cerumen impaction removal.   3. Will recheck her hearing in 2 months. If she is still having mixed hearing loss in the left ear, then would consider ordering CT temporal.       Nikki Dow NP    Answers submitted by the patient for this visit:  Review of Symptoms Questionnaire  (Submitted on 10/11/2023)  None of these: Yes  hearing loss: Yes  sinus pressure : Yes  sore throat: Yes  eye itching: Yes  None of these: Yes  None of these : Yes  Acid Reflux?: Yes  None of these: Yes  Muscle aches / pain?: Yes  back pain: Yes  neck pain: Yes  None of these : Yes  Seasonal Allergies?: Yes  None of these : Yes  None of these: Yes  Bruises or bleeds easily: Yes  decreased concentration: Yes  Feeling depressed?: Yes  nervous/ anxious: Yes  sleep disturbance: Yes

## 2023-10-12 NOTE — PROGRESS NOTES
Adelaida Flores, a 67 y.o. female was seen today in the clinic for an audiologic evaluation after ear cleaning. The patient's primary complaint was decreased hearing perception.  She reports some improvement after ear cleaning. Ms. Flores denies tinnitus, ear  pain, drainage, and dizziness. No history of noise exposure was reported.     Pure tone testing revealed borderline normal sloping to moderate sensorineural hearing loss in the right ear and borderline normal sloping to severe mixed hearing loss in the left ear. Speech reception thresholds were obtained at 20 dBHL in the right ear and 25 dBHL in the left ear. Speech discrimination scores were 84% in the right ear and 88% in the left ear. Tympanometry revealed Type Ad tympanograms in both ears (compliance AD 2.05 ml  AS 2.06 ml).    Recommendations:  Otologic evaluation  Follow up audiologic evaluation at ENT follow up for conductive component  Hearing protection in noise  Hearing aid consultation after medical clearance

## 2023-10-15 ENCOUNTER — PATIENT MESSAGE (OUTPATIENT)
Dept: REHABILITATION | Facility: HOSPITAL | Age: 68
End: 2023-10-15
Payer: MEDICARE

## 2023-10-16 ENCOUNTER — CLINICAL SUPPORT (OUTPATIENT)
Dept: REHABILITATION | Facility: HOSPITAL | Age: 68
End: 2023-10-16
Payer: MEDICARE

## 2023-10-16 DIAGNOSIS — G89.29 CHRONIC RIGHT SHOULDER PAIN: Primary | ICD-10-CM

## 2023-10-16 DIAGNOSIS — M25.611 DECREASED ROM OF RIGHT SHOULDER: ICD-10-CM

## 2023-10-16 DIAGNOSIS — M25.511 CHRONIC RIGHT SHOULDER PAIN: Primary | ICD-10-CM

## 2023-10-16 DIAGNOSIS — R29.898 WEAKNESS OF SHOULDER: ICD-10-CM

## 2023-10-16 PROCEDURE — 97110 THERAPEUTIC EXERCISES: CPT

## 2023-10-16 PROCEDURE — 97112 NEUROMUSCULAR REEDUCATION: CPT

## 2023-10-16 NOTE — PROGRESS NOTES
[facility]   Physical Therapy Treatment Note      Name: Adelaida Flores  Clinic Number: [id number]    Therapy Diagnosis:   Encounter Diagnoses   Name Primary?    Chronic right shoulder pain Yes    Weakness of shoulder     Decreased ROM of right shoulder              Physician: Catie Portillo MD    Visit Date: 10/16/2023    Physician: Catie Portillo MD     Physician Orders: PT Eval and Treat   Medical Diagnosis from Referral:   M25.511,G89.29 (ICD-10-CM) - Chronic right shoulder pain   R26.81 (ICD-10-CM) - Unsteady gait   W19.XXXD (ICD-10-CM) - Fall, subsequent encounter   Evaluation Date: 8/31/2023  Authorization Period Expiration: 10/26/23  Plan of Care Expiration: 11/31/2023  Progress Note Due: 9/31/2023  Visit # / Visits authorized: 1/1, 6/23  FOTO: 7/5     FOTO Initial Evaluation: 53  FOTO 2nd F/U: NA  FOTO Discharge: NA     Precautions: Standard      Time In: 145  Time Out:230   Total Appointment Time (timed & untimed codes): 45 minutes      PTA Visit #: 0/5       Subjective     Patient reports: she is is about 75% better since starting therapy. Her chief complaint is raising her arm to the side and overhead.     She was partially compliant with home exercise program.  Response to previous treatment: no adverse affect  Functional change: progressing    Pain: 4/10  Location: right shoulder      Objective      Assessed on initial evaluation    Shoulder Right Right Right Left Left Left Pain/Dysfunction with Movement     AROM PROM MMT AROM PROM MMT     Flexion 180 180 4/5  180 180 4+/5     Extension 50 50 4+/5 50 50 4+/5     Abduction 180 ! 180 4-/5 ! 180 180 4+/5     Adduction 45 ! 50 ! 4+/5 50 55 4+/5     Internal rotation 50 55 4/5 !! 90 90 4+/5     External Rotation 70 75 4/5 ! 90 90 4+/5        ** Less pain with PROM scaption and abduction on R shoulder      Posture Assessment: fwd shoulder posture     Saul: + on R      Painful-Arc: + on R      Infraspinatus Test: + on R      Drop  "Arm: Negative      Lift Off Belly Test: + on R     Treatment     Adelaida received the treatments listed below:      therapeutic exercises to develop strength, endurance, ROM, flexibility, posture, and core stabilization for 20 minutes including:    UBE 6 min <>  Wand Flexion 4#   - 3x10   Sidelying ER 3#   - 3x10   Standing diagonals YTB 3x10  Standing horizontal abduction YTB 3x10           Serratus punches supine 2#  - 3x10  90* flexion ABC's x 2  Wall slides flex/abd 20x ea.    neuromuscular re-education activities to improve: Balance, Coordination, Kinesthetic, Sense, Proprioception, and Posture for 25 minutes. The following activities were included:    No money YTB 3x10   Rows 13lbs 3x10   Single arm row 7lbs (scap positioning)   - 3x10   ER w/ BTB   - 3x10   IR w/ BTB  - 3x10         Not Today   Pulldowns BTB   - 3x10   Shoulder iso add ball squeeze 10x10"      therapeutic activities to improve functional performance for 0  minutes, including:    MANUAL THERAPY TECHNIQUES including Joint mobilizations and Soft tissue Mobilization were applied to R shoulder for 0 minutes.    Patient Education and Home Exercises       Education provided:   - safety awareness  - ice for pain    Written Home Exercises Provided: Patient instructed to cont prior HEP. Exercises were reviewed and Adelaida was able to demonstrate them prior to the end of the session.  Adelaida demonstrated good  understanding of the education provided. See Electronic Medical Record under Patient Instructions for exercises provided during therapy sessions    Assessment     Patient was reassessed today and showed improvements with shoulder range of motion and strength. Patients pain is also less compared to starting therapy. Patient had no adverse effects from today. Patients chief complaint is painful adduction and abduction.         Adelaida Is progressing well towards her goals.   Patient prognosis is Good.     Patient will continue to benefit from skilled " outpatient physical therapy to address the deficits listed in the problem list box on initial evaluation, provide pt/family education and to maximize pt's level of independence in the home and community environment.     Patient's spiritual, cultural and educational needs considered and pt agreeable to plan of care and goals.     Anticipated barriers to physical therapy: none    Goals:   Short Term Goals (4 Weeks):   1. Pt will be independent with HEP to supplement PT in improving functional use of R UE.  2. Pt will report 2/10 pain at its worst with daily activities   3. Pt will be able to dress without pain  Long Term Goals (8 Weeks):   1. Pt will improve FOTO score to >/=65 to decrease perceived limitation with R shoulder carrying, moving, and handling objects.  2. Pt will increase R shoulder PROM to WNL in all planes to improve functional use of R RUE.  3. Pt will increase R shoulder AROM to WFL in all planes to improve functional use of R RUE.  4. Pt will improve R shoulder MMTs to = L to promote equal use of B UEs in performing functional tasks.  5. Pt will lift 20 lb objects without pain to promote functional QOL.  6. Pt to report pain </= 0/10 with ADLs and IADLs using R UE to improve functional QOL.     Plan     Plan of care Certification: 8/31/2023 to 11/31/2023.     Outpatient Physical Therapy 2 times weekly for 8 weeks to include the following interventions: Manual Therapy, Moist Heat/ Ice, Neuromuscular Re-ed, Patient Education, Self Care, Therapeutic Activities, Therapeutic Exercise, and FDN.     Liu Mcmahon, PT

## 2023-10-19 ENCOUNTER — PATIENT MESSAGE (OUTPATIENT)
Dept: REHABILITATION | Facility: HOSPITAL | Age: 68
End: 2023-10-19
Payer: MEDICARE

## 2023-10-23 ENCOUNTER — CLINICAL SUPPORT (OUTPATIENT)
Dept: REHABILITATION | Facility: HOSPITAL | Age: 68
End: 2023-10-23
Payer: MEDICARE

## 2023-10-23 DIAGNOSIS — M25.511 CHRONIC RIGHT SHOULDER PAIN: Primary | ICD-10-CM

## 2023-10-23 DIAGNOSIS — R29.898 WEAKNESS OF SHOULDER: ICD-10-CM

## 2023-10-23 DIAGNOSIS — M25.611 DECREASED ROM OF RIGHT SHOULDER: ICD-10-CM

## 2023-10-23 DIAGNOSIS — G89.29 CHRONIC RIGHT SHOULDER PAIN: Primary | ICD-10-CM

## 2023-10-23 PROCEDURE — 97112 NEUROMUSCULAR REEDUCATION: CPT | Mod: CQ

## 2023-10-23 PROCEDURE — 97110 THERAPEUTIC EXERCISES: CPT | Mod: CQ

## 2023-10-26 ENCOUNTER — PATIENT MESSAGE (OUTPATIENT)
Dept: PRIMARY CARE CLINIC | Facility: CLINIC | Age: 68
End: 2023-10-26
Payer: MEDICARE

## 2023-10-30 ENCOUNTER — PATIENT MESSAGE (OUTPATIENT)
Dept: REHABILITATION | Facility: HOSPITAL | Age: 68
End: 2023-10-30
Payer: MEDICARE

## 2023-11-03 NOTE — PROGRESS NOTES
[facility]   Physical Therapy Treatment Note      Name: Adelaida Flores  Clinic Number: [id number]    Therapy Diagnosis:   No diagnosis found.            Physician: Catie Portillo MD    Visit Date: 11/6/2023    Physician: Catie Portillo MD     Physician Orders: PT Eval and Treat   Medical Diagnosis from Referral:   M25.511,G89.29 (ICD-10-CM) - Chronic right shoulder pain   R26.81 (ICD-10-CM) - Unsteady gait   W19.XXXD (ICD-10-CM) - Fall, subsequent encounter   Evaluation Date: 8/31/2023  Authorization Period Expiration: 10/26/23  Plan of Care Expiration: 11/31/2023  Progress Note Due: 9/31/2023  Visit # / Visits authorized: 1/1, 8/23  FOTO: 8     FOTO Initial Evaluation: 53  FOTO 2nd F/U: NA  FOTO Discharge: NA     Precautions: Standard      Time In: ***  Time Out: ***  Total Appointment Time (timed & untimed codes): *** minutes      PTA Visit #: 0/5       Subjective     Patient reports: *** she is doing pretty good. She was not as compliant with her HEP the last week.    She was partially compliant with home exercise program.  Response to previous treatment: no adverse affect  Functional change: progressing    Pain: 0/10  Location: right shoulder      Objective      Assessed on initial evaluation    Shoulder Right Right Right Left Left Left Pain/Dysfunction with Movement     AROM PROM MMT AROM PROM MMT     Flexion 180 180 4/5  180 180 4+/5     Extension 50 50 4+/5 50 50 4+/5     Abduction 180 ! 180 4-/5 ! 180 180 4+/5     Adduction 45 ! 50 ! 4+/5 50 55 4+/5     Internal rotation 50 55 4/5 !! 90 90 4+/5     External Rotation 70 75 4/5 ! 90 90 4+/5        ** Less pain with PROM scaption and abduction on R shoulder      Posture Assessment: fwd shoulder posture     Ángel-Alexis: + on R      Painful-Arc: + on R      Infraspinatus Test: + on R      Drop Arm: Negative      Lift Off Belly Test: + on R     Treatment     Adelaida received the treatments listed below:      therapeutic exercises to develop  "strength, endurance, ROM, flexibility, posture, and core stabilization for *** minutes including:    UBE 6 min <>  Wand Flexion 4#   - 3x10   Sidelying ER 3#   - 3x10   Standing diagonals YTB 3x10  Standing horizontal abduction YTB 3x10         Serratus punches supine 2#  - 3x10  90* flexion ABC's x 2  Wall slides flex/abd 20x ea.    neuromuscular re-education activities to improve: Balance, Coordination, Kinesthetic, Sense, Proprioception, and Posture for *** minutes. The following activities were included:    No money YTB 3x10   Rows 13lbs 3x10   Single arm row 7lbs (scap positioning)   - 3x10   ER w/ BTB   - 3x10   IR w/ BTB  - 3x10         Not Today   Pulldowns BTB   - 3x10   Shoulder iso add ball squeeze 10x10"      therapeutic activities to improve functional performance for 0  minutes, including:    MANUAL THERAPY TECHNIQUES including Joint mobilizations and Soft tissue Mobilization were applied to R shoulder for 0 minutes.    Patient Education and Home Exercises       Education provided:   - safety awareness  - ice for pain    Written Home Exercises Provided: Patient instructed to cont prior HEP. Exercises were reviewed and Adelaida was able to demonstrate them prior to the end of the session.  Adelaida demonstrated good  understanding of the education provided. See Electronic Medical Record under Patient Instructions for exercises provided during therapy sessions    Assessment     *** Patient continues to make progress as evidenced by subjective reports. Continued with established program with emphasis on shoulder and periscapular strength. Patient with mild pain performing SL ER. Updated HEP issued.        Adelaida Is progressing well towards her goals.   Patient prognosis is Good.     Patient will continue to benefit from skilled outpatient physical therapy to address the deficits listed in the problem list box on initial evaluation, provide pt/family education and to maximize pt's level of independence in the " home and community environment.     Patient's spiritual, cultural and educational needs considered and pt agreeable to plan of care and goals.     Anticipated barriers to physical therapy: none    Goals:   Short Term Goals (4 Weeks):   1. Pt will be independent with HEP to supplement PT in improving functional use of R UE.  2. Pt will report 2/10 pain at its worst with daily activities   3. Pt will be able to dress without pain  Long Term Goals (8 Weeks):   1. Pt will improve FOTO score to >/=65 to decrease perceived limitation with R shoulder carrying, moving, and handling objects.  2. Pt will increase R shoulder PROM to WNL in all planes to improve functional use of R RUE.  3. Pt will increase R shoulder AROM to WFL in all planes to improve functional use of R RUE.  4. Pt will improve R shoulder MMTs to = L to promote equal use of B UEs in performing functional tasks.  5. Pt will lift 20 lb objects without pain to promote functional QOL.  6. Pt to report pain </= 0/10 with ADLs and IADLs using R UE to improve functional QOL.     Plan     Plan of care Certification: 8/31/2023 to 11/31/2023.     Outpatient Physical Therapy 2 times weekly for 8 weeks to include the following interventions: Manual Therapy, Moist Heat/ Ice, Neuromuscular Re-ed, Patient Education, Self Care, Therapeutic Activities, Therapeutic Exercise, and FDN.     Liu Mcmahon, PT

## 2023-11-06 ENCOUNTER — CLINICAL SUPPORT (OUTPATIENT)
Dept: REHABILITATION | Facility: HOSPITAL | Age: 68
End: 2023-11-06
Payer: MEDICARE

## 2023-11-06 ENCOUNTER — PATIENT MESSAGE (OUTPATIENT)
Dept: PRIMARY CARE CLINIC | Facility: CLINIC | Age: 68
End: 2023-11-06
Payer: MEDICARE

## 2023-11-06 DIAGNOSIS — M25.511 CHRONIC RIGHT SHOULDER PAIN: Primary | ICD-10-CM

## 2023-11-06 DIAGNOSIS — G89.29 CHRONIC RIGHT SHOULDER PAIN: Primary | ICD-10-CM

## 2023-11-06 DIAGNOSIS — R29.898 WEAKNESS OF SHOULDER: ICD-10-CM

## 2023-11-06 DIAGNOSIS — M25.611 DECREASED ROM OF RIGHT SHOULDER: ICD-10-CM

## 2023-11-06 PROCEDURE — 97112 NEUROMUSCULAR REEDUCATION: CPT

## 2023-11-06 PROCEDURE — 97110 THERAPEUTIC EXERCISES: CPT

## 2023-11-06 NOTE — PROGRESS NOTES
[facility]   Physical Therapy Treatment Note      Name: Adelaida Flores  Clinic Number: [id number]    Therapy Diagnosis:   Encounter Diagnoses   Name Primary?    Chronic right shoulder pain Yes    Weakness of shoulder     Decreased ROM of right shoulder          Physician: Catie Portillo MD    Visit Date: 11/6/2023    Physician: Catie Portlilo MD     Physician Orders: PT Eval and Treat   Medical Diagnosis from Referral:   M25.511,G89.29 (ICD-10-CM) - Chronic right shoulder pain   R26.81 (ICD-10-CM) - Unsteady gait   W19.XXXD (ICD-10-CM) - Fall, subsequent encounter   Evaluation Date: 8/31/2023  Authorization Period Expiration: 10/26/23  Plan of Care Expiration: 11/31/2023  Progress Note Due: 9/31/2023  Visit # / Visits authorized: 1/1, 8/23  FOTO: 2/3     FOTO Initial Evaluation: 53  FOTO 2nd F/U: 63  FOTO Discharge: NA     Precautions: Standard      Time In: 12:15 pm  Time Out: 1 pm   Total Appointment Time (timed & untimed codes): 45  minutes      PTA Visit #: 1/5       Subjective     Patient reports: she still gets some twinges in her shoulder, but overall doing better. She still has trouble with reaching forward and opening a jar, but thinks some of this might be due to a wrist fracture years ago. [place], something she wouldn't have been able to do before PT.     She was partially compliant with home exercise program.  Response to previous treatment: no adverse affect  Functional change: progressing    Pain: 0/10  Location: right shoulder      Objective      Assessed on initial evaluation    Shoulder Right Right Right Left Left Left Pain/Dysfunction with Movement     AROM PROM MMT AROM PROM MMT     Flexion 180 180 4/5  180 180 4+/5     Extension 50 50 4+/5 50 50 4+/5     Abduction 180 !(Minor)  180 4-/5 ! 180 180 4+/5     Adduction 50 ! 50 ! 4+/5 50 55 4+/5     Internal rotation 50  55 4/5  90 90 4+/5     External Rotation 70 75 4/5  90 90 4+/5        **  "Less pain with PROM scaption and abduction on R shoulder      Posture Assessment: fwd shoulder posture     Saul: - on R     Painful-Arc: - on R      Infraspinatus Test: + on R      Drop Arm: Negative      Lift Off Belly Test: + on R     Treatment     Adelaida received the treatments listed below:      therapeutic exercises to develop strength, endurance, ROM, flexibility, posture, and core stabilization for 15 minutes including:    Re-assess completed   UBE 6 min <>  Wand Flexion 4#   - 3x10   Sidelying ER 3#   - 3x10     Not performed:  Serratus punches supine 2#  - 3x10  90* flexion ABC's x 2  Wall slides flex/abd 20x ea.    neuromuscular re-education activities to improve: Balance, Coordination, Kinesthetic, Sense, Proprioception, and Posture for 30 minutes. The following activities were included:    No money RedTB 2x10   Single arm row 7lbs (scap positioning)   - 3x10   ER w/ BTB   - 3x10   IR w/ BTB  - 3x10   Standing diagonals YTB 3x10  Standing horizontal abduction YTB 3x10   Dynamic hugs green TB 2x10     Not Today   Rows 13lbs 3x10   Pulldowns BTB   - 3x10   Shoulder iso add ball squeeze 10x10"      therapeutic activities to improve functional performance for 0  minutes, including:    MANUAL THERAPY TECHNIQUES including Joint mobilizations and Soft tissue Mobilization were applied to R shoulder for 0 minutes.    Patient Education and Home Exercises       Education provided:   - safety awareness  - ice for pain    Written Home Exercises Provided: Patient instructed to cont prior HEP. Exercises were reviewed and Adelaida was able to demonstrate them prior to the end of the session.  Adelaida demonstrated good  understanding of the education provided. See Electronic Medical Record under Patient Instructions for exercises provided during therapy sessions    Assessment     Pt presents with reducing pain with activity and tolerated progression of donnie-scap strength training with no c/o pain today. Patient " would like to continue 2 more visits for shoulder and then re-assess for her balance.         Adelaida Is progressing well towards her goals.   Patient prognosis is Good.     Patient will continue to benefit from skilled outpatient physical therapy to address the deficits listed in the problem list box on initial evaluation, provide pt/family education and to maximize pt's level of independence in the home and community environment.     Patient's spiritual, cultural and educational needs considered and pt agreeable to plan of care and goals.     Anticipated barriers to physical therapy: none    Goals:   Short Term Goals (4 Weeks):   1. Pt will be independent with HEP to supplement PT in improving functional use of R UE.  2. Pt will report 2/10 pain at its worst with daily activities   3. Pt will be able to dress without pain  Long Term Goals (8 Weeks):   1. Pt will improve FOTO score to >/=65 to decrease perceived limitation with R shoulder carrying, moving, and handling objects.  2. Pt will increase R shoulder PROM to WNL in all planes to improve functional use of R RUE.  3. Pt will increase R shoulder AROM to WFL in all planes to improve functional use of R RUE.  4. Pt will improve R shoulder MMTs to = L to promote equal use of B UEs in performing functional tasks.  5. Pt will lift 20 lb objects without pain to promote functional QOL.  6. Pt to report pain </= 0/10 with ADLs and IADLs using R UE to improve functional QOL.     Plan     Plan of care Certification: 8/31/2023 to 11/31/2023.     Outpatient Physical Therapy 2 times weekly for 8 weeks to include the following interventions: Manual Therapy, Moist Heat/ Ice, Neuromuscular Re-ed, Patient Education, Self Care, Therapeutic Activities, Therapeutic Exercise, and FDN.     Catie Chan, PT

## 2023-11-13 ENCOUNTER — CLINICAL SUPPORT (OUTPATIENT)
Dept: REHABILITATION | Facility: HOSPITAL | Age: 68
End: 2023-11-13
Payer: MEDICARE

## 2023-11-13 DIAGNOSIS — R29.898 WEAKNESS OF SHOULDER: ICD-10-CM

## 2023-11-13 DIAGNOSIS — G89.29 CHRONIC RIGHT SHOULDER PAIN: Primary | ICD-10-CM

## 2023-11-13 DIAGNOSIS — M25.511 CHRONIC RIGHT SHOULDER PAIN: Primary | ICD-10-CM

## 2023-11-13 DIAGNOSIS — M25.611 DECREASED ROM OF RIGHT SHOULDER: ICD-10-CM

## 2023-11-13 PROCEDURE — 97110 THERAPEUTIC EXERCISES: CPT | Mod: CQ

## 2023-11-13 PROCEDURE — 97112 NEUROMUSCULAR REEDUCATION: CPT | Mod: CQ

## 2023-11-13 NOTE — PROGRESS NOTES
[facility]   Physical Therapy Treatment Note      Name: Adelaida Flores  Clinic Number: [id number]    Therapy Diagnosis:   Encounter Diagnoses   Name Primary?    Chronic right shoulder pain Yes    Weakness of shoulder     Decreased ROM of right shoulder            Physician: Catie Portillo MD    Visit Date: 11/13/2023    Physician: Catie Portillo MD     Physician Orders: PT Eval and Treat   Medical Diagnosis from Referral:   M25.511,G89.29 (ICD-10-CM) - Chronic right shoulder pain   R26.81 (ICD-10-CM) - Unsteady gait   W19.XXXD (ICD-10-CM) - Fall, subsequent encounter   Evaluation Date: 8/31/2023  Authorization Period Expiration: 10/26/23  Plan of Care Expiration: 11/31/2023  Progress Note Due: 9/31/2023  Visit # / Visits authorized: 1/1, 9/23  FOTO: 2/3     FOTO Initial Evaluation: 53  FOTO 2nd F/U: 63  FOTO Discharge: NA     Precautions: Standard      Time In: 11:31 am  Time Out: 12:12 pm   Total Appointment Time (timed & untimed codes): 41  minutes      PTA Visit #: 1/5       Subjective     Patient reports: that she is doing well, but still gets some pain in her posterior shoulder with end range external rotation.     She was partially compliant with home exercise program.  Response to previous treatment: no adverse affect  Functional change: progressing    Pain: 0/10  Location: right shoulder      Objective      Assessed on initial evaluation    Shoulder Right Right Right Left Left Left Pain/Dysfunction with Movement     AROM PROM MMT AROM PROM MMT     Flexion 180 180 4/5  180 180 4+/5     Extension 50 50 4+/5 50 50 4+/5     Abduction 180 !(Minor)  180 4-/5 ! 180 180 4+/5     Adduction 50 ! 50 ! 4+/5 50 55 4+/5     Internal rotation 50  55 4/5  90 90 4+/5     External Rotation 70 75 4/5  90 90 4+/5        ** Less pain with PROM scaption and abduction on R shoulder      Posture Assessment: fwd shoulder posture     Neal-Alexis: - on R     Painful-Arc: - on R      Infraspinatus Test: + on R     "  Drop Arm: Negative      Lift Off Belly Test: + on R     Treatment     Adelaida received the treatments listed below:      therapeutic exercises to develop strength, endurance, ROM, flexibility, posture, and core stabilization for 11 minutes including:    UBE 6 min <>  Wand Flexion 4#   - 3x10   Sidelying ER 3#   - 3x10   Serratus punches supine c/ wand  4#  - 3x10      Not performed:  90* flexion ABC's x 2  Wall slides flex/abd 20x ea.    neuromuscular re-education activities to improve: Balance, Coordination, Kinesthetic, Sense, Proprioception, and Posture for 30 minutes. The following activities were included:    No money RedTB 2x10   Single arm row 7lbs (scap positioning)   - 3x10   ER w/ BTB   - 3x10   IR w/ BTB  - 3x10   Standing diagonals YTB 3x10  Standing horizontal abduction YTB 3x10   Shld stars YTB 10x   Rows 13lbs 3x10   Dynamic hugs green TB 3x10       Not Today   Pulldowns BTB   - 3x10   Shoulder iso add ball squeeze 10x10"      therapeutic activities to improve functional performance for 0  minutes, including:    MANUAL THERAPY TECHNIQUES including Joint mobilizations and Soft tissue Mobilization were applied to R shoulder for 0 minutes.    Patient Education and Home Exercises       Education provided:   - safety awareness  - ice for pain    Written Home Exercises Provided: Patient instructed to cont prior HEP. Exercises were reviewed and Adelaida was able to demonstrate them prior to the end of the session.  Adelaida demonstrated good  understanding of the education provided. See Electronic Medical Record under Patient Instructions for exercises provided during therapy sessions    Assessment     Pt exhibited tolerance to all therex performed this session. Occasional verbal and tactile cues provided for proper form throughout session. Will progress pt per her tolerance when she returns from [place].         Adelaida Is progressing well towards her goals.   Patient prognosis is Good.     Patient will continue to " benefit from skilled outpatient physical therapy to address the deficits listed in the problem list box on initial evaluation, provide pt/family education and to maximize pt's level of independence in the home and community environment.     Patient's spiritual, cultural and educational needs considered and pt agreeable to plan of care and goals.     Anticipated barriers to physical therapy: none    Goals:   Short Term Goals (4 Weeks):   1. Pt will be independent with HEP to supplement PT in improving functional use of R UE.  2. Pt will report 2/10 pain at its worst with daily activities   3. Pt will be able to dress without pain  Long Term Goals (8 Weeks):   1. Pt will improve FOTO score to >/=65 to decrease perceived limitation with R shoulder carrying, moving, and handling objects.  2. Pt will increase R shoulder PROM to WNL in all planes to improve functional use of R RUE.  3. Pt will increase R shoulder AROM to WFL in all planes to improve functional use of R RUE.  4. Pt will improve R shoulder MMTs to = L to promote equal use of B UEs in performing functional tasks.  5. Pt will lift 20 lb objects without pain to promote functional QOL.  6. Pt to report pain </= 0/10 with ADLs and IADLs using R UE to improve functional QOL.     Plan     Plan of care Certification: 8/31/2023 to 11/31/2023.     Outpatient Physical Therapy 2 times weekly for 8 weeks to include the following interventions: Manual Therapy, Moist Heat/ Ice, Neuromuscular Re-ed, Patient Education, Self Care, Therapeutic Activities, Therapeutic Exercise, and FDN.     Bree Kearney, PTA

## 2023-11-15 RX ORDER — LAMOTRIGINE 100 MG/1
100 TABLET ORAL DAILY
Qty: 90 TABLET | Refills: 3 | Status: SHIPPED | OUTPATIENT
Start: 2023-11-15 | End: 2023-12-20

## 2023-11-15 RX ORDER — FLUOXETINE HYDROCHLORIDE 40 MG/1
80 CAPSULE ORAL DAILY
Qty: 180 CAPSULE | Refills: 2 | Status: SHIPPED | OUTPATIENT
Start: 2023-11-15 | End: 2024-02-20 | Stop reason: SDUPTHER

## 2023-11-27 ENCOUNTER — CLINICAL SUPPORT (OUTPATIENT)
Dept: REHABILITATION | Facility: HOSPITAL | Age: 68
End: 2023-11-27
Payer: MEDICARE

## 2023-11-27 DIAGNOSIS — G89.29 CHRONIC RIGHT SHOULDER PAIN: Primary | ICD-10-CM

## 2023-11-27 DIAGNOSIS — M25.611 DECREASED ROM OF RIGHT SHOULDER: ICD-10-CM
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
## 2023-11-27 DIAGNOSIS — M25.511 CHRONIC RIGHT SHOULDER PAIN: Primary | ICD-10-CM

## 2023-11-27 DIAGNOSIS — R26.81 UNSTEADY GAIT: ICD-10-CM

## 2023-11-27 DIAGNOSIS — R26.89 BALANCE PROBLEM: ICD-10-CM

## 2023-11-27 DIAGNOSIS — R29.898 WEAKNESS OF SHOULDER: ICD-10-CM

## 2023-11-27 PROCEDURE — 97112 NEUROMUSCULAR REEDUCATION: CPT

## 2023-11-27 PROCEDURE — 97164 PT RE-EVAL EST PLAN CARE: CPT

## 2023-11-27 NOTE — PLAN OF CARE
[facility]  Physical Therapy Plan of Care Note     Name: Adelaida Flores  [facility] Number: [id number]    Therapy Diagnosis:   Encounter Diagnoses   Name Primary?    Chronic right shoulder pain Yes    Weakness of shoulder     Decreased ROM of right shoulder      Physician: Catie Portillo MD    Visit Date: 11/27/2023    Physician Orders: Eval and Treat   Medical Diagnosis from Referral:   M25.511,G89.29 (ICD-10-CM) - Chronic right shoulder pain   R26.81 (ICD-10-CM) - Unsteady gait   W19.XXXD (ICD-10-CM) - Fall, subsequent encounter     Evaluation Date: 8/31/23  Authorization Period Expiration: 12/31/23   Plan of Care Expiration: 11/31/23, 2/27/24  Progress Note Due: 12/27/23   Visit # / Visits authorized: 10/23  FOTO: 1/3    FOTO Shoulder  Initial Evaluation: 53  FOTO 2nd F/U: 63  FOTO Discharge: NA    FOTO Balance  Initial Evaluation: 56  FOTO 2nd F/U: NA  FOTO Discharge: NA    Precautions: Standard, Fall, and osteoporosis  Functional Level Prior to Evaluation:  (I)    SUBJECTIVE     Update: she feels like she has difficulty catching herself when she slips, resulting in falling. he had a fall last week at the dog park when she slipped in some mud, without injury,     OBJECTIVE     Update:   Balance Assessment:      Dynamic Gait Index  1. Gait on level surface: 3. Normal: Walks 20', No Assistive device, no evidence for imbalance. Normal gait pattern.  2. Change in Gait speed: 2. Mild impairment: Is able to change speed, but demonstrates mild gait deviations, or no gait deviations but unable to achieve a signifcant change in velocity, or uses an assistive device.  3. Gait with Horizontal Head Turns: 1. Moderate impairment: performs head turns with moderate changes in gait velocity, slows down, staggers but recovers, cna continue to walk.  4. Gait with Vertical Head Turns: 1. Moderate impairment: performs task with moderate change in gait velocity, slows down, staggers but recovers, can continue to  walk.  5. Gait and Pivot turn: 1. Moderate impairment: Turns slowly, requires verbal cuing, requries several small steps to catch balance following turn and stop.  6. Step Over Obstacle: 2. Mild impairment: Is able to step over box, but must slow down and adjust steps to clear box safely.  7. Step around obstacles: 2. Mild impairment: Is able to step around both cones, but must slow down and adjust steps to clear cones.  8. Steps: 2. Mild impairment: Alternating feet, must use rail.     Score: 14/24     Interpretation: < 19/24 = predictive of falls in the elderly  > 22/24 = safe ambulators           TU sec w/o AD      30 sec sit to stand: 10 sec      SLS: R 3 sec  L 6 sec         Hip Right MMT Left MMT Pain/Dysfunction with Movement (pain=!)   AROM             flexion  WFL 4-/5  WFL 4-/5                   abduction  WFL 4-/5  WFL 4-/5                                    Knee Right MMT Left MMT Pain/Dysfunction with Movement (pain=!)   AROM             flexion  WFL 4/5  WFL 4/5     extension  WFL 4/5  WFL 4/5          ASSESSMENT     Update: Pt demonstrates improvement in functional use of R shoulder, although she still has some pain with activities.     Previous Short Term Goals Status:   Short Term Goals (4 Weeks):   1. Pt will be independent with HEP to supplement PT in improving functional use of R UE. MET  2. Pt will report 2/10 pain at its worst with daily activities  MET  3. Pt will be able to dress without pain MET  Long Term Goals (8 Weeks):   1. Pt will improve FOTO score to >/=65 to decrease perceived limitation with R shoulder carrying, moving, and handling objects. NOT MET  2. Pt will increase R shoulder PROM to WNL in all planes to improve functional use of R RUE. MET   3. Pt will increase R shoulder AROM to WFL in all planes to improve functional use of R RUE. MET  4. Pt will improve R shoulder MMTs to = L to promote equal use of B UEs in performing functional tasks. (Progressing, not met)   5. Pt  will lift 20 lb objects without pain to promote functional QOL. (Progressing, not met)   6. Pt to report pain </= 0/10 with ADLs and IADLs using R UE to improve functional QOL. Discharge goal   New Short Term Goals Status:   1. Pt will be (I) with BLE HEP for improved functional strength and balance  2. Pt will demonstrate single leg stance >/=10 sec B for improved balance with functional tasks   Long Term Goal Status: modified:  1. Pt will score 20/24 on DGI to reduce risk for falls in the community  2. Pt will score 61% on FOTO, indicating improved balance with functional activities   Reasons for Recertification of Therapy:   extension of plan of care, focus on balance diagnosis       PLAN     Updated Certification Period: 11/27/23 to 2/27/24   Recommended Treatment Plan: 1-2 times per week for 12 visits:  Gait Training, Manual Therapy, Moist Heat/ Ice, Neuromuscular Re-ed, Patient Education, Therapeutic Activities, and Therapeutic Exercise      Catie Chan, PT

## 2023-11-27 NOTE — PROGRESS NOTES
[facility]   Physical Therapy Treatment Note      Name: Adelaida Flores  Clinic Number: [id number]    Therapy Diagnosis:   Encounter Diagnoses   Name Primary?    Chronic right shoulder pain Yes    Weakness of shoulder     Decreased ROM of right shoulder     Fall, subsequent encounter     Unsteady gait     Balance problem          Physician: Catie Portillo MD    Visit Date: 11/27/2023    Physician: Catie Portillo MD     Physician Orders: PT Eval and Treat   Medical Diagnosis from Referral:   M25.511,G89.29 (ICD-10-CM) - Chronic right shoulder pain   R26.81 (ICD-10-CM) - Unsteady gait   W19.XXXD (ICD-10-CM) - Fall, subsequent encounter   Evaluation Date: 8/31/2023  Authorization Period Expiration: 10/26/23  Plan of Care Expiration: 11/31/2023  Progress Note Due: 9/31/2023  Visit # / Visits authorized: 1/1, 10/23  FOTO: 2/3     FOTO Initial Evaluation: 53  FOTO 2nd F/U: 63  FOTO Discharge: NA     Precautions: Standard      Time In: 10:45 am  Time Out: 11:30am   Total Appointment Time (timed & untimed codes): 45  minutes      PTA Visit #: 1/5       Subjective     Patient reports: she had a fall last week at the dog park when she slipped in some mud. She would like to change her plan of care to work on her balance at this point.     She was partially compliant with home exercise program.  Response to previous treatment: no adverse affect  Functional change: progressing    Pain: 0/10  Location: right shoulder      Objective      Assessed on initial evaluation    Shoulder Right Right Right Left Left Left Pain/Dysfunction with Movement     AROM PROM MMT AROM PROM MMT     Flexion 180 180 4/5  180 180 4+/5     Extension 50 50 4+/5 50 50 4+/5     Abduction 180 !(Minor)  180 4-/5 ! 180 180 4+/5     Adduction 50 ! 50 ! 4+/5 50 55 4+/5     Internal rotation 50  55 4/5  90 90 4+/5     External Rotation 70 75 4/5  90 90 4+/5        ** Less pain with PROM scaption and abduction on R shoulder      Posture Assessment:  fwd shoulder posture     Saul: - on R     Painful-Arc: - on R      Infraspinatus Test: + on R      Drop Arm: Negative      Lift Off Belly Test: + on R       Balance Assessment:     Dynamic Gait Index  1. Gait on level surface: 3. Normal: Walks 20', No Assistive device, no evidence for imbalance. Normal gait pattern.  2. Change in Gait speed: 2. Mild impairment: Is able to change speed, but demonstrates mild gait deviations, or no gait deviations but unable to achieve a signifcant change in velocity, or uses an assistive device.  3. Gait with Horizontal Head Turns: 1. Moderate impairment: performs head turns with moderate changes in gait velocity, slows down, staggers but recovers, cna continue to walk.  4. Gait with Vertical Head Turns: 1. Moderate impairment: performs task with moderate change in gait velocity, slows down, staggers but recovers, can continue to walk.  5. Gait and Pivot turn: 1. Moderate impairment: Turns slowly, requires verbal cuing, requries several small steps to catch balance following turn and stop.  6. Step Over Obstacle: 2. Mild impairment: Is able to step over box, but must slow down and adjust steps to clear box safely.  7. Step around obstacles: 2. Mild impairment: Is able to step around both cones, but must slow down and adjust steps to clear cones.  8. Steps: 2. Mild impairment: Alternating feet, must use rail.    Score: 14/24    Interpretation: < 19/24 = predictive of falls in the elderly  > 22/24 = safe ambulators     Gait: no AD, unable to maintain straight path     TU sec w/o AD     30 sec sit to stand: 10 reps    SLS: R 3 sec L 6 sec        Hip Right MMT Left MMT Pain/Dysfunction with Movement (pain=!)   AROM        flexion  WFL 4-/5  WFL 4-/5            abduction  WFL 4-/5  WFL 4-/5                      Knee Right MMT Left MMT Pain/Dysfunction with Movement (pain=!)   AROM        flexion  WFL 4/5  WFL 4/5    extension  WFL 4/5  WFL 4/5        Treatment     Adelaida  "received the treatments listed below:      therapeutic exercises to develop strength, endurance, ROM, flexibility, posture, and core stabilization for 10  minutes including:    Shoulder re-assessment completed           Not performed:  UBE 6 min <>  Wand Flexion 4#   - 3x10   Sidelying ER 3#   - 3x10   Serratus punches supine c/ wand  4#  - 3x10  90* flexion ABC's x 2  Wall slides flex/abd 20x ea.    neuromuscular re-education activities to improve: Balance, Coordination, Kinesthetic, Sense, Proprioception, and Posture for 25 minutes. The following activities were included:    Balance assessment completed       Not performed :  No money RedTB 2x10   Single arm row 7lbs (scap positioning)   - 3x10   ER w/ BTB   - 3x10   IR w/ BTB  - 3x10   Standing diagonals YTB 3x10  Standing horizontal abduction YTB 3x10   Shld stars YTB 10x   Rows 13lbs 3x10   Dynamic hugs green TB 3x10       Not Today   Pulldowns BTB   - 3x10   Shoulder iso add ball squeeze 10x10"      therapeutic activities to improve functional performance for 10  minutes, including:  []bridge 2x10   []sidelying hip abd  []  []  []  []      MANUAL THERAPY TECHNIQUES including Joint mobilizations and Soft tissue Mobilization were applied to R shoulder for 0 minutes.    Patient Education and Home Exercises       Education provided:   - safety awareness  - ice for pain    Written Home Exercises Provided: Patient instructed to cont prior HEP. Exercises were reviewed and Adelaida was able to demonstrate them prior to the end of the session.  Adelaida demonstrated good  understanding of the education provided. See Electronic Medical Record under Patient Instructions for exercises provided during therapy sessions    Assessment     See updated plan of care         Adelaida Is progressing well towards her goals.   Patient prognosis is Good.     Patient will continue to benefit from skilled outpatient physical therapy to address the deficits listed in the problem list box on " initial evaluation, provide pt/family education and to maximize pt's level of independence in the home and community environment.     Patient's spiritual, cultural and educational needs considered and pt agreeable to plan of care and goals.     Anticipated barriers to physical therapy: none    Goals:   See updated plan of care      Plan     See updated plan of care     Catie Chan, PT

## 2023-12-04 ENCOUNTER — PATIENT MESSAGE (OUTPATIENT)
Dept: OTOLARYNGOLOGY | Facility: CLINIC | Age: 68
End: 2023-12-04
Payer: MEDICARE

## 2023-12-04 ENCOUNTER — PATIENT MESSAGE (OUTPATIENT)
Dept: GASTROENTEROLOGY | Facility: CLINIC | Age: 68
End: 2023-12-04
Payer: MEDICARE

## 2023-12-04 ENCOUNTER — CLINICAL SUPPORT (OUTPATIENT)
Dept: REHABILITATION | Facility: HOSPITAL | Age: 68
End: 2023-12-04
Payer: MEDICARE

## 2023-12-04 DIAGNOSIS — M25.611 DECREASED ROM OF RIGHT SHOULDER: ICD-10-CM

## 2023-12-04 DIAGNOSIS — M25.511 CHRONIC RIGHT SHOULDER PAIN: Primary | ICD-10-CM

## 2023-12-04 DIAGNOSIS — R29.898 WEAKNESS OF SHOULDER: ICD-10-CM

## 2023-12-04 DIAGNOSIS — R26.89 BALANCE PROBLEM: ICD-10-CM

## 2023-12-04 DIAGNOSIS — G89.29 CHRONIC RIGHT SHOULDER PAIN: Primary | ICD-10-CM

## 2023-12-04 PROCEDURE — 97112 NEUROMUSCULAR REEDUCATION: CPT | Mod: CQ

## 2023-12-04 PROCEDURE — 97530 THERAPEUTIC ACTIVITIES: CPT | Mod: CQ

## 2023-12-04 NOTE — PROGRESS NOTES
"[facility] OUTPATIENT THERAPY AND WELLNESS   Physical Therapy Treatment Note      Name: Adelaida Flores  Clinic Number: [id number]    Therapy Diagnosis:   Encounter Diagnoses   Name Primary?    Chronic right shoulder pain Yes    Weakness of shoulder     Decreased ROM of right shoulder     Balance problem            Physician: Catie Portillo MD    Visit Date: 12/4/2023    Physician: Catie Portillo MD     Physician Orders: PT Eval and Treat   Medical Diagnosis from Referral:   M25.511,G89.29 (ICD-10-CM) - Chronic right shoulder pain   R26.81 (ICD-10-CM) - Unsteady gait   W19.XXXD (ICD-10-CM) - Fall, subsequent encounter   Evaluation Date: 8/31/2023  Authorization Period Expiration: 10/26/23  Plan of Care Expiration: 11/31/2023  Progress Note Due: 9/31/2023  Visit # / Visits authorized: 1/1, 11/23  FOTO: 2/3     FOTO Initial Evaluation: 53  FOTO 2nd F/U: 63  FOTO Discharge: NA     Precautions: Standard      Time In: 830 am  Time Out: 913 am   Total Appointment Time (timed & untimed codes): 43  minutes      PTA Visit #: 1/5       Subjective     Patient reports: she continues to have decreased strength t/o the L LE especially when negotiating stairs.    She was partially compliant with home exercise program.  Response to previous treatment: no adverse affect  Functional change: progressing    Pain: 0/10  Location: right shoulder      Objective          Treatment     Adelaida received the treatments listed below:      therapeutic exercises to develop strength, endurance, ROM, flexibility, posture, and core stabilization for 10 minutes including:  [x]shuttle dl 3 c  [x]shuttle sl 1 c  [x]LAQ 2# B 2x10  [x]B SLR 2#  []  []  []  []    neuromuscular re-education activities to improve: Balance, Coordination, Kinesthetic, Sense, Proprioception, and Posture for 23 minutes. The following activities were included:  [x]sls B 3x30"  [x]cone taps 20x B  [x]step ups lvl 3 fwd/lateral x 20 B  [x]marching on foam 30s x 2  [x]GTB SLS /c march 20x " B  []  []  []  []      therapeutic activities to improve functional performance for 10  minutes, including:  [x]bridge 3x10   [x]sidelying hip abd  [x]touch and go squats on 24' plyobox  []  []  []  []    MANUAL THERAPY TECHNIQUES including Joint mobilizations and Soft tissue Mobilization were applied to R shoulder for 0 minutes.    Patient Education and Home Exercises       Education provided:   - safety awareness      Written Home Exercises Provided: Patient instructed to cont prior HEP. Exercises were reviewed and Adelaida was able to demonstrate them prior to the end of the session.  Adelaida demonstrated good  understanding of the education provided. See Electronic Medical Record under Patient Instructions for exercises provided during therapy sessions    Assessment   Pt presents to clinic this date in good spirits as she participated in gym program prior to therapy. Addressed balance deficits today requiring SBA d/t intermittent LOB. Pt encouraged to challenge her self with regards to UE support in clinic, and instructed patient not to practice balance drills outside of clinic for safety concerns. Also incorporated LE and glute TE in order to improve stability with ADL's and ambulation in community setting.        Adelaida Is progressing well towards her goals.   Patient prognosis is Good.     Patient will continue to benefit from skilled outpatient physical therapy to address the deficits listed in the problem list box on initial evaluation, provide pt/family education and to maximize pt's level of independence in the home and community environment.     Patient's spiritual, cultural and educational needs considered and pt agreeable to plan of care and goals.     Anticipated barriers to physical therapy: none    Goals:      New Short Term Goals Status:   1. Pt will be (I) with BLE HEP for improved functional strength and balance  2. Pt will demonstrate single leg stance >/=10 sec B for improved balance with  functional tasks   Long Term Goal Status: modified:  1. Pt will score 20/24 on DGI to reduce risk for falls in the community  2. Pt will score 61% on FOTO, indicating improved balance with functional activities   Reasons for Recertification of Therapy:   extension of plan of care, focus on balance diagnosis  Plan     Updated Certification Period: 11/27/23 to 2/27/24   Recommended Treatment Plan: 1-2 times per week for 12 visits:  Gait Training, Manual Therapy, Moist Heat/ Ice, Neuromuscular Re-ed, Patient Education, Therapeutic Activities, and Therapeutic Exercise       Preston Bustillos, PTA

## 2023-12-06 ENCOUNTER — PATIENT MESSAGE (OUTPATIENT)
Dept: ENDOSCOPY | Facility: HOSPITAL | Age: 68
End: 2023-12-06
Payer: MEDICARE

## 2023-12-06 ENCOUNTER — PATIENT MESSAGE (OUTPATIENT)
Dept: PRIMARY CARE CLINIC | Facility: CLINIC | Age: 68
End: 2023-12-06
Payer: MEDICARE

## 2023-12-11 ENCOUNTER — CLINICAL SUPPORT (OUTPATIENT)
Dept: REHABILITATION | Facility: HOSPITAL | Age: 68
End: 2023-12-11
Payer: MEDICARE

## 2023-12-11 DIAGNOSIS — R29.898 WEAKNESS OF SHOULDER: ICD-10-CM

## 2023-12-11 DIAGNOSIS — G89.29 CHRONIC RIGHT SHOULDER PAIN: Primary | ICD-10-CM

## 2023-12-11 DIAGNOSIS — M25.511 CHRONIC RIGHT SHOULDER PAIN: Primary | ICD-10-CM

## 2023-12-11 DIAGNOSIS — M25.611 DECREASED ROM OF RIGHT SHOULDER: ICD-10-CM

## 2023-12-11 PROCEDURE — 97112 NEUROMUSCULAR REEDUCATION: CPT | Mod: CQ

## 2023-12-11 PROCEDURE — 97530 THERAPEUTIC ACTIVITIES: CPT | Mod: CQ

## 2023-12-11 NOTE — PROGRESS NOTES
"[facility]   Physical Therapy Treatment Note      Name: Adelaida Flores  Clinic Number: [id number]    Therapy Diagnosis:   Encounter Diagnoses   Name Primary?    Chronic right shoulder pain Yes    Weakness of shoulder     Decreased ROM of right shoulder            Physician: Catie Portillo MD    Visit Date: 12/11/2023    Physician: Catie Portillo MD     Physician Orders: PT Eval and Treat   Medical Diagnosis from Referral:   M25.511,G89.29 (ICD-10-CM) - Chronic right shoulder pain   R26.81 (ICD-10-CM) - Unsteady gait   W19.XXXD (ICD-10-CM) - Fall, subsequent encounter   Evaluation Date: 8/31/2023  Authorization Period Expiration: 10/26/23  Plan of Care Expiration: 11/31/2023  Progress Note Due: 9/31/2023  Visit # / Visits authorized: 1/1, 12/23  FOTO: 2/3     FOTO Initial Evaluation: 53  FOTO 2nd F/U: 63  FOTO Discharge: NA     Precautions: Standard      Time In: 1000 am  Time Out: 1045 am   Total Appointment Time (timed & untimed codes): 45  minutes      PTA Visit #: 2/5       Subjective     Patient reports: she has noticed that her knees are having less pain since starting new episode of PT.    She was partially compliant with home exercise program.  Response to previous treatment: no adverse affect  Functional change: progressing    Pain: 0/10  Location: right shoulder      Objective          Treatment     Adelaida received the treatments listed below:      therapeutic exercises to develop strength, endurance, ROM, flexibility, posture, and core stabilization for 10 minutes including:  [x]shuttle dl 3 c  [x]shuttle sl 1 c  [x]LAQ 2# B 2x10  [x]B SLR 2#  []  []  []  []    neuromuscular re-education activities to improve: Balance, Coordination, Kinesthetic, Sense, Proprioception, and Posture for 23 minutes. The following activities were included:  [x]sls B 3x30"  [x]cone taps 20x B  [x]step ups lvl 3 fwd/lateral x 20 B  [x]marching on foam 30s x 2  [x]GTB SLS /c march 20x B  [x]Rhomberg stance 30s x 1, " 30s /c head turns x 1  []  []  []      therapeutic activities to improve functional performance for 10 minutes, including:  [x]bridge 3x10   [x]sidelying hip abd  [x]touch and go squats on 24' plyobox  [x]STS No UE 2x10  []  []  []    MANUAL THERAPY TECHNIQUES including Joint mobilizations and Soft tissue Mobilization were applied to R shoulder for 0 minutes.    Patient Education and Home Exercises       Education provided:   - safety awareness      Written Home Exercises Provided: Patient instructed to cont prior HEP. Exercises were reviewed and Adelaida was able to demonstrate them prior to the end of the session.  Adelaida demonstrated good  understanding of the education provided. See Electronic Medical Record under Patient Instructions for exercises provided during therapy sessions    Assessment   Pt is progressing well with NMR activities. Able to progress patient further this date for improved balance. Difficulty with theraband marching today as she had multiple LOB episodes, in which she was able to recover. Pt remains highly motivated to improve. Plan to continue and progress as tolerated.        Adelaida Is progressing well towards her goals.   Patient prognosis is Good.     Patient will continue to benefit from skilled outpatient physical therapy to address the deficits listed in the problem list box on initial evaluation, provide pt/family education and to maximize pt's level of independence in the home and community environment.     Patient's spiritual, cultural and educational needs considered and pt agreeable to plan of care and goals.     Anticipated barriers to physical therapy: none    Goals:      New Short Term Goals Status:   1. Pt will be (I) with BLE HEP for improved functional strength and balance  2. Pt will demonstrate single leg stance >/=10 sec B for improved balance with functional tasks   Long Term Goal Status: modified:  1. Pt will score 20/24 on DGI to reduce risk for falls in the  community  2. Pt will score 61% on FOTO, indicating improved balance with functional activities   Reasons for Recertification of Therapy:   extension of plan of care, focus on balance diagnosis  Plan     Updated Certification Period: 11/27/23 to 2/27/24   Recommended Treatment Plan: 1-2 times per week for 12 visits:  Gait Training, Manual Therapy, Moist Heat/ Ice, Neuromuscular Re-ed, Patient Education, Therapeutic Activities, and Therapeutic Exercise       Preston Bustillos, PTA

## 2023-12-12 ENCOUNTER — CLINICAL SUPPORT (OUTPATIENT)
Dept: OTOLARYNGOLOGY | Facility: CLINIC | Age: 68
End: 2023-12-12
Payer: MEDICARE

## 2023-12-12 ENCOUNTER — OFFICE VISIT (OUTPATIENT)
Dept: OTOLARYNGOLOGY | Facility: CLINIC | Age: 68
End: 2023-12-12
Payer: MEDICARE

## 2023-12-12 VITALS
WEIGHT: 117.06 LBS | SYSTOLIC BLOOD PRESSURE: 96 MMHG | DIASTOLIC BLOOD PRESSURE: 58 MMHG | BODY MASS INDEX: 20.74 KG/M2 | HEIGHT: 63 IN

## 2023-12-12 DIAGNOSIS — H90.A32 MIXED CONDUCTIVE AND SENSORINEURAL HEARING LOSS OF LEFT EAR WITH RESTRICTED HEARING OF RIGHT EAR: Primary | ICD-10-CM

## 2023-12-12 DIAGNOSIS — H69.93 DYSFUNCTION OF BOTH EUSTACHIAN TUBES: ICD-10-CM

## 2023-12-12 DIAGNOSIS — H61.23 BILATERAL IMPACTED CERUMEN: ICD-10-CM

## 2023-12-12 DIAGNOSIS — J30.89 NON-SEASONAL ALLERGIC RHINITIS, UNSPECIFIED TRIGGER: ICD-10-CM

## 2023-12-12 PROCEDURE — 99214 OFFICE O/P EST MOD 30 MIN: CPT | Mod: 25,S$GLB,, | Performed by: NURSE PRACTITIONER

## 2023-12-12 PROCEDURE — 1126F AMNT PAIN NOTED NONE PRSNT: CPT | Mod: CPTII,S$GLB,, | Performed by: NURSE PRACTITIONER

## 2023-12-12 PROCEDURE — 99999 PR PBB SHADOW E&M-EST. PATIENT-LVL III: ICD-10-PCS | Mod: PBBFAC,,, | Performed by: NURSE PRACTITIONER

## 2023-12-12 PROCEDURE — 92567 TYMPANOMETRY: CPT | Mod: S$GLB,,,

## 2023-12-12 PROCEDURE — 1100F PR PT FALLS ASSESS DOC 2+ FALLS/FALL W/INJURY/YR: ICD-10-PCS | Mod: CPTII,S$GLB,, | Performed by: NURSE PRACTITIONER

## 2023-12-12 PROCEDURE — 92567 PR TYMPA2METRY: ICD-10-PCS | Mod: S$GLB,,,

## 2023-12-12 PROCEDURE — 1159F MED LIST DOCD IN RCRD: CPT | Mod: CPTII,S$GLB,, | Performed by: NURSE PRACTITIONER

## 2023-12-12 PROCEDURE — 3074F SYST BP LT 130 MM HG: CPT | Mod: CPTII,S$GLB,, | Performed by: NURSE PRACTITIONER

## 2023-12-12 PROCEDURE — 92552 PURE TONE AUDIOMETRY AIR: CPT | Mod: 52,S$GLB,,

## 2023-12-12 PROCEDURE — 69210 EAR CERUMEN REMOVAL: ICD-10-PCS | Mod: S$GLB,,, | Performed by: NURSE PRACTITIONER

## 2023-12-12 PROCEDURE — 1159F PR MEDICATION LIST DOCUMENTED IN MEDICAL RECORD: ICD-10-PCS | Mod: CPTII,S$GLB,, | Performed by: NURSE PRACTITIONER

## 2023-12-12 PROCEDURE — 99999 PR PBB SHADOW E&M-EST. PATIENT-LVL II: ICD-10-PCS | Mod: PBBFAC,,,

## 2023-12-12 PROCEDURE — 92552 PR PURE TONE AUDIOMETRY, AIR: ICD-10-PCS | Mod: 52,S$GLB,,

## 2023-12-12 PROCEDURE — 1160F PR REVIEW ALL MEDS BY PRESCRIBER/CLIN PHARMACIST DOCUMENTED: ICD-10-PCS | Mod: CPTII,S$GLB,, | Performed by: NURSE PRACTITIONER

## 2023-12-12 PROCEDURE — 1126F PR PAIN SEVERITY QUANTIFIED, NO PAIN PRESENT: ICD-10-PCS | Mod: CPTII,S$GLB,, | Performed by: NURSE PRACTITIONER

## 2023-12-12 PROCEDURE — 1100F PTFALLS ASSESS-DOCD GE2>/YR: CPT | Mod: CPTII,S$GLB,, | Performed by: NURSE PRACTITIONER

## 2023-12-12 PROCEDURE — 3288F FALL RISK ASSESSMENT DOCD: CPT | Mod: CPTII,S$GLB,, | Performed by: NURSE PRACTITIONER

## 2023-12-12 PROCEDURE — 3008F BODY MASS INDEX DOCD: CPT | Mod: CPTII,S$GLB,, | Performed by: NURSE PRACTITIONER

## 2023-12-12 PROCEDURE — 3288F PR FALLS RISK ASSESSMENT DOCUMENTED: ICD-10-PCS | Mod: CPTII,S$GLB,, | Performed by: NURSE PRACTITIONER

## 2023-12-12 PROCEDURE — 3078F DIAST BP <80 MM HG: CPT | Mod: CPTII,S$GLB,, | Performed by: NURSE PRACTITIONER

## 2023-12-12 PROCEDURE — 3008F PR BODY MASS INDEX (BMI) DOCUMENTED: ICD-10-PCS | Mod: CPTII,S$GLB,, | Performed by: NURSE PRACTITIONER

## 2023-12-12 PROCEDURE — 3074F PR MOST RECENT SYSTOLIC BLOOD PRESSURE < 130 MM HG: ICD-10-PCS | Mod: CPTII,S$GLB,, | Performed by: NURSE PRACTITIONER

## 2023-12-12 PROCEDURE — 99999 PR PBB SHADOW E&M-EST. PATIENT-LVL III: CPT | Mod: PBBFAC,,, | Performed by: NURSE PRACTITIONER

## 2023-12-12 PROCEDURE — 69210 REMOVE IMPACTED EAR WAX UNI: CPT | Mod: S$GLB,,, | Performed by: NURSE PRACTITIONER

## 2023-12-12 PROCEDURE — 99214 PR OFFICE/OUTPT VISIT, EST, LEVL IV, 30-39 MIN: ICD-10-PCS | Mod: 25,S$GLB,, | Performed by: NURSE PRACTITIONER

## 2023-12-12 PROCEDURE — 3078F PR MOST RECENT DIASTOLIC BLOOD PRESSURE < 80 MM HG: ICD-10-PCS | Mod: CPTII,S$GLB,, | Performed by: NURSE PRACTITIONER

## 2023-12-12 PROCEDURE — 99999 PR PBB SHADOW E&M-EST. PATIENT-LVL II: CPT | Mod: PBBFAC,,,

## 2023-12-12 PROCEDURE — 1160F RVW MEDS BY RX/DR IN RCRD: CPT | Mod: CPTII,S$GLB,, | Performed by: NURSE PRACTITIONER

## 2023-12-12 RX ORDER — LEVOCETIRIZINE DIHYDROCHLORIDE 5 MG/1
5 TABLET, FILM COATED ORAL NIGHTLY
Qty: 30 TABLET | Refills: 11 | Status: SHIPPED | OUTPATIENT
Start: 2023-12-12 | End: 2024-12-11

## 2023-12-12 RX ORDER — FLUTICASONE PROPIONATE 50 MCG
2 SPRAY, SUSPENSION (ML) NASAL DAILY
Qty: 9.9 ML | Refills: 11 | Status: SHIPPED | OUTPATIENT
Start: 2023-12-12 | End: 2024-03-26 | Stop reason: DRUGHIGH

## 2023-12-12 NOTE — PROGRESS NOTES
Patient ID: Adelaida Flores is a 68 y.o. y.o. female    Chief Complaint:   Chief Complaint   Patient presents with    Follow-up    Otitis Media       HPI 10/12/2023: Patient is self-referred for evaluation of a possible wax impaction in bilateral ears.  she has complaints of hearing loss in the affected ears, but denies pain or drainage.  This has been an issue in the past.  The patient has been using any sort of ear drop to soften the wax.    Interval HPI 12/12/2023:  Follow up visit. She reports having muffled hearing in bilateral ears but the left is usually worse.    She has not noted any tinnitus in either ear.  She  has not had a recent issues with ear pain or ear drainage. She   has not had any previous otologic surgery. She denies any history of significant loud noise exposure.She  denies issues with dizziness.  She reports having grass allergy and has had immunotherapy in the past. She is currently on Allegra and sometimes switch to Xyzal.     Review of Systems   Constitutional: Negative for fever, chills, fatigue and unexpected weight change.   HENT: Positive for ear blockage. Negative for hearing loss, nosebleeds, congestion, sore throat, facial swelling, rhinorrhea, sneezing, trouble swallowing, dental problem, voice change, postnasal drip, sinus pressure, tinnitus and ear discharge.    Eyes: Negative for redness, itching and visual disturbance.   Respiratory: Negative for cough, choking and wheezing.    Cardiovascular: Negative for chest pain and palpitations.   Gastrointestinal: Negative for abdominal pain.        No reflux.   Musculoskeletal: Negative for gait problem.   Skin: Negative for rash.   Neurological: Negative for dizziness, light-headedness and headaches.     Past Medical History:   Diagnosis Date    Alcohol dependence in sustained full remission 03/21/2022    Allergy     Anxiety 02/22/2019    Broken hip     Colon polyps     Depression     Hyperlipidemia 02/22/2019    IBS (irritable bowel  syndrome)     Osteoporosis     T12 compression fracture s/p corpectomy 2013    Volvulus     s/p colectomy     Past Surgical History:   Procedure Laterality Date    COLON SURGERY      due to volvulus    COLONOSCOPY N/A 2017    Procedure: COLONOSCOPY miralax;  Surgeon: Trey Haas Jr., MD;  Location: [facility] ENDO;  Service: Endoscopy;  Laterality: N/A;    COLONOSCOPY N/A 2023    Procedure: COLONOSCOPY;  Surgeon: Graeme Rasmussen MD;  [facility];  Service: Endoscopy;  Laterality: N/A;    ESOPHAGOGASTRODUODENOSCOPY N/A 2023    Procedure: EGD (ESOPHAGOGASTRODUODENOSCOPY);  Surgeon: Graeme Rasmussen MD;  [facility];  Service: Endoscopy;  Laterality: N/A;  ref by / [facility]-    EYE SURGERY      Cataract/torc    FRACTURE SURGERY      right broken wrist and had surgery    HERNIA REPAIR  2012    SMALL INTESTINE SURGERY  2011    volvulus x 2    SPINE SURGERY      due to fall while on a boat.    TONSILLECTOMY       Social History     Socioeconomic History    Marital status:    Occupational History    Occupation: Dietary Consultant      Employer: [employer]   Tobacco Use    Smoking status: Former     Current packs/day: 0.00     Average packs/day: 1 pack/day for 10.0 years (10.0 ttl pk-yrs)     Types: Cigarettes     Start date: 1983     Quit date: 1993     Years since quittin.9    Smokeless tobacco: Never    Tobacco comments:     Quit 28 years ago   Substance and Sexual Activity    Alcohol use: No    Drug use: No    Sexual activity: Yes     Partners: Male     Birth control/protection: None   Social History Narrative    Got  to Kristopher Calderon in 2019.     Does some physical activity daily previously. But since getting  less active. Still does weights 3x/week.     Social Determinants of Health     Financial Resource Strain: Medium Risk (2023)    Overall Financial Resource Strain (CARDIA)     Difficulty of  Paying Living Expenses: Somewhat hard   Food Insecurity: No Food Insecurity (8/18/2023)    Hunger Vital Sign     Worried About Running Out of Food in the Last Year: Never true     Ran Out of Food in the Last Year: Never true   Recent Concern: Food Insecurity - Food Insecurity Present (8/3/2023)    Hunger Vital Sign     Worried About Running Out of Food in the Last Year: Sometimes true     Ran Out of Food in the Last Year: Never true   Transportation Needs: No Transportation Needs (8/18/2023)    PRAPARE - Transportation     Lack of Transportation (Medical): No     Lack of Transportation (Non-Medical): No   Physical Activity: Insufficiently Active (8/18/2023)    Exercise Vital Sign     Days of Exercise per Week: 1 day     Minutes of Exercise per Session: 30 min   Stress: Stress Concern Present (8/18/2023)    [employer] - Occupational Stress Questionnaire     Feeling of Stress : Very much   Social Connections: Unknown (8/18/2023)    Social Connection and Isolation Panel [NHANES]     Frequency of Communication with Friends and Family: Once a week     Frequency of Social Gatherings with Friends and Family: Once a week     Active Member of Clubs or Organizations: Yes     Attends Club or Organization Meetings: More than 4 times per year     Marital Status:    Housing Stability: Low Risk  (8/18/2023)    Housing Stability Vital Sign     Unable to Pay for Housing in the Last Year: No     Number of Places Lived in the Last Year: 1     Unstable Housing in the Last Year: No     Family History   Problem Relation Age of Onset    Colon polyps Mother     Cancer Mother         skin    Osteoarthritis Mother     Colon polyps Father     Cancer Father         prostate    Heart disease Father     Osteoarthritis Maternal Aunt     Osteoarthritis Maternal Grandmother     Cancer Sister         skin    Asthma Brother     Diabetes Neg Hx     Cirrhosis Neg Hx        Objective:      Vitals:    12/12/23 1337    BP: (!) 96/58         Physical Exam   Constitutional: Well appearing / communicating without difficutly.  NAD.  Eyes: EOM I Bilaterally  Head/Face: Normocephalic. Negative paranasal sinus pressure/tenderness.  Salivary glands WNL.  House Brackmann I Bilaterally.     Right Ear: Auricle normal appearance. External Auditory Canal with scant amount of cerumen impaction. EAC with no lesions or masses,TM w/o masses/lesions/perforations. TM normal with limited mobility.  Left Ear: Auricle normal appearance. External Auditory Canal with scant amount of cerumen impaction. EAC with no lesions or masses,TM w/o masses/lesions/perforations. TM normal with limited mobility.   Ramsay: lateralizes to the right  Rinne test: AC>BC bilateral   Nose: No gross nasal septal deviation. Inferior Turbinates 3+ bilaterally. No septal perforation. No masses/lesions. External nasal skin appears normal without masses/lesions.   Oral Cavity: Gingiva/lips within normal limits.  Dentition/gingiva healthy appearing. Mucus membranes moist. Floor of mouth soft, no masses palpated. Oral Tongue mobile. Hard Palate appears normal.    Oropharynx: Base of tongue appears normal. No masses/lesions noted. Tonsillar fossa/pharyngeal wall without lesions. Posterior oropharynx WNL.  Soft palate without masses. Midline uvula.   Neck/Lymphatic: No LAD I-VI bilaterally.  No thyromegaly.  No masses noted on exam.     Mirror laryngoscopy/nasopharyngoscopy: Active gag reflex.  Unable to perform.     Neuro/Psychiatric: AOx3.  Normal mood and affect.   Cardiovascular: Normal carotid pulses bilaterally, no increasing jugular venous distention noted at cervical region bilaterally.    Respiratory: Normal respiratory effort, no stridor, no retractions noted.      Ear Cerumen Removal under microscopy    Date/Time: 12/12/2023 1:40 PM    Performed by: Nikki Dow NP  Authorized by: Nikki Dow NP    Consent Done?:  Yes (Verbal)  Location details:  Both  ears  Procedure type: curette    Procedure type comment:  Suction  Cerumen  Removal Results:  Cerumen completely removed  Patient tolerance:  Patient tolerated the procedure well with no immediate complications     Bilateral ear canals with scant amount of cerumen.       Audiogram interpreted personally by me and discussed in detail with the patient today.   Pure tone testing revealed normal sloping to severe mixed hearing loss in the left ear. No changes in thresholds were noted as compared to previous findings. Tympanometry revealed Type A tympanogram in the right ear and Type As tympanogram in the left ear.       Assessment:         ICD-10-CM ICD-9-CM    1. Mixed conductive and sensorineural hearing loss of left ear with restricted hearing of right ear  H90.A32 389.22 CT Temporal Bone without contrast      2. Dysfunction of both eustachian tubes  H69.93 381.81       3. Bilateral impacted cerumen  H61.23 380.4 Ear Cerumen Removal      4. Non-seasonal allergic rhinitis, unspecified trigger  J30.89 477.8              Plan:       -  Cerumen impaction:  Removed under microscopy today without difficulty.  I would recommend the use of a wax softening drop, either over the counter Debrox or mineral oil, on a weekly basis.  I also instructed the patient to avoid Qtips.  -repeat audiogram today revealed mixed hearing loss in the left ear with no improvement since last visit. Otoscopic exam benign in both ears.   - ordered CT temporal for mixed hearing loss in left ear. If CT temporal is normal, she would benefit from hearing aids  -start Flonase 2 sprays in each nostril daily  -start on Xyzal 5 mg nightly. Stop Allegra  -f/u 1 year or sooner as needed        Nikki Dow NP      Answers submitted by the patient for this visit:  Review of Symptoms Questionnaire  (Submitted on 12/9/2023)  Fatigue (Tiredness)?: Yes  sinus pressure : Yes  eye itching: Yes  None of these: Yes  None of these : Yes  diarrhea: Yes  None of  these: Yes  Muscle aches / pain?: Yes  back pain: Yes  neck pain: Yes  None of these : Yes  Seasonal Allergies?: Yes  None of these : Yes  headaches: Yes  Bruises or bleeds easily: Yes  decreased concentration: Yes  Feeling depressed?: Yes  nervous/ anxious: Yes

## 2023-12-12 NOTE — PROCEDURES
Ear Cerumen Removal    Date/Time: 12/12/2023 1:40 PM    Performed by: Nikki Dow NP  Authorized by: Nikki Dow NP    Consent Done?:  Yes (Verbal)  Location details:  Both ears  Procedure type: curette    Procedure type comment:  Suction  Cerumen  Removal Results:  Cerumen completely removed  Patient tolerance:  Patient tolerated the procedure well with no immediate complications     Bilateral ear canals with scant amount of cerumen.

## 2023-12-12 NOTE — PROGRESS NOTES
Adelaida Flores, a 68 y.o. female was seen today in the clinic for follow up audiological evaluation for mixed hearing loss in the left ear. Ms. Flores reports no perceived changes in hearing in the left ear.     Pure tone testing revealed normal sloping to severe mixed hearing loss in the left ear. No changes in thresholds were noted as compared to previous findings. Tympanometry revealed Type A tympanogram in the right ear and Type As tympanogram in the left ear.    Recommendations:  Otologic evaluation  Annual audiologic evaluation  Hearing protection in noise  Hearing aid consultation following medical clearance and when patient is ready to consider amplification

## 2023-12-18 ENCOUNTER — CLINICAL SUPPORT (OUTPATIENT)
Dept: REHABILITATION | Facility: HOSPITAL | Age: 68
End: 2023-12-18
Payer: MEDICARE

## 2023-12-18 DIAGNOSIS — R29.898 WEAKNESS OF SHOULDER: ICD-10-CM

## 2023-12-18 DIAGNOSIS — G89.29 CHRONIC RIGHT SHOULDER PAIN: Primary | ICD-10-CM

## 2023-12-18 DIAGNOSIS — M25.511 CHRONIC RIGHT SHOULDER PAIN: Primary | ICD-10-CM

## 2023-12-18 DIAGNOSIS — R26.89 BALANCE PROBLEM: ICD-10-CM

## 2023-12-18 DIAGNOSIS — M25.611 DECREASED ROM OF RIGHT SHOULDER: ICD-10-CM

## 2023-12-18 PROCEDURE — 97530 THERAPEUTIC ACTIVITIES: CPT

## 2023-12-18 PROCEDURE — 97112 NEUROMUSCULAR REEDUCATION: CPT

## 2023-12-18 NOTE — PROGRESS NOTES
[facility]   Physical Therapy Treatment Note      Name: Adelaida Flores  Clinic Number: [id number]    Therapy Diagnosis:   Encounter Diagnoses   Name Primary?    Chronic right shoulder pain Yes    Weakness of shoulder     Decreased ROM of right shoulder     Balance problem            Physician: Catie Portillo MD    Visit Date: 12/18/2023    Physician: Catie Portillo MD     Physician Orders: PT Eval and Treat   Medical Diagnosis from Referral:   M25.511,G89.29 (ICD-10-CM) - Chronic right shoulder pain   R26.81 (ICD-10-CM) - Unsteady gait   W19.XXXD (ICD-10-CM) - Fall, subsequent encounter   Evaluation Date: 8/31/2023  Authorization Period Expiration: 10/26/23  Plan of Care Expiration: 11/31/2023  Progress Note Due: 9/31/2023, 12/27/23  Visit # / Visits authorized:  13/23  FOTO: 2/3     FOTO Initial Evaluation: 53  FOTO 2nd F/U: 63  FOTO Discharge: NA     Precautions: Standard      Time In: 1045 am  Time Out: 11:30 am   Total Appointment Time (timed & untimed codes): 45 minutes      PTA Visit #: 0/5       Subjective     Patient reports: her knees have been hurting her, especially when she bends them. She thinks her balance is improving but slowly.     She was partially compliant with home exercise program.  Response to previous treatment: no adverse affect  Functional change: progressing    Pain: 3/10  Location: left knee    Objective      12/18/23  Knee Right MMT Left MMT Pain/Dysfunction with Movement (pain=!)   AROM             flexion  WFL 4/5  WFL 4/5     extension  WFL 4/5  WFL 4-/5  mild R knee pain with MMT         Treatment     Adelaida received the treatments listed below:      therapeutic exercises to develop strength, endurance, ROM, flexibility, posture, and core stabilization for 8 minutes including:    [x]upright bike 6' Lvl 3 resistance       []LAQ 2# B 2x10  []B SLR 2#    []  []  []    neuromuscular re-education activities to improve: Balance, Coordination, Kinesthetic, Sense,  "Proprioception, and Posture for 23 minutes. The following activities were included:    []sls B 3x30"  []cone taps 20x B  [x]step ups lvl 3 fwd/lateral x 30 B  []marching on foam 30s x 2  []GTB SLS /c march 20x B  [x]Rhomberg stance 30s x 1  [x]VMO extensions x10 B   [x]TKE maroon band x20 left lower extremity   [x]TKE in supine with towel for tactile cues x20 left lower extremity       therapeutic activities to improve functional performance for 14 minutes, including:  [x]bridge 3x10   [x]shuttle dl 3 c 3x10   [x]shuttle sl 1.5 c 2x10 B   []sidelying hip abd  []touch and go squats on 24' plyobox  []STS No UE 2x10  []  []  []    Next:   []Eccentric quads     MANUAL THERAPY TECHNIQUES including Joint mobilizations and Soft tissue Mobilization were applied to R shoulder for 0 minutes.    Patient Education and Home Exercises       Education provided:   - safety awareness      Written Home Exercises Provided: Patient instructed to cont prior HEP. Exercises were reviewed and Adelaida was able to demonstrate them prior to the end of the session.  Adelaida demonstrated good  understanding of the education provided. See Electronic Medical Record under Patient Instructions for exercises provided during therapy sessions    Assessment   Focused session on L quad activation with verbal/tactile cues required to avoid compensatory gluteal activation with exercises. Cont with eccentric control of quads with squatting motions next visit.         Adelaida Is progressing well towards her goals.   Patient prognosis is Good.     Patient will continue to benefit from skilled outpatient physical therapy to address the deficits listed in the problem list box on initial evaluation, provide pt/family education and to maximize pt's level of independence in the home and community environment.     Patient's spiritual, cultural and educational needs considered and pt agreeable to plan of care and goals.     Anticipated barriers to physical therapy: " none    Goals:      New Short Term Goals Status:   1. Pt will be (I) with BLE HEP for improved functional strength and balance  2. Pt will demonstrate single leg stance >/=10 sec B for improved balance with functional tasks   Long Term Goal Status: modified:  1. Pt will score 20/24 on DGI to reduce risk for falls in the community  2. Pt will score 61% on FOTO, indicating improved balance with functional activities   Reasons for Recertification of Therapy:   extension of plan of care, focus on balance diagnosis  Plan     Updated Certification Period: 11/27/23 to 2/27/24   Recommended Treatment Plan: 1-2 times per week for 12 visits:  Gait Training, Manual Therapy, Moist Heat/ Ice, Neuromuscular Re-ed, Patient Education, Therapeutic Activities, and Therapeutic Exercise       Catie Chan, PT

## 2023-12-19 ENCOUNTER — HOSPITAL ENCOUNTER (OUTPATIENT)
Dept: RADIOLOGY | Facility: HOSPITAL | Age: 68
Discharge: HOME | End: 2023-12-19
Attending: NURSE PRACTITIONER
Payer: MEDICARE

## 2023-12-19 DIAGNOSIS — H90.A32 MIXED CONDUCTIVE AND SENSORINEURAL HEARING LOSS OF LEFT EAR WITH RESTRICTED HEARING OF RIGHT EAR: ICD-10-CM

## 2023-12-19 PROCEDURE — 70480 CT ORBIT/EAR/FOSSA W/O DYE: CPT | Mod: TC

## 2023-12-20 ENCOUNTER — OFFICE VISIT (OUTPATIENT)
Dept: PSYCHIATRY | Facility: CLINIC | Age: 68
End: 2023-12-20
Payer: MEDICARE

## 2023-12-20 DIAGNOSIS — F33.1 MDD (MAJOR DEPRESSIVE DISORDER), RECURRENT EPISODE, MODERATE: ICD-10-CM

## 2023-12-20 DIAGNOSIS — R63.0 ANOREXIA: Primary | ICD-10-CM

## 2023-12-20 DIAGNOSIS — F10.21 ALCOHOL DEPENDENCE IN SUSTAINED FULL REMISSION: ICD-10-CM

## 2023-12-20 DIAGNOSIS — F41.1 GAD (GENERALIZED ANXIETY DISORDER): ICD-10-CM

## 2023-12-20 PROCEDURE — 99214 OFFICE O/P EST MOD 30 MIN: CPT | Mod: 95,,, | Performed by: PHYSICIAN ASSISTANT

## 2023-12-20 PROCEDURE — 1160F RVW MEDS BY RX/DR IN RCRD: CPT | Mod: CPTII,95,, | Performed by: PHYSICIAN ASSISTANT

## 2023-12-20 PROCEDURE — 1159F MED LIST DOCD IN RCRD: CPT | Mod: CPTII,95,, | Performed by: PHYSICIAN ASSISTANT

## 2023-12-20 PROCEDURE — 1159F PR MEDICATION LIST DOCUMENTED IN MEDICAL RECORD: ICD-10-PCS | Mod: CPTII,95,, | Performed by: PHYSICIAN ASSISTANT

## 2023-12-20 PROCEDURE — 1160F PR REVIEW ALL MEDS BY PRESCRIBER/CLIN PHARMACIST DOCUMENTED: ICD-10-PCS | Mod: CPTII,95,, | Performed by: PHYSICIAN ASSISTANT

## 2023-12-20 PROCEDURE — 99214 PR OFFICE/OUTPT VISIT, EST, LEVL IV, 30-39 MIN: ICD-10-PCS | Mod: 95,,, | Performed by: PHYSICIAN ASSISTANT

## 2023-12-20 RX ORDER — LAMOTRIGINE 150 MG/1
150 TABLET ORAL DAILY
Qty: 90 TABLET | Refills: 1 | Status: SHIPPED | OUTPATIENT
Start: 2023-12-20 | End: 2024-06-17

## 2023-12-20 NOTE — PROGRESS NOTES
"The patient location is: [place]  The chief complaint leading to consultation is: depression, anxiety, anorexia f/u    Visit type: audiovisual      Each patient to whom he or she provides medical services by telemedicine is:  (1) informed of the relationship between the physician and patient and the respective role of any other health care provider with respect to management of the patient; and (2) notified that he or she may decline to receive medical services by telemedicine and may withdraw from such care at any time.    Notes:       Outpatient Psychiatry Follow-Up Visit (MD/AMBER)    12/20/2023    Clinical Status of Patient:  Outpatient (Ambulatory)    Chief Complaint:  Adelaida Flores is a 68 y.o. female who presents today for follow-up of depression and anxiety.  Met with patient.      Interval History and Content of Current Session:  Interim Events/Subjective Report/Content of Current Session:    Pt reports today: "its been hard. Working on the eating disorder, its a lot more anxiety producing because I'm working on it. The depression is getting better. I see eating disorder  and specialist regularly in this program. Its intense to do on top of work, I have homework to do. I'm making progress but its tough."    Last weight was 117 lb about 1 week ago at other clinic visit. Pt reports discussing her current weight makes her anxious, states she might get rid of her scale so she isnt weighing herself frequently. Pt reports her normal previous weight was 120-125lb.    Mood overall is "a bit depressed but more anxious". Pt reports frequently ruminating on anxiety and having difficulty make decisions.    Rates depression as 5/10 and anxiety as 8/10 over the past 2 weeks.    Discussed starting low dose zyprexa to increase appetite and help with anxiety and depression. Pt very hesitant and anxious regarding starting zyprexa.    Pt agreeable to try increase of lamictal to 150mg daily.    Reports pt is eating " "normal amount of calories and eating more solid foods.    Patients mood is anxious, affect appears mood congruent. Linear and logical, friendly and cooperative, good eye contact.    Denies SI/HI/AVH. Pt reports sleeping well and normalizing appetite. Denies side effects of medications.    Pt reports taking medications as prescribed and behaving appropriately during interview today.      Psychotherapy:  Target symptoms: depression, anxiety , eating d/o  Why chosen therapy is appropriate versus another modality: relevant to diagnosis, evidence based practice  Outcome monitoring methods: self-report, observation  Therapeutic intervention type: insight oriented psychotherapy, supportive psychotherapy  Topics discussed/themes: relationships difficulties, building skills sets for symptom management, symptom recognition  The patient's response to the intervention is accepting. The patient's progress toward treatment goals is fair.   Duration of intervention: 16 minutes.      Prior visit:    Pt never started zyprexa since prescribed at last visit.    Pt reports today: "its been rough, really rough"    Has been working with eating disorder therapist and . Had considered going inpatient for eating disorder but has issues with that due to insurance.    Pt hesitant to start zyprexa as her eating disorder therapist told her it might not be better for a mood change. Discussed at length benefits of starting zyprexa and significant amount of studies showing benefits of zyprexa for anorexia, however pt is hesistant at this time, states she wants to discuss it with the eating disorder therapist first.    Reports has felt less depressed recently, "I think its maybe I'm staying busy with the eating disorder and ".    States she is mainly consuming mainly liquid meals. States 1 day per week "I allow myself to eat solid foods". Pt reports that weight has been between 108-110lb. Pt states her normal weight is around " 120lb.    Patients mood is anxious, affect appears mood congruent. Linear and logical, friendly and cooperative, good eye contact.    Denies SI/HI/AVH. Pt reports sleeping well and decreased appetite. Reports dry mouth from medications, denies other side effects    Pt reports taking medications as prescribed and behaving appropriately during interview today.    Review of Systems     Review of Systems   Constitutional:  Negative for fever.   HENT:  Negative for sore throat.    Eyes:  Negative for photophobia.   Respiratory:  Negative for cough.    Cardiovascular:  Negative for chest pain and palpitations.   Gastrointestinal:  Negative for abdominal pain.   Genitourinary:  Negative for dysuria.   Musculoskeletal:  Negative for myalgias.   Skin:  Negative for rash.   Neurological:  Negative for dizziness.   Endo/Heme/Allergies:  Does not bruise/bleed easily.   Psychiatric/Behavioral:  Positive for depression. Negative for hallucinations, substance abuse and suicidal ideas. The patient is nervous/anxious. The patient does not have insomnia.        Psychiatric Review Of Systems - Is patient experiencing or having changes in:  sleep: no  appetite: yes  weight: yes  energy/anergy: yes  interest/pleasure/anhedonia: no  somatic symptoms: no  libido: no  anxiety/panic: yes  guilty/hopelessness: no  concentration: yes  S.I.B.s/risky behavior: no  Irritability: no  Racing thoughts: yes  Impulsive behaviors: no  Paranoia: no  AVH: no      Past Medical, Family and Social History: The patient's past medical, family and social history have been reviewed and updated as appropriate within the electronic medical record - see encounter notes.      Current Medications:   Medication List with Changes/Refills   Current Medications    DENOSUMAB (PROLIA) 60 MG/ML SYRG        FLUOROMETHOLONE 0.1% (FML) 0.1 % DRPS    Place into both eyes.    FLUOXETINE 40 MG CAPSULE    Take 2 capsules (80 mg total) by mouth once daily.    FLUTICASONE  "PROPIONATE (FLONASE) 50 MCG/ACTUATION NASAL SPRAY    2 sprays (100 mcg total) by Each Nostril route once daily.    HYDROXYZINE PAMOATE (VISTARIL) 25 MG CAP    Take 1 capsule (25 mg total) by mouth nightly as needed (insomnia).    KRILL OIL ORAL        LAMOTRIGINE (LAMICTAL) 100 MG TABLET    Take 1 tablet (100 mg total) by mouth once daily.    LEVOCETIRIZINE (XYZAL) 5 MG TABLET    Take 1 tablet (5 mg total) by mouth every evening.    MULTIVITAMIN CAPSULE    Take 1 capsule by mouth once daily.    OMEPRAZOLE (PRILOSEC) 40 MG CAPSULE    Take 1 capsule (40 mg total) by mouth 2 (two) times daily before meals.    ONDANSETRON (ZOFRAN-ODT) 4 MG TBDL    Take 1 tablet (4 mg total) by mouth every 8 (eight) hours as needed (nausea).    SODIUM,POTASSIUM,MAG SULFATES (SUPREP BOWEL PREP KIT) 17.5-3.13-1.6 GRAM SOLR    Follow instructions given by Endoscopy scheduling nurse    VITAMIN D (VITAMIN D3) 1000 UNITS TAB    Take 1,000 Units by mouth once daily.         Allergies:   Review of patient's allergies indicates:   Allergen Reactions    Cefdinir Diarrhea    Grass pollen-red top, standard     House dust Itching         Vitals   There were no vitals filed for this visit.       Labs/Imaging/Studies:   No results found for this or any previous visit (from the past 48 hour(s)).   No results found for: "PHENYTOIN", "PHENOBARB", "VALPROATE", "CBMZ"    Compliance: yes    Side effects: None    Risk Parameters:  Patient reports no suicidal ideation  Patient reports no homicidal ideation  Patient reports no self-injurious behavior  Patient reports no violent behavior    Exam (detailed: at least 9 elements; comprehensive: all 15 elements)   Constitutional  Vitals:  Most recent vital signs, dated less than 90 days prior to this appointment, were reviewed.   There were no vitals filed for this visit.     General:  age appropriate, thin     Musculoskeletal  Muscle Strength/Tone:  not examined   Gait & Station:  Not examined "     Psychiatric  Speech:  no latency; no press   Mood & Affect:  Anxious  congruent and appropriate   Thought Process:  normal and logical   Associations:  intact   Thought Content:  normal, no suicidality, no homicidality, delusions, or paranoia   Insight:  intact, has awareness of illness   Judgement: behavior is adequate to circumstances   Orientation:  grossly intact   Memory: intact for content of interview   Language: grossly intact   Attention Span & Concentration:  able to focus   Fund of Knowledge:  intact and appropriate to age and level of education     Assessment and Diagnosis   Status/Progress: Based on the examination today, the patient's problem(s) is/are improved and inadequately controlled. New problems have not been presented today.   Co-morbidities, Diagnostic uncertainty and Lack of compliance are not complicating management of the primary condition.  There are no active rule-out diagnoses for this patient at this time.     General Impression:      ICD-10-CM ICD-9-CM   1. Anorexia  R63.0 783.0   2. MDD (major depressive disorder), recurrent episode, moderate  F33.1 296.32   3. MISTY (generalized anxiety disorder)  F41.1 300.02   4. Alcohol dependence in sustained full remission  F10.21 303.93         Intervention/Counseling/Treatment Plan   Medication Management: Continue current medications. The risks and benefits of medication were discussed with the patient.    -continue prozac 80mg daily    -increase lamictal tp 150mg daily    -pt refusing zyprexa at this time    -continue vistaril prn insomnia      Return to Clinic: 2 months        Last Manrique PA-C      Total face to face time: 32 min  Total time (chart review, patient contact, documentation): 35 min      *This note has been prepared using a combination of a dictation device and typing.  It has been checked for errors but some errors may still exist within the note as a result of speech recognition errors and/or typographical  errors.

## 2023-12-28 ENCOUNTER — PATIENT MESSAGE (OUTPATIENT)
Dept: OTOLARYNGOLOGY | Facility: CLINIC | Age: 68
End: 2023-12-28
Payer: MEDICARE

## 2023-12-28 ENCOUNTER — TELEPHONE (OUTPATIENT)
Dept: OTOLARYNGOLOGY | Facility: CLINIC | Age: 68
End: 2023-12-28
Payer: MEDICARE

## 2023-12-28 RX ORDER — DOXYCYCLINE 100 MG/1
100 CAPSULE ORAL EVERY 12 HOURS
Qty: 28 CAPSULE | Refills: 0 | Status: SHIPPED | OUTPATIENT
Start: 2023-12-28 | End: 2024-01-11

## 2023-12-28 NOTE — TELEPHONE ENCOUNTER
I called and spoke with her over the phone to reviewed CT temporal results. CT showed normal inner ear structures but has mucosal thickening of the bilateral maxillary sinuses. She has been taking Allegra still but has not started the Flonase. She denies nasal congestion, sinus pressure and purulent nasal drainage. Will start her on doxycycline x 14 days and follow up in 3 weeks. Recommended start using Flonase.

## 2023-12-28 NOTE — TELEPHONE ENCOUNTER
Spoke with patient and got her scheduled for 1/18/23 with [facility].  ----- Message from Nikki Dow NP sent at 12/28/2023 11:49 AM CST -----  I spoke with her and reviewed CT scan. Please schedule her a follow up appt in 3-4 weeks. Thank you

## 2023-12-29 ENCOUNTER — PATIENT MESSAGE (OUTPATIENT)
Dept: OTOLARYNGOLOGY | Facility: CLINIC | Age: 68
End: 2023-12-29
Payer: MEDICARE

## 2023-12-31 ENCOUNTER — PATIENT MESSAGE (OUTPATIENT)
Dept: PRIMARY CARE CLINIC | Facility: CLINIC | Age: 68
End: 2023-12-31
Payer: MEDICARE

## 2024-01-02 ENCOUNTER — PATIENT MESSAGE (OUTPATIENT)
Dept: HEPATOLOGY | Facility: CLINIC | Age: 69
End: 2024-01-02
Payer: MEDICARE

## 2024-01-02 ENCOUNTER — CLINICAL SUPPORT (OUTPATIENT)
Dept: REHABILITATION | Facility: HOSPITAL | Age: 69
End: 2024-01-02
Payer: MEDICARE

## 2024-01-02 DIAGNOSIS — K76.0 HEPATIC STEATOSIS: ICD-10-CM

## 2024-01-02 DIAGNOSIS — G89.29 CHRONIC RIGHT SHOULDER PAIN: Primary | ICD-10-CM

## 2024-01-02 DIAGNOSIS — R74.8 ELEVATED LIVER ENZYMES: Primary | ICD-10-CM

## 2024-01-02 DIAGNOSIS — M25.611 DECREASED ROM OF RIGHT SHOULDER: ICD-10-CM

## 2024-01-02 DIAGNOSIS — R29.898 WEAKNESS OF SHOULDER: ICD-10-CM

## 2024-01-02 DIAGNOSIS — M25.511 CHRONIC RIGHT SHOULDER PAIN: Primary | ICD-10-CM

## 2024-01-02 PROCEDURE — 97112 NEUROMUSCULAR REEDUCATION: CPT | Mod: CQ

## 2024-01-02 PROCEDURE — 97530 THERAPEUTIC ACTIVITIES: CPT | Mod: CQ

## 2024-01-02 NOTE — PROGRESS NOTES
[facility]   Physical Therapy Treatment Note      Name: Adelaida Flores  Clinic Number: [id number]    Therapy Diagnosis:   Encounter Diagnoses   Name Primary?    Chronic right shoulder pain Yes    Weakness of shoulder     Decreased ROM of right shoulder          Physician: Catie Portillo MD    Visit Date: 1/2/2024    Physician: Catie Portillo MD     Physician Orders: PT Eval and Treat   Medical Diagnosis from Referral:   M25.511,G89.29 (ICD-10-CM) - Chronic right shoulder pain   R26.81 (ICD-10-CM) - Unsteady gait   W19.XXXD (ICD-10-CM) - Fall, subsequent encounter   Evaluation Date: 8/31/2023  Authorization Period Expiration: 1/29/24  Plan of Care Expiration: 11/31/2023  Progress Note Due: 9/31/2023, 12/27/23  Visit # / Visits authorized:  13/23, 1/10  FOTO: 2/3     FOTO Initial Evaluation: 53  FOTO 2nd F/U: 63  FOTO Discharge: NA     Precautions: Standard      Time In: 10:28 am  Time Out: 11:13 am   Total Appointment Time (timed & untimed codes): 45 minutes      PTA Visit #: 1/5       Subjective     Patient reports: that she fell this morning as she got knocked over by a dog at the dog park this morning. Pt states that she fell on to her left shoulder and hip but her shoulder is bothering her more than her hip.     She was partially compliant with home exercise program.  Response to previous treatment: no adverse affect  Functional change: progressing    Pain: 3/10  Location: left knee    Objective      12/18/23  Knee Right MMT Left MMT Pain/Dysfunction with Movement (pain=!)   AROM             flexion  WFL 4/5  WFL 4/5     extension  WFL 4/5  WFL 4-/5  mild R knee pain with MMT         Treatment     Adelaida received the treatments listed below:      therapeutic exercises to develop strength, endurance, ROM, flexibility, posture, and core stabilization for 10 minutes including:    [x]upright bike 6' Lvl 3 resistance   []LAQ 2# B 2x10  [x]B SLR 2#  []  []  []    neuromuscular re-education activities to  "improve: Balance, Coordination, Kinesthetic, Sense, Proprioception, and Posture for 27 minutes. The following activities were included:    []sls B 3x30"  []cone taps 20x B  [x]step ups lvl 3 fwd/lateral x 30 (R only today)   []marching on foam 30s x 2  []GTB SLS /c march 20x B  [x]Rhomberg stance 30s x 2  [x]VMO extensions 2x10 B   [x]TKE maroon band left 3x10 BLE  [x]TKE in supine with towel for tactile cues x20 left lower extremity       therapeutic activities to improve functional performance for 8 minutes, including:  [x]bridge 3x10   [x]shuttle dl 3 c 3x10   []shuttle sl 1.5 c 2x10 B   []sidelying hip abd  []touch and go squats on 24' plyobox  []STS No UE 2x10  []  []  []    Next:   []Eccentric quads     MANUAL THERAPY TECHNIQUES including Joint mobilizations and Soft tissue Mobilization were applied to R shoulder for 0 minutes.    Patient Education and Home Exercises       Education provided:   - safety awareness      Written Home Exercises Provided: Patient instructed to cont prior HEP. Exercises were reviewed and Adelaida was able to demonstrate them prior to the end of the session.  Adelaida demonstrated good  understanding of the education provided. See Electronic Medical Record under Patient Instructions for exercises provided during therapy sessions    Assessment   Pt experienced fall this morning after being knocked over by a dog at the dog park. Modified several exercises this session to be performed with RLE due to LLE soreness. SBA provided during NMR activities for patient safety, however no need for CGA or higher. Will continue to progress pt per her tolerance during future sessions.         Adelaida Is progressing well towards her goals.   Patient prognosis is Good.     Patient will continue to benefit from skilled outpatient physical therapy to address the deficits listed in the problem list box on initial evaluation, provide pt/family education and to maximize pt's level of independence in the home " and community environment.     Patient's spiritual, cultural and educational needs considered and pt agreeable to plan of care and goals.     Anticipated barriers to physical therapy: none    Goals:      New Short Term Goals Status:   1. Pt will be (I) with BLE HEP for improved functional strength and balance  2. Pt will demonstrate single leg stance >/=10 sec B for improved balance with functional tasks   Long Term Goal Status: modified:  1. Pt will score 20/24 on DGI to reduce risk for falls in the community  2. Pt will score 61% on FOTO, indicating improved balance with functional activities   Reasons for Recertification of Therapy:   extension of plan of care, focus on balance diagnosis  Plan     Updated Certification Period: 11/27/23 to 2/27/24   Recommended Treatment Plan: 1-2 times per week for 12 visits:  Gait Training, Manual Therapy, Moist Heat/ Ice, Neuromuscular Re-ed, Patient Education, Therapeutic Activities, and Therapeutic Exercise       Bree Kearney, PTA

## 2024-01-05 ENCOUNTER — PATIENT MESSAGE (OUTPATIENT)
Dept: GASTROENTEROLOGY | Facility: CLINIC | Age: 69
End: 2024-01-05
Payer: MEDICARE

## 2024-01-08 NOTE — PROGRESS NOTES
[facility]   Physical Therapy Treatment Note      Name: Adelaida Flores  Clinic Number: [id number]    Therapy Diagnosis:   Encounter Diagnoses   Name Primary?    Chronic right shoulder pain Yes    Weakness of shoulder     Decreased ROM of right shoulder            Physician: Catie Portillo MD    Visit Date: 1/9/2024    Physician: Catie Portillo MD     Physician Orders: PT Eval and Treat   Medical Diagnosis from Referral:   M25.511,G89.29 (ICD-10-CM) - Chronic right shoulder pain   R26.81 (ICD-10-CM) - Unsteady gait   W19.XXXD (ICD-10-CM) - Fall, subsequent encounter   Evaluation Date: 8/31/2023  Authorization Period Expiration: 1/29/24  Plan of Care Expiration: 11/31/2023  Progress Note Due: 9/31/2023, 12/27/23  Visit # / Visits authorized:  13/23, 2/10  FOTO: 2/3     FOTO Initial Evaluation: 53  FOTO 2nd F/U: 63  FOTO Discharge: NA     Precautions: Standard      Time In: 10:25 am  Time Out: 11:17 am   Total Appointment Time (timed & untimed codes): 47 minutes (5 minutes not billed d/t restroom break)       PTA Visit #: 2/5       Subjective     Patient reports: that she hasn't had any falls since her last visit and is doing well overall.     She was partially compliant with home exercise program.  Response to previous treatment: no adverse affect  Functional change: progressing    Pain: 3/10  Location: left knee    Objective      12/18/23  Knee Right MMT Left MMT Pain/Dysfunction with Movement (pain=!)   AROM             flexion  WFL 4/5  WFL 4/5     extension  WFL 4/5  WFL 4-/5  mild R knee pain with MMT         Treatment     Adelaida received the treatments listed below:      therapeutic exercises to develop strength, endurance, ROM, flexibility, posture, and core stabilization for 10 minutes including:    [x]bike 6' Lvl 3 resistance   []LAQ 2# B 2x10  [x]B SLR 2# 2x10 BLE   []  []  []    neuromuscular re-education activities to improve: Balance, Coordination, Kinesthetic, Sense, Proprioception, and  "Posture for 22 minutes. The following activities were included:    [x]sls B 2x30"  []cone taps 20x B  [x]step ups lvl 3 fwd/lateral x 30 B  [x]marching on foam 2x10   []GTB SLS /c march 20x B  [x]NBOS on airex 30s x 2  [x]Tandem stance on airex 2x30" RFF/LFF  [x]VMO extensions 2x10 B   [x]TKE maroon band left 3x10 BLE  [x]TKE in supine with towel for tactile cues x20 left lower extremity       therapeutic activities to improve functional performance for 15 minutes, including:  [x]bridge 3x10   [x]shuttle dl 3 c 3x10   [x]shuttle sl 1.5 c 2x10 B   []sidelying hip abd  []touch and go squats on 24' plyobox  []STS No UE 2x10  [x]Eccentric step downs lvl 2 15x B   []  []      Patient Education and Home Exercises       Education provided:   - safety awareness      Written Home Exercises Provided: Patient instructed to cont prior HEP. Exercises were reviewed and Adelaida was able to demonstrate them prior to the end of the session.  Adelaida demonstrated good  understanding of the education provided. See Electronic Medical Record under Patient Instructions for exercises provided during therapy sessions    Assessment   Pt exhibited tolerance to all therex performed this session. Incorporated eccentric step downs in order to work towards functional quad strength. Pt without complaints of pain, however noted fatigue in BLE. Will continue to progress pt towards goals as tolerated.         Adelaida Is progressing well towards her goals.   Patient prognosis is Good.     Patient will continue to benefit from skilled outpatient physical therapy to address the deficits listed in the problem list box on initial evaluation, provide pt/family education and to maximize pt's level of independence in the home and community environment.     Patient's spiritual, cultural and educational needs considered and pt agreeable to plan of care and goals.     Anticipated barriers to physical therapy: none    Goals:      New Short Term Goals Status:   1. " Pt will be (I) with BLE HEP for improved functional strength and balance  2. Pt will demonstrate single leg stance >/=10 sec B for improved balance with functional tasks   Long Term Goal Status: modified:  1. Pt will score 20/24 on DGI to reduce risk for falls in the community  2. Pt will score 61% on FOTO, indicating improved balance with functional activities   Reasons for Recertification of Therapy:   extension of plan of care, focus on balance diagnosis  Plan     Updated Certification Period: 11/27/23 to 2/27/24   Recommended Treatment Plan: 1-2 times per week for 12 visits:  Gait Training, Manual Therapy, Moist Heat/ Ice, Neuromuscular Re-ed, Patient Education, Therapeutic Activities, and Therapeutic Exercise       Bree Kearney, PTA

## 2024-01-09 ENCOUNTER — CLINICAL SUPPORT (OUTPATIENT)
Dept: REHABILITATION | Facility: HOSPITAL | Age: 69
End: 2024-01-09
Payer: MEDICARE

## 2024-01-09 DIAGNOSIS — M25.511 CHRONIC RIGHT SHOULDER PAIN: Primary | ICD-10-CM

## 2024-01-09 DIAGNOSIS — G89.29 CHRONIC RIGHT SHOULDER PAIN: Primary | ICD-10-CM

## 2024-01-09 DIAGNOSIS — R29.898 WEAKNESS OF SHOULDER: ICD-10-CM

## 2024-01-09 DIAGNOSIS — M25.611 DECREASED ROM OF RIGHT SHOULDER: ICD-10-CM

## 2024-01-09 PROCEDURE — 97110 THERAPEUTIC EXERCISES: CPT | Mod: CQ

## 2024-01-09 PROCEDURE — 97530 THERAPEUTIC ACTIVITIES: CPT | Mod: CQ

## 2024-01-09 PROCEDURE — 97112 NEUROMUSCULAR REEDUCATION: CPT | Mod: CQ

## 2024-01-11 DIAGNOSIS — Z00.00 ENCOUNTER FOR MEDICARE ANNUAL WELLNESS EXAM: ICD-10-CM

## 2024-01-12 ENCOUNTER — LAB VISIT (OUTPATIENT)
Dept: LAB | Facility: HOSPITAL | Age: 69
End: 2024-01-12
Attending: NURSE PRACTITIONER
Payer: MEDICARE

## 2024-01-12 DIAGNOSIS — K76.0 HEPATIC STEATOSIS: ICD-10-CM

## 2024-01-12 DIAGNOSIS — R74.8 ELEVATED LIVER ENZYMES: ICD-10-CM

## 2024-01-12 LAB
ALBUMIN SERPL BCP-MCNC: 4.1 G/DL (ref 3.5–5.2)
ALP SERPL-CCNC: 78 U/L (ref 55–135)
ALT SERPL W/O P-5'-P-CCNC: 20 U/L (ref 10–44)
AST SERPL-CCNC: 25 U/L (ref 10–40)
BILIRUB DIRECT SERPL-MCNC: 0.1 MG/DL (ref 0.1–0.3)
BILIRUB SERPL-MCNC: 0.4 MG/DL (ref 0.1–1)
PROT SERPL-MCNC: 7.2 G/DL (ref 6–8.4)

## 2024-01-12 PROCEDURE — 36415 COLL VENOUS BLD VENIPUNCTURE: CPT | Performed by: NURSE PRACTITIONER

## 2024-01-12 PROCEDURE — 80076 HEPATIC FUNCTION PANEL: CPT | Performed by: NURSE PRACTITIONER

## 2024-01-16 ENCOUNTER — PATIENT MESSAGE (OUTPATIENT)
Dept: PRIMARY CARE CLINIC | Facility: CLINIC | Age: 69
End: 2024-01-16
Payer: MEDICARE

## 2024-01-17 ENCOUNTER — OFFICE VISIT (OUTPATIENT)
Dept: SURGERY | Facility: CLINIC | Age: 69
End: 2024-01-17
Payer: MEDICARE

## 2024-01-17 ENCOUNTER — OFFICE VISIT (OUTPATIENT)
Dept: ENDOCRINOLOGY | Facility: CLINIC | Age: 69
End: 2024-01-17
Payer: MEDICARE

## 2024-01-17 VITALS — DIASTOLIC BLOOD PRESSURE: 61 MMHG | RESPIRATION RATE: 18 BRPM | HEART RATE: 61 BPM | SYSTOLIC BLOOD PRESSURE: 100 MMHG

## 2024-01-17 DIAGNOSIS — R19.7 DIARRHEA, UNSPECIFIED TYPE: ICD-10-CM

## 2024-01-17 DIAGNOSIS — K64.8 INTERNAL HEMORRHOIDS: Primary | ICD-10-CM

## 2024-01-17 DIAGNOSIS — M81.0 OSTEOPOROSIS, POST-MENOPAUSAL: Primary | ICD-10-CM

## 2024-01-17 DIAGNOSIS — R15.9 INCONTINENCE OF FECES, UNSPECIFIED FECAL INCONTINENCE TYPE: ICD-10-CM

## 2024-01-17 DIAGNOSIS — K62.89 ANAL PAIN: ICD-10-CM

## 2024-01-17 DIAGNOSIS — R26.89 BALANCE PROBLEM: ICD-10-CM

## 2024-01-17 PROCEDURE — 99999 PR PBB SHADOW E&M-EST. PATIENT-LVL IV: CPT | Mod: PBBFAC,,, | Performed by: NURSE PRACTITIONER

## 2024-01-17 PROCEDURE — 99214 OFFICE O/P EST MOD 30 MIN: CPT | Mod: 95,,, | Performed by: INTERNAL MEDICINE

## 2024-01-17 PROCEDURE — 99204 OFFICE O/P NEW MOD 45 MIN: CPT | Mod: 25,S$GLB,, | Performed by: NURSE PRACTITIONER

## 2024-01-17 PROCEDURE — 46600 DIAGNOSTIC ANOSCOPY SPX: CPT | Mod: S$GLB,,, | Performed by: NURSE PRACTITIONER

## 2024-01-17 RX ORDER — HYDROCORTISONE 25 MG/G
CREAM TOPICAL 2 TIMES DAILY
Qty: 28 G | Refills: 2 | Status: SHIPPED | OUTPATIENT
Start: 2024-01-17 | End: 2024-06-10

## 2024-01-17 NOTE — PROGRESS NOTES
Subjective:      Patient ID: Adelaida Flores is a 68 y.o.    Chief Complaint:  Osteoporosis      History of Present Illness  With regards to her  postmenopausal osteoporosis.        She used alendronate 2010- 2014 - had GERD and fracture while on therapy    was changed to prolia 2014 - last 8/31/23     We discussed changing to an anabolic last visit but her preference was to continue Prolia since the Forteo was so expensive     Prone to falls - poor balance    she has soft falls with hiking - no fractures      Has a dog - takes her to the dog park and she had falls there  - seeing physical therapy     Strong FH of OP     Fracture hx:  Right wrist 2009  T12 fx  2013    right greater trochanter fracture early 2023     Menarche 9 y/o  Menopause 37 y/o   no hrt use       last bmd  1/21/22  FRAX:     14% risk of a major osteoporotic fracture in the next 10 years.     2.2% risk of hip fracture in the next 10 years.     Impression:     *Osteoporosis on treatment with Prolia  *Compared with previous DXA, BMD at the lumbar spine has increased and the BMD at the total hip has remained stable.     IBS with abd pain and diarrhea    On PPI qd       +DJD pain         ROS:   As above    Objective:     There were no vitals taken for this visit.    There is no height or weight on file to calculate BMI.      Physical Exam  Constitutional:       Appearance: Normal appearance.   Psychiatric:         Attention and Perception: Attention normal.         Mood and Affect: Mood normal.         Speech: Speech normal.         Behavior: Behavior normal.         Thought Content: Thought content normal.         Judgment: Judgment normal.                Lab Review:   Lab Results   Component Value Date    HGBA1C 5.2 10/22/2021     Lab Results   Component Value Date    CHOL 196 10/22/2021    HDL 52 10/22/2021    LDLCALC 126.2 10/22/2021    TRIG 89 10/22/2021    CHOLHDL 26.5 10/22/2021     Lab Results   Component Value Date     07/21/2023    K  4.3 07/21/2023     07/21/2023    CO2 28 07/21/2023    GLU 87 07/21/2023    BUN 23 07/21/2023    CREATININE 0.7 07/21/2023    CALCIUM 9.4 07/21/2023    PROT 7.2 01/12/2024    ALBUMIN 4.1 01/12/2024    BILITOT 0.4 01/12/2024    ALKPHOS 78 01/12/2024    AST 25 01/12/2024    ALT 20 01/12/2024    ANIONGAP 7 (L) 07/21/2023    ESTGFRAFRICA >60.0 07/08/2022    EGFRNONAA >60.0 07/08/2022    TSH 4.574 (H) 07/08/2022     Vit D, 25-Hydroxy   Date Value Ref Range Status   01/26/2023 32 30 - 96 ng/mL Final     Comment:     Vitamin D deficiency.........<10 ng/mL                              Vitamin D insufficiency......10-29 ng/mL       Vitamin D sufficiency........> or equal to 30 ng/mL  Vitamin D toxicity............>100 ng/mL         Assessment and Plan     Osteoporosis, post-menopausal  Reviewed fall precautions.    RDA calcium and vitamin-D  Continue Prolia   Reviewed that she should not stop Prolia without letting me know so I could follow it up with a bisphosphonate    Balance problem  Reviewed that her biggest risk for fracturing is falling.  spent time in discussing fall prevention.    Recheck bmd     The patient location is: home  The chief complaint leading to consultation is: above     Visit type: audiovisual    Face to Face time with patient: 20 minutes of total time spent on the encounter, which includes face to face time and non-face to face time preparing to see the patient (eg, review of tests), Obtaining and/or reviewing separately obtained history, Documenting clinical information in the electronic or other health record, Independently interpreting results (not separately reported) and communicating results to the patient/family/caregiver, or Care coordination (not separately reported).         Each patient to whom he or she provides medical services by telemedicine is:  (1) informed of the relationship between the physician and patient and the respective role of any other health care provider with respect  to management of the patient; and (2) notified that he or she may decline to receive medical services by telemedicine and may withdraw from such care at any time.

## 2024-01-17 NOTE — ASSESSMENT & PLAN NOTE
Reviewed fall precautions.    RDA calcium and vitamin-D  Continue Prolia   Reviewed that she should not stop Prolia without letting me know so I could follow it up with a bisphosphonate

## 2024-01-17 NOTE — PROGRESS NOTES
CRS Office Visit History and Physical    Referring Md:   Self, Aaarefadrienne  No address on file    SUBJECTIVE:     Chief Complaint: anal pain     History of Present Illness:  The patient is new patient to this practice.   Course is as follows:  Patient is a 68 y.o. female presents with anal pain.  This pain is constant. Sometimes relieved with passing gas/stool  Symptoms have been present for a few weeks. Denies rectal bleeding   Has tried otc hemorrhoidal tx without noted improvement.  Associated bleeding: no  Previous anorectal procedures: Yes, rectopexy d/t prolapse  Also with abdominal sx d/t volvulus x2  denies straining/prolonged time on toilet with bowel movements.  is not currently taking fiber supplement or stool softener.  Having multiple loose stools per day. Up to 5.   Blood thinners: No    Last Colonoscopy completed on 9/26/2023  - Patent end-to-side ileo-colonic anastomosis, characterized by healthy appearing mucosa.   - The examination was otherwise normal on direct and retroflexion views.   - No specimens collected.   - repeat in 5 yrs      Review of patient's allergies indicates:   Allergen Reactions    Cefdinir Diarrhea    Grass pollen-red top, standard     House dust Itching       Past Medical History:   Diagnosis Date    Alcohol dependence in sustained full remission 03/21/2022    Allergy     Anxiety 02/22/2019    Broken hip     Colon polyps     Depression     Hyperlipidemia 02/22/2019    IBS (irritable bowel syndrome)     Osteoporosis     T12 compression fracture s/p corpectomy 07/13/2013    Volvulus     s/p colectomy     Past Surgical History:   Procedure Laterality Date    COLON SURGERY      due to volvulus    COLONOSCOPY N/A 8/28/2017    Procedure: COLONOSCOPY miralax;  Surgeon: Trey Haas Jr., MD;  Location: [facility];  Service: Endoscopy;  Laterality: N/A;    COLONOSCOPY N/A 9/26/2023    Procedure: COLONOSCOPY;  Surgeon: Graeme Rasmussen MD;  Location: [facility] ENDOSCOPY;  Service:  Endoscopy;  Laterality: N/A;    ESOPHAGOGASTRODUODENOSCOPY N/A 2023    Procedure: EGD (ESOPHAGOGASTRODUODENOSCOPY);  Surgeon: Graeme Rasmussen MD;  [facility];  Service: Endoscopy;  Laterality: N/A;  ref by / [facility] inst portal-RB    EYE SURGERY  2012    Cataract/torc    FRACTURE SURGERY      right broken wrist and had surgery    HERNIA REPAIR  2012    SMALL INTESTINE SURGERY  2011    volvulus x 2    SPINE SURGERY      due to fall while on a boat.    TONSILLECTOMY       Family History   Problem Relation Age of Onset    Colon polyps Mother     Cancer Mother         skin    Osteoarthritis Mother     Colon polyps Father     Cancer Father         prostate    Heart disease Father     Osteoarthritis Maternal Aunt     Osteoarthritis Maternal Grandmother     Cancer Sister         skin    Asthma Brother     Diabetes Neg Hx     Cirrhosis Neg Hx      Social History     Tobacco Use    Smoking status: Former     Current packs/day: 0.00     Average packs/day: 1 pack/day for 10.0 years (10.0 total pack years)     Types: Cigarettes     Start date: 1983     Quit date: 1993     Years since quittin.0    Smokeless tobacco: Never    Tobacco comments:     Quit 28 years ago   Substance Use Topics    Alcohol use: No    Drug use: No        Review of Systems:  Review of Systems   Gastrointestinal:  Positive for diarrhea. Negative for blood in stool and constipation.        Anal pain       OBJECTIVE:     Vital Signs (Most Recent)  Blood Pressure 100/61 (BP Location: Left arm, Patient Position: Sitting, BP Method: Large (Automatic))   Pulse 61   Respiration 18     Physical Exam:  General: White female in no distress   Neuro: Alert and oriented to person, place, and time.  Moves all extremities.     HEENT: No icterus.  Trachea midline  Respiratory: Respirations are even and unlabored, no cough or audible wheezing  Skin: Warm dry and intact, No visible rashes, no jaundice    Labs reviewed today:  Lab  Results   Component Value Date    WBC 5.78 07/21/2023    HGB 12.4 07/21/2023    HCT 38.8 07/21/2023     07/21/2023    CHOL 196 10/22/2021    TRIG 89 10/22/2021    HDL 52 10/22/2021    ALT 20 01/12/2024    AST 25 01/12/2024     07/21/2023    K 4.3 07/21/2023     07/21/2023    CREATININE 0.7 07/21/2023    BUN 23 07/21/2023    CO2 28 07/21/2023    TSH 4.574 (H) 07/08/2022    INR 1.0 05/10/2022    HGBA1C 5.2 10/22/2021       Imaging reviewed today:  none    Endoscopy reviewed today:  Last Colonoscopy completed on 9/26/2023  - Patent end-to-side ileo-colonic anastomosis, characterized by healthy appearing mucosa.   - The examination was otherwise normal on direct and retroflexion views.   - No specimens collected.   - repeat in 5 yrs    Anorectal Exam:    Anal Skin: Normal    Digital Rectal Exam:  Resting Tone normal  Squeeze low  Relaxation with bear down absent  Mass none  Tenderness  absent    Anoscopy:  Verbal consent was obtained.   Clear plastic anoscope inserted.    Hemorrhoids  present  Stigmata of bleeding  absent  Stigmata of prolapsed  absent  Distal rectal mucosa normal    ASSESSMENT/PLAN:     Diagnoses and all orders for this visit:    Internal hemorrhoids  -     hydrocortisone (ANUSOL-HC) 2.5 % rectal cream; Place rectally 2 (two) times daily.    Anal pain  -     hydrocortisone (ANUSOL-HC) 2.5 % rectal cream; Place rectally 2 (two) times daily.    Incontinence of feces, unspecified fecal incontinence type  -     Ambulatory referral/consult to Physical/Occupational Therapy; Future    Diarrhea, unspecified type    - has tried questran and/or welchol in past. Cost prohibitive.     The patient was instructed to:  Anusol rectally 2x/day for 2 weeks  Increase water intake to at least 8-10 glasses of water per day.  Take a daily fiber supplement (Konsyl, Benefiber, Metamucil) and increase dietary intake to 20-30 grams/day.  Avoid straining or spending >5min/event with bowel movements.  Follow-up  in clinic prn with MD if no improvement. F/u appt with MD made in clinic.       Mary Calderon, MARTIP-C  Colon and Rectal Surgery

## 2024-01-17 NOTE — ASSESSMENT & PLAN NOTE
Reviewed that her biggest risk for fracturing is falling.  spent time in discussing fall prevention.

## 2024-01-17 NOTE — PATIENT INSTRUCTIONS
Thank you for completing a virtual visit with me!     Per our conversation, we will set up a repeat bone density.  You will hear from me once it is completed.  In the interim we will continue Prolia.  Please do your best to focus on fall prevention.    Please let me know if you have any other questions.    Thank you,  Adriana Ybarra MD

## 2024-01-22 ENCOUNTER — PATIENT MESSAGE (OUTPATIENT)
Dept: SURGERY | Facility: CLINIC | Age: 69
End: 2024-01-22
Payer: MEDICARE

## 2024-01-22 ENCOUNTER — CLINICAL SUPPORT (OUTPATIENT)
Dept: REHABILITATION | Facility: HOSPITAL | Age: 69
End: 2024-01-22
Payer: MEDICARE

## 2024-01-22 DIAGNOSIS — M25.611 DECREASED ROM OF RIGHT SHOULDER: ICD-10-CM

## 2024-01-22 DIAGNOSIS — R29.898 WEAKNESS OF SHOULDER: ICD-10-CM

## 2024-01-22 DIAGNOSIS — M25.511 CHRONIC RIGHT SHOULDER PAIN: Primary | ICD-10-CM

## 2024-01-22 DIAGNOSIS — G89.29 CHRONIC RIGHT SHOULDER PAIN: Primary | ICD-10-CM

## 2024-01-22 PROCEDURE — 97530 THERAPEUTIC ACTIVITIES: CPT

## 2024-01-22 PROCEDURE — 97112 NEUROMUSCULAR REEDUCATION: CPT

## 2024-01-22 NOTE — PROGRESS NOTES
[facility]   Physical Therapy Treatment Note      Name: Adelaida Flores  [facility] Number: [id number]    Therapy Diagnosis:   Encounter Diagnoses   Name Primary?    Chronic right shoulder pain Yes    Weakness of shoulder     Decreased ROM of right shoulder        Physician: Catie Portillo MD    Visit Date: 1/22/2024    Physician: Catie Portillo MD     Physician Orders: PT Eval and Treat   Medical Diagnosis from Referral:   M25.511,G89.29 (ICD-10-CM) - Chronic right shoulder pain   R26.81 (ICD-10-CM) - Unsteady gait   W19.XXXD (ICD-10-CM) - Fall, subsequent encounter   Evaluation Date: 8/31/2023  Authorization Period Expiration: 1/29/24  Plan of Care Expiration: 11/31/2023, 2/27/24  Progress Note Due: 9/31/2023, 12/27/23, 2/22/24  Visit # / Visits authorized:  13/23, 3/10  FOTO: 2/3         FOTO Initial Evaluation: 53  FOTO 2nd F/U: 63  FOTO Discharge: NA     Precautions: Standard      Time In: 10:45 am  Time Out: 11:30 am   Total Appointment Time (timed & untimed codes): 45min       PTA Visit #: 2/5     Subjective     Patient reports: she is sore from her fall at the dog park a few days ago. She states she could be working harder on her home exercises and is going to start her shoulder exercises again. She still feels like she lost a lot of leg strength since she hasn't been in the gym or riding her bike. Her MD has asked her to limit her exercise until she gains weight.     She was partially compliant with home exercise program.  Response to previous treatment: no adverse affect  Functional change: progressing    Pain: 3/10  Location: left knee    Objective      Update:   Balance Assessment:      Dynamic Gait Index  1. Gait on level surface: 3. Normal: Walks 20', No Assistive device, no evidence for imbalance. Normal gait pattern.  2. Change in Gait speed: 2. Mild impairment: Is able to change speed, but demonstrates mild gait deviations, or no gait deviations but unable to achieve a signifcant change  "in velocity, or uses an assistive device.  3. Gait with Horizontal Head Turns: 2. Mild impairment:   4. Gait with Vertical Head Turns: 3. Normal  5. Gait and Pivot turn: 2. Mild Impairment  6. Step Over Obstacle: 3. Normal   7. Step around obstacles: 3. Normal  8. Steps: 2. Mild impairment: Alternating feet, must use rail.     Score: 20/24     Interpretation: < 19/24 = predictive of falls in the elderly  > 22/24 = safe ambulators       30 sec sit to stand: 12 sec      SLS: R 3 sec  L 10 sec         Hip Right MMT Left MMT Pain/Dysfunction with Movement (pain=!)   AROM             flexion  WFL 4-/5  WFL 4-/5                   abduction  WFL 4/5  WFL 4/5                                    Knee Right MMT Left MMT Pain/Dysfunction with Movement (pain=!)   AROM             flexion  WFL 4/5  WFL 4/5     extension  WFL 4/5  WFL 4/5         mCTSIB:  Eyes open firm surface: 30 sec     Eyes closed firm surface: 30 sec     Eyes open soft surface: 30 sec     Eyes closed soft surface: 30 sec     Treatment     Adelaida received the treatments listed below:      therapeutic exercises to develop strength, endurance, ROM, flexibility, posture, and core stabilization for 0 minutes including:    []bike 6' Lvl 3 resistance   []LAQ 2# B 2x10  []B SLR 2# 2x10 BLE   []  []      neuromuscular re-education activities to improve: Balance, Coordination, Kinesthetic, Sense, Proprioception, and Posture for 35 minutes. The following activities were included:    [x]balance re-assessment completed     []sls B 2x30"  []cone taps 20x B  []step ups lvl 3 fwd/lateral x 30 B  []marching on foam 2x10   []GTB SLS /c march 20x B  []NBOS on airex 30s x 2  [x]Tandem stance on airex 2x30" RFF/LFF  []VMO extensions 2x10 B   []TKE maroon band left 3x10 BLE  []TKE in supine with towel for tactile cues x20 left lower extremity       therapeutic activities to improve functional performance for 10 minutes, including:  [x]bridge 3x10   []shuttle dl 3 c 3x10 "   []shuttle sl 1.5 c 2x10 B   []sidelying hip abd  []touch and go squats on 24' plyobox  []STS No UE 2x10  []Eccentric step downs lvl 2 15x B   [x]Squats 5# dumbells (2) 2x12   []      Patient Education and Home Exercises       Education provided:   - safety awareness      Written Home Exercises Provided: Patient instructed to cont prior HEP. Exercises were reviewed and Adelaida was able to demonstrate them prior to the end of the session.  Adelaida demonstrated good  understanding of the education provided. See Electronic Medical Record under Patient Instructions for exercises provided during therapy sessions    Assessment   See updated plan of care. Plan to add more light weight training to routine.         Adelaida Is progressing well towards her goals.   Patient prognosis is Good.     Patient will continue to benefit from skilled outpatient physical therapy to address the deficits listed in the problem list box on initial evaluation, provide pt/family education and to maximize pt's level of independence in the home and community environment.     Patient's spiritual, cultural and educational needs considered and pt agreeable to plan of care and goals.     Anticipated barriers to physical therapy: none    Goals:      see updated plan of care   Plan     See updated plan of care        Catie Chan, PT

## 2024-01-23 ENCOUNTER — PATIENT MESSAGE (OUTPATIENT)
Dept: HEPATOLOGY | Facility: CLINIC | Age: 69
End: 2024-01-23
Payer: MEDICARE

## 2024-01-23 NOTE — PLAN OF CARE
[facility]  Physical Therapy Plan of Care Note     Name: Adelaida Flores  [facility] Number: [id number]    Therapy Diagnosis:   Encounter Diagnoses   Name Primary?    Chronic right shoulder pain Yes    Weakness of shoulder     Decreased ROM of right shoulder      Physician: Catie Portillo MD    Visit Date: 1/22/2024    Physician Orders: PT Eval and Treat   Medical Diagnosis from Referral:   M25.511,G89.29 (ICD-10-CM) - Chronic right shoulder pain   R26.81 (ICD-10-CM) - Unsteady gait   W19.XXXD (ICD-10-CM) - Fall, subsequent encounter   Evaluation Date: 8/31/2023  Authorization Period Expiration: 1/29/24  Plan of Care Expiration: 11/31/2023, 2/27/24  Progress Note Due: 9/31/2023, 12/27/23, 2/22/24  Visit # / Visits authorized:  13/23, 3/10  FOTO: 2/3     FOTO Initial Evaluation: 53  FOTO 2nd F/U: 63  FOTO Discharge: NA     Precautions: Standard         Precautions: Standard  Functional Level Prior to Evaluation:  (I)    SUBJECTIVE     Update: She states she could be working harder on her home exercises and is going to start her shoulder exercises again. She still feels like she lost a lot of leg strength since she hasn't been in the gym or riding her bike. Her MD has asked her to limit her exercise until she gains weight.      OBJECTIVE     Update: Update:   Balance Assessment:      Dynamic Gait Index  1. Gait on level surface: 3. Normal: Walks 20', No Assistive device, no evidence for imbalance. Normal gait pattern.  2. Change in Gait speed: 2. Mild impairment: Is able to change speed, but demonstrates mild gait deviations, or no gait deviations but unable to achieve a signifcant change in velocity, or uses an assistive device.  3. Gait with Horizontal Head Turns: 2. Mild impairment:   4. Gait with Vertical Head Turns: 3. Normal  5. Gait and Pivot turn: 2. Mild Impairment  6. Step Over Obstacle: 3. Normal   7. Step around obstacles: 3. Normal  8. Steps: 2. Mild impairment: Alternating feet, must use  stanley.     Score: 20/24     Interpretation: < 19/24 = predictive of falls in the elderly  > 22/24 = safe ambulators        30 sec sit to stand: 12 sec      SLS: R 3 sec  L 10 sec         Hip Right MMT Left MMT Pain/Dysfunction with Movement (pain=!)   AROM             flexion  WFL 4-/5  WFL 4-/5                   abduction  WFL 4/5  WFL 4/5                                    Knee Right MMT Left MMT Pain/Dysfunction with Movement (pain=!)   AROM             flexion  WFL 4/5  WFL 4/5     extension  WFL 4/5  WFL 4/5         mCTSIB:         Eyes open firm surface: 30 sec                           Eyes closed firm surface: 30 sec                           Eyes open soft surface: 30 sec                           Eyes closed soft surface: 30 sec     ASSESSMENT     Update: Pt demonstrates improving BLE strength and balance since start of care. Scoring 2 points higher on the DGI, she is no longer in the high falls category for this measure. She continues to lack functional BLE strength required to stabilize herself on uneven surfaces or being unexpectedly off balance (ie the dog park). She would benefit from further skilled PT to safely progress her program with added weights as tolerated in     Previous Short Term Goals Status:   per initial eval  New Short Term Goals Status:   per initial evaluation   Long Term Goal Status: continue per initial plan of care.  Reasons for Recertification of Therapy:   extension of plan of care     GOALS  Short Term Goals Status:   1. Pt will be (I) with BLE HEP for improved functional strength and balance (progressing)  2. Pt will demonstrate single leg stance >/=10 sec B for improved balance with functional tasks (progressing)     Long Term Goal Status: modified:  1. Pt will score 20/24 on DGI to reduce risk for falls in the community MET   2. Pt will score 59% on FOTO, indicating improved balance with functional activities  (progressing, modified)  3. Pt will demonstrate improved B hip  strength to 4/5 grossly for reduced fall risk with dynamic movements (progressing, new)  4. Pt will improve 30 sec sit<>Stand score to 15 reps for improved BLE functional strength/endurance   Reasons for Recertification of Therapy:   extension of plan of care, focus on balance diagnosis    PLAN     Updated Certification Period: 1/23/24 to 3/23/24   Recommended Treatment Plan: 1 times per week for 6 weeks:  Gait Training, Manual Therapy, Moist Heat/ Ice, Neuromuscular Re-ed, Patient Education, Therapeutic Activities, and Therapeutic Exercise  Other Recommendations: n/a    Catie Chan, PT

## 2024-01-29 ENCOUNTER — CLINICAL SUPPORT (OUTPATIENT)
Dept: REHABILITATION | Facility: HOSPITAL | Age: 69
End: 2024-01-29
Payer: MEDICARE

## 2024-01-29 ENCOUNTER — APPOINTMENT (OUTPATIENT)
Dept: RADIOLOGY | Facility: CLINIC | Age: 69
End: 2024-01-29
Attending: INTERNAL MEDICINE
Payer: MEDICARE

## 2024-01-29 DIAGNOSIS — M25.611 DECREASED ROM OF RIGHT SHOULDER: ICD-10-CM

## 2024-01-29 DIAGNOSIS — M81.0 OSTEOPOROSIS, POST-MENOPAUSAL: ICD-10-CM

## 2024-01-29 DIAGNOSIS — R29.898 WEAKNESS OF SHOULDER: ICD-10-CM

## 2024-01-29 DIAGNOSIS — G89.29 CHRONIC RIGHT SHOULDER PAIN: Primary | ICD-10-CM

## 2024-01-29 DIAGNOSIS — M25.511 CHRONIC RIGHT SHOULDER PAIN: Primary | ICD-10-CM

## 2024-01-29 PROCEDURE — 77080 DXA BONE DENSITY AXIAL: CPT | Mod: 26,,, | Performed by: INTERNAL MEDICINE

## 2024-01-29 PROCEDURE — 97110 THERAPEUTIC EXERCISES: CPT

## 2024-01-29 PROCEDURE — 97530 THERAPEUTIC ACTIVITIES: CPT

## 2024-01-29 PROCEDURE — 77080 DXA BONE DENSITY AXIAL: CPT | Mod: TC,PO

## 2024-01-29 NOTE — PROGRESS NOTES
[facility]   Physical Therapy Treatment Note      Name: Adelaida Flores  [facility] Number: [id number]    Therapy Diagnosis:   No diagnosis found.      Physician: Mary Calderon NP    Visit Date: 1/29/2024    Physician: Catie Portillo MD     Physician Orders: PT Eval and Treat   Medical Diagnosis from Referral:   M25.511,G89.29 (ICD-10-CM) - Chronic right shoulder pain   R26.81 (ICD-10-CM) - Unsteady gait   W19.XXXD (ICD-10-CM) - Fall, subsequent encounter   Evaluation Date: 8/31/2023  Authorization Period Expiration: 1/29/24  Plan of Care Expiration: 11/31/2023, 2/27/24, 3/23/24  Progress Note Due: 9/31/2023, 12/27/23, 2/22/24  Visit # / Visits authorized:  13/23, 4/10  FOTO: 2/3         FOTO Initial Evaluation: 53  FOTO 2nd F/U: 63  FOTO Discharge: NA     Precautions: Standard      Time In: 9:15 am  Time Out: 10 am   Total Appointment Time (timed & untimed codes): 45min       PTA Visit #: 2/5     Subjective     Patient reports: she is sore from her fall at the dog park a few days ago. She states she could be working harder on her home exercises and is going to start her shoulder exercises again. She still feels like she lost a lot of leg strength since she hasn't been in the gym or riding her bike. Her MD has asked her to limit her exercise until she gains weight.     She was partially compliant with home exercise program.  Response to previous treatment: no adverse affect  Functional change: progressing    Pain: 3/10  Location: left knee    Objective      Update:   Balance Assessment:      Dynamic Gait Index  1. Gait on level surface: 3. Normal: Walks 20', No Assistive device, no evidence for imbalance. Normal gait pattern.  2. Change in Gait speed: 2. Mild impairment: Is able to change speed, but demonstrates mild gait deviations, or no gait deviations but unable to achieve a signifcant change in velocity, or uses an assistive device.  3. Gait with Horizontal Head Turns: 2. Mild impairment:  "  4. Gait with Vertical Head Turns: 3. Normal  5. Gait and Pivot turn: 2. Mild Impairment  6. Step Over Obstacle: 3. Normal   7. Step around obstacles: 3. Normal  8. Steps: 2. Mild impairment: Alternating feet, must use rail.     Score: 20/24     Interpretation: < 19/24 = predictive of falls in the elderly  > 22/24 = safe ambulators       30 sec sit to stand: 12 sec      SLS: R 3 sec  L 10 sec         Hip Right MMT Left MMT Pain/Dysfunction with Movement (pain=!)   AROM             flexion  WFL 4-/5  WFL 4-/5                   abduction  WFL 4/5  WFL 4/5                                    Knee Right MMT Left MMT Pain/Dysfunction with Movement (pain=!)   AROM             flexion  WFL 4/5  WFL 4/5     extension  WFL 4/5  WFL 4/5         mCTSIB:  Eyes open firm surface: 30 sec     Eyes closed firm surface: 30 sec     Eyes open soft surface: 30 sec     Eyes closed soft surface: 30 sec     Treatment     Adelaida received the treatments listed below:      therapeutic exercises to develop strength, endurance, ROM, flexibility, posture, and core stabilization for 10  minutes including:    [x]bike 6' Lvl 3 resistance   []LAQ 2# B 2x10  []B SLR 2# 2x10 BLE   []  []      neuromuscular re-education activities to improve: Balance, Coordination, Kinesthetic, Sense, Proprioception, and Posture for 3  minutes. The following activities were included:    []balance re-assessment completed     []sls B 2x30"  []cone taps 20x B    [x]marching on foam 1' x2   []GTB SLS /c march 20x B  []NBOS on airex 30s x 2  []Tandem stance on airex 2x30" RFF/LFF  []VMO extensions 2x10 B   []TKE maroon band left 3x10 BLE  []TKE in supine with towel for tactile cues x20 left lower extremity       therapeutic activities to improve functional performance for 32  minutes, including:  [x]bridge 2x10 with (2) 5# dumbbells   [x]shuttle dl 3 c 3x10   [x]shuttle sl 1.5 c 3x10 B   []sidelying hip abd  []touch and go squats on 24' plyobox  []STS No UE " 2x10  []Eccentric step downs lvl 2 15x B   [x]Squats 5# dumbells (2) 3x10   [x] [place] carry 10# x3 laps   [x]step ups lvl 3 fwd/  with (2) 5# dumbbells   [x] Lvl 3 lateral step over with 3# ankle weights 2x10       Patient Education and Home Exercises       Education provided:   - safety awareness  - educated on safe progressions of HEP including use of resistance bands       Written Home Exercises Provided: Patient instructed to cont prior HEP. Exercises were reviewed and Adelaida was able to demonstrate them prior to the end of the session.  Adelaida demonstrated good  understanding of the education provided. See Electronic Medical Record under Patient Instructions for exercises provided during therapy sessions    Assessment    She c/o twinges of pain in the R knee during shuttle leg press and some step ups. Weakness of L gluteal/quad noted with step ups, no worse after. She continues to require single UE support for balance exercises (march on foam). Cont weight training progression, for improved bone density & functional strength, as tolerated.         Adelaida Is progressing well towards her goals.   Patient prognosis is Good.     Patient will continue to benefit from skilled outpatient physical therapy to address the deficits listed in the problem list box on initial evaluation, provide pt/family education and to maximize pt's level of independence in the home and community environment.     Patient's spiritual, cultural and educational needs considered and pt agreeable to plan of care and goals.     Anticipated barriers to physical therapy: none    Goals:   Short Term Goals Status:   1. Pt will be (I) with BLE HEP for improved functional strength and balance (progressing)  2. Pt will demonstrate single leg stance >/=10 sec B for improved balance with functional tasks (progressing)      Long Term Goal Status: modified:  1. Pt will score 20/24 on DGI to reduce risk for falls in the community MET   2. Pt will score  59% on FOTO, indicating improved balance with functional activities  (progressing, modified)  3. Pt will demonstrate improved B hip strength to 4/5 grossly for reduced fall risk with dynamic movements (progressing, new)  4. Pt will improve 30 sec sit<>Stand score to 15 reps for improved BLE functional strength/endurance   Reasons for Recertification of Therapy:   extension of plan of care, focus on balance diagnosis  Plan     Updated Certification Period: 1/23/24 to 3/23/24   Recommended Treatment Plan: 1 times per week for 6 weeks:  Gait Training, Manual Therapy, Moist Heat/ Ice, Neuromuscular Re-ed, Patient Education, Therapeutic Activities, and Therapeutic Exercise  Other Recommendations: n/a       Catie Chan, PT

## 2024-01-31 ENCOUNTER — PATIENT MESSAGE (OUTPATIENT)
Dept: SURGERY | Facility: CLINIC | Age: 69
End: 2024-01-31
Payer: MEDICARE

## 2024-02-01 ENCOUNTER — ANESTHESIA EVENT (OUTPATIENT)
Dept: ENDOSCOPY | Facility: HOSPITAL | Age: 69
End: 2024-02-01
Payer: MEDICARE

## 2024-02-01 NOTE — ANESTHESIA PREPROCEDURE EVALUATION
02/01/2024  Adelaida Flores is a 68 y.o., female.  [facility]  Anesthesia Pre-Operative Evaluation       Patient Name: Adelaida Flores  YOB: 1955  MRN: [id number]  CSN: [id number]      Code Status: Prior   Date of Procedure: 2/19/2024  Anesthesia: Choice Procedure: Procedure(s) (LRB):  EGD (ESOPHAGOGASTRODUODENOSCOPY) (N/A)  Pre-Operative Diagnosis: Esophagitis [K20.90]  Proceduralist: Surgeon(s) and Role:     * Alfredo Gaxiola MD - Primary        SUBJECTIVE:   Adelaida Flores is a 68 y.o. female who  has a past medical history of Alcohol dependence in sustained full remission (03/21/2022), Allergy, Anxiety (02/22/2019), Broken hip, Colon polyps, Depression, Hyperlipidemia (02/22/2019), IBS (irritable bowel syndrome), Osteoporosis, T12 compression fracture s/p corpectomy (07/13/2013), and Volvulus..     she has a current medication list which includes the following long-term medication(s): fluoxetine, lamotrigine, levocetirizine, and omeprazole.     ALLERGIES:     Review of patient's allergies indicates:   Allergen Reactions    Cefdinir Diarrhea    Grass pollen-red top, standard     House dust Itching     LDA:          Lines/Drains/Airways       None                  Anesthesia Evaluation      Airway   TM distance: Normal  Neck ROM: Normal ROM  Dental      Pulmonary    (+) sleep apnea  Cardiovascular     Rate: Normal    Neuro/Psych      GI/Hepatic/Renal      Endo/Other  Arthritis: Osteoporosis.  Abdominal                     MEDICATIONS:     Antibiotics (From admission, onward)      None          VTE Risk Mitigation (From admission, onward)      None              No current facility-administered medications for this encounter.     Current Outpatient Medications   Medication Sig Dispense Refill    denosumab (PROLIA) 60 mg/mL Syrg       fluorometholone 0.1% (FML) 0.1 % DrpS Place  into both eyes.      FLUoxetine 40 MG capsule Take 2 capsules (80 mg total) by mouth once daily. 180 capsule 2    fluticasone propionate (FLONASE) 50 mcg/actuation nasal spray 2 sprays (100 mcg total) by Each Nostril route once daily. 9.9 mL 11    hydrocortisone (ANUSOL-HC) 2.5 % rectal cream Place rectally 2 (two) times daily. 28 g 2    hydrOXYzine pamoate (VISTARIL) 25 MG Cap Take 1 capsule (25 mg total) by mouth nightly as needed (insomnia). 90 capsule 2    KRILL OIL ORAL       lamoTRIgine (LAMICTAL) 150 MG Tab Take 1 tablet (150 mg total) by mouth once daily. 90 tablet 1    levocetirizine (XYZAL) 5 MG tablet Take 1 tablet (5 mg total) by mouth every evening. 30 tablet 11    multivitamin capsule Take 1 capsule by mouth once daily.      omeprazole (PRILOSEC) 40 MG capsule Take 1 capsule (40 mg total) by mouth 2 (two) times daily before meals. 60 capsule 5    vitamin D (VITAMIN D3) 1000 units Tab Take 1,000 Units by mouth once daily.            History:   There are no hospital problems to display for this patient.    Surgical History:    has a past surgical history that includes Fracture surgery; Tonsillectomy; Colonoscopy (N/A, 8/28/2017); Colon surgery; Spine surgery; Eye surgery (2012); Small intestine surgery (2011); Hernia repair (2012); Esophagogastroduodenoscopy (N/A, 9/26/2023); and Colonoscopy (N/A, 9/26/2023).   Social History:    reports being sexually active and has had partner(s) who are male. She reports using the following method of birth control/protection: None.  reports that she quit smoking about 31 years ago. Her smoking use included cigarettes. She started smoking about 41 years ago. She has a 10.0 pack-year smoking history. She has never used smokeless tobacco. She reports that she does not drink alcohol and does not use drugs.     OBJECTIVE:     Vital Signs (Most Recent):    Vital Signs Range (Last 24H):          There is no height or weight on file to calculate BMI.   Wt Readings from Last 4  "Encounters:   12/12/23 53.1 kg (117 lb 1 oz)   10/12/23 52.1 kg (114 lb 14.5 oz)   10/04/23 47.6 kg (105 lb)   09/26/23 47.6 kg (105 lb)       Significant Labs:  Lab Results   Component Value Date    WBC 5.78 07/21/2023    HGB 12.4 07/21/2023    HCT 38.8 07/21/2023     07/21/2023     07/21/2023    K 4.3 07/21/2023     07/21/2023    CREATININE 0.7 07/21/2023    BUN 23 07/21/2023    CO2 28 07/21/2023    GLU 87 07/21/2023    CALCIUM 9.4 07/21/2023    MG 2.1 07/21/2023    PHOS 4.0 07/21/2023    ALKPHOS 78 01/12/2024    ALT 20 01/12/2024    AST 25 01/12/2024    ALBUMIN 4.1 01/12/2024    INR 1.0 05/10/2022    APTT 22.6 07/15/2013    HGBA1C 5.2 10/22/2021     07/24/2011    CPKMB 2.8 07/24/2011    TROPONINI 0.013 07/24/2011    MB 1.7 07/24/2011     No LMP recorded. Patient is postmenopausal.  No results found for this or any previous visit (from the past 72 hour(s)).    EKG:   Results for orders placed or performed during the hospital encounter of 07/21/23   SCHEDULED EKG 12-LEAD (to Muse)    Collection Time: 07/21/23 12:04 PM    Narrative    Test Reason : F50.00,    Vent. Rate : 058 BPM     Atrial Rate : 000 BPM     P-R Int : 166 ms          QRS Dur : 000 ms      QT Int : 430 ms       P-R-T Axes : 078 052 022 degrees     QTc Int : 423 ms     EKG waveform PDF for this EKG is in EPIC     Sinus bradycardia  Otherwise normal ECG  When compared with ECG of 02-Jun-2015 09:30:04,  No significant change was found  Confirmed by Preston Wiley MD ([id number]),  LORI ORDONEZ ([id number]) on  7/21/2023 12:20:50 PM    Referred By: GILMA LOERA           Confirmed By:Preston Wiley MD       TTE:  No results found. However, due to the size of the patient record, not all encounters were searched. Please check Results Review for a complete set of results.  No results found for: "EF"   No results found. However, due to the size of the patient record, not all encounters were searched. Please check Results Review " "for a complete set of results.  JAVIER:  No results found. However, due to the size of the patient record, not all encounters were searched. Please check Results Review for a complete set of results.  Stress Test:  No results found for this or any previous visit.     LHC:  No results found for this or any previous visit.     PFT:  No results found for: "FEV1", "FVC", "HVQ1CLT", "TLC", "DLCO"     ASSESSMENT/PLAN:        Pre-op Assessment    I have reviewed the Patient Summary Reports.     I have reviewed the Nursing Notes. I have reviewed the NPO Status.   I have reviewed the Medications.     Review of Systems  Anesthesia Hx:             Denies Family Hx of Anesthesia complications.    Denies Personal Hx of Anesthesia complications.                    Social:  Alcohol Use ETOH use in remission  Easting disorder      Cardiovascular:                hyperlipidemia                             Pulmonary:        Sleep Apnea                Hepatic/GI:        IBS          Musculoskeletal:  Arthritis: Osteoporosis.   Balance Disorder            Psych:    depression                Physical Exam  General: Well nourished, Cooperative, Alert and Oriented    Airway:  Mouth Opening: Normal  TM Distance: Normal  Tongue: Normal  Neck ROM: Normal ROM    Chest/Lungs:  Clear to auscultation    Heart:  Rate: Normal    Abdomen:  Normal        Anesthesia Plan  Type of Anesthesia, risks & benefits discussed:    Anesthesia Type: Gen Natural Airway  Intra-op Monitoring Plan: Standard ASA Monitors  Post Op Pain Control Plan: multimodal analgesia  Induction:  IV  Informed Consent: Informed consent signed with the Patient and all parties understand the risks and agree with anesthesia plan.  All questions answered. Patient consented to blood products? No  ASA Score: 3  Day of Surgery Review of History & Physical: H&P Update referred to the surgeon/provider.    Ready For Surgery From Anesthesia Perspective.     .      "

## 2024-02-05 ENCOUNTER — CLINICAL SUPPORT (OUTPATIENT)
Dept: REHABILITATION | Facility: HOSPITAL | Age: 69
End: 2024-02-05
Payer: MEDICARE

## 2024-02-05 ENCOUNTER — PATIENT MESSAGE (OUTPATIENT)
Dept: ENDOCRINOLOGY | Facility: CLINIC | Age: 69
End: 2024-02-05
Payer: MEDICARE

## 2024-02-05 DIAGNOSIS — G89.29 CHRONIC RIGHT SHOULDER PAIN: Primary | ICD-10-CM

## 2024-02-05 DIAGNOSIS — M25.611 DECREASED ROM OF RIGHT SHOULDER: ICD-10-CM

## 2024-02-05 DIAGNOSIS — M25.511 CHRONIC RIGHT SHOULDER PAIN: Primary | ICD-10-CM

## 2024-02-05 DIAGNOSIS — R29.898 WEAKNESS OF SHOULDER: ICD-10-CM

## 2024-02-05 PROCEDURE — 97530 THERAPEUTIC ACTIVITIES: CPT

## 2024-02-05 PROCEDURE — 97112 NEUROMUSCULAR REEDUCATION: CPT

## 2024-02-05 NOTE — PROGRESS NOTES
[facility]   Physical Therapy Treatment Note      Name: Adelaida Flores  [facility] Number: [id number]    Therapy Diagnosis:   Encounter Diagnoses   Name Primary?    Chronic right shoulder pain Yes    Weakness of shoulder     Decreased ROM of right shoulder          Physician: Mary Calderon NP    Visit Date: 2/5/2024    Physician: Catie Portillo MD     Physician Orders: PT Eval and Treat   Medical Diagnosis from Referral:   M25.511,G89.29 (ICD-10-CM) - Chronic right shoulder pain   R26.81 (ICD-10-CM) - Unsteady gait   W19.XXXD (ICD-10-CM) - Fall, subsequent encounter   Evaluation Date: 8/31/2023  Authorization Period Expiration: 1/29/24  Plan of Care Expiration: 11/31/2023, 2/27/24, 3/23/24  Progress Note Due: 9/31/2023, 12/27/23, 2/22/24  Visit # / Visits authorized:  13/23, 5/10  FOTO: 2/3         FOTO Initial Evaluation: 53  FOTO 2nd F/U: 63  FOTO Discharge: NA     Precautions: Standard      Time In: 9:15 am  Time Out: 10 am   Total Appointment Time (timed & untimed codes): 45min       PTA Visit #: 2/5     Subjective     Patient reports: last session was the first time she felt some DOMs after a PT session. She is not sore today.     She was partially compliant with home exercise program.  Response to previous treatment: no adverse affect  Functional change: progressing    Pain: 3/10  Location: left knee    Objective      Update:   Balance Assessment:      Dynamic Gait Index  1. Gait on level surface: 3. Normal: Walks 20', No Assistive device, no evidence for imbalance. Normal gait pattern.  2. Change in Gait speed: 2. Mild impairment: Is able to change speed, but demonstrates mild gait deviations, or no gait deviations but unable to achieve a signifcant change in velocity, or uses an assistive device.  3. Gait with Horizontal Head Turns: 2. Mild impairment:   4. Gait with Vertical Head Turns: 3. Normal  5. Gait and Pivot turn: 2. Mild Impairment  6. Step Over Obstacle: 3. Normal   7. Step  "around obstacles: 3. Normal  8. Steps: 2. Mild impairment: Alternating feet, must use rail.     Score: 20/24     Interpretation: < 19/24 = predictive of falls in the elderly  > 22/24 = safe ambulators       30 sec sit to stand: 12 sec      SLS: R 3 sec  L 10 sec         Hip Right MMT Left MMT Pain/Dysfunction with Movement (pain=!)   AROM             flexion  WFL 4-/5  WFL 4-/5                   abduction  WFL 4/5  WFL 4/5                                    Knee Right MMT Left MMT Pain/Dysfunction with Movement (pain=!)   AROM             flexion  WFL 4/5  WFL 4/5     extension  WFL 4/5  WFL 4/5         mCTSIB:  Eyes open firm surface: 30 sec     Eyes closed firm surface: 30 sec     Eyes open soft surface: 30 sec     Eyes closed soft surface: 30 sec     Treatment     Adelaida received the treatments listed below:      therapeutic exercises to develop strength, endurance, ROM, flexibility, posture, and core stabilization for 8  minutes including:    [x]bike 6' Lvl 3 resistance   [x]LAQ 2# B 2x15  []B SLR 2# 2x10 BLE   []  []      neuromuscular re-education activities to improve: Balance, Coordination, Kinesthetic, Sense, Proprioception, and Posture for 14   minutes. The following activities were included:    [x] Fwd step ups on BOSU with 2# ankle weights x20 B (single UE support)   []  [x]sls B 2x30"  [x]cone taps B 1min on airex with 2# ankle weights   []marching on foam 1' x2   []GTB SLS /c march 20x B  []NBOS on airex 30s x 2  [x]Tandem stance on airex 2x30" RFF/LFF  []VMO extensions 2x10 B   []TKE maroon band left 3x10 BLE  []TKE in supine with towel for tactile cues x20 left lower extremity         therapeutic activities to improve functional performance for 23   minutes, including:  [x]bridge 2x10 with (2) 7# dumbbells   []shuttle dl 3 c 3x10   []shuttle sl 1.5 c 3x10 B   [x]sidelying hip abd 2# 2x15 B   [x]touch and go squats on 18' plyobox with single airex & (2) 7# dumbbells   2x15   []STS No UE " 2x10  []Eccentric step downs lvl 2 15x B   []Squats 5# dumbells (2) 3x10   [x] [place] carry 15# x3 laps   []step ups lvl 3 fwd/  with (2) 5# dumbbells   [] Lvl 3 lateral step over with 3# ankle weights 2x10       Patient Education and Home Exercises       Education provided:   - safety awareness  - educated on safe progressions of HEP including use of resistance bands       Written Home Exercises Provided: Patient instructed to cont prior HEP. Exercises were reviewed and Adelaida was able to demonstrate them prior to the end of the session.  Adelaida demonstrated good  understanding of the education provided. See Electronic Medical Record under Patient Instructions for exercises provided during therapy sessions    Assessment    Pt demonstrated LOB while performing squats and cones taps on airex, requiring CGA to recover. Continued progression of weight training with current exercises. Used mirror for visual cues with farmer's carry to maintain neutral spine. Reducing single UE support required with tandem balance on airex. Cont as tolerated.       Adelaida Is progressing well towards her goals.   Patient prognosis is Good.     Patient will continue to benefit from skilled outpatient physical therapy to address the deficits listed in the problem list box on initial evaluation, provide pt/family education and to maximize pt's level of independence in the home and community environment.     Patient's spiritual, cultural and educational needs considered and pt agreeable to plan of care and goals.     Anticipated barriers to physical therapy: none    Goals:   Short Term Goals Status:   1. Pt will be (I) with BLE HEP for improved functional strength and balance (progressing)  2. Pt will demonstrate single leg stance >/=10 sec B for improved balance with functional tasks (progressing)      Long Term Goal Status: modified:  1. Pt will score 20/24 on DGI to reduce risk for falls in the community MET   2. Pt will score 59% on  FOTO, indicating improved balance with functional activities  (progressing, modified)  3. Pt will demonstrate improved B hip strength to 4/5 grossly for reduced fall risk with dynamic movements (progressing, new)  4. Pt will improve 30 sec sit<>Stand score to 15 reps for improved BLE functional strength/endurance   Reasons for Recertification of Therapy:   extension of plan of care, focus on balance diagnosis  Plan     Updated Certification Period: 1/23/24 to 3/23/24   Recommended Treatment Plan: 1 times per week for 6 weeks:  Gait Training, Manual Therapy, Moist Heat/ Ice, Neuromuscular Re-ed, Patient Education, Therapeutic Activities, and Therapeutic Exercise  Other Recommendations: n/a       Catie Chan, PT

## 2024-02-12 ENCOUNTER — CLINICAL SUPPORT (OUTPATIENT)
Dept: REHABILITATION | Facility: HOSPITAL | Age: 69
End: 2024-02-12
Payer: MEDICARE

## 2024-02-12 DIAGNOSIS — M25.511 CHRONIC RIGHT SHOULDER PAIN: Primary | ICD-10-CM

## 2024-02-12 DIAGNOSIS — M25.611 DECREASED ROM OF RIGHT SHOULDER: ICD-10-CM

## 2024-02-12 DIAGNOSIS — R29.898 WEAKNESS OF SHOULDER: ICD-10-CM

## 2024-02-12 DIAGNOSIS — G89.29 CHRONIC RIGHT SHOULDER PAIN: Primary | ICD-10-CM

## 2024-02-12 PROCEDURE — 97530 THERAPEUTIC ACTIVITIES: CPT | Mod: CQ

## 2024-02-12 PROCEDURE — 97112 NEUROMUSCULAR REEDUCATION: CPT | Mod: CQ

## 2024-02-12 NOTE — PROGRESS NOTES
"[facility]   Physical Therapy Treatment Note      Name: Adelaida Flores  Clinic Number: [id number]    Therapy Diagnosis:   Encounter Diagnoses   Name Primary?    Chronic right shoulder pain Yes    Weakness of shoulder     Decreased ROM of right shoulder          Physician: Mary Calderon NP    Visit Date: 2/12/2024    Physician: Catie Portillo MD     Physician Orders: PT Eval and Treat   Medical Diagnosis from Referral:   M25.511,G89.29 (ICD-10-CM) - Chronic right shoulder pain   R26.81 (ICD-10-CM) - Unsteady gait   W19.XXXD (ICD-10-CM) - Fall, subsequent encounter   Evaluation Date: 8/31/2023  Authorization Period Expiration: 1/29/24  Plan of Care Expiration: 11/31/2023, 2/27/24, 3/23/24  Progress Note Due: 9/31/2023, 12/27/23, 2/22/24  Visit # / Visits authorized:  13/23, 6/10  FOTO: 2/3         FOTO Initial Evaluation: 53  FOTO 2nd F/U: 63  FOTO Discharge: NA     Precautions: Standard      Time In: 9:15 am  Time Out: 1000 am   Total Appointment Time (timed & untimed codes): 45min       PTA Visit #: 1/5     Subjective     Patient reports: under a lot of stress lately. She was very sore in her legs from home exercises.    She was partially compliant with home exercise program.  Response to previous treatment: no adverse affect  Functional change: progressing    Pain: 0/10  Location: left knee    Objective          Treatment     Adelaida received the treatments listed below:      therapeutic exercises to develop strength, endurance, ROM, flexibility, posture, and core stabilization for 8  minutes including:    [x]bike 6' Lvl 3 resistance   [x]LAQ 2# B 2x15  []B SLR 2# 2x10 BLE   []  []      neuromuscular re-education activities to improve: Balance, Coordination, Kinesthetic, Sense, Proprioception, and Posture for 14   minutes. The following activities were included:    [x] Fwd step ups on BOSU with 2# ankle weights x20 B (single UE support)   []  [x]sls B 2x30"  [x]cone taps B 1min on airex with " "2# ankle weights   []marching on foam 1' x2   []GTB SLS /c march 20x B  []NBOS on airex 30s x 2  [x]Tandem stance on airex 2x30" RFF/LFF  []VMO extensions 2x10 B   []TKE maroon band left 3x10 BLE  []TKE in supine with towel for tactile cues x20 left lower extremity         therapeutic activities to improve functional performance for 23   minutes, including:  [x]bridge 2x10 with (2) 7# dumbbells   []shuttle dl 3 c 3x10   []shuttle sl 1.5 c 3x10 B   [x]sidelying hip abd 2# 2x15 B   [x]touch and go squats on 18' plyobox with single airex & (2) 7# dumbbells   2x15   []STS No UE 2x10  []Eccentric step downs lvl 2 15x B   []Squats 5# dumbells (2) 3x10   [x] [place] carry 15# x3 laps   [x]step ups lvl 3 fwd/  with (2) 5# dumbbells   [] Lvl 3 lateral step over with 3# ankle weights 2x10       Patient Education and Home Exercises       Education provided:   - safety awareness  - educated on safe progressions of HEP including use of resistance bands       Written Home Exercises Provided: Patient instructed to cont prior HEP. Exercises were reviewed and Adelaida was able to demonstrate them prior to the end of the session.  Adelaida demonstrated good  understanding of the education provided. See Electronic Medical Record under Patient Instructions for exercises provided during therapy sessions    Assessment    Pt demonstrated need for intermittent UE support with all balance activities. Pt remains with decreased confidence in community setting mostly when attending the dog [place]. Step ups performed this date d/t patients difficulty outside of clinic. Patient continues to be motivated to improve her condition.       Adelaida Is progressing well towards her goals.   Patient prognosis is Good.     Patient will continue to benefit from skilled outpatient physical therapy to address the deficits listed in the problem list box on initial evaluation, provide pt/family education and to maximize pt's level of independence in the home and " community environment.     Patient's spiritual, cultural and educational needs considered and pt agreeable to plan of care and goals.     Anticipated barriers to physical therapy: none    Goals:   Short Term Goals Status:   1. Pt will be (I) with BLE HEP for improved functional strength and balance (progressing)  2. Pt will demonstrate single leg stance >/=10 sec B for improved balance with functional tasks (progressing)      Long Term Goal Status: modified:  1. Pt will score 20/24 on DGI to reduce risk for falls in the community MET   2. Pt will score 59% on FOTO, indicating improved balance with functional activities  (progressing, modified)  3. Pt will demonstrate improved B hip strength to 4/5 grossly for reduced fall risk with dynamic movements (progressing, new)  4. Pt will improve 30 sec sit<>Stand score to 15 reps for improved BLE functional strength/endurance   Reasons for Recertification of Therapy:   extension of plan of care, focus on balance diagnosis  Plan     Updated Certification Period: 1/23/24 to 3/23/24   Recommended Treatment Plan: 1 times per week for 6 weeks:  Gait Training, Manual Therapy, Moist Heat/ Ice, Neuromuscular Re-ed, Patient Education, Therapeutic Activities, and Therapeutic Exercise  Other Recommendations: n/a       Preston Bustillos, PTA

## 2024-02-19 ENCOUNTER — HOSPITAL ENCOUNTER (OUTPATIENT)
Facility: HOSPITAL | Age: 69
Discharge: HOME | End: 2024-02-19
Attending: INTERNAL MEDICINE | Admitting: INTERNAL MEDICINE
Payer: MEDICARE

## 2024-02-19 ENCOUNTER — ANESTHESIA (OUTPATIENT)
Dept: ENDOSCOPY | Facility: HOSPITAL | Age: 69
End: 2024-02-19
Payer: MEDICARE

## 2024-02-19 VITALS
WEIGHT: 117 LBS | TEMPERATURE: 99 F | SYSTOLIC BLOOD PRESSURE: 103 MMHG | HEIGHT: 65 IN | OXYGEN SATURATION: 98 % | DIASTOLIC BLOOD PRESSURE: 61 MMHG | BODY MASS INDEX: 19.49 KG/M2 | RESPIRATION RATE: 12 BRPM | HEART RATE: 62 BPM

## 2024-02-19 DIAGNOSIS — K20.90 ESOPHAGITIS: Primary | ICD-10-CM

## 2024-02-19 PROCEDURE — 94761 N-INVAS EAR/PLS OXIMETRY MLT: CPT

## 2024-02-19 PROCEDURE — D9220A PRA ANESTHESIA: Mod: ,,, | Performed by: NURSE ANESTHETIST, CERTIFIED REGISTERED

## 2024-02-19 PROCEDURE — 25000003 PHARM REV CODE 250: Performed by: NURSE ANESTHETIST, CERTIFIED REGISTERED

## 2024-02-19 PROCEDURE — 27201012 HC FORCEPS, HOT/COLD, DISP: Performed by: INTERNAL MEDICINE

## 2024-02-19 PROCEDURE — 37000009 HC ANESTHESIA EA ADD 15 MINS: Performed by: INTERNAL MEDICINE

## 2024-02-19 PROCEDURE — 63600175 PHARM REV CODE 636 W HCPCS: Performed by: NURSE ANESTHETIST, CERTIFIED REGISTERED

## 2024-02-19 PROCEDURE — 99900035 HC TECH TIME PER 15 MIN (STAT)

## 2024-02-19 PROCEDURE — 88305 TISSUE EXAM BY PATHOLOGIST: CPT | Performed by: PATHOLOGY

## 2024-02-19 PROCEDURE — 88305 TISSUE EXAM BY PATHOLOGIST: CPT | Mod: 26,,, | Performed by: PATHOLOGY

## 2024-02-19 PROCEDURE — 43239 EGD BIOPSY SINGLE/MULTIPLE: CPT | Performed by: INTERNAL MEDICINE

## 2024-02-19 PROCEDURE — 43239 EGD BIOPSY SINGLE/MULTIPLE: CPT | Mod: ,,, | Performed by: INTERNAL MEDICINE

## 2024-02-19 PROCEDURE — 37000008 HC ANESTHESIA 1ST 15 MINUTES: Performed by: INTERNAL MEDICINE

## 2024-02-19 RX ORDER — LIDOCAINE HYDROCHLORIDE 20 MG/ML
INJECTION INTRAVENOUS
Status: DISCONTINUED | OUTPATIENT
Start: 2024-02-19 | End: 2024-02-19

## 2024-02-19 RX ORDER — SODIUM CHLORIDE 9 MG/ML
INJECTION, SOLUTION INTRAVENOUS CONTINUOUS
Status: DISCONTINUED | OUTPATIENT
Start: 2024-02-19 | End: 2024-02-19 | Stop reason: HOSPADM

## 2024-02-19 RX ORDER — PROPOFOL 10 MG/ML
VIAL (ML) INTRAVENOUS
Status: DISCONTINUED | OUTPATIENT
Start: 2024-02-19 | End: 2024-02-19

## 2024-02-19 RX ORDER — PROPOFOL 10 MG/ML
VIAL (ML) INTRAVENOUS CONTINUOUS PRN
Status: DISCONTINUED | OUTPATIENT
Start: 2024-02-19 | End: 2024-02-19

## 2024-02-19 RX ADMIN — LIDOCAINE HYDROCHLORIDE 50 MG: 20 INJECTION INTRAVENOUS at 07:02

## 2024-02-19 RX ADMIN — PROPOFOL 200 MCG/KG/MIN: 10 INJECTION, EMULSION INTRAVENOUS at 07:02

## 2024-02-19 RX ADMIN — PROPOFOL 70 MG: 10 INJECTION, EMULSION INTRAVENOUS at 07:02

## 2024-02-19 RX ADMIN — GLYCOPYRROLATE 0.2 MG: 0.2 INJECTION, SOLUTION INTRAMUSCULAR; INTRAVENOUS at 07:02

## 2024-02-19 RX ADMIN — SODIUM CHLORIDE: 0.9 INJECTION, SOLUTION INTRAVENOUS at 06:02

## 2024-02-19 NOTE — ANESTHESIA POSTPROCEDURE EVALUATION
Anesthesia Post Evaluation    Patient: Adelaida Flores    Procedure(s) Performed: Procedure(s) (LRB):  EGD (ESOPHAGOGASTRODUODENOSCOPY) (N/A)    Final Anesthesia Type: general      Patient location during evaluation: PACU  Patient participation: Yes- Able to Participate  Level of consciousness: awake and alert  Post-procedure vital signs: reviewed and stable  Pain management: adequate  Airway patency: patent    PONV status at discharge: No PONV  Anesthetic complications: no      Cardiovascular status: stable  Respiratory status: room air  Hydration status: euvolemic  Follow-up not needed.              Vitals Value Taken Time   /61 02/19/24 0817   Temp 37.2 °C (99 °F) 02/19/24 0755   Pulse 61 02/19/24 0818   Resp 20 02/19/24 0818   SpO2 99 % 02/19/24 0818   Vitals shown include unvalidated device data.      Event Time   Out of Recovery 08:10:00         Pain/Pascale Score: Pascale Score: 10 (2/19/2024  8:10 AM)

## 2024-02-19 NOTE — H&P
Short Stay Endoscopy History and Physical    PCP - Catie Portillo MD    Procedure - EGD  Sedation: GA  ASA - per anesthesia  Mallampati - per anesthesia  History of Anesthesia problems - no  Family history Anesthesia problems -  no     HPI:  This is a 68 y.o. female here for evaluation of : GERD    Reflux - yes  Dysphagia - no  Abdominal pain - no  Diarrhea - no    ROS:  Constitutional: No fevers, chills, No weight loss  ENT: No allergies  CV: No chest pain  Pulm: No cough, No shortness of breath  Ophtho: No vision changes  GI: see HPI  Medical History:  has a past medical history of Alcohol dependence in sustained full remission (03/21/2022), Allergy, Anxiety (02/22/2019), Broken hip, Colon polyps, Depression, GERD (gastroesophageal reflux disease), Hyperlipidemia (02/22/2019), IBS (irritable bowel syndrome), Osteoporosis, T12 compression fracture s/p corpectomy (07/13/2013), and Volvulus.    Surgical History:  has a past surgical history that includes Fracture surgery; Tonsillectomy; Colonoscopy (N/A, 8/28/2017); Colon surgery; Spine surgery; Eye surgery (2012); Small intestine surgery (2011); Hernia repair (2012); Esophagogastroduodenoscopy (N/A, 9/26/2023); and Colonoscopy (N/A, 9/26/2023).    Family History: family history includes Asthma in her brother; Cancer in her father, mother, and sister; Colon polyps in her father and mother; Heart disease in her father; Osteoarthritis in her maternal aunt, maternal grandmother, and mother.. Otherwise no colon cancer, inflammatory bowel disease, or GI malignancies.    Social History:  reports that she quit smoking about 31 years ago. Her smoking use included cigarettes. She started smoking about 41 years ago. She has a 10.0 pack-year smoking history. She has never used smokeless tobacco. She reports that she does not drink alcohol and does not use drugs.    Review of patient's allergies indicates:   Allergen Reactions    Cefdinir Diarrhea    Grass pollen-red top, standard      House dust Itching       Medications:   Medications Prior to Admission   Medication Sig Dispense Refill Last Dose    FLUoxetine 40 MG capsule Take 2 capsules (80 mg total) by mouth once daily. 180 capsule 2 2/18/2024    fluticasone propionate (FLONASE) 50 mcg/actuation nasal spray 2 sprays (100 mcg total) by Each Nostril route once daily. 9.9 mL 11 Past Week    KRILL OIL ORAL    Past Month    lamoTRIgine (LAMICTAL) 150 MG Tab Take 1 tablet (150 mg total) by mouth once daily. 90 tablet 1 2/18/2024    levocetirizine (XYZAL) 5 MG tablet Take 1 tablet (5 mg total) by mouth every evening. 30 tablet 11 2/18/2024    multivitamin capsule Take 1 capsule by mouth once daily.   2/18/2024    omeprazole (PRILOSEC) 40 MG capsule Take 1 capsule (40 mg total) by mouth 2 (two) times daily before meals. 60 capsule 5 2/18/2024    vitamin D (VITAMIN D3) 1000 units Tab Take 1,000 Units by mouth once daily.   Past Week    denosumab (PROLIA) 60 mg/mL Syrg    More than a month    fluorometholone 0.1% (FML) 0.1 % DrpS Place into both eyes.       hydrocortisone (ANUSOL-HC) 2.5 % rectal cream Place rectally 2 (two) times daily. 28 g 2     hydrOXYzine pamoate (VISTARIL) 25 MG Cap Take 1 capsule (25 mg total) by mouth nightly as needed (insomnia). 90 capsule 2 More than a month       Objective Findings:    Vital Signs: Per nursing notes.    Physical Exam:  General Appearance: Well appearing in no acute distress  Head:   Normocephalic, without obvious abnormality  Eyes:    No scleral icterus  Airway: Open  Neck: No restriction in mobility  Lungs: CTA bilaterally in anterior and posterior fields, no wheezes, no crackles.  Heart:  Regular rate and rhythm, S1, S2 normal, no murmurs heard  Abdomen: Soft, non tender, non distended      Labs:  Lab Results   Component Value Date    WBC 5.78 07/21/2023    HGB 12.4 07/21/2023    HCT 38.8 07/21/2023     07/21/2023    CHOL 196 10/22/2021    TRIG 89 10/22/2021    HDL 52 10/22/2021    ALT 20  01/12/2024    AST 25 01/12/2024     07/21/2023    K 4.3 07/21/2023     07/21/2023    CREATININE 0.7 07/21/2023    BUN 23 07/21/2023    CO2 28 07/21/2023    TSH 4.574 (H) 07/08/2022    INR 1.0 05/10/2022    HGBA1C 5.2 10/22/2021         I have explained the risks and benefits of endoscopy procedures to the patient including but not limited to bleeding, perforation, infection, and death.    Thank you so much for allowing me to participate in the care of Adelaida Gaxiola MD

## 2024-02-19 NOTE — [employer] PATIENT INSTRUCTIONS
Discharge Summary/Instructions after an Endoscopic Procedure  Patient Name: Adelaida Flores  Patient MRN: [id number]  Patient YOB: 1955 Monday, February 19, 2024  Alfredo Gaxiola MD  Dear patient,  As a result of recent federal legislation (The Federal Cures Act), you may   receive lab or pathology results from your procedure in your [facility]   account before your physician is able to contact you. Your physician or   their representative will relay the results to you with their   recommendations at their soonest availability.  Thank you,  RESTRICTIONS:  During your procedure today, you received medications for sedation.  These   medications may affect your judgment, balance and coordination.  Therefore,   for 24 hours, you have the following restrictions:   - DO NOT drive a car, operate machinery, make legal/financial decisions,   sign important papers or drink alcohol.    ACTIVITY:  Today: no heavy lifting, straining or running due to procedural   sedation/anesthesia.  The following day: return to full activity including work.  DIET:  Eat and drink normally unless instructed otherwise.     TREATMENT FOR COMMON SIDE EFFECTS:  - Mild abdominal pain, nausea, belching, bloating or excessive gas:  rest,   eat lightly and use a heating pad.  - Sore Throat: treat with throat lozenges and/or gargle with warm salt   water.  - Because air was used during the procedure, expelling large amounts of air   from your rectum or belching is normal.  - If a bowel prep was taken, you may not have a bowel movement for 1-3 days.    This is normal.  SYMPTOMS TO WATCH FOR AND REPORT TO YOUR PHYSICIAN:  1. Abdominal pain or bloating, other than gas cramps.  2. Chest pain.  3. Back pain.  4. Signs of infection such as: chills or fever occurring within 24 hours   after the procedure.  5. Rectal bleeding, which would show as bright red, maroon, or black stools.   (A tablespoon of blood from the rectum is not serious, especially  if   hemorrhoids are present.)  6. Vomiting.  7. Weakness or dizziness.  GO DIRECTLY TO THE NEAREST EMERGENCY ROOM IF YOU HAVE ANY OF THE FOLLOWING:      Difficulty breathing              Chills and/or fever over 101 F   Persistent vomiting and/or vomiting blood   Severe abdominal pain   Severe chest pain   Black, tarry stools   Bleeding- more than one tablespoon   Any other symptom or condition that you feel may need urgent attention  Your doctor recommends these additional instructions:  If any biopsies were taken, your doctors clinic will contact you in 1 to 2   weeks with any results.  - Patient has a contact number available for emergencies.  The signs and   symptoms of potential delayed complications were discussed with the   patient.  Return to normal activities tomorrow.  Written discharge   instructions were provided to the patient.   - Discharge patient to home.   - Resume previous diet.   - Continue present medications.   - Await pathology results.   - Follow an antireflux regimen.   For questions, problems or results please call your physician - Alfredo Gaxiola MD at Work:  [phone].  [facility] [place] [facility], EMERGENCY ROOM PHONE NUMBER: [phone]  IF A COMPLICATION OR EMERGENCY SITUATION ARISES AND YOU ARE UNABLE TO REACH   YOUR PHYSICIAN - GO DIRECTLY TO THE EMERGENCY ROOM.  Alfredo Gaxiola MD  2/19/2024 7:52:20 AM  This report has been verified and signed electronically.  Dear patient,  As a result of recent federal legislation (The Federal Cures Act), you may   receive lab or pathology results from your procedure in your [facility]   account before your physician is able to contact you. Your physician or   their representative will relay the results to you with their   recommendations at their soonest availability.  Thank you,  [employer]

## 2024-02-19 NOTE — TRANSFER OF CARE
"Anesthesia Transfer of Care Note    Patient: Adelaida Flores    Procedure(s) Performed: Procedure(s) (LRB):  EGD (ESOPHAGOGASTRODUODENOSCOPY) (N/A)    Patient location: PACU    Anesthesia Type: general    Transport from OR: Transported from OR on room air with adequate spontaneous ventilation    Post pain: adequate analgesia    Post assessment: no apparent anesthetic complications and tolerated procedure well    Post vital signs: stable    Level of consciousness: responds to stimulation and sedated    Nausea/Vomiting: no nausea/vomiting    Complications: none    Transfer of care protocol was followed      Last vitals: Visit Vitals  BP (!) 100/55 (BP Location: Left arm, Patient Position: Lying)   Pulse (!) 56   Resp 20   Ht 5' 5" (1.651 m)   Wt 53.1 kg (117 lb)   SpO2 98%   Breastfeeding No   BMI 19.47 kg/m²     "

## 2024-02-20 ENCOUNTER — OFFICE VISIT (OUTPATIENT)
Dept: PSYCHIATRY | Facility: CLINIC | Age: 69
End: 2024-02-20
Payer: MEDICARE

## 2024-02-20 DIAGNOSIS — R63.0 ANOREXIA: Primary | ICD-10-CM

## 2024-02-20 DIAGNOSIS — F33.1 MDD (MAJOR DEPRESSIVE DISORDER), RECURRENT EPISODE, MODERATE: ICD-10-CM

## 2024-02-20 DIAGNOSIS — F41.1 GAD (GENERALIZED ANXIETY DISORDER): ICD-10-CM

## 2024-02-20 DIAGNOSIS — F10.21 ALCOHOL DEPENDENCE IN SUSTAINED FULL REMISSION: ICD-10-CM

## 2024-02-20 PROCEDURE — 99214 OFFICE O/P EST MOD 30 MIN: CPT | Mod: 95,,, | Performed by: PHYSICIAN ASSISTANT

## 2024-02-20 RX ORDER — FLUOXETINE HYDROCHLORIDE 40 MG/1
80 CAPSULE ORAL DAILY
Qty: 180 CAPSULE | Refills: 1 | Status: SHIPPED | OUTPATIENT
Start: 2024-02-20 | End: 2024-05-28 | Stop reason: SDUPTHER

## 2024-02-20 NOTE — PROGRESS NOTES
"The patient location is: [place]  The chief complaint leading to consultation is: depression, anxiety, anorexia f/u    Visit type: audiovisual      Each patient to whom he or she provides medical services by telemedicine is:  (1) informed of the relationship between the physician and patient and the respective role of any other health care provider with respect to management of the patient; and (2) notified that he or she may decline to receive medical services by telemedicine and may withdraw from such care at any time.    Notes:       Outpatient Psychiatry Follow-Up Visit (MD/AMBER)    2/20/2024    Clinical Status of Patient:  Outpatient (Ambulatory)    Chief Complaint:  Adelaida Flores is a 68 y.o. female who presents today for follow-up of depression and anxiety.  Met with patient.      Interval History and Content of Current Session:  Interim Events/Subjective Report/Content of Current Session:    Pt reports that she has to go to court later today to deal with "a crazy neighbor". States she is very anxious and frustrated regarding this.    Pt reports continues to work eating  and making progress.    "Just really anxious. I have so many appointments piling me up and its stressing me out."    States has been using hydroxyzine prn anxiety which finds effective.    Pt reports has been gaining weight per "blind weight scale" given to her by eating disorder .    Mood overall is "anxious and upset. Its been this neighbor and a whole lot of stuff". Pt reports frequently ruminating on anxiety and having difficulty make decisions.    Discussed starting low dose zyprexa to increase appetite and help with anxiety and depression. Pt very hesitant and anxious regarding starting zyprexa, wants to address at future visit.    Pt agreeable to try increase of lamictal to 150mg daily.    Reports pt is eating normal amount of calories and eating more solid foods.    Patients mood is anxious, affect appears mood congruent. " "Linear and logical, friendly and cooperative, good eye contact.    Denies SI/HI/AVH. Pt reports sleeping well and normalizing appetite. Denies side effects of medications.    Pt reports taking medications as prescribed and behaving appropriately during interview today.    Prior visit:    Pt reports today: "its been hard. Working on the eating disorder, its a lot more anxiety producing because I'm working on it. The depression is getting better. I see eating disorder  and specialist regularly in this program. Its intense to do on top of work, I have homework to do. I'm making progress but its tough."    Last weight was 117 lb about 1 week ago at other clinic visit. Pt reports discussing her current weight makes her anxious, states she might get rid of her scale so she isnt weighing herself frequently. Pt reports her normal previous weight was 120-125lb.    Mood overall is "a bit depressed but more anxious". Pt reports frequently ruminating on anxiety and having difficulty make decisions.    Rates depression as 5/10 and anxiety as 8/10 over the past 2 weeks.    Discussed starting low dose zyprexa to increase appetite and help with anxiety and depression. Pt very hesitant and anxious regarding starting zyprexa.    Pt agreeable to try increase of lamictal to 150mg daily.    Reports pt is eating normal amount of calories and eating more solid foods.    Patients mood is anxious, affect appears mood congruent. Linear and logical, friendly and cooperative, good eye contact.    Denies SI/HI/AVH. Pt reports sleeping well and normalizing appetite. Denies side effects of medications.    Pt reports taking medications as prescribed and behaving appropriately during interview today.    Review of Systems     Review of Systems   Constitutional:  Negative for fever.   HENT:  Negative for sore throat.    Eyes:  Negative for photophobia.   Respiratory:  Negative for cough.    Cardiovascular:  Negative for chest pain and " palpitations.   Gastrointestinal:  Negative for abdominal pain.   Genitourinary:  Negative for dysuria.   Musculoskeletal:  Negative for myalgias.   Skin:  Negative for rash.   Neurological:  Negative for dizziness.   Endo/Heme/Allergies:  Does not bruise/bleed easily.   Psychiatric/Behavioral:  Positive for depression. Negative for hallucinations, substance abuse and suicidal ideas. The patient is nervous/anxious. The patient does not have insomnia.        Psychiatric Review Of Systems - Is patient experiencing or having changes in:  sleep: no  appetite: yes  weight: improved  energy/anergy: yes  interest/pleasure/anhedonia: no  somatic symptoms: no  libido: no  anxiety/panic: yes  guilty/hopelessness: no  concentration: yes  S.I.B.s/risky behavior: no  Irritability: no  Racing thoughts: yes  Impulsive behaviors: no  Paranoia: no  AVH: no      Past Medical, Family and Social History: The patient's past medical, family and social history have been reviewed and updated as appropriate within the electronic medical record - see encounter notes.      Current Medications:   Medication List with Changes/Refills   Current Medications    DENOSUMAB (PROLIA) 60 MG/ML SYRG        FLUOROMETHOLONE 0.1% (FML) 0.1 % DRPS    Place into both eyes.    FLUTICASONE PROPIONATE (FLONASE) 50 MCG/ACTUATION NASAL SPRAY    2 sprays (100 mcg total) by Each Nostril route once daily.    HYDROCORTISONE (ANUSOL-HC) 2.5 % RECTAL CREAM    Place rectally 2 (two) times daily.    HYDROXYZINE PAMOATE (VISTARIL) 25 MG CAP    Take 1 capsule (25 mg total) by mouth nightly as needed (insomnia).    KRILL OIL ORAL        LAMOTRIGINE (LAMICTAL) 150 MG TAB    Take 1 tablet (150 mg total) by mouth once daily.    LEVOCETIRIZINE (XYZAL) 5 MG TABLET    Take 1 tablet (5 mg total) by mouth every evening.    MULTIVITAMIN CAPSULE    Take 1 capsule by mouth once daily.    OMEPRAZOLE (PRILOSEC) 40 MG CAPSULE    Take 1 capsule (40 mg total) by mouth 2 (two) times daily  "before meals.    VITAMIN D (VITAMIN D3) 1000 UNITS TAB    Take 1,000 Units by mouth once daily.   Changed and/or Refilled Medications    Modified Medication Previous Medication    FLUOXETINE 40 MG CAPSULE FLUoxetine 40 MG capsule       Take 2 capsules (80 mg total) by mouth once daily.    Take 2 capsules (80 mg total) by mouth once daily.         Allergies:   Review of patient's allergies indicates:   Allergen Reactions    Cefdinir Diarrhea    Grass pollen-red top, standard     House dust Itching         Vitals   There were no vitals filed for this visit.       Labs/Imaging/Studies:   No results found for this or any previous visit (from the past 48 hour(s)).   No results found for: "PHENYTOIN", "PHENOBARB", "VALPROATE", "CBMZ"    Compliance: yes    Side effects: None    Risk Parameters:  Patient reports no suicidal ideation  Patient reports no homicidal ideation  Patient reports no self-injurious behavior  Patient reports no violent behavior    Exam (detailed: at least 9 elements; comprehensive: all 15 elements)   Constitutional  Vitals:  Most recent vital signs, dated less than 90 days prior to this appointment, were reviewed.   There were no vitals filed for this visit.     General:  age appropriate, thin     Musculoskeletal  Muscle Strength/Tone:  not examined   Gait & Station:  Not examined     Psychiatric  Speech:  no latency; no press   Mood & Affect:  Anxious  congruent and appropriate   Thought Process:  normal and logical   Associations:  intact   Thought Content:  normal, no suicidality, no homicidality, delusions, or paranoia   Insight:  intact, has awareness of illness   Judgement: behavior is adequate to circumstances   Orientation:  grossly intact   Memory: intact for content of interview   Language: grossly intact   Attention Span & Concentration:  able to focus   Fund of Knowledge:  intact and appropriate to age and level of education     Assessment and Diagnosis   Status/Progress: Based on the " examination today, the patient's problem(s) is/are improved and inadequately controlled. New problems have not been presented today.   Co-morbidities, Diagnostic uncertainty and Lack of compliance are not complicating management of the primary condition.  There are no active rule-out diagnoses for this patient at this time.     General Impression:      ICD-10-CM ICD-9-CM   1. Anorexia  R63.0 783.0   2. MDD (major depressive disorder), recurrent episode, moderate  F33.1 296.32   3. MISTY (generalized anxiety disorder)  F41.1 300.02   4. Alcohol dependence in sustained full remission  F10.21 303.93           Intervention/Counseling/Treatment Plan   Medication Management: Continue current medications. The risks and benefits of medication were discussed with the patient.    -continue prozac 80mg daily    -continue lamictal to 150mg daily    -pt refusing zyprexa at this time    -continue vistaril 25 prn insomnia      Return to Clinic: 3 months    The risks and benefits of medication were discussed with the patient.  Discussed diagnosis, risks and benefits of proposed treatment above vs alternative treatments vs no treatment, and potential side effects of these treatments. The patient expresses understanding of the above and displays the capacity to agree with this treatment given said understanding. Patient also agrees that, currently, the benefits outweigh the risks and would like to pursue treatment at this time.  Discussed inherent unpredictability of medications in each individual.   Encouraged Patient to keep future appointments.   Take medications as prescribed and abstain from substance abuse.   In the event of an emergency, patient was advised to go to the emergency room      Last Manrique PA-C      Total face to face time: 13 min  Total time (chart review, patient contact, documentation): 20 min      *This note has been prepared using a combination of a dictation device and typing.  It has been checked for errors but  some errors may still exist within the note as a result of speech recognition errors and/or typographical errors.

## 2024-02-21 ENCOUNTER — CLINICAL SUPPORT (OUTPATIENT)
Dept: REHABILITATION | Facility: HOSPITAL | Age: 69
End: 2024-02-21
Payer: MEDICARE

## 2024-02-21 ENCOUNTER — PATIENT MESSAGE (OUTPATIENT)
Dept: PRIMARY CARE CLINIC | Facility: CLINIC | Age: 69
End: 2024-02-21
Payer: MEDICARE

## 2024-02-21 DIAGNOSIS — M25.511 CHRONIC RIGHT SHOULDER PAIN: Primary | ICD-10-CM

## 2024-02-21 DIAGNOSIS — G89.29 CHRONIC RIGHT SHOULDER PAIN: Primary | ICD-10-CM

## 2024-02-21 DIAGNOSIS — R29.898 WEAKNESS OF SHOULDER: ICD-10-CM

## 2024-02-21 DIAGNOSIS — M25.611 DECREASED ROM OF RIGHT SHOULDER: ICD-10-CM

## 2024-02-21 PROCEDURE — 97530 THERAPEUTIC ACTIVITIES: CPT | Mod: CQ

## 2024-02-21 PROCEDURE — 97112 NEUROMUSCULAR REEDUCATION: CPT | Mod: CQ

## 2024-02-21 NOTE — PROGRESS NOTES
[facility]   Physical Therapy Treatment Note      Name: Adelaida Flores  Clinic Number: [id number]    Therapy Diagnosis:   Encounter Diagnoses   Name Primary?    Chronic right shoulder pain Yes    Weakness of shoulder     Decreased ROM of right shoulder        Physician: Mary Calderon NP    Visit Date: 2/21/2024    Physician: Catie Portillo MD     Physician Orders: PT Eval and Treat   Medical Diagnosis from Referral:   M25.511,G89.29 (ICD-10-CM) - Chronic right shoulder pain   R26.81 (ICD-10-CM) - Unsteady gait   W19.XXXD (ICD-10-CM) - Fall, subsequent encounter   Evaluation Date: 8/31/2023  Authorization Period Expiration: 1/29/24  Plan of Care Expiration: 11/31/2023, 2/27/24, 3/23/24  Progress Note Due: 9/31/2023, 12/27/23, 2/22/24  Visit # / Visits authorized:  13/23, 7/10  FOTO: 2/3         FOTO Initial Evaluation: 53  FOTO 2nd F/U: 63  FOTO Discharge: NA     Precautions: Standard      Time In: 3:55 pm  Time Out: 4:39 pm   Total Appointment Time (timed & untimed codes): 44 min       PTA Visit #: 1/5     Subjective     Patient reports: that she is feeling alright today but feels that her left leg is a little more fatigued today. Pt states that in the mornings she is usually less sore than compared to afternoons.     She was partially compliant with home exercise program.  Response to previous treatment: no adverse affect  Functional change: progressing    Pain: 0/10  Location: left knee    Objective          Treatment     Adelaida received the treatments listed below:      therapeutic exercises to develop strength, endurance, ROM, flexibility, posture, and core stabilization for 10  minutes including:    [x]bike 6' Lvl 3 resistance   [x]LAQ 2# B 2x15  [x]B SLR 2# 2x10 BLE   []  []      neuromuscular re-education activities to improve: Balance, Coordination, Kinesthetic, Sense, Proprioception, and Posture for 11 minutes. The following activities were included:    [x] Fwd step ups on BOSU with  "2# ankle weights x20 B (single UE support)   []  [x]sls B 2x30"  [x]cone taps B 1min on airex with 2# ankle weights   []marching on foam 1' x2   []GTB SLS /c march 20x B  []NBOS on airex 30s x 2  [x]Tandem stance on airex 2x30" RFF/LFF  []VMO extensions 2x10 B   []TKE maroon band left 3x10 BLE  []TKE in supine with towel for tactile cues x20 left lower extremity         therapeutic activities to improve functional performance for 23 minutes, including:  [x]bridge 2x10 with (2) 7# dumbbells   [x]shuttle dl 3 c 3x10   [x]shuttle sl 1.5 c 3x10 B   [x]sidelying hip abd 2# 2x15 B   [x]touch and go squats on 18' plyobox  & (2) 7# dumbbells   2x15   []STS No UE 2x10  []Eccentric step downs lvl 2 15x B   []Squats 5# dumbells (2) 3x10   [] Agenda carry 15# x3 laps   [x]step ups lvl 3 fwd/  with (2) 5# dumbbells   [] Lvl 3 lateral step over with 3# ankle weights 2x10       Patient Education and Home Exercises       Education provided:   - safety awareness  - educated on safe progressions of HEP including use of resistance bands       Written Home Exercises Provided: Patient instructed to cont prior HEP. Exercises were reviewed and Adelaida was able to demonstrate them prior to the end of the session.  Adelaida demonstrated good  understanding of the education provided. See Electronic Medical Record under Patient Instructions for exercises provided during therapy sessions    Assessment   Continued with established activities this session. Pt required tactile cues during LAQ to decrease hip flexion compensation and isolate quadriceps with proper holding. SBA provided during NMR and BOSU activities for patient safety with minimal wavering noted, however no need for assistance in recovery. Will continue to progress pt towards goals as tolerated.        Adelaida Is progressing well towards her goals.   Patient prognosis is Good.     Patient will continue to benefit from skilled outpatient physical therapy to address the deficits listed " in the problem list box on initial evaluation, provide pt/family education and to maximize pt's level of independence in the home and community environment.     Patient's spiritual, cultural and educational needs considered and pt agreeable to plan of care and goals.     Anticipated barriers to physical therapy: none    Goals:   Short Term Goals Status:   1. Pt will be (I) with BLE HEP for improved functional strength and balance (progressing)  2. Pt will demonstrate single leg stance >/=10 sec B for improved balance with functional tasks (progressing)      Long Term Goal Status: modified:  1. Pt will score 20/24 on DGI to reduce risk for falls in the community MET   2. Pt will score 59% on FOTO, indicating improved balance with functional activities  (progressing, modified)  3. Pt will demonstrate improved B hip strength to 4/5 grossly for reduced fall risk with dynamic movements (progressing, new)  4. Pt will improve 30 sec sit<>Stand score to 15 reps for improved BLE functional strength/endurance   Reasons for Recertification of Therapy:   extension of plan of care, focus on balance diagnosis  Plan     Updated Certification Period: 1/23/24 to 3/23/24   Recommended Treatment Plan: 1 times per week for 6 weeks:  Gait Training, Manual Therapy, Moist Heat/ Ice, Neuromuscular Re-ed, Patient Education, Therapeutic Activities, and Therapeutic Exercise  Other Recommendations: n/a       Bree Kearney, BHAKTI

## 2024-02-21 NOTE — TELEPHONE ENCOUNTER
Yes, looks like standing orders from Milana Scroggs, NP so can be scheduled a week prior to appt w/ me.

## 2024-02-22 ENCOUNTER — OFFICE VISIT (OUTPATIENT)
Dept: PRIMARY CARE CLINIC | Facility: CLINIC | Age: 69
End: 2024-02-22
Payer: MEDICARE

## 2024-02-22 ENCOUNTER — TELEPHONE (OUTPATIENT)
Dept: GASTROENTEROLOGY | Facility: CLINIC | Age: 69
End: 2024-02-22
Payer: MEDICARE

## 2024-02-22 VITALS
DIASTOLIC BLOOD PRESSURE: 64 MMHG | OXYGEN SATURATION: 98 % | RESPIRATION RATE: 16 BRPM | HEART RATE: 60 BPM | WEIGHT: 117.06 LBS | BODY MASS INDEX: 19.5 KG/M2 | HEIGHT: 65 IN | SYSTOLIC BLOOD PRESSURE: 108 MMHG

## 2024-02-22 DIAGNOSIS — M85.611 CYST OF BONE OF RIGHT SHOULDER: Primary | ICD-10-CM

## 2024-02-22 LAB
FINAL PATHOLOGIC DIAGNOSIS: NORMAL
GROSS: NORMAL
Lab: NORMAL

## 2024-02-22 PROCEDURE — 99213 OFFICE O/P EST LOW 20 MIN: CPT | Mod: S$GLB,,, | Performed by: PHYSICIAN ASSISTANT

## 2024-02-22 PROCEDURE — 99999 PR PBB SHADOW E&M-EST. PATIENT-LVL IV: CPT | Mod: PBBFAC,,, | Performed by: PHYSICIAN ASSISTANT

## 2024-02-22 NOTE — TELEPHONE ENCOUNTER
do you have any openings to see this patient for a shoulder cyst? I saw you have slots open but I was not able to book anything without overriding. Let us know when you can, ty!

## 2024-02-22 NOTE — TELEPHONE ENCOUNTER
"Pt c/o what PT is referring to as a cyst on her shoulder. "Big lump"   ( See portal msg)   Pt PCP appt is 3/5.   UC vs NP appt?     "

## 2024-02-22 NOTE — PROGRESS NOTES
"  Primary Care Provider Appointment   [facility]        Subjective:       Patient ID:  Adelaida is a 68 y.o. female being seen for Cyst      Chief Complaint: Cyst    HPI: 69 yo female presents for shoulder issue. States last May she [place] injuring right shoulder. Xray was negative for fracture. She began therapy in August 2023 which was helping her shoulder pain. 2 days ago noted a "mass" on her right shoulder. It is non tender, no heat or warmth. Had done nothing for it. Pain in her shoulder had seemed to return in the past couple of weeks, no affect on activity.     Past Medical History:   Diagnosis Date    Alcohol dependence in sustained full remission 03/21/2022    Allergy     Anxiety 02/22/2019    Broken hip     Colon polyps     Depression     GERD (gastroesophageal reflux disease)     Hyperlipidemia 02/22/2019    IBS (irritable bowel syndrome)     Osteoporosis     T12 compression fracture s/p corpectomy 07/13/2013    Volvulus     s/p colectomy       Review of Systems   Musculoskeletal:  Positive for joint pain (right shoulder).   Skin:         + mass               Health Maintenance         Date Due Completion Date    Mammogram 02/09/2025 2/9/2023    TETANUS VACCINE 09/24/2025 9/24/2015    Override on 10/5/2009: Done    DEXA Scan 01/29/2026 1/29/2024    Lipid Panel 10/22/2026 10/22/2021    Colorectal Cancer Screening 09/26/2028 9/26/2023                   Objective:      Vitals:    02/22/24 1503   BP: 108/64   BP Location: Right arm   Patient Position: Sitting   BP Method: Small (Manual)   Pulse: 60   Resp: 16   SpO2: 98%   Weight: 53.1 kg (117 lb 1 oz)   Height: 5' 5" (1.651 m)     Estimated body mass index is 19.48 kg/m² as calculated from the following:    Height as of this encounter: 5' 5" (1.651 m).    Weight as of this encounter: 53.1 kg (117 lb 1 oz).  Physical Exam  Constitutional:       Appearance: Normal appearance.   HENT:      Head: Normocephalic " and atraumatic.   Musculoskeletal:      Right shoulder: Deformity present. No tenderness or bony tenderness. Normal range of motion.        Arms:       Comments: Normal ROM of the bilateral shoulder. Normal pronation and supination. Mild pain to the posterior right shoulder with complete flexion, 5/5 strength   Neurological:      Mental Status: She is alert.   Psychiatric:         Mood and Affect: Mood normal.         Thought Content: Thought content normal.             Assessment and Plan:         1. Cyst of bone of right shoulder  Assessment & Plan:  Resembles ganglion cyst. Cyst is non tender. Not affecting ROM. Discussed with pt this is likely benign. Will do watchful waiting. Can take ibuprofen prn pain. If it becomes larger, red, or painful she will notify me or PCP immediately. Can always refer to ortho in the future for drainage.            Follow Up:   No follow-ups on file.        Amy Lado, PA-C [facility]

## 2024-02-22 NOTE — TELEPHONE ENCOUNTER
Can you schedule her to see NP Lado for UC visit? If it's not painful, red, draining, swollen, it's likely not urgent but hard to tell w/o seeing it.

## 2024-02-22 NOTE — TELEPHONE ENCOUNTER
----- Message from Alfredo Gaxiola MD sent at 2/22/2024  3:06 PM CST -----  Please notify patient, the biopsies look fine. Nothing to worry. Continue Omeprazole 40 mg once daily. Schedule a follow up with  at [facility], next available.

## 2024-02-22 NOTE — PROGRESS NOTES
Please notify patient, the biopsies look fine. Nothing to worry. Continue Omeprazole 40 mg once daily. Schedule a follow up with  at [facility], next available.

## 2024-02-22 NOTE — ASSESSMENT & PLAN NOTE
Resembles ganglion cyst. Cyst is non tender. Not affecting ROM. Discussed with pt this is likely benign. Will do watchful waiting. Can take ibuprofen prn pain. If it becomes larger, red, or painful she will notify me or PCP immediately. Can always refer to ortho in the future for drainage.

## 2024-02-26 ENCOUNTER — CLINICAL SUPPORT (OUTPATIENT)
Dept: REHABILITATION | Facility: HOSPITAL | Age: 69
End: 2024-02-26
Payer: MEDICARE

## 2024-02-26 ENCOUNTER — OFFICE VISIT (OUTPATIENT)
Dept: OTOLARYNGOLOGY | Facility: CLINIC | Age: 69
End: 2024-02-26
Payer: MEDICARE

## 2024-02-26 VITALS — HEART RATE: 71 BPM | SYSTOLIC BLOOD PRESSURE: 106 MMHG | DIASTOLIC BLOOD PRESSURE: 68 MMHG

## 2024-02-26 DIAGNOSIS — H90.A32 MIXED CONDUCTIVE AND SENSORINEURAL HEARING LOSS OF LEFT EAR WITH RESTRICTED HEARING OF RIGHT EAR: ICD-10-CM

## 2024-02-26 DIAGNOSIS — R29.898 WEAKNESS OF SHOULDER: ICD-10-CM

## 2024-02-26 DIAGNOSIS — M25.611 DECREASED ROM OF RIGHT SHOULDER: ICD-10-CM

## 2024-02-26 DIAGNOSIS — H69.93 DYSFUNCTION OF BOTH EUSTACHIAN TUBES: ICD-10-CM

## 2024-02-26 DIAGNOSIS — J32.0 CHRONIC MAXILLARY SINUSITIS: ICD-10-CM

## 2024-02-26 DIAGNOSIS — J30.89 NON-SEASONAL ALLERGIC RHINITIS, UNSPECIFIED TRIGGER: Primary | ICD-10-CM

## 2024-02-26 DIAGNOSIS — R26.81 UNSTEADY GAIT: ICD-10-CM

## 2024-02-26 DIAGNOSIS — G89.29 CHRONIC RIGHT SHOULDER PAIN: Primary | ICD-10-CM

## 2024-02-26 DIAGNOSIS — M25.511 CHRONIC RIGHT SHOULDER PAIN: Primary | ICD-10-CM

## 2024-02-26 DIAGNOSIS — R26.89 BALANCE PROBLEM: ICD-10-CM

## 2024-02-26 PROCEDURE — 99999 PR PBB SHADOW E&M-EST. PATIENT-LVL IV: CPT | Mod: PBBFAC,,, | Performed by: NURSE PRACTITIONER

## 2024-02-26 PROCEDURE — 97112 NEUROMUSCULAR REEDUCATION: CPT

## 2024-02-26 PROCEDURE — 97530 THERAPEUTIC ACTIVITIES: CPT

## 2024-02-26 PROCEDURE — 99214 OFFICE O/P EST MOD 30 MIN: CPT | Mod: S$GLB,,, | Performed by: NURSE PRACTITIONER

## 2024-02-26 RX ORDER — AZELASTINE 1 MG/ML
1 SPRAY, METERED NASAL 2 TIMES DAILY
Qty: 30 ML | Refills: 11 | Status: SHIPPED | OUTPATIENT
Start: 2024-02-26 | End: 2024-06-10

## 2024-02-26 NOTE — PROGRESS NOTES
[facility]   Physical Therapy Treatment Note      Name: Adelaida Flores  Clinic Number: [id number]    Therapy Diagnosis:   Encounter Diagnoses   Name Primary?    Chronic right shoulder pain Yes    Weakness of shoulder     Decreased ROM of right shoulder     Balance problem     Unsteady gait          Physician: Mary Calderon NP    Visit Date: 2/26/2024    Physician: Catie Portillo MD     Physician Orders: PT Eval and Treat   Medical Diagnosis from Referral:   M25.511,G89.29 (ICD-10-CM) - Chronic right shoulder pain   R26.81 (ICD-10-CM) - Unsteady gait   W19.XXXD (ICD-10-CM) - Fall, subsequent encounter   Evaluation Date: 8/31/2023  Authorization Period Expiration: 1/29/24  Plan of Care Expiration: 11/31/2023, 2/27/24, 3/23/24  Progress Note Due: 9/31/2023, 12/27/23, 2/22/24  Visit # / Visits authorized:  13/23, 8/10  FOTO: 3/3         FOTO Initial Evaluation: 53  FOTO 2nd F/U: 60  FOTO Discharge: NA     Precautions: Standard      Time In: 9:15 a m  Time Out: 10 am   Total Appointment Time (timed & untimed codes): 45  min       PTA Visit #: 1/5     Subjective     Patient reports: she has a new cyst on her R shoulder. Her  R arm and L knee are hurting. She hasn't had any recent falls. She feels like her balance is better and she is starting pelvic floor therapy soon.     She was partially compliant with home exercise program.  Response to previous treatment: no adverse affect  Functional change: progressing    Pain: 0/10  Location: left knee    Objective          Treatment     Adelaida received the treatments listed below:      therapeutic exercises to develop strength, endurance, ROM, flexibility, posture, and core stabilization for 7  minutes including:    [x]Nustep 6' Lvl 3 resistance   []LAQ 2# B 2x15  []B SLR 2# 2x10 BLE   []  []      neuromuscular re-education activities to improve: Balance, Coordination, Kinesthetic, Sense, Proprioception, and Posture for 15 minutes. The following  "activities were included:    [x] Fwd  & lateral  step ups on BOSU with 2# ankle weights x20 B (single UE support)   []  []sls B 2x30"  []cone taps B 1min on airex with 2# ankle weights   []marching on foam 1' x2   []GTB SLS /c march 20x B  []NBOS on airex 30s x 2  []Tandem stance on airex 2x30" RFF/LFF  []VMO extensions 2x10 B   [x]TKE maroon band left 3x10 BLE  []TKE in supine with towel for tactile cues x20 left lower extremity     [x] KT tape L knee         therapeutic activities to improve functional performance for 23 minutes, including:  []bridge 2x10 with (2) 7# dumbbells   [x]shuttle dl 3 c 3x10   [x]shuttle sl 1.5 c 3x10 B   []sidelying hip abd 2# 2x15 B   []touch and go squats on 18' plyobox  & (2) 7# dumbbells   2x15   []STS No UE 2x10  []Eccentric step downs lvl 2 15x B   [x]Squats 10# kettlebell 3x10   [x] [place] carry 20# x6 laps   []step ups lvl 3 fwd/  with (2) 5# dumbbells   [] Lvl 3 lateral step over with 3# ankle weights 2x10       Patient Education and Home Exercises       Education provided:   - safety awareness  - educated on safe progressions of HEP including use of resistance bands       Written Home Exercises Provided: Patient instructed to cont prior HEP. Exercises were reviewed and Adelaida was able to demonstrate them prior to the end of the session.  Adelaida demonstrated good  understanding of the education provided. See Electronic Medical Record under Patient Instructions for exercises provided during therapy sessions    Assessment   Tolerated session well. No knee pain reported with squatting activities after application of KT tape for improved quad activation. 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 No LOB noted during session. Plan to DC next visit.       Adelaida Is progressing well towards her goals.   Patient prognosis is Good.     Patient will continue to benefit from skilled outpatient physical therapy to address the deficits listed in the problem list box on " initial evaluation, provide pt/family education and to maximize pt's level of independence in the home and community environment.     Patient's spiritual, cultural and educational needs considered and pt agreeable to plan of care and goals.     Anticipated barriers to physical therapy: none    Goals:   Short Term Goals Status:   1. Pt will be (I) with BLE HEP for improved functional strength and balance (progressing)  2. Pt will demonstrate single leg stance >/=10 sec B for improved balance with functional tasks (progressing)      Long Term Goal Status: modified:  1. Pt will score 20/24 on DGI to reduce risk for falls in the community MET   2. Pt will score 59% on FOTO, indicating improved balance with functional activities  (progressing, modified)  3. Pt will demonstrate improved B hip strength to 4/5 grossly for reduced fall risk with dynamic movements (progressing, new)  4. Pt will improve 30 sec sit<>Stand score to 15 reps for improved BLE functional strength/endurance   Reasons for Recertification of Therapy:   extension of plan of care, focus on balance diagnosis  Plan     Updated Certification Period: 1/23/24 to 3/23/24   Recommended Treatment Plan: 1 times per week for 6 weeks:  Gait Training, Manual Therapy, Moist Heat/ Ice, Neuromuscular Re-ed, Patient Education, Therapeutic Activities, and Therapeutic Exercise  Other Recommendations: n/a       Catie Chan, PT

## 2024-02-26 NOTE — PROGRESS NOTES
Patient ID: Adelaida Flores is a 68 y.o. y.o. female    Chief Complaint:   Chief Complaint   Patient presents with    Follow-up    Ear Fullness       HPI 10/12/2023: Patient is self-referred for evaluation of a possible wax impaction in bilateral ears.  she has complaints of hearing loss in the affected ears, but denies pain or drainage.  This has been an issue in the past.  The patient has been using any sort of ear drop to soften the wax.    Interval HPI 12/12/2023:  Follow up visit. She reports having muffled hearing in bilateral ears but the left is usually worse.    She has not noted any tinnitus in either ear.  She  has not had a recent issues with ear pain or ear drainage. She   has not had any previous otologic surgery. She denies any history of significant loud noise exposure.She  denies issues with dizziness.  She reports having grass allergy and has had immunotherapy in the past. She is currently on Allegra and sometimes switch to Xyzal.     Interval HPI 2/26/2024:  She reports nasal congestion every morning. CT temporal on 12/26/2023 showed mucosal thickening in the bilateral maxillary sinuses. She was started on doxycycline 100 mg bid x 14 days. She did not notice any difference. She has been using Flonase and taking Xyzal daily. She gets nosebleeds and dry mouth with the medications. Had immunotherapy as a child and around her 30s. Former smoker. She stopped when she was in her 40s.     Review of Systems   Constitutional: Negative for fever, chills, fatigue and unexpected weight change.   HENT: Positive for ear blockage. Negative for hearing loss, nosebleeds, congestion, sore throat, facial swelling, rhinorrhea, sneezing, trouble swallowing, dental problem, voice change, postnasal drip, sinus pressure, tinnitus and ear discharge.    Eyes: Negative for redness, itching and visual disturbance.   Respiratory: Negative for cough, choking and wheezing.    Cardiovascular: Negative for chest pain and  palpitations.   Gastrointestinal: Negative for abdominal pain.        No reflux.   Musculoskeletal: Negative for gait problem.   Skin: Negative for rash.   Neurological: Negative for dizziness, light-headedness and headaches.     Past Medical History:   Diagnosis Date    Alcohol dependence in sustained full remission 03/21/2022    Allergy     Anxiety 02/22/2019    Broken hip     Colon polyps     Depression     GERD (gastroesophageal reflux disease)     Hyperlipidemia 02/22/2019    IBS (irritable bowel syndrome)     Osteoporosis     T12 compression fracture s/p corpectomy 07/13/2013    Volvulus     s/p colectomy     Past Surgical History:   Procedure Laterality Date    COLON SURGERY      due to volvulus    COLONOSCOPY N/A 8/28/2017    Procedure: COLONOSCOPY miralax;  Surgeon: Trey Haas Jr., MD;  Location: [facility] ENDO;  Service: Endoscopy;  Laterality: N/A;    COLONOSCOPY N/A 9/26/2023    Procedure: COLONOSCOPY;  Surgeon: Graeme Rasmussen MD;  Location: [facility] ENDOSCOPY;  Service: Endoscopy;  Laterality: N/A;    ESOPHAGOGASTRODUODENOSCOPY N/A 9/26/2023    Procedure: EGD (ESOPHAGOGASTRODUODENOSCOPY);  Surgeon: Gramee Rasmussen MD;  Location: [facility] ENDOSCOPY;  Service: Endoscopy;  Laterality: N/A;  [facility]    ESOPHAGOGASTRODUODENOSCOPY N/A 2/19/2024    Procedure: EGD (ESOPHAGOGASTRODUODENOSCOPY);  Surgeon: Alfredo Gaxiola MD;  Location: [facility] ENDOSCOPY;  Service: Endoscopy;  Laterality: N/A;  referral: DR. Rasmussen, krishan last rpocedure reporat Pt to f/u EGD in 12 weeks - esophagitis any GI, CV/ prep ins on portal - ERW  2/12- pc complete. DBM  2-16 left message with call back number did not read instructions.  [facility]    EYE SURGERY  2012    Cataract/torc    FRACTURE SURGERY      right broken wrist and had surgery    HERNIA REPAIR  2012    SMALL INTESTINE SURGERY  2011    volvulus x 2    SPINE SURGERY      due to fall while on a boat.    TONSILLECTOMY       Social History      Socioeconomic History    Marital status:    Occupational History    Occupation: Dietary Consultant      Employer: [employer]   Tobacco Use    Smoking status: Former     Current packs/day: 0.00     Average packs/day: 1 pack/day for 10.0 years (10.0 ttl pk-yrs)     Types: Cigarettes     Start date: 1983     Quit date: 1993     Years since quittin.1    Smokeless tobacco: Never    Tobacco comments:     Quit 28 years ago   Substance and Sexual Activity    Alcohol use: No    Drug use: No    Sexual activity: Yes     Partners: Male     Birth control/protection: None   Social History Narrative    Got  to Kristopher Calderon in 2019.     Does some physical activity daily previously. But since getting  less active. Still does weights 3x/week.     Social Determinants of Health     Financial Resource Strain: Low Risk  (2024)    Overall Financial Resource Strain (CARDIA)     Difficulty of Paying Living Expenses: Not very hard   Recent Concern: Financial Resource Strain - Medium Risk (2023)    Overall Financial Resource Strain (CARDIA)     Difficulty of Paying Living Expenses: Somewhat hard   Food Insecurity: No Food Insecurity (2024)    Hunger Vital Sign     Worried About Running Out of Food in the Last Year: Never true     Ran Out of Food in the Last Year: Never true   Transportation Needs: No Transportation Needs (2024)    PRAPARE - Transportation     Lack of Transportation (Medical): No     Lack of Transportation (Non-Medical): No   Physical Activity: Insufficiently Active (2024)    Exercise Vital Sign     Days of Exercise per Week: 7 days     Minutes of Exercise per Session: 20 min   Stress: Stress Concern Present (2024)    [place] [employer] - Occupational Stress Questionnaire     Feeling of Stress : Very much   Social Connections: Unknown (2024)    Social Connection and Isolation Panel [NHANES]     Frequency of Communication  with Friends and Family: Twice a week     Frequency of Social Gatherings with Friends and Family: Once a week     Active Member of Clubs or Organizations: Yes     Attends Club or Organization Meetings: More than 4 times per year     Marital Status:    Housing Stability: Low Risk  (1/17/2024)    Housing Stability Vital Sign     Unable to Pay for Housing in the Last Year: No     Number of Places Lived in the Last Year: 1     Unstable Housing in the Last Year: No     Family History   Problem Relation Age of Onset    Colon polyps Mother     Cancer Mother         skin    Osteoarthritis Mother     Colon polyps Father     Cancer Father         prostate    Heart disease Father     Osteoarthritis Maternal Aunt     Osteoarthritis Maternal Grandmother     Cancer Sister         skin    Asthma Brother     Diabetes Neg Hx     Cirrhosis Neg Hx        Objective:      Vitals:    02/26/24 1114   BP: 106/68   Pulse: 71           Physical Exam   Constitutional: Well appearing / communicating without difficutly.  NAD.  Eyes: EOM I Bilaterally  Head/Face: Normocephalic. Negative paranasal sinus pressure/tenderness.  Salivary glands WNL.  House Brackmann I Bilaterally.     Right Ear: Auricle normal appearance.  EAC with no lesions or masses,TM w/o masses/lesions/perforations. TM normal with limited mobility.  Left Ear: Auricle normal appearance. EAC with no lesions or masses,TM w/o masses/lesions/perforations. TM normal with limited mobility.     Nose: No gross nasal septal deviation. Inferior Turbinates 3+ bilaterally. No septal perforation. No masses/lesions. External nasal skin appears normal without masses/lesions.   Oral Cavity: Gingiva/lips within normal limits.  Dentition/gingiva healthy appearing. Mucus membranes moist. Floor of mouth soft, no masses palpated. Oral Tongue mobile. Hard Palate appears normal.    Oropharynx: Base of tongue appears normal. No masses/lesions noted. Tonsillar fossa/pharyngeal wall without  lesions. Posterior oropharynx WNL.  Soft palate without masses. Midline uvula.   Neck/Lymphatic: No LAD I-VI bilaterally.  No thyromegaly.  No masses noted on exam.     Mirror laryngoscopy/nasopharyngoscopy: Active gag reflex.  Unable to perform.     Neuro/Psychiatric: AOx3.  Normal mood and affect.   Cardiovascular: Normal carotid pulses bilaterally, no increasing jugular venous distention noted at cervical region bilaterally.    Respiratory: Normal respiratory effort, no stridor, no retractions noted.        Audiogram interpreted personally by me and discussed in detail with the patient today.           CT Temporal Bones Without Contrast 12/26/2023      FINDINGS:  Right inner ear: Normal. Right ossicles and middle ear: Normal. The middle ear ossicles are intact. Right external auditory canal: Normal. Right facial nerve canal: Normal. Right jugular foramen: No jugular dehiscence. Right carotid canal: No aberrant carotid canal. Right mastoid air cells: Normal. No mastoid effusions. Left inner ear: Normal. Left ossicles and middle ear: Normal. The middle ear ossicles are intact. Left external auditory canal: Normal. Left facial nerve canal: Normal. Left jugular foramen: No jugular dehiscence. Left carotid canal: No aberrant carotid canal. Left mastoid air cells: Normal. No mastoid effusions. Paranasal sinuses: Mucosal thickening in the bilateral maxillary sinuses. Soft tissues: Unremarkable.     Impression:     1. No CT abnormalities of the temporal bones. 2. Mucosal thickening in the bilateral maxillary sinuses.    Assessment:         ICD-10-CM ICD-9-CM    1. Non-seasonal allergic rhinitis, unspecified trigger  J30.89 477.8 Aspergillus fumagatus IgE      [place] grass IgE      Cat epithelium IgE      Cladosporium IgE      Cockroach, American IgE      [place], bald IgE      D. farinae IgE      D. pteronyssinus IgE      Dog dander IgE      Plantain, English IgE      Viral grass IgE      Marsh elder, rough IgE       Mugwort IgE      Nettle IgE      Oak, white IgE      Penicillium IgE      Ragweed, short, common IgE      Suman IgE      Allergen, Cocklebur      Allergen, Elm [place]      Allergen, Meadow Grass (eco4cloudy Blue)      Allergen, Mucor Racemosus      Allergen, Pecan [place] IgE      Allergen, White Ru      Allergen-Alternaria Alternata      Allergen-[place]      Allergen-Common Pigweed      Allergen-[place]      Feather Panel #2      RAST Allergen for [place]      RAST Allergen Maple ([place])      RAST Allergen [place]      RAST Allergen, Lamb's Quarters      RAST Allergen, Sheep [place])      2. Chronic maxillary sinusitis  J32.0 473.0 Ambulatory referral/consult to ENT      3. Mixed conductive and sensorineural hearing loss of left ear with restricted hearing of right ear  H90.A32 389.22       4. Dysfunction of both eustachian tubes  H69.93 381.81                Plan:      -referral to ENT   -ordered RAST/immunocap   -start on Azelastine 1 spray in each nostril bid  -start on nasal saline rinses bid  -continue on Flonase 2 sprays in each nostril daily  -stop Xyzal due to dry mouth  -CT temporal showed no abnormalities of the temporal bones     Nikki Dow NP        Answers submitted by the patient for this visit:  Review of Symptoms Questionnaire  (Submitted on 2/25/2024)  None of these: Yes  sinus pressure : Yes  nosebleeds: Yes  Tooth/Dental Problems?: Yes  eye itching: Yes  None of these: Yes  None of these : Yes  abdominal pain: Yes  diarrhea: Yes  None of these: Yes  Muscle aches / pain?: Yes  neck pain: Yes  None of these : Yes  Seasonal Allergies?: Yes  None of these : Yes  headaches: Yes  None of these: Yes  decreased concentration: Yes  Feeling depressed?: Yes  nervous/ anxious: Yes

## 2024-02-27 ENCOUNTER — PATIENT MESSAGE (OUTPATIENT)
Dept: PRIMARY CARE CLINIC | Facility: CLINIC | Age: 69
End: 2024-02-27
Payer: MEDICARE

## 2024-02-27 DIAGNOSIS — F50.00 ANOREXIA NERVOSA: ICD-10-CM

## 2024-02-27 DIAGNOSIS — E78.2 MIXED HYPERLIPIDEMIA: Primary | ICD-10-CM

## 2024-03-04 ENCOUNTER — PATIENT MESSAGE (OUTPATIENT)
Dept: PRIMARY CARE CLINIC | Facility: CLINIC | Age: 69
End: 2024-03-04
Payer: MEDICARE

## 2024-03-04 ENCOUNTER — CLINICAL SUPPORT (OUTPATIENT)
Dept: REHABILITATION | Facility: HOSPITAL | Age: 69
End: 2024-03-04
Payer: MEDICARE

## 2024-03-04 DIAGNOSIS — M25.511 CHRONIC RIGHT SHOULDER PAIN: Primary | ICD-10-CM

## 2024-03-04 DIAGNOSIS — R29.898 WEAKNESS OF SHOULDER: ICD-10-CM

## 2024-03-04 DIAGNOSIS — G89.29 CHRONIC RIGHT SHOULDER PAIN: Primary | ICD-10-CM

## 2024-03-04 DIAGNOSIS — M25.611 DECREASED ROM OF RIGHT SHOULDER: ICD-10-CM

## 2024-03-04 PROCEDURE — 97110 THERAPEUTIC EXERCISES: CPT

## 2024-03-04 PROCEDURE — 97530 THERAPEUTIC ACTIVITIES: CPT

## 2024-03-04 NOTE — PROGRESS NOTES
[facility]   Physical Therapy Treatment Note      Name: Adelaida Flores  [facility] Number: [id number]    Therapy Diagnosis:   Encounter Diagnoses   Name Primary?    Chronic right shoulder pain Yes    Weakness of shoulder     Decreased ROM of right shoulder          Physician: Mary Calderon NP    Visit Date: 3/4/2024    Physician: Catie Portillo MD     Physician Orders: PT Eval and Treat   Medical Diagnosis from Referral:   M25.511,G89.29 (ICD-10-CM) - Chronic right shoulder pain   R26.81 (ICD-10-CM) - Unsteady gait   W19.XXXD (ICD-10-CM) - Fall, subsequent encounter   Evaluation Date: 8/31/2023  Authorization Period Expiration: 1/29/24  Plan of Care Expiration: 11/31/2023, 2/27/24, 3/23/24  Progress Note Due: 9/31/2023, 12/27/23, 2/22/24  Visit # / Visits authorized:  13/23, 9/10  FOTO: 3/3         FOTO Initial Evaluation: 53  FOTO 2nd F/U: 60  FOTO Discharge: 58     Precautions: Standard      Time In: 9:15 a m  Time Out: 10 am   Total Appointment Time (timed & untimed codes): 45  min       PTA Visit #: 1/5     Subjective     Patient reports: she was doing her exercises with her 's theraband at home and thinks she aggravated her shoulder again.     She was partially compliant with home exercise program.  Response to previous treatment: no adverse affect  Functional change: progressing    Pain: 0/10  Location: left knee    Objective      3/4/24  Update:   Balance Assessment:      Dynamic Gait Index  1. Gait on level surface: 3. Normal: Walks 20', No Assistive device, no evidence for imbalance. Normal gait pattern.  2. Change in Gait speed: 2. Mild impairment: Is able to change speed, but demonstrates mild gait deviations, or no gait deviations but unable to achieve a signifcant change in velocity, or uses an assistive device.  3. Gait with Horizontal Head Turns: 2. Mild impairment:   4. Gait with Vertical Head Turns: 3. Normal  5. Gait and Pivot turn: 2. Mild Impairment  6. Step Over  "Obstacle: 3. Normal   7. Step around obstacles: 3. Normal  8. Steps: 2. Mild impairment: Alternating feet, must use rail.     Score: 20/24     Interpretation: < 19/24 = predictive of falls in the elderly  > 22/24 = safe ambulators        30 sec sit to stand: 13 reps      SLS: R 1 sec  L 6 sec         Hip Right MMT Left MMT Pain/Dysfunction with Movement (pain=!)   AROM             flexion  WFL 4-/5  WFL 4-/5                   abduction  WFL 4/5  WFL 4/5                                    Knee Right MMT Left MMT Pain/Dysfunction with Movement (pain=!)   AROM             flexion  WFL 4/5  WFL 4/5 Minor L knee pain with MMT     extension  WFL 4/5  WFL 4/5          Treatment     Adelaida received the treatments listed below:      therapeutic exercises to develop strength, endurance, ROM, flexibility, posture, and core stabilization for 27  minutes including:    [x]Nustep 6' Lvl 3 resistance   []LAQ 2# B 2x15  []B SLR 2# 2x10 BLE   [x] HEP review  [x] re-assessment completed       neuromuscular re-education activities to improve: Balance, Coordination, Kinesthetic, Sense, Proprioception, and Posture for 10 minutes. The following activities were included:    [x] Fwd  & lateral  step ups on BOSU with 2# ankle weights x20 B (single UE support)   []  []sls B 2x30"  []cone taps B 1min on airex with 2# ankle weights   []marching on foam 1' x2   []GTB SLS /c march 20x B  []NBOS on airex 30s x 2  []Tandem stance on airex 2x30" RFF/LFF  []VMO extensions 2x10 B   [x]TKE maroon band left 3x10 BLE  []TKE in supine with towel for tactile cues x20 left lower extremity     [] KT tape L knee         therapeutic activities to improve functional performance for 8  minutes, including:  []bridge 2x10 with (2) 7# dumbbells   []shuttle dl 3 c 3x10   []shuttle sl 1.5 c 3x10 B   []sidelying hip abd 2# 2x15 B   []touch and go squats on 18' plyobox  & (2) 7# dumbbells   2x15   []STS No UE 2x10  []Eccentric step downs lvl 2 15x B   [x]Squats 10# " kettlebell 3x10   [] [place] carry 20# x6 laps   []step ups lvl 3 fwd/  with (2) 5# dumbbells   [] Lvl 3 lateral step over with 3# ankle weights 2x10       Patient Education and Home Exercises       Education provided:   - safety awareness  - educated on safe progressions of HEP including use of resistance bands       Written Home Exercises Provided: Patient instructed to cont prior HEP. Exercises were reviewed and Adelaida was able to demonstrate them prior to the end of the session.  Adelaida demonstrated good  understanding of the education provided. See Electronic Medical Record under Patient Instructions for exercises provided during therapy sessions      [facility]  Discharge Note    Name: Adelaida Flores  Clinic Number: [id number]    Therapy Diagnosis:   Encounter Diagnoses   Name Primary?    Chronic right shoulder pain Yes    Weakness of shoulder     Decreased ROM of right shoulder      Physician: Mary Calderon NP    Physician Orders: PT Eval and Treat   Medical Diagnosis from Referral:   M25.511,G89.29 (ICD-10-CM) - Chronic right shoulder pain   R26.81 (ICD-10-CM) - Unsteady gait   W19.XXXD (ICD-10-CM) - Fall, subsequent encounter   Evaluation Date: 8/31/2023    Date of Last visit: 3/4/24  Total Visits Received: 21    ASSESSMENT      Pt demonstrates improved BLE strength since start of care. Dynamic balance is improved, but still having difficulty with static balance per tests today. Pt is (I) with HEP and plans to address her pelvic floor issues in PT going forward.     Discharge reason: Patient has reached the maximum rehab potential for the present time    Discharge FOTO Score: 58    Goals: Short Term Goals Status:   1. Pt will be (I) with BLE HEP for improved functional strength and balance MET  2. Pt will demonstrate single leg stance >/=10 sec B for improved balance with functional tasks NOT MET     Long Term Goal Status: modified:  1. Pt will score 20/24 on DGI to reduce risk  for falls in the community MET   2. Pt will score 59% on FOTO, indicating improved balance with functional activities  MET  3. Pt will demonstrate improved B hip strength to 4/5 grossly for reduced fall risk with dynamic movements MET  4. Pt will improve 30 sec sit<>Stand score to 15 reps for improved BLE functional strength/endurance NOT MET    PLAN   This patient is discharged from Physical Therapy      Catie Chan, PT             Catie Chan, PT

## 2024-03-05 ENCOUNTER — PATIENT MESSAGE (OUTPATIENT)
Dept: ENDOCRINOLOGY | Facility: CLINIC | Age: 69
End: 2024-03-05
Payer: MEDICARE

## 2024-03-05 ENCOUNTER — PATIENT MESSAGE (OUTPATIENT)
Dept: SURGERY | Facility: CLINIC | Age: 69
End: 2024-03-05
Payer: MEDICARE

## 2024-03-05 ENCOUNTER — OFFICE VISIT (OUTPATIENT)
Dept: PRIMARY CARE CLINIC | Facility: CLINIC | Age: 69
End: 2024-03-05
Payer: MEDICARE

## 2024-03-05 ENCOUNTER — LAB VISIT (OUTPATIENT)
Dept: LAB | Facility: HOSPITAL | Age: 69
End: 2024-03-05
Attending: INTERNAL MEDICINE
Payer: MEDICARE

## 2024-03-05 VITALS
HEIGHT: 65 IN | BODY MASS INDEX: 19.48 KG/M2 | HEART RATE: 61 BPM | SYSTOLIC BLOOD PRESSURE: 120 MMHG | TEMPERATURE: 98 F | OXYGEN SATURATION: 99 % | DIASTOLIC BLOOD PRESSURE: 66 MMHG

## 2024-03-05 DIAGNOSIS — J30.9 ALLERGIC RHINITIS, UNSPECIFIED SEASONALITY, UNSPECIFIED TRIGGER: ICD-10-CM

## 2024-03-05 DIAGNOSIS — G89.29 CHRONIC RIGHT SHOULDER PAIN: Primary | ICD-10-CM

## 2024-03-05 DIAGNOSIS — F50.00 ANOREXIA NERVOSA: ICD-10-CM

## 2024-03-05 DIAGNOSIS — F33.1 MDD (MAJOR DEPRESSIVE DISORDER), RECURRENT EPISODE, MODERATE: ICD-10-CM

## 2024-03-05 DIAGNOSIS — K76.0 HEPATIC STEATOSIS: ICD-10-CM

## 2024-03-05 DIAGNOSIS — R74.8 ELEVATED LIVER ENZYMES: ICD-10-CM

## 2024-03-05 DIAGNOSIS — E44.1 MILD PROTEIN-CALORIE MALNUTRITION: ICD-10-CM

## 2024-03-05 DIAGNOSIS — F41.1 GAD (GENERALIZED ANXIETY DISORDER): ICD-10-CM

## 2024-03-05 DIAGNOSIS — F10.21 ALCOHOL DEPENDENCE IN SUSTAINED FULL REMISSION: ICD-10-CM

## 2024-03-05 DIAGNOSIS — J30.89 NON-SEASONAL ALLERGIC RHINITIS, UNSPECIFIED TRIGGER: ICD-10-CM

## 2024-03-05 DIAGNOSIS — M25.511 CHRONIC RIGHT SHOULDER PAIN: Primary | ICD-10-CM

## 2024-03-05 DIAGNOSIS — M81.0 SENILE OSTEOPOROSIS: ICD-10-CM

## 2024-03-05 DIAGNOSIS — E78.2 MIXED HYPERLIPIDEMIA: ICD-10-CM

## 2024-03-05 DIAGNOSIS — R15.1 FECAL SMEARING: ICD-10-CM

## 2024-03-05 LAB
ALBUMIN SERPL BCP-MCNC: 3.9 G/DL (ref 3.5–5.2)
ALP SERPL-CCNC: 83 U/L (ref 55–135)
ALT SERPL W/O P-5'-P-CCNC: 17 U/L (ref 10–44)
ANION GAP SERPL CALC-SCNC: 10 MMOL/L (ref 8–16)
AST SERPL-CCNC: 22 U/L (ref 10–40)
BASOPHILS # BLD AUTO: 0.03 K/UL (ref 0–0.2)
BASOPHILS NFR BLD: 0.5 % (ref 0–1.9)
BILIRUB DIRECT SERPL-MCNC: 0.2 MG/DL (ref 0.1–0.3)
BILIRUB SERPL-MCNC: 0.4 MG/DL (ref 0.1–1)
BUN SERPL-MCNC: 18 MG/DL (ref 8–23)
CALCIUM SERPL-MCNC: 9.8 MG/DL (ref 8.7–10.5)
CHLORIDE SERPL-SCNC: 100 MMOL/L (ref 95–110)
CHOLEST SERPL-MCNC: 203 MG/DL (ref 120–199)
CHOLEST/HDLC SERPL: 3.9 {RATIO} (ref 2–5)
CO2 SERPL-SCNC: 26 MMOL/L (ref 23–29)
CREAT SERPL-MCNC: 0.7 MG/DL (ref 0.5–1.4)
DIFFERENTIAL METHOD BLD: ABNORMAL
EOSINOPHIL # BLD AUTO: 0.1 K/UL (ref 0–0.5)
EOSINOPHIL NFR BLD: 2.2 % (ref 0–8)
ERYTHROCYTE [DISTWIDTH] IN BLOOD BY AUTOMATED COUNT: 13.8 % (ref 11.5–14.5)
EST. GFR  (NO RACE VARIABLE): >60 ML/MIN/1.73 M^2
GLUCOSE SERPL-MCNC: 76 MG/DL (ref 70–110)
HCT VFR BLD AUTO: 37.9 % (ref 37–48.5)
HDLC SERPL-MCNC: 52 MG/DL (ref 40–75)
HDLC SERPL: 25.6 % (ref 20–50)
HGB BLD-MCNC: 12.1 G/DL (ref 12–16)
IMM GRANULOCYTES # BLD AUTO: 0.02 K/UL (ref 0–0.04)
IMM GRANULOCYTES NFR BLD AUTO: 0.3 % (ref 0–0.5)
LDLC SERPL CALC-MCNC: 141.4 MG/DL (ref 63–159)
LYMPHOCYTES # BLD AUTO: 1.7 K/UL (ref 1–4.8)
LYMPHOCYTES NFR BLD: 28.3 % (ref 18–48)
MCH RBC QN AUTO: 29 PG (ref 27–31)
MCHC RBC AUTO-ENTMCNC: 31.9 G/DL (ref 32–36)
MCV RBC AUTO: 91 FL (ref 82–98)
MONOCYTES # BLD AUTO: 0.5 K/UL (ref 0.3–1)
MONOCYTES NFR BLD: 8.9 % (ref 4–15)
NEUTROPHILS # BLD AUTO: 3.6 K/UL (ref 1.8–7.7)
NEUTROPHILS NFR BLD: 59.8 % (ref 38–73)
NONHDLC SERPL-MCNC: 151 MG/DL
NRBC BLD-RTO: 0 /100 WBC
PLATELET # BLD AUTO: 298 K/UL (ref 150–450)
PMV BLD AUTO: 12.2 FL (ref 9.2–12.9)
POTASSIUM SERPL-SCNC: 4.4 MMOL/L (ref 3.5–5.1)
PREALB SERPL-MCNC: 13 MG/DL (ref 20–43)
PROT SERPL-MCNC: 6.9 G/DL (ref 6–8.4)
RBC # BLD AUTO: 4.17 M/UL (ref 4–5.4)
SODIUM SERPL-SCNC: 136 MMOL/L (ref 136–145)
TRIGL SERPL-MCNC: 48 MG/DL (ref 30–150)
WBC # BLD AUTO: 5.94 K/UL (ref 3.9–12.7)

## 2024-03-05 PROCEDURE — 80061 LIPID PANEL: CPT | Performed by: INTERNAL MEDICINE

## 2024-03-05 PROCEDURE — 86003 ALLG SPEC IGE CRUDE XTRC EA: CPT | Performed by: NURSE PRACTITIONER

## 2024-03-05 PROCEDURE — 85025 COMPLETE CBC W/AUTO DIFF WBC: CPT | Performed by: INTERNAL MEDICINE

## 2024-03-05 PROCEDURE — 36415 COLL VENOUS BLD VENIPUNCTURE: CPT | Mod: PN | Performed by: INTERNAL MEDICINE

## 2024-03-05 PROCEDURE — 99214 OFFICE O/P EST MOD 30 MIN: CPT | Mod: S$GLB,,, | Performed by: INTERNAL MEDICINE

## 2024-03-05 PROCEDURE — 99999 PR PBB SHADOW E&M-EST. PATIENT-LVL IV: CPT | Mod: PBBFAC,,, | Performed by: INTERNAL MEDICINE

## 2024-03-05 PROCEDURE — 86003 ALLG SPEC IGE CRUDE XTRC EA: CPT | Mod: 59 | Performed by: NURSE PRACTITIONER

## 2024-03-05 PROCEDURE — 80076 HEPATIC FUNCTION PANEL: CPT | Performed by: NURSE PRACTITIONER

## 2024-03-05 PROCEDURE — 80048 BASIC METABOLIC PNL TOTAL CA: CPT | Performed by: INTERNAL MEDICINE

## 2024-03-05 PROCEDURE — 84134 ASSAY OF PREALBUMIN: CPT | Performed by: INTERNAL MEDICINE

## 2024-03-05 NOTE — PROGRESS NOTES
Subjective:       Patient ID: Adelaida Flores is a 68 y.o. female.    Chief Complaint: Annual Exam    HPI  Had some chronic diarrhea w/ sometimes incontinence. Also had some pain at the tailbone when she was sitting.  Seen GI. Dx w/ hemorrhoids. Referred to pelvic floor PT. Was finishing R shoulder PT and was doing well. Most recently switched to balance. Yesterday, R shoulder started hurting again.   XR shoulder R 2023 -   No evidence of acute fracture or dislocation of the right shoulder.  Chronic appearing deformity involving the lesser tuberosity of the right humerus.  The findings may relate to chronic injury.  Reports R shoulder has a cyst that was spongy. Not painful.     MDD/MISTY - fluoxetine 40mg 2 caps daily, lamictal 150mg daily  Last saw FEMI Munoz 2/20/24  Never started zyprexa - too scared to start it - weight gain, dry mouth, etc.      3/5/2024     9:03 AM 2/22/2024     3:05 PM 8/31/2023    10:57 AM 5/24/2023    10:17 AM 5/24/2023    10:15 AM 2/20/2023    10:28 AM 1/20/2023    10:26 AM   Depression Patient Health Questionnaire   Over the last two weeks how often have you been bothered by little interest or pleasure in doing things More than half the days Not at all Several days Not at all Not at all Not at all Not at all   Over the last two weeks how often have you been bothered by feeling down, depressed or hopeless Several days Not at all Several days Several days Not at all Not at all Not at all   PHQ-2 Total Score 3 0 2 1 0 0 0   Over the last two weeks how often have you been bothered by trouble falling or staying asleep, or sleeping too much Several days         Over the last two weeks how often have you been bothered by feeling tired or having little energy Several days         Over the last two weeks how often have you been bothered by a poor appetite or overeating More than half the days         Over the last two weeks how often have you been bothered by feeling bad about yourself - or  that you are a failure or have let yourself or your family down Nearly every day         Over the last two weeks how often have you been bothered by trouble concentrating on things, such as reading the newspaper or watching television Nearly every day         Over the last two weeks how often have you been bothered by moving or speaking so slowly that other people could have noticed. Or the opposite - being so fidgety or restless that you have been moving around a lot more than usual. Not at all         Over the last two weeks how often have you been bothered by thoughts that you would be better off dead, or of hurting yourself Several days         If you checked off any problems, how difficult have these problems made it for you to do your work, take care of things at home or get along with other people? Somewhat difficult         PHQ-9 Score 14         PHQ-9 Interpretation Moderate               3/5/2024     9:04 AM 5/12/2022    10:15 AM 3/18/2022     9:07 AM   GAD7   1. Feeling nervous, anxious, or on edge? 3 2 3   2. Not being able to stop or control worrying? 2 1 2   3. Worrying too much about different things? 3 3 3   4. Trouble relaxing? 3 2 2   5. Being so restless that it is hard to sit still? 1 0 0   6. Becoming easily annoyed or irritable? 1 1 1   7. Feeling afraid as if something awful might happen? 1 1 0   8. If you checked off any problems, how difficult have these problems made it for you to do your work, take care of things at home, or get along with other people?  2    MISTY-7 Score 14 10 11   Labs from today is pending.      Anorexia - has a kamlesh that'll be expiring in July w/ [facility].   Has therapist and dietician. Homework makes her a little anxious and hopefully the increased lamictal would help w/ that.   Has a blind weight scale and goes to dietician.     H/o alcohol dependence and now on remission    Osteoporosis - prolia (last injection was August 2023). Was going to have injection this  "afternoon but will have to reschedule.   Had a few falls b/c dog's playmate ran at her from behind. Last fall was 1 mo ago.     Allergic rhinitis - astelin 1 SEN bid, nasal saline rinses BID, flonase 2 SEN. Stopped on xyzal due to dry mouth.   Saw ENT 2/26/24  Hasn't started the regimen yet.   Plan for allergy testing.     Review of Systems  Comprehensive review of systems otherwise negative. See history/subjective section for more details.    Objective:      Physical Exam    /66 (BP Location: Right arm, Patient Position: Sitting, BP Method: Large (Manual))   Pulse 61   Temp 98.4 °F (36.9 °C) (Oral)   Ht 5' 5" (1.651 m)   SpO2 99%   BMI 19.48 kg/m²     GEN - A+OX4, NAD, thin.   HEENT - PERRL, EOMI, OP clear, MMM.   Neck - No thyromegaly or cervical LAD.   CV - RRR, no m/r   Chest - CTAB, no wheezing or rhonchi  Abd - S/NT/ND/+BS.   Ext - 2+BDP and radial pulses. No C/C/E.  Neuro - 5/5 BUE and BLE strength. Normal gait.   Skin - No rash.    Labs reviewed.     Assessment/Plan     Adelaida was seen today for annual exam.    Diagnoses and all orders for this visit:    Chronic right shoulder pain - s/p PT. Plan to work on balance after.     MDD (major depressive disorder), recurrent episode, moderate/MISTY (generalized anxiety disorder) - recently increased lamictal w/ psychiatry PA. LFT pending from today.     Fecal smearing - plan for pelvic floor PT.     Anorexia nervosa - currently under care of [facility] w/ dietician and therapist.     Mild protein-calorie malnutrition - as above.     Alcohol dependence in sustained full remission - continued cessation. Doing well.     Senile osteoporosis - had to reschedule prolia injection due to family in town. Rescheduled for March. Fall precaution. Ca and Vit D.     Allergic rhinitis, unspecified seasonality, unspecified trigger - start meds as rx w/ ENT.     Advance Care Planning     Date: 03/05/2024    Living Will  During this visit, I engaged the patient  in the voluntary " advance care planning process.  The patient and I reviewed the role for advance directives and their purpose in directing future healthcare if the patient's unable to speak for him/herself.  At this point in time, the patient does have full decision-making capacity.  We discussed different extreme health states that she could experience, and reviewed what kind of medical care she would want in those situations.  The patient communicated that if she were comatose and had little chance of a meaningful recovery, she would not want machines/life-sustaining treatments used. In addition to the above preference, other important end-of-life issues for the patient include  SEE PAPERWORK . The patient has completed a living will to reflect these preferences and The patient has already designated a healthcare power of  to make decisions on her behalf.  I spent a total of 2 minutes engaging the patient in this advance care planning discussion.          Power of   I initiated the process of voluntary advance care planning today and explained the importance of this process to the patient.  I introduced the concept of advance directives to the patient, as well. Then the patient received detailed information about the importance of designating a Health Care Power of  (HCPOA). She was also instructed to communicate with this person about their wishes for future healthcare, should she become sick and lose decision-making capacity. The patient has previously appointed a HCPOA. After our discussion, the patient has decided to complete a HCPOA and has appointed her significant other, health care agent:  Trevin Calderon  & health care agent number:  [phone]  I spent a total time of 3 minutes discussing this issue with the patient.       Follow up in about 4 months (around 7/5/2024).      Catie Portillo MD  Department of Internal Medicine - [facility]  8:12 AM

## 2024-03-06 ENCOUNTER — PATIENT MESSAGE (OUTPATIENT)
Dept: PRIMARY CARE CLINIC | Facility: CLINIC | Age: 69
End: 2024-03-06
Payer: MEDICARE

## 2024-03-06 LAB
AMER SYCAMORE IGE QN: 0.11 KU/L
FEATHER PANEL #2: <0.1 KU/L

## 2024-03-06 NOTE — TELEPHONE ENCOUNTER
See portal msg from patient : Saw Adrien on 2/22     Resembles ganglion cyst. Cyst is non tender. Not affecting ROM. Discussed with pt this is likely benign. Will do watchful waiting. Can take ibuprofen prn pain. If it becomes larger, red, or painful she will notify me or PCP immediately. Can always refer to ortho in the future for drainage.

## 2024-03-07 ENCOUNTER — TELEPHONE (OUTPATIENT)
Dept: PRIMARY CARE CLINIC | Facility: CLINIC | Age: 69
End: 2024-03-07
Payer: MEDICARE

## 2024-03-08 ENCOUNTER — PATIENT MESSAGE (OUTPATIENT)
Dept: OTOLARYNGOLOGY | Facility: CLINIC | Age: 69
End: 2024-03-08
Payer: MEDICARE

## 2024-03-08 ENCOUNTER — TELEPHONE (OUTPATIENT)
Dept: OTOLARYNGOLOGY | Facility: CLINIC | Age: 69
End: 2024-03-08
Payer: MEDICARE

## 2024-03-08 DIAGNOSIS — J30.89 NON-SEASONAL ALLERGIC RHINITIS, UNSPECIFIED TRIGGER: Primary | ICD-10-CM

## 2024-03-08 LAB
A ALTERNATA IGE QN: <0.1 KU/L
A FUMIGATUS IGE QN: <0.1 KU/L
ALLERGEN BOXELDER MAPLE TREE IGE: 0.11 KU/L
ALLERGEN MAPLE (BOX ELDER) CLASS: ABNORMAL
ALLERGEN MULBERRY CLASS: NORMAL
ALLERGEN MULBERRY TREE IGE: <0.1 KU/L
ALLERGEN PIGWEED IGE: <0.1 KU/L
ALLERGEN WHITE ASH TREE IGE: 0.1 KU/L
BALD CYPRESS IGE QN: <0.1 KU/L
BERMUDA GRASS IGE QN: 0.66 KU/L
C HERBARUM IGE QN: <0.1 KU/L
CAT DANDER IGE QN: 0.11 KU/L
CEDAR IGE QN: <0.1 KU/L
COCKLEBUR IGE QN: 0.1 KU/L
COMMON PIGWEED CLASS: NORMAL
COMMON RAGWEED IGE QN: 0.15 KU/L
D FARINAE IGE QN: <0.1 KU/L
D PTERONYSS IGE QN: <0.1 KU/L
DEPRECATED A ALTERNATA IGE RAST QL: NORMAL
DEPRECATED A FUMIGATUS IGE RAST QL: NORMAL
DEPRECATED BALD CYPRESS IGE RAST QL: NORMAL
DEPRECATED BERMUDA GRASS IGE RAST QL: ABNORMAL
DEPRECATED C HERBARUM IGE RAST QL: NORMAL
DEPRECATED CAT DANDER IGE RAST QL: ABNORMAL
DEPRECATED CEDAR IGE RAST QL: NORMAL
DEPRECATED COCKLEBUR IGE RAST QL: NORMAL
DEPRECATED COMMON RAGWEED IGE RAST QL: ABNORMAL
DEPRECATED D FARINAE IGE RAST QL: NORMAL
DEPRECATED D PTERONYSS IGE RAST QL: NORMAL
DEPRECATED DOG DANDER IGE RAST QL: ABNORMAL
DEPRECATED ELDER IGE RAST QL: ABNORMAL
DEPRECATED ENGL PLANTAIN IGE RAST QL: NORMAL
DEPRECATED GOOSEFOOT IGE RAST QL: NORMAL
DEPRECATED JOHNSON GRASS IGE RAST QL: ABNORMAL
DEPRECATED KENT BLUE GRASS IGE RAST QL: ABNORMAL
DEPRECATED M RACEMOSUS IGE RAST QL: NORMAL
DEPRECATED MUGWORT IGE RAST QL: NORMAL
DEPRECATED NETTLE IGE RAST QL: NORMAL
DEPRECATED P NOTATUM IGE RAST QL: NORMAL
DEPRECATED PECAN/HICK TREE IGE RAST QL: NORMAL
DEPRECATED ROACH IGE RAST QL: NORMAL
DEPRECATED SHEEP SORREL IGE RAST QL: NORMAL
DEPRECATED SILVER BIRCH IGE RAST QL: NORMAL
DEPRECATED TIMOTHY IGE RAST QL: ABNORMAL
DEPRECATED WHITE OAK IGE RAST QL: NORMAL
DOG DANDER IGE QN: 1.32 KU/L
ELDER IGE QN: 0.11 KU/L
ELM CEDAR CLASS: NORMAL
ELM CEDAR, IGE: <0.1 KU/L
ENGL PLANTAIN IGE QN: 0.1 KU/L
GOOSEFOOT IGE QN: <0.1 KU/L
JOHNSON GRASS IGE QN: 0.59 KU/L
KENT BLUE GRASS IGE QN: 0.71 KU/L
M RACEMOSUS IGE QN: <0.1 KU/L
MUGWORT IGE QN: <0.1 KU/L
NETTLE IGE QN: <0.1 KU/L
P NOTATUM IGE QN: <0.1 KU/L
PECAN/HICK TREE IGE QN: 0.1 KU/L
ROACH IGE QN: <0.1 KU/L
SHEEP SORREL IGE QN: <0.1 KU/L
SILVER BIRCH IGE QN: <0.1 KU/L
TIMOTHY IGE QN: 0.68 KU/L
WHITE ASH CLASS: NORMAL
WHITE OAK IGE QN: <0.1 KU/L

## 2024-03-08 NOTE — TELEPHONE ENCOUNTER
----- Message from Nikki Dow NP sent at 3/8/2024  2:56 PM CST -----  Please let her know that allergy test showed allergies to dog danger and grass. I will refer her to allergy.

## 2024-03-08 NOTE — PROGRESS NOTES
Please let her know that allergy test showed allergies to dog danger and grass. I will refer her to allergy.

## 2024-03-08 NOTE — TELEPHONE ENCOUNTER
Spoke with patient and gave her results and information about being referred to an Allergist. Pt will keep her upcoming appointment with Dr. Harry as well see an Allergist. Pt gave verbal understanding.

## 2024-03-11 ENCOUNTER — CLINICAL SUPPORT (OUTPATIENT)
Dept: REHABILITATION | Facility: HOSPITAL | Age: 69
End: 2024-03-11
Payer: MEDICARE

## 2024-03-11 DIAGNOSIS — R15.9 INCONTINENCE OF FECES, UNSPECIFIED FECAL INCONTINENCE TYPE: ICD-10-CM

## 2024-03-11 DIAGNOSIS — R53.1 WEAKNESS: ICD-10-CM

## 2024-03-11 DIAGNOSIS — M62.89 PELVIC FLOOR DYSFUNCTION: Primary | ICD-10-CM

## 2024-03-11 PROCEDURE — 97530 THERAPEUTIC ACTIVITIES: CPT

## 2024-03-11 PROCEDURE — 97140 MANUAL THERAPY 1/> REGIONS: CPT

## 2024-03-11 PROCEDURE — 97161 PT EVAL LOW COMPLEX 20 MIN: CPT

## 2024-03-11 NOTE — PATIENT INSTRUCTIONS
Home Exercise Program: 03/11/2024    DIAPHRAGMATIC BREATHING     The diaphragm is a dome shaped muscle that forms the floor of the rib cage. It is the most efficient muscle for breathing and relaxation, although most people are not used to using the diaphragm. Diaphragmatic or belly breathing is an important technique to learn because it helps settle down or relax the autonomic nervous system. The correct use of diaphragmatic breathing can help to quiet brain activity resulting in the relaxation of all the muscles and organs of the body. This is accomplished by slow rhythmic breathing concentrated in the diaphragm muscle rather than the chest.    How to do proper relaxation breathing:    Start by lying on your back or reclining in a chair in a relaxed position. Place one hand on your chest and the other on your abdomen.  Relax your jaw by placing your tongue on the roof of your mouth and keeping your teeth slightly apart.   Take a deep breath in, letting the abdomen expand and rise while you keep your upper chest, neck and shoulders relaxed.   As you breathe out, allow your abdomen and chest to fall. Exhale completely.  It doesn't matter if you breathe in/out through your nose and/or mouth. Do whichever feels comfortable.  Remember to breathe slowly.  Do not force your breathing. Do not hold your breath.  Repeat for 2-5 minutes every day.  (If you are short on time completing 3 deep breaths is better than none).      Home Exercise Program: 03/11/2024

## 2024-03-11 NOTE — PLAN OF CARE
[facility]   Physical Therapy Initial Evaluation        Date: 3/11/2024   Name: Adelaida Flores  Clinic Number: [id number]    Therapy Diagnosis:   Encounter Diagnoses   Name Primary?    Incontinence of feces, unspecified fecal incontinence type     Pelvic floor dysfunction Yes    Weakness      Physician: Mary Calderon NP    Physician Orders: PT Eval and Treat   Medical Diagnosis from Referral:   Evaluation Date: 3/11/2024  Authorization Period Expiration: 24  Plan of Care Expiration: 6-3-24  Progress Note Due: 24  Visit # / Visits authorized:    FOTO: 1/3    Precautions: Standard, Fall, and osteoporosis       Time In: 845  Time Out: 930  Total Appointment Time (timed & untimed codes): 45 minutes    SUBJECTIVE       Date of onset: 4     History of current condition - Adelaida reports: She was diagnosed with a hemorrhoid about a month ago.  Adelaida reports a history of fecal incontinence that has been ongoing for with a history of laxative abuse which she used for anorexia.  She has stopped taking laxatives though.      History of right fractured hip (1.5 years ago), thoracic back fracture    History of frequent falls when knocked into (by a dog for example) but none reported in the past month.      OB/GYN History:   Using vaginal estrogen cream: No  Sexually active? No  Pelvic Pain with: none reported  Pelvic pressure? No    Bladder/Bowel History:   Difficulty initiating urine stream: No  Urine stream: strong  Complete emptying: Yes  Bladder leakage: Yes  Activities that cause leakage: sneezing, coughing, urgency   Frequency of incidents: just with sneezing   Amount leaked (urine): teaspoon(s)  Urinary Urgency: Yes.  Able to delay the urge for at least 5 minute(s).  Pain with delaying the urge to urinate: No     Frequency of bowel movements: more than 5 times a day  Difficulty initiating BM: No  Quality/Shape of BM: Filer Stool Chart 6 or 7  Does Patient Feel Empty  "after BM? No  Bowel Urgency: Yes.  Able to delay the urge for at least 1 minute(s).  Fiber Supplements or Laxative Use? Yes: metamucil    Pain with BM: No   Bleeding with BM: No   Colon leakage: Yes  Frequency of incidents: 4x/wk    Amount leaked (bowels): streaking/staining      Form of protection: brief only when working away from home (5/month)      Types of fluid intake: coffee, tea, water ("not enough"), soy milk  Diet: Standard  Habitus: thin  Abuse/Neglect: Pt denies a history of physical or emotional abuse at this visit.       Pain:  Location: right shoulder pain     Medical History: Adelaida  has a past medical history of Alcohol dependence in sustained full remission (03/21/2022), Allergy, Anxiety (02/22/2019), Broken hip, Colon polyps, Depression, GERD (gastroesophageal reflux disease), Hyperlipidemia (02/22/2019), IBS (irritable bowel syndrome), Osteoporosis, T12 compression fracture s/p corpectomy (07/13/2013), and Volvulus.     Surgical History: Adelaida Flores  has a past surgical history that includes Fracture surgery; Tonsillectomy; Colonoscopy (N/A, 8/28/2017); Colon surgery; Spine surgery; Eye surgery (2012); Small intestine surgery (2011); Hernia repair (2012); Esophagogastroduodenoscopy (N/A, 9/26/2023); Colonoscopy (N/A, 9/26/2023); and Esophagogastroduodenoscopy (N/A, 2/19/2024).    Medications: Adelaida has a current medication list which includes the following prescription(s): azelastine, prolia, fluorometholone 0.1%, fluoxetine, fluticasone propionate, hydrocortisone, hydroxyzine pamoate, krill oil, lamotrigine, levocetirizine, multivitamin, omeprazole, and vitamin d.    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  Current Level of Function: fecal incontinence affecting activities of daily living     Constitutional Symptoms Review: Nausea and Diarrhea     Flags Screening  Red Flags:family history of cancer: dad: prostate cancer, Mom: skin cancer       Pts goals: eliminate fecal incontinence and improve pelvic floor muscle(s) control     OBJECTIVE       ORTHO SCREEN  Posture in sitting: slouched  and sits squarely   Posture in standing: forward head and forward and rounded shoulders   Pelvic alignment: Not assessed today        ABDOMINAL WALL ASSESSMENT  Palpation: increased tension  and hypertonic throughout  Abdominal strength: Rectus abdominus: 4/5     Transverse abdominus: 4/5  Scarring: noted from previous abdominal surgeries with mod restrictions in all planes   Pelvic Floor Muscle and Transverse Abdominus Synergy: not tested  Diastasis: not tested       BREATHING MECHANICS ASSESSMENT   Thorax Assessment During Quiet Respiration: Decreased excursion of the sternum , Decreased excursion of abdominal wall , Decreased excursion of lateral ribs, Decreased excursion of anterior ribs, and Decreased excursion of posterior ribs  Minimal motion noted with quiet respiration  Thorax Assessment During Deep Respiration: Excessive excursion of sternum and paradoxical (abdominal wall sucks in during inhalation)    VAGINAL PELVIC FLOOR EXAM    Deferred due to time constraints     RECTAL PELVIC FLOOR EXAM    Deferred due to time constraints     Intake Outcome Measures for FOTO Survey    Therapist reviewed FOTO scores for Adelaida Flores on 3/11/2024.     FOTO report- see Media section in Epic or account episode details in FOTO.      Intake Score:             TREATMENT        Treatment Time In: 912  Treatment Time Out: 930  Total Treatment time (time-based codes) separate from Evaluation: 18 minutes      Manual Therapy to develop flexibility and extensibility for 8 minutes including:   [x]  Bowel massage performed to help with gut motility.  Pt edu in self-bowel massage technique to help with gut motility needed to have a comfortable BM.      Therapeutic Activity Patient participated in dynamic functional therapeutic activities to improve functional performance for 10 minutes. Including: Education as described below.     [x] Instruction on diaphragmatic breathing   [x] Pelvic anatomy edu and impact on current level of function   [x] HEP building/HEP review      Written Home Exercises and Education Provided: yes. Exercises were reviewed and Adelaida was able to demonstrate them prior to the end of the session.  Adelaida demonstrated good  understanding of the education provided. See EMR under Patient Instructions for exercises and education provided during therapy sessions.      ASSESSMENT     Adelaida is a 68 y.o. female referred to outpatient Physical Therapy with a medical diagnosis of fecal incontinence. Pt presents with reports of fecal incontinence and urinary incontinence that is affecting activities of daily living participation. Pelvic floor muscle(s) examination was deferred today secondary to time constraints.  It is suspected that Adelaida has pelvic floor muscle(s) dysfunction including coordination deficits and possibly increased resting tension.  She is expected to benefit from skilled intervention to work towards addressing pelvic floor muscle(s) function in order to improve bowel and bladder control in order to return towards prior level of function.       Pt prognosis is Good.   Pt will benefit from skilled outpatient Physical Therapy to address the deficits stated above and in the chart below, provide pt/family education, and to maximize pt's level of  "independence.     Plan of care discussed with patient: Yes  Pt's spiritual, cultural and educational needs considered and patient is agreeable to the plan of care and goals as stated below:     Anticipated Barriers for therapy: none    Medical Necessity is demonstrated by the following  History  Co-morbidities and personal factors that may impact the plan of care [x] LOW: no personal factors / co-morbidities  [] MODERATE: 1-2 personal factors / co-morbidities  [] HIGH: 3+ personal factors / co-morbidities    Moderate / High Support Documentation:   Co-morbidities affecting plan of care: osteoporosis     Personal Factors:   no deficits     Examination  Body Structures and Functions, activity limitations and participation restrictions that may impact the plan of care [x] LOW: addressing 1-2 elements  [] MODERATE: 3+ elements  [] HIGH: 4+ elements (please support below)    Moderate / High Support Documentation: pelvic floor muscle(s) dysfunction      Clinical Presentation [x] LOW: stable  [] MODERATE: Evolving  [] HIGH: Unstable     Decision Making/ Complexity Score: low         Goals:  Short Term Goals: 4 weeks   Pt to perform "the knack" prior to coughing, laughing or sneezing to decrease risk of incontinence.  Pt to demonstrate being able to correctly and consistently perform a kegel which is needed for continence.   Pt to report a 40% increase in confidence to participate in social activities due to improving bowel function.   Pt to report a decrease in episodes of incontinence to no more than 1x/week to demonstrate improving pelvic floor muscle strength and coordination.   Pt to demonstrate an improved score in the FOTO bowel leakage survey  to at least 57 to demonstrate improving pelvic floor muscle(s) function needed for continence.         Long Term Goals: 12 weeks ,   Pt to be discharged with home plan for carry over after discharge.    Pt to report being able to delay the urge to have a BM at least 10 minutes " when out in public to decrease episodes of incontinence while looking for a bathroom or waiting in line at the bathroom.   Pt to report a decrease in episodes of FI to no more than 1x every 2 weeks to improve confidence needed to participate in social outings.   Pt to report an 80% reduction of fecal incontinence symptoms with ADL participation thereby demonstrating improved pelvic floor muscle control and strength.   Pt to demonstrate an improved score in the FOTO Bowel Leakage survey  to at least 61 to demonstrate improving pelvic floor muscle(s) function needed for continence.         PLAN     Plan of care Certification: 3/11/2024 to 6-3-24.    Outpatient Physical Therapy 1-2 times weekly for 12 weeks to include the following interventions: therapeutic exercises, therapeutic activity, neuromuscular re-education, gait training, manual therapy, modalities PRN, patient/family education, and self care/home management, and dry needling.     Catie Martinez, PT

## 2024-03-14 ENCOUNTER — CLINICAL SUPPORT (OUTPATIENT)
Dept: REHABILITATION | Facility: HOSPITAL | Age: 69
End: 2024-03-14
Payer: MEDICARE

## 2024-03-14 DIAGNOSIS — R53.1 WEAKNESS: ICD-10-CM

## 2024-03-14 DIAGNOSIS — M62.89 PELVIC FLOOR DYSFUNCTION: Primary | ICD-10-CM

## 2024-03-14 PROCEDURE — 97112 NEUROMUSCULAR REEDUCATION: CPT

## 2024-03-14 PROCEDURE — 97530 THERAPEUTIC ACTIVITIES: CPT

## 2024-03-14 PROCEDURE — 97140 MANUAL THERAPY 1/> REGIONS: CPT

## 2024-03-14 NOTE — PROGRESS NOTES
[facility]   Physical Therapy Treatment Note      Name: Adelaida Flores  Clinic Number: [id number]    Therapy Diagnosis:   Encounter Diagnoses   Name Primary?    Pelvic floor dysfunction Yes    Weakness      Physician: Mary Calderon NP    Visit Date: 3/14/2024    Physician Orders: PT Eval and Treat   Medical Diagnosis from Referral: fecal incontinence   Evaluation Date: 3/11/2024  Authorization Period Expiration: 12-31-24  Plan of Care Expiration: 6-3-24  Progress Note Due: 4-11-24  Visit # / Visits authorized: 2/ 20   FOTO: 1/3  Date last given: 3-11-24    Time In: 1100  Time Out: 1145  Total Billable Time: 45 minutes    Precautions: Standard, Fall, and osteoporosis       Subjective     Pt reports: She has been working on her abdominal massage and breathing techniques  .  She was compliant with home exercise program.  Response to previous treatment: no issues reported   Functional change: no changes reported    Pain: 0/10  Location: pelvic or back     Constitutional Symptoms Review: The patient denies having any constitutional symptoms.       Objective      Objective Measures updated at progress report unless specified.     See EMR under MEDIA for written consent provided 3/14/2024  Chaperone: declined      RECTAL PELVIC FLOOR EXAM    EXTERNAL ASSESSMENT  Anus: WNL  Skin condition: WNL   Scarring: none  Sensation: WNL   Pain:  none  Voluntary contraction:  significant accessory muscle(s) activation of gluteals. No visible contraction of the pelvic floor muscle(s)          INTERNAL ASSESSMENT  EAS tone: hypotonic   Impaction: none   Pain: none  Sensation: able to localize pressure appropriately   Muscle Bulk: atrophy   Muscle Power: 0/5  Coordination: tends to hold breath during PFM contration   Comments: significant accessory muscle(s) activation of gluteals. No  contraction felt of the pelvic floor muscle(s)    Treatment   Adelaida received the treatments listed below:    Pt verbally  consents to intrarectal treatment today.  Signed consent form already on file.  Chaperone declined today.    Adelaida received the following manual therapy techniques: to develop flexibility and extensibility for 12 minutes including:   [x] trigger point/myofascial release of abdominal wall muscle(s) globally  [x] Bowel massage performed to help with gut motility. Pt edu in self-bowel massage technique to help with gut motility needed to have a comfortable BM.       Adelaida participated in neuromuscular re-education activities to develop Coordination and Control for 23 minutes including:   [x] Kegel edu and practice.  Increased time spent on edu on proper technique.  Adelaida is not able to activate her pelvic floor muscle(s).  Used tapping intrarectally over muscle bellies to try to elicit a contraction with kegels.         Adelaida participated in dynamic functional therapeutic activities to improve functional performance for 10  minutes, including:  [x] Plan of care review  [x] Anatomy review and discussion of the anatomy on current level of function   [x] Patient was instructed in and practice diaphragmatic breathing as a method to help control pain, decrease anxiety and help w/ sleep. Pt was provided w / handout w/ instructions for practice. Practiced  with mod verbal and tactile instruction.  Mirror was also used to help Adelaida visualize paradoxical breathing patterns      [x] Home exercise program issued and reviewed     Home Exercises Provided and Patient Education Provided     Education provided: See above  Discussed progression of plan of care with patient; educated pt in activity modification; reviewed HEP with pt. Pt demonstrated and verbalized understanding of all instruction and was provided with a handout of HEP (see Patient Instructions).    Written Home Exercises Provided: yes.  Exercises were reviewed and Adelaida was able to demonstrate them prior to the end of the session.  Adelaida demonstrated good   "understanding of the education provided.     See EMR under Patient Instructions for exercises provided 3/14/2024.    Assessment     Continued working on breathing strategies today and focused on diaphragmatic breathing.   Patient used a mirror to help visualize her abdominal wall to help minimize paradoxical activation.  Bowel massage performed to help with gut motility. Pt edu in self-bowel massage technique to help with gut motility needed to have a comfortable BM.   Adelaida is not able to activate her pelvic floor muscle(s) and demonstrates significant atrophy along with overflow from her gluteal muscle(s).  Used tapping intrarectally over muscle bellies to try to elicit a contraction with kegels. Pt will continue to benefit from skilled outpatient physical therapy to address the deficits listed in the problem list box on initial evaluation, provide pt/family education and to maximize pt's level of independence in the home and community environment.       Adelaida Is progressing well towards her goals.   Pt prognosis is Good.     Pt will continue to benefit from skilled outpatient physical therapy to address the deficits listed in the problem list box on initial evaluation, provide pt/family education and to maximize pt's level of independence in the home and community environment.     Pt's spiritual, cultural and educational needs considered and pt agreeable to plan of care and goals.     Anticipated barriers to physical therapy: none    Goals:   Short Term Goals: 4 weeks   Pt to perform "the knack" prior to coughing, laughing or sneezing to decrease risk of incontinence.  Pt to demonstrate being able to correctly and consistently perform a kegel which is needed for continence.   Pt to report a 40% increase in confidence to participate in social activities due to improving bowel function.   Pt to report a decrease in episodes of incontinence to no more than 1x/week to demonstrate improving pelvic floor muscle " strength and coordination.   Pt to demonstrate an improved score in the FOTO bowel leakage survey  to at least 57 to demonstrate improving pelvic floor muscle(s) function needed for continence.           Long Term Goals: 12 weeks ,   Pt to be discharged with home plan for carry over after discharge.    Pt to report being able to delay the urge to have a BM at least 10 minutes when out in public to decrease episodes of incontinence while looking for a bathroom or waiting in line at the bathroom.   Pt to report a decrease in episodes of FI to no more than 1x every 2 weeks to improve confidence needed to participate in social outings.   Pt to report an 80% reduction of fecal incontinence symptoms with ADL participation thereby demonstrating improved pelvic floor muscle control and strength.   Pt to demonstrate an improved score in the FOTO Bowel Leakage survey  to at least 61 to demonstrate improving pelvic floor muscle(s) function needed for continence.           PLAN      Plan of care Certification: 3/11/2024 to 6-3-24.     Outpatient Physical Therapy 1-2 times weekly for 12 weeks to include the following interventions: therapeutic exercises, therapeutic activity, neuromuscular re-education, gait training, manual therapy, modalities PRN, patient/family education, and self care/home management, and dry needling.     Plan     Continue with established Plan of Care, working toward established PT goals.      At next visit: intrarectal work to help facilitate kegel.  Hip and abdominal wall muscle(s).     Catie Martinez, PT

## 2024-03-14 NOTE — PATIENT INSTRUCTIONS
Home Program:     Kegels in sittin. Sit comfortably with legs and buttocks relaxed.  2. Squeeze and LIFT the muscles that stop the flow of urine and passage of gas.  Keep legs and buttock muscles quiet.  3. Hold lift for 1 seconds without holding breath.  4. Release and DROP for 10 seconds.  5. Repeat 1- times, 1 sets, 2 times per day.      No Kegels while urinating!

## 2024-03-15 ENCOUNTER — TELEPHONE (OUTPATIENT)
Dept: SURGERY | Facility: CLINIC | Age: 69
End: 2024-03-15
Payer: MEDICARE

## 2024-03-18 ENCOUNTER — PATIENT MESSAGE (OUTPATIENT)
Dept: PRIMARY CARE CLINIC | Facility: CLINIC | Age: 69
End: 2024-03-18

## 2024-03-18 ENCOUNTER — OFFICE VISIT (OUTPATIENT)
Dept: PRIMARY CARE CLINIC | Facility: CLINIC | Age: 69
End: 2024-03-18
Payer: MEDICARE

## 2024-03-18 ENCOUNTER — PATIENT MESSAGE (OUTPATIENT)
Dept: PRIMARY CARE CLINIC | Facility: CLINIC | Age: 69
End: 2024-03-18
Payer: MEDICARE

## 2024-03-18 DIAGNOSIS — J32.9 SINUSITIS, UNSPECIFIED CHRONICITY, UNSPECIFIED LOCATION: Primary | ICD-10-CM

## 2024-03-18 PROCEDURE — 99213 OFFICE O/P EST LOW 20 MIN: CPT | Mod: 95,,, | Performed by: INTERNAL MEDICINE

## 2024-03-18 RX ORDER — AMOXICILLIN AND CLAVULANATE POTASSIUM 875; 125 MG/1; MG/1
1 TABLET, FILM COATED ORAL 2 TIMES DAILY
Qty: 20 TABLET | Refills: 0 | Status: SHIPPED | OUTPATIENT
Start: 2024-03-18 | End: 2024-03-18 | Stop reason: SDUPTHER

## 2024-03-18 RX ORDER — AMOXICILLIN AND CLAVULANATE POTASSIUM 875; 125 MG/1; MG/1
1 TABLET, FILM COATED ORAL 2 TIMES DAILY
Qty: 20 TABLET | Refills: 0 | Status: SHIPPED | OUTPATIENT
Start: 2024-03-18 | End: 2024-03-28

## 2024-03-18 RX ORDER — LEVOFLOXACIN 500 MG/1
500 TABLET, FILM COATED ORAL DAILY
Qty: 7 TABLET | Refills: 0 | Status: SHIPPED | OUTPATIENT
Start: 2024-03-18 | End: 2024-03-18

## 2024-03-18 NOTE — PROGRESS NOTES
The patient location is: [place], [place]  The chief complaint leading to consultation is: sinus pain    Visit type: audiovisual    Face to Face time with patient: 8 min  8 minutes of total time spent on the encounter, which includes face to face time and non-face to face time preparing to see the patient (eg, review of tests), Obtaining and/or reviewing separately obtained history, Documenting clinical information in the electronic or other health record, Independently interpreting results (not separately reported) and communicating results to the patient/family/caregiver, or Care coordination (not separately reported).         Each patient to whom he or she provides medical services by telemedicine is:  (1) informed of the relationship between the physician and patient and the respective role of any other health care provider with respect to management of the patient; and (2) notified that he or she may decline to receive medical services by telemedicine and may withdraw from such care at any time.    Notes:     Subjective:       Patient ID: Adelaida Flores is a 68 y.o. female.    HPI  Few weeks ago chest tightness and needing to clear throat. 5 days ago severe sinus pain and lost voice. Took sudafed. Loosened some of the mucus. Still w sinus pain and congestion and pressure behind ears.     Review of Systems   Constitutional:  Positive for activity change. Negative for unexpected weight change.   HENT:  Positive for hearing loss. Negative for rhinorrhea and trouble swallowing.    Eyes:  Positive for visual disturbance. Negative for discharge.   Respiratory:  Positive for chest tightness. Negative for wheezing.    Cardiovascular:  Negative for chest pain and palpitations.   Gastrointestinal:  Positive for diarrhea. Negative for blood in stool, constipation and vomiting.   Endocrine: Negative for polydipsia and polyuria.   Genitourinary:  Negative for difficulty urinating, dysuria, hematuria and menstrual problem.    Musculoskeletal:  Negative for arthralgias, joint swelling and neck pain.   Neurological:  Positive for weakness and headaches.   Psychiatric/Behavioral:  Positive for dysphoric mood. Negative for confusion.          Objective:     Gen - A+OX4, NAD  CHEST - completing full sentences. Normal work of breathing.     Assessment/Plan     Diagnoses and all orders for this visit:    Sinusitis, unspecified chronicity, unspecified location    Other orders  -     levoFLOXacin (LEVAQUIN) 500 MG tablet; Take 1 tablet (500 mg total) by mouth once daily. Do not drink milk or yogurt 2 hours before and after taking medicine. for 7 days    Restart xyzal.     Previously scheduled w/ ENT in 3 weeks.      Follow up if symptoms worsen or fail to improve.      Catie Portillo MD  Department of Internal Medicine - [facility]

## 2024-03-20 ENCOUNTER — PATIENT MESSAGE (OUTPATIENT)
Dept: REHABILITATION | Facility: HOSPITAL | Age: 69
End: 2024-03-20
Payer: MEDICARE

## 2024-03-21 ENCOUNTER — CLINICAL SUPPORT (OUTPATIENT)
Dept: REHABILITATION | Facility: HOSPITAL | Age: 69
End: 2024-03-21
Payer: MEDICARE

## 2024-03-21 DIAGNOSIS — R53.1 WEAKNESS: ICD-10-CM

## 2024-03-21 DIAGNOSIS — M62.89 PELVIC FLOOR DYSFUNCTION: Primary | ICD-10-CM

## 2024-03-21 PROCEDURE — 97530 THERAPEUTIC ACTIVITIES: CPT

## 2024-03-21 PROCEDURE — 97112 NEUROMUSCULAR REEDUCATION: CPT

## 2024-03-21 NOTE — PROGRESS NOTES
[facility]   Physical Therapy Treatment Note      Name: Adelaida Flores  Clinic Number: [id number]    Therapy Diagnosis:   Encounter Diagnoses   Name Primary?    Pelvic floor dysfunction Yes    Weakness      Physician: Mary Calderon NP    Visit Date: 3/21/2024    Physician Orders: PT Eval and Treat   Medical Diagnosis from Referral: fecal incontinence   Evaluation Date: 3/11/2024  Authorization Period Expiration: 12-31-24  Plan of Care Expiration: 6-3-24  Progress Note Due: 4-11-24  Visit # / Visits authorized: 3/20   FOTO: 1/3  Date last given: 3-11-24    Time In: 800  Time Out: 845  Total Billable Time: 45 minutes    Precautions: Standard, Fall, and osteoporosis       Subjective     Pt reports: She has been trying to work on her kegels but is struggling to not use her accessory muscle(s).  Has been working on massage and diaphragmatic breathing.      She was compliant with home exercise program.  Response to previous treatment: no issues reported   Functional change: no changes reported    Pain: 0/10  Location: pelvic or back     Constitutional Symptoms Review: The patient denies having any constitutional symptoms.       Objective      Objective Measures updated at progress report unless specified.     See EMR under MEDIA for written consent provided 3/21/2024  Chaperone: declined         VAGINAL PELVIC FLOOR EXAM    EXTERNAL ASSESSMENT 3-21-24  Introitus: WNL  Skin condition: clitoral saxena not observable nor labia minora   Scarring: none   Sensation: WNL   Pain:  none  Voluntary contraction: accessory muscle use  Involuntary contraction: bulge  Bearing down: reflex tightening  Perineal descent: absent      INTERNAL ASSESSMENT  Pain: none   Sensation: able to localized pressure appropriately   Vaginal vault: WNL   Muscle Bulk: slight increase in resting tension    Muscle Power: trace     Quality of contraction: slow rise, decreased hold, and slow relaxation   Specificity: patient  contracts: gluteals and hip adductors    Coordination: tends to hold breath during PFM contration         RECTAL PELVIC FLOOR EXAM    EXTERNAL ASSESSMENT  Anus: WNL  Skin condition: WNL   Scarring: none  Sensation: WNL   Pain:  none  Voluntary contraction:  significant accessory muscle(s) activation of gluteals. No visible contraction of the pelvic floor muscle(s)          INTERNAL ASSESSMENT  EAS tone: hypotonic   Impaction: none   Pain: none  Sensation: able to localize pressure appropriately   Muscle Bulk: atrophy   Muscle Power: 0/5  Coordination: tends to hold breath during PFM contration   Comments: significant accessory muscle(s) activation of gluteals. No  contraction felt of the pelvic floor muscle(s)    Treatment   Adelaida received the treatments listed below:    Pt verbally consents to intrarectal treatment today.  Signed consent form already on file.  Chaperone declined today.    Adelaida received the following manual therapy techniques: to develop flexibility and extensibility for 12 minutes including:   [x] trigger point/myofascial release of abdominal wall muscle(s) globally  [] Bowel massage performed to help with gut motility. Pt edu in self-bowel massage technique to help with gut motility needed to have a comfortable BM.   [x] Pelvic floor muscle(s) intravaginal myofascial release       Adelaida participated in neuromuscular re-education activities to develop Coordination and Control for 23 minutes including:   [x] Kegel edu and practice.  Increased time spent on edu on proper technique.  Adelaida is not able to activate her pelvic floor muscle(s).  Used tapping intrarectally over muscle bellies to try to elicit a contraction with kegels. Performed intravaginally today   [x] Pt struggles to isolate TrA initially but improves with practice. Tactile and verbal cues required for correct activation of TrA. Initially pt substitutes with rectus abdominus muscle but with mod verbal and tactile instruction is  able to isolate out  lower abdominal muscles.    [x] Transverse abdominus muscle 5 sec x 10 reps  [x] Transverse abdominus muscle +Kegel 5 sec x 10 reps  [] Transverse abdominus muscle +Kegel+BKFO/Marching x10 reps.   Cues needed for correct TA activation and coordination with kegel.            Adelaida participated in dynamic functional therapeutic activities to improve functional performance for 10  minutes, including:  [x] Plan of care review  [x] Anatomy review and discussion of the anatomy on current level of function   [x] Patient was instructed in and practice diaphragmatic breathing as a method to help control pain, decrease anxiety and help w/ sleep. Pt was provided w / handout w/ instructions for practice. Practiced  with mod verbal and tactile instruction.  Mirror was also used to help Adelaida visualize paradoxical breathing patterns      [x] Home exercise program issued and reviewed     Home Exercises Provided and Patient Education Provided     Education provided: See above  Discussed progression of plan of care with patient; educated pt in activity modification; reviewed HEP with pt. Pt demonstrated and verbalized understanding of all instruction and was provided with a handout of HEP (see Patient Instructions).    Written Home Exercises Provided: yes.  Exercises were reviewed and Adelaida was able to demonstrate them prior to the end of the session.  Adelaida demonstrated good  understanding of the education provided.     See EMR under Patient Instructions for exercises provided 3/14/2024.    Assessment     Worked on pelvic floor muscle(s) activation using intravaginal tactile feedback. Adelaida is not able to activate her pelvic floor muscle(s) and demonstrates significant atrophy along with overflow from her gluteal muscle(s). Improved ability to reduce overflow today though and Adelaida is making steady progress.  Pt will continue to benefit from skilled outpatient physical therapy to address the deficits  "listed in the problem list box on initial evaluation, provide pt/family education and to maximize pt's level of independence in the home and community environment.       Adelaida Is progressing well towards her goals.   Pt prognosis is Good.     Pt will continue to benefit from skilled outpatient physical therapy to address the deficits listed in the problem list box on initial evaluation, provide pt/family education and to maximize pt's level of independence in the home and community environment.     Pt's spiritual, cultural and educational needs considered and pt agreeable to plan of care and goals.     Anticipated barriers to physical therapy: none    Goals:   Short Term Goals: 4 weeks   Pt to perform "the knack" prior to coughing, laughing or sneezing to decrease risk of incontinence.  Pt to demonstrate being able to correctly and consistently perform a kegel which is needed for continence.   Pt to report a 40% increase in confidence to participate in social activities due to improving bowel function.   Pt to report a decrease in episodes of incontinence to no more than 1x/week to demonstrate improving pelvic floor muscle strength and coordination.   Pt to demonstrate an improved score in the FOTO bowel leakage survey  to at least 57 to demonstrate improving pelvic floor muscle(s) function needed for continence.           Long Term Goals: 12 weeks ,   Pt to be discharged with home plan for carry over after discharge.    Pt to report being able to delay the urge to have a BM at least 10 minutes when out in public to decrease episodes of incontinence while looking for a bathroom or waiting in line at the bathroom.   Pt to report a decrease in episodes of FI to no more than 1x every 2 weeks to improve confidence needed to participate in social outings.   Pt to report an 80% reduction of fecal incontinence symptoms with ADL participation thereby demonstrating improved pelvic floor muscle control and strength.   Pt to " demonstrate an improved score in the FOTO Bowel Leakage survey  to at least 61 to demonstrate improving pelvic floor muscle(s) function needed for continence.           PLAN      Plan of care Certification: 3/11/2024 to 6-3-24.     Outpatient Physical Therapy 1-2 times weekly for 12 weeks to include the following interventions: therapeutic exercises, therapeutic activity, neuromuscular re-education, gait training, manual therapy, modalities PRN, patient/family education, and self care/home management, and dry needling.     Plan     Continue with established Plan of Care, working toward established PT goals.      At next visit: intrarectal work to help facilitate kegel.  Hip and abdominal wall muscle(s).     Catie Martinez, PT

## 2024-03-21 NOTE — PROGRESS NOTES
Received Prolia 60 mg injection sub Q in left arm. Patient stated she is taking vitamin D supplements.  Pt denies dental surgery < 3 months, Tolerated well and left in NAD             n/a

## 2024-03-21 NOTE — PATIENT INSTRUCTIONS
"Home Exercise Program: 03/21/2024    Abdominal Muscle Training:  Tighten your abdominal muscles without sucking in our pooching your belly.  Tighten and draw in your muscles.  Don't hold your breath!  It actually helps to exhale while your tighten.  "Blow out birthday candles."     Hold 5 seconds.  Repeat 10 times.  2 sets per day without kegel  Hold 5 seconds.  Repeat 10 times.  2 sets per day with kegel                 "

## 2024-03-26 ENCOUNTER — OFFICE VISIT (OUTPATIENT)
Dept: ALLERGY | Facility: CLINIC | Age: 69
End: 2024-03-26
Payer: MEDICARE

## 2024-03-26 ENCOUNTER — OFFICE VISIT (OUTPATIENT)
Dept: OTOLARYNGOLOGY | Facility: CLINIC | Age: 69
End: 2024-03-26
Payer: MEDICARE

## 2024-03-26 VITALS — SYSTOLIC BLOOD PRESSURE: 102 MMHG | HEART RATE: 70 BPM | DIASTOLIC BLOOD PRESSURE: 65 MMHG

## 2024-03-26 VITALS — HEART RATE: 71 BPM | SYSTOLIC BLOOD PRESSURE: 103 MMHG | OXYGEN SATURATION: 97 % | DIASTOLIC BLOOD PRESSURE: 68 MMHG

## 2024-03-26 DIAGNOSIS — J30.1 NON-SEASONAL ALLERGIC RHINITIS DUE TO POLLEN: ICD-10-CM

## 2024-03-26 DIAGNOSIS — J30.9 CHRONIC ALLERGIC RHINITIS: ICD-10-CM

## 2024-03-26 DIAGNOSIS — F41.1 GAD (GENERALIZED ANXIETY DISORDER): ICD-10-CM

## 2024-03-26 DIAGNOSIS — J32.0 CHRONIC MAXILLARY SINUSITIS: Primary | Chronic | ICD-10-CM

## 2024-03-26 DIAGNOSIS — J30.89 NON-SEASONAL ALLERGIC RHINITIS, UNSPECIFIED TRIGGER: Chronic | ICD-10-CM

## 2024-03-26 DIAGNOSIS — M81.0 OSTEOPOROSIS, POST-MENOPAUSAL: ICD-10-CM

## 2024-03-26 DIAGNOSIS — J30.81 ALLERGIC RHINITIS DUE TO ANIMAL (CAT) (DOG) HAIR AND DANDER: ICD-10-CM

## 2024-03-26 DIAGNOSIS — H10.403 CHRONIC CONJUNCTIVITIS OF BOTH EYES, UNSPECIFIED CHRONIC CONJUNCTIVITIS TYPE: ICD-10-CM

## 2024-03-26 DIAGNOSIS — J32.0 CHRONIC SINUSITIS OF BOTH MAXILLARY SINUSES: ICD-10-CM

## 2024-03-26 DIAGNOSIS — H69.93 EUSTACHIAN TUBE DYSFUNCTION, BILATERAL: Primary | ICD-10-CM

## 2024-03-26 DIAGNOSIS — J34.3 HYPERTROPHY OF INFERIOR NASAL TURBINATE: Chronic | ICD-10-CM

## 2024-03-26 DIAGNOSIS — H69.93 DYSFUNCTION OF BOTH EUSTACHIAN TUBES: ICD-10-CM

## 2024-03-26 PROCEDURE — 99999 PR PBB SHADOW E&M-EST. PATIENT-LVL IV: CPT | Mod: PBBFAC,,, | Performed by: STUDENT IN AN ORGANIZED HEALTH CARE EDUCATION/TRAINING PROGRAM

## 2024-03-26 PROCEDURE — 99999 PR PBB SHADOW E&M-EST. PATIENT-LVL IV: CPT | Mod: PBBFAC,,, | Performed by: OTOLARYNGOLOGY

## 2024-03-26 PROCEDURE — 99205 OFFICE O/P NEW HI 60 MIN: CPT | Mod: S$GLB,,, | Performed by: STUDENT IN AN ORGANIZED HEALTH CARE EDUCATION/TRAINING PROGRAM

## 2024-03-26 PROCEDURE — 31231 NASAL ENDOSCOPY DX: CPT | Mod: S$GLB,,, | Performed by: OTOLARYNGOLOGY

## 2024-03-26 PROCEDURE — 99214 OFFICE O/P EST MOD 30 MIN: CPT | Mod: 25,S$GLB,, | Performed by: OTOLARYNGOLOGY

## 2024-03-26 RX ORDER — FLUTICASONE PROPIONATE 50 MCG
2 SPRAY, SUSPENSION (ML) NASAL 2 TIMES DAILY
Qty: 31.6 ML | Refills: 5 | Status: SHIPPED | OUTPATIENT
Start: 2024-03-26

## 2024-03-26 RX ORDER — AMOXICILLIN AND CLAVULANATE POTASSIUM 875; 125 MG/1; MG/1
1 TABLET, FILM COATED ORAL 2 TIMES DAILY
Qty: 20 TABLET | Refills: 0 | Status: SHIPPED | OUTPATIENT
Start: 2024-03-26 | End: 2024-04-05

## 2024-03-26 RX ORDER — KETOROLAC TROMETHAMINE 5 MG/ML
1 SOLUTION OPHTHALMIC 4 TIMES DAILY PRN
Qty: 10 ML | Refills: 11 | Status: SHIPPED | OUTPATIENT
Start: 2024-03-26 | End: 2024-04-05

## 2024-03-26 NOTE — PROCEDURES
Nasal/sinus endoscopy    Date/Time: 3/26/2024 3:00 PM    Performed by: China Diehl MD  Authorized by: China Diehl MD    Consent Done?:  Yes (Verbal)  Anesthesia:     Local anesthetic:  Lidocaine 2% and [place]-Synephrine 1/2%  Nose:     Procedure Performed:  Nasal Endoscopy  External:      No external nasal deformity  Intranasal:      Mucosa no polyps     Mucosa ulcers not present     No mucosa lesions present     Turbinates not enlarged     No septum gross deformity  Nasopharynx:      Posterior choanae patent     Eustachian tube patent     Bilateral middle turbinate edema with narrowing of the middle meatus; + narrowing of sphenoethmoid recess

## 2024-03-26 NOTE — PROGRESS NOTES
"  Allergy Clinic Note  [facility]    This note was created by combination of typed  and dictation. Transcription errors are likely.  If there are any questions, please contact me.    Subjective:      Patient ID: Adelaida Flores is a 68 y.o. female.    Chief Complaint: Allergies (First consultation for Allergies.   Patient states when she was  a child she used to have Allergy Shots 4 x weekly.  )      Referring Provider: Nikki Dow    History of Present Illness: Adelaida Flores is a 68 y.o. female referred by ENT nurse practitioner Nikki Dow for chronic rhinitis complicated by Eustachian tube dysfunction.  She is here with her  and the Hx is difficult.    Related medications and other interventions  Allegra or Xyzal--not taking  Flonase 2 squirts each side daily  Astelin 1 spray 2 times daily --nearly daily  Vistaril 25 mg nightly as needed --hardly ever  FML eyedrops daily  Patanol -- not taking  (omeprazole 40 mg twice daily)    03/26/2024:  At initial visit, vesta reported 2-3 year Hx of "fluid" sensation in both ears, "like wanting to shake your head after swimming."  She says her ears also feel "plugged up" but denies pressure/pain or vertigo.  Sx are perennial and have not been helped by Allegra, Xyzal, Azelastine or Flonase 2 squirts daily.  Client reports previous allergy testing positive for grass and house dust.  Most recent immunocaps are positive for their pet dog and for grass.    She also has a Hx of intermittent itchy eyes, somewhat helped by FML but not helped by Zaditor or Pataday.  Other rhinitis Sx are intermittent nasal congestion and sneezing.  Over the last 2 weeks, she has also had laryngitis, face pain, cough and fever that started 4 days after starting antibiotics.  These symptoms are improving.     MEDICAL HISTORY      Significant past medical history:  GERD, Sensorineural hearing loss, anxiety, osteoporosis, status post partial colectomy  Active problem list " reviewed  ENT surgery:  Tonsillectomy   Significant family history: Brother with asthma.  Mother with rhinitis  Exposures: pet dog in crate in bedroom (not in bed).  No smoke, No mold.  Smoking Hx:  Client  reports that she quit smoking about 31 years ago. Her smoking use included cigarettes. She started smoking about 41 years ago. She has a 10.0 pack-year smoking history. She has never been exposed to tobacco smoke. She has never used smokeless tobacco.    Meds: MAR reviewed    Asthma:  No  Eczema:  No  Rhinitis:  Yes.  See HPI  Drug allergy/intolerance:  Cefdinir--> diarrhea (side effect)  Venom allergy:  No  Latex allergy:  No    Patient Active Problem List   Diagnosis    Osteoporosis, post-menopausal    Irritable bowel syndrome    Moderate episode of recurrent major depressive disorder    Joint hyperextensibility of multiple sites    Dyspepsia    Frequent loose stools    Senile osteoporosis    MISTY (generalized anxiety disorder)    Hyperlipidemia    History of partial colectomy    Alcohol dependence in sustained full remission    Positive LENORA (antinuclear antibody)    Protein-calorie malnutrition    Eating disorder    NADIA (obstructive sleep apnea)    Chronic right shoulder pain    Weakness of shoulder    Decreased ROM of right shoulder    Balance problem    Cyst of bone of right shoulder    Pelvic floor dysfunction    Weakness     Medication List with Changes/Refills   New Medications    FLUTICASONE PROPIONATE (FLONASE) 50 MCG/ACTUATION NASAL SPRAY    2 sprays (100 mcg total) by Each Nostril route 2 (two) times daily.       Start Date: 3/26/2024 End Date: --    KETOROLAC 0.5% (ACULAR) 0.5 % DROP    Place 1 drop into both eyes 4 (four) times daily as needed.       Start Date: 3/26/2024 End Date: 4/5/2024   Current Medications    AMOXICILLIN-CLAVULANATE 875-125MG (AUGMENTIN) 875-125 MG PER TABLET    Take 1 tablet by mouth 2 (two) times daily. for 10 days       Start Date: 3/18/2024 End Date: 3/28/2024    AZELASTINE  (ASTELIN) 137 MCG (0.1 %) NASAL SPRAY    1 spray (137 mcg total) by Nasal route 2 (two) times daily.       Start Date: 2/26/2024 End Date: 2/25/2025    DENOSUMAB (PROLIA) 60 MG/ML SYRG           Start Date: 12/1/2013 End Date: --    FLUOROMETHOLONE 0.1% (FML) 0.1 % DRPS    Place into both eyes.       Start Date: 10/18/2021End Date: --    FLUOXETINE 40 MG CAPSULE    Take 2 capsules (80 mg total) by mouth once daily.       Start Date: 2/20/2024 End Date: 8/18/2024    HYDROCORTISONE (ANUSOL-HC) 2.5 % RECTAL CREAM    Place rectally 2 (two) times daily.       Start Date: 1/17/2024 End Date: --    HYDROXYZINE PAMOATE (VISTARIL) 25 MG CAP    Take 1 capsule (25 mg total) by mouth nightly as needed (insomnia).       Start Date: 3/25/2022 End Date: --    KRILL OIL ORAL           Start Date: 9/2/2018  End Date: --    LAMOTRIGINE (LAMICTAL) 150 MG TAB    Take 1 tablet (150 mg total) by mouth once daily.       Start Date: 12/20/2023End Date: 6/17/2024    LEVOCETIRIZINE (XYZAL) 5 MG TABLET    Take 1 tablet (5 mg total) by mouth every evening.       Start Date: 12/12/2023End Date: 12/11/2024    MULTIVITAMIN CAPSULE    Take 1 capsule by mouth once daily.       Start Date: --        End Date: --    OMEPRAZOLE (PRILOSEC) 40 MG CAPSULE    Take 1 capsule (40 mg total) by mouth 2 (two) times daily before meals.       Start Date: 9/26/2023 End Date: 9/25/2024    VITAMIN D (VITAMIN D3) 1000 UNITS TAB    Take 1,000 Units by mouth once daily.       Start Date: --        End Date: --   Discontinued Medications    FLUTICASONE PROPIONATE (FLONASE) 50 MCG/ACTUATION NASAL SPRAY    2 sprays (100 mcg total) by Each Nostril route once daily.       Start Date: 12/12/2023End Date: 3/26/2024         REVIEW OF SYSTEMS      CONST: no F/C/NS, no unintentional weight changes  NEURO:  no tremor, no weakness  EYES: no discharge, no erythema  EARS: no hearing loss, no sensation of fullness, T  PULM:  no SOB, no wheezing, no cough  CV: no CP, no  palpitations  DERM: no rashes, no skin breaks    PHYSICAL EXAM     /68 (BP Location: Left arm, Patient Position: Sitting, BP Method: Medium (Automatic))   Pulse 71   SpO2 97%   GEN: Awake and alert, no distress  DERM: No flushing, No rashes  EYE:  No ocular discharge  HENT: No nasal discharge, no hoarseness, TMs are translucent bilaterally.  Nares are pink with moderate turbinate swelling.  Oropharynx is benign without exudate.  Tongue is not coated.  PULM: Normal work of breathing, no cough  NEURO:  No focal deficit, speech fluent and logical  PSYCH: appropriate affect, normal behavior    MEDICAL DECISION MAKING     Data reviewed (new entries in bold-face)      Allergy Testing      Extensive inhalant immunocaps ordered at ENT are positive for dog and grass, 2024  Class to dog, [place] blue grass  Class 1 [place] grass, Viral grass, Suman grass  All other aeroallergens less than 0.35 KU/L.  [place] sycamore 0.11; box elder/maple 0.11; bay 0.10; pecan pollen 0.10; ragweed 0.15; Andreina 0.11; cat 0.11.  Testing was notably negative to dust mites, Aspergillus, Penicillium, Cladosporium, and Alternaria      Lab results      No IgE count available   EO count 100, 2024      Imaging and other diagnostics      CT temporal bones without contrast (12/26/2023) significant for paranasal sinuses: Mucosal thickening in the bilateral maxillary sinuses. The remainder of the study was normal      Medical records review   At initial visit reviewed ENT NP note of 12/12/2023.  She presented with decreased hearing attributed to wax impaction.  She also reported a history of grass allergy treated with immunotherapy in the past.  She was prescribed Flonase and Xyzal       Diagnoses:     Adelaida Flores is a 68 y.o. female. with  1. Eustachian tube dysfunction, bilateral    2. Chronic allergic rhinitis    3. Chronic conjunctivitis of both eyes, unspecified chronic conjunctivitis type    4. Non-seasonal allergic rhinitis due to  pollen    5. Allergic rhinitis due to (dog) hair and dander    6. Chronic sinusitis of both maxillary sinuses    7. MISTY (generalized anxiety disorder)          Plan:   Client is presenting with bilateral ear fullness presumably due to Eustachian tube dysfunction and hypertrophied nasal turbinates.  Immunocap testing is positive to grass and pet dog.  Offered nasal rinses versus an increased dose of Flonase.  She chose the latter.  I prescribed Flonase 2 squirts each nostril twice daily.    For her intermittent chronic conjunctivitis symptoms, I recommended ketorolac as needed in addition to the FML drops prescribed by her ophthalmologist.      Eustachian tube dysfunction, bilateral  -     Ambulatory referral/consult to Allergy  -     fluticasone propionate (FLONASE) 50 mcg/actuation nasal spray; 2 sprays (100 mcg total) by Each Nostril route 2 (two) times daily.  Dispense: 31.6 mL; Refill: 5    Chronic allergic rhinitis due to grass and pet dog  -     Ambulatory referral/consult to Allergy  -     great work keeping dog out of bed  -     increase Flonase as above    Chronic conjunctivitis of both eyes, unspecified chronic conjunctivitis type  -     ketorolac 0.5% (ACULAR) 0.5 % Drop; Place 1 drop into both eyes 4 (four) times daily as needed.  Dispense: 10 mL; Refill: 11    Chronic sinusitis of both maxillary sinuses by temporal bone CT  Defer to ENT    Comorbidities  Anxiety --avoid oral decongestants  Osteoporosis --avoid systemic steroids  GERD    PATIENT INSTRUCTIONS AND FOLLOW-UP     Patient Instructions   Allergic to dogs and grass        Great job on keeping the dog out of bed  Consider removing dog from bedroom  Consider shampooing twice a week    Stop azelastine, Xyzal, Allegra    Increase Flonase (= fluticasone) nasal spray:  2 squirts each nostril twice a day   Remember to aim out toward your ear.   Needs to be used regularly 5-14 days for full effect.    Ketorolac eye drops:  1 drop each eye as needed, up  to 4 times daily  Warning:  burns    Continue FML eyedrops    Follow up as needed.        Rosalind Diaz MD  Allergy, Asthma & Immunology        I spent a total of 64 minutes on the day of the visit.This includes face to face time and non-face to face time preparing to see the patient (eg, review of tests), obtaining and/or reviewing separately obtained history, documenting clinical information in the electronic or other health record, independently interpreting results and communicating results to the patient/family/caregiver, or care coordinator.

## 2024-03-26 NOTE — PROGRESS NOTES
Patient ID: Adelaida Flores is a 68 y.o. y.o. female    Chief Complaint:   Chief Complaint   Patient presents with    Sinus Problem     Sinus has an deformity      HPI 3/26/2024:  Patient referred to me by Nikki Dow NP(ENT) for chronic maxillary sinusitis noted on CT of temporal bones in 12/23.  She has been treated with doxycyline course. She was recently started on Augmentin course on 3/18/24 for sinusitis by primary care. She had severe bilateral maxillary pressure prior to starting antibiotics.  She has been on flonase and xyzal but had to stop xyzal due to dryness in the mouth/nose.  She also has had immunoCAP testing showing class 2 allergy to grass and dog danger, mild sensitivity to cat dander.       Review of Systems   Constitutional: Negative for fever, chills, fatigue and unexpected weight change.   HENT: Positive for ear blockage. Negative for hearing loss, nosebleeds, congestion, sore throat, facial swelling, rhinorrhea, sneezing, trouble swallowing, dental problem, voice change, postnasal drip, sinus pressure, tinnitus and ear discharge.    Eyes: Negative for redness, itching and visual disturbance.   Respiratory: Negative for cough, choking and wheezing.    Cardiovascular: Negative for chest pain and palpitations.   Gastrointestinal: Negative for abdominal pain.        No reflux.   Musculoskeletal: Negative for gait problem.   Skin: Negative for rash.   Neurological: Negative for dizziness, light-headedness and headaches.     Past Medical History:   Diagnosis Date    Alcohol dependence in sustained full remission 03/21/2022    Allergy     Anxiety 02/22/2019    Broken hip     Colon polyps     Depression     GERD (gastroesophageal reflux disease)     Hyperlipidemia 02/22/2019    IBS (irritable bowel syndrome)     Osteoporosis     Recurrent upper respiratory infection (URI)     T12 compression fracture s/p corpectomy 07/13/2013    Volvulus     s/p colectomy     Past Surgical History:   Procedure  Laterality Date    COLON SURGERY      due to volvulus    COLONOSCOPY N/A 2017    Procedure: COLONOSCOPY miralax;  Surgeon: Trey Haas Jr., MD;  Location: [facility] ENDO;  Service: Endoscopy;  Laterality: N/A;    COLONOSCOPY N/A 2023    Procedure: COLONOSCOPY;  Surgeon: Graeme Rasmussen MD;  [facility];  Service: Endoscopy;  Laterality: N/A;    ESOPHAGOGASTRODUODENOSCOPY N/A 2023    Procedure: EGD (ESOPHAGOGASTRODUODENOSCOPY);  Surgeon: Graeme Rasmussen MD;  [facility];  Service: Endoscopy;  Laterality: N/A;  ref by / kristie inst portal-RB    ESOPHAGOGASTRODUODENOSCOPY N/A 2024    Procedure: EGD (ESOPHAGOGASTRODUODENOSCOPY);  Surgeon: Alfredo Gaxiola MD;  [facility];  Service: Endoscopy;  Laterality: N/A;  referral: DR. Rasmussen, pre last rpocedure reporat Pt to f/u EGD in 12 weeks - esophagitis any GI, CV/ prep ins on portal - ERW  - pc complete. DBM  2-16 left message with call back number did not read instructions.  [facility]    EYE SURGERY  2012    Cataract/torc    FRACTURE SURGERY      right broken wrist and had surgery    HERNIA REPAIR  2012    SMALL INTESTINE SURGERY  2011    volvulus x 2    SPINE SURGERY      due to fall while on a boat.    TONSILLECTOMY       Social History     Socioeconomic History    Marital status:    Occupational History    Occupation: Dietary Consultant      Employer: [employer]   Tobacco Use    Smoking status: Former     Current packs/day: 0.00     Average packs/day: 1 pack/day for 10.0 years (10.0 ttl pk-yrs)     Types: Cigarettes     Start date: 1983     Quit date: 1993     Years since quittin.2     Passive exposure: Never    Smokeless tobacco: Never    Tobacco comments:     Quit 28 years ago   Substance and Sexual Activity    Alcohol use: No    Drug use: No    Sexual activity: Yes     Partners: Male     Birth control/protection: None   Social History Narrative    Got  to  Kristopher Calderon in 1/2019.     Does some physical activity daily previously. But since getting  less active. Still does weights 3x/week.     Social Determinants of Health     Financial Resource Strain: Low Risk  (1/17/2024)    Overall Financial Resource Strain (CARDIA)     Difficulty of Paying Living Expenses: Not very hard   Recent Concern: Financial Resource Strain - Medium Risk (12/19/2023)    Overall Financial Resource Strain (CARDIA)     Difficulty of Paying Living Expenses: Somewhat hard   Food Insecurity: No Food Insecurity (1/17/2024)    Hunger Vital Sign     Worried About Running Out of Food in the Last Year: Never true     Ran Out of Food in the Last Year: Never true   Transportation Needs: No Transportation Needs (1/17/2024)    PRAPARE - Transportation     Lack of Transportation (Medical): No     Lack of Transportation (Non-Medical): No   Physical Activity: Insufficiently Active (1/17/2024)    Exercise Vital Sign     Days of Exercise per Week: 7 days     Minutes of Exercise per Session: 20 min   Stress: Stress Concern Present (1/17/2024)    [place] [employer] - Occupational Stress Questionnaire     Feeling of Stress : Very much   Social Connections: Unknown (1/17/2024)    Social Connection and Isolation Panel [NHANES]     Frequency of Communication with Friends and Family: Twice a week     Frequency of Social Gatherings with Friends and Family: Once a week     Active Member of Clubs or Organizations: Yes     Attends Club or Organization Meetings: More than 4 times per year     Marital Status:    Housing Stability: Low Risk  (1/17/2024)    Housing Stability Vital Sign     Unable to Pay for Housing in the Last Year: No     Number of Places Lived in the Last Year: 1     Unstable Housing in the Last Year: No     Family History   Problem Relation Age of Onset    Colon polyps Mother     Cancer Mother         skin    Osteoarthritis Mother     Colon polyps Father     Cancer Father          prostate    Heart disease Father     Osteoarthritis Maternal Aunt     Osteoarthritis Maternal Grandmother     Cancer Sister         skin    Asthma Brother     Diabetes Neg Hx     Cirrhosis Neg Hx        Objective:      Vitals:    03/26/24 1512   BP: 102/65   Pulse: 70         Constitutional:   She appears well-developed and well-nourished.     Head:  Normocephalic and atraumatic. Salivary glands normal.  Facial strength is normal.      Ears:  Hearing normal to normal and whispered voice; external ear normal without scars, lesions, or masses; ear canal, tympanic membrane, and middle ear normal..     Nose:  Mucosal edema present. Turbinate hypertrophy.  Right sinus exhibits no maxillary sinus tenderness and no frontal sinus tenderness. Left sinus exhibits no maxillary sinus tenderness and no frontal sinus tenderness.     Mouth/Throat  Oropharynx clear and moist without lesions or asymmetry and normal uvula midline.     Neck:  Neck normal without thyromegaly masses, asymmetry, normal tracheal structure, crepitus, and tenderness.           See separate procedure note for nasal endoscopy.       CT Temporal Bones Without Contrast 12/26/2023      FINDINGS:  Right inner ear: Normal. Right ossicles and middle ear: Normal. The middle ear ossicles are intact. Right external auditory canal: Normal. Right facial nerve canal: Normal. Right jugular foramen: No jugular dehiscence. Right carotid canal: No aberrant carotid canal. Right mastoid air cells: Normal. No mastoid effusions. Left inner ear: Normal. Left ossicles and middle ear: Normal. The middle ear ossicles are intact. Left external auditory canal: Normal. Left facial nerve canal: Normal. Left jugular foramen: No jugular dehiscence. Left carotid canal: No aberrant carotid canal. Left mastoid air cells: Normal. No mastoid effusions. Paranasal sinuses: Mucosal thickening in the bilateral maxillary sinuses. Soft tissues: Unremarkable.     Impression:     1. No CT  abnormalities of the temporal bones. 2. Mucosal thickening in the bilateral maxillary sinuses.    Assessment:         ICD-10-CM ICD-9-CM    1. Chronic maxillary sinusitis  J32.0 473.0 Ambulatory referral/consult to ENT      CT [employer] Sinuses without      2. Non-seasonal allergic rhinitis, unspecified trigger  J30.89 477.8       3. Hypertrophy of inferior nasal turbinate  J34.3 478.0       4. Dysfunction of both eustachian tubes  H69.93 381.81                  Plan:      -start on nasal saline rinses bid  -continue on Flonase 2 sprays in each nostril daily  Extend Augmentin course to 21 days for chronic maxillary sinusitis.   Obtain dedicated post-treatment CT scan of sinuses  Follow up in 4-6 weeks for recheck.     China Figueroa MD

## 2024-03-26 NOTE — LETTER
March 26, 2024        Nikki Dow, ABHINAV  [address]  [place] [address]             [facility]  [address]  [place] [address]  Phone: [phone]  Fax: [phone]   Patient: Adelaida Flores   MR Number: [id number]   YOB: 1955   Date of Visit: 3/26/2024     Dear Dr. Dow,     Thank you for referring Ms. Flores for continuing care of Eustachian tube dysfunction and chronic allergic rhinitis due to dog and grass.  Family is unable/unwilling to decrease dog exposure.  Recommended increasing Flonase to 2 squirts each nostril twice daily while discontinuing azelastine, Xyzal, and Allegra.  For her chronic conjunctivitis, I recommended adding NSAID eye drops ketorolac to the FML steroid drops prescribed by her ophthalmologist.    I thank you for the opportunity to participate in the care of 1 of your patients.    Sincerely,      Rosalind Diaz MD CC Mary Yu, MD    [facility]

## 2024-03-26 NOTE — PATIENT INSTRUCTIONS
Allergic to dogs and grass        Great job on keeping the dog out of bed  Consider removing dog from bedroom  Consider shampooing twice a week    Stop azelastine, Xyzal, Allegra    Increase Flonase (= fluticasone) nasal spray:  2 squirts each nostril twice a day   Remember to aim out toward your ear.   Needs to be used regularly 5-14 days for full effect.    Ketorolac eye drops:  1 drop each eye as needed, up to 4 times daily  Warning:  burns    Continue FML eyedrops

## 2024-03-27 ENCOUNTER — INFUSION (OUTPATIENT)
Dept: INFECTIOUS DISEASES | Facility: HOSPITAL | Age: 69
End: 2024-03-27
Payer: MEDICARE

## 2024-03-27 VITALS
TEMPERATURE: 99 F | SYSTOLIC BLOOD PRESSURE: 120 MMHG | WEIGHT: 112.44 LBS | HEIGHT: 65 IN | RESPIRATION RATE: 19 BRPM | DIASTOLIC BLOOD PRESSURE: 58 MMHG | HEART RATE: 65 BPM | OXYGEN SATURATION: 97 % | BODY MASS INDEX: 18.73 KG/M2

## 2024-03-27 DIAGNOSIS — M81.0 OSTEOPOROSIS, POST-MENOPAUSAL: Primary | ICD-10-CM

## 2024-03-27 DIAGNOSIS — M81.0 SENILE OSTEOPOROSIS: ICD-10-CM

## 2024-03-27 PROCEDURE — 63600175 PHARM REV CODE 636 W HCPCS: Mod: JZ,JG | Performed by: INTERNAL MEDICINE

## 2024-03-27 PROCEDURE — 96372 THER/PROPH/DIAG INJ SC/IM: CPT

## 2024-03-27 RX ADMIN — DENOSUMAB 60 MG: 60 INJECTION SUBCUTANEOUS at 09:03

## 2024-03-27 NOTE — PROGRESS NOTES
Patient arrives for prolia injection - confirms use of calcium and vitamin D supplements and denies dental procedures over past 3 months - administered per guidelines.    Limited head-to-toe assessment due to privacy issues and visit reason though the opportunity was given for patient to express any concerns

## 2024-03-28 ENCOUNTER — CLINICAL SUPPORT (OUTPATIENT)
Dept: REHABILITATION | Facility: HOSPITAL | Age: 69
End: 2024-03-28
Payer: MEDICARE

## 2024-03-28 DIAGNOSIS — M62.89 PELVIC FLOOR DYSFUNCTION: Primary | ICD-10-CM

## 2024-03-28 DIAGNOSIS — R53.1 WEAKNESS: ICD-10-CM

## 2024-03-28 PROCEDURE — 97112 NEUROMUSCULAR REEDUCATION: CPT

## 2024-03-28 PROCEDURE — 97530 THERAPEUTIC ACTIVITIES: CPT

## 2024-03-28 NOTE — PROGRESS NOTES
[facility]   Physical Therapy Treatment Note      Name: Adelaida Flores  Clinic Number: [id number]    Therapy Diagnosis:   Encounter Diagnoses   Name Primary?    Pelvic floor dysfunction Yes    Weakness      Physician: Mary Calderon NP    Visit Date: 3/28/2024    Physician Orders: PT Eval and Treat   Medical Diagnosis from Referral: fecal incontinence   Evaluation Date: 3/11/2024  Authorization Period Expiration: 12-31-24  Plan of Care Expiration: 6-3-24  Progress Note Due: 4-11-24  Visit # / Visits authorized: 3/20   FOTO: 1/3  Date last given: 3-11-24    Time In: 800  Time Out: 845  Total Billable Time: 45 minutes    Precautions: Standard, Fall, and osteoporosis       Subjective     Pt reports: Adelaida is struggling to perform a kegel with her transverse abdominus muscle contraction.   Has been working on massage and diaphragmatic breathing.  She recently started an antibiotics for a sinus issue and she is noticing increased bowel symptom(s).      She was compliant with home exercise program.  Response to previous treatment: no issues reported   Functional change: no changes reported    Pain: 0/10  Location: pelvic or back     Constitutional Symptoms Review: The patient denies having any constitutional symptoms.       Objective      Objective Measures updated at progress report unless specified.     See EMR under MEDIA for written consent provided 3/28/2024  Chaperone: declined         VAGINAL PELVIC FLOOR EXAM    EXTERNAL ASSESSMENT 3-28-24  Introitus: WNL  Skin condition: clitoral saxena not observable nor labia minora   Scarring: none   Sensation: WNL   Pain:  none  Voluntary contraction: accessory muscle use  Involuntary contraction: bulge  Bearing down: reflex tightening  Perineal descent: absent      INTERNAL ASSESSMENT  Pain: none   Sensation: able to localized pressure appropriately   Vaginal vault: WNL   Muscle Bulk: slight increase in resting tension    Muscle Power: trace but  starting to improve with transverse abdominus muscle co-contraction     Quality of contraction: slow rise, decreased hold, and slow relaxation   Specificity: patient contracts: gluteals and hip adductors    Coordination: tends to hold breath during PFM contration         RECTAL PELVIC FLOOR EXAM    EXTERNAL ASSESSMENT  Anus: WNL  Skin condition: WNL   Scarring: none  Sensation: WNL   Pain:  none  Voluntary contraction:  significant accessory muscle(s) activation of gluteals. No visible contraction of the pelvic floor muscle(s)          INTERNAL ASSESSMENT  EAS tone: hypotonic   Impaction: none   Pain: none  Sensation: able to localize pressure appropriately   Muscle Bulk: atrophy   Muscle Power: 0/5  Coordination: tends to hold breath during PFM contration   Comments: significant accessory muscle(s) activation of gluteals. No  contraction felt of the pelvic floor muscle(s)    Treatment   Adelaida received the treatments listed below:    Pt verbally consents to intrarectal treatment today.  Signed consent form already on file.  Chaperone declined today.    Adelaida received the following manual therapy techniques: to develop flexibility and extensibility for 8 minutes including:   [] trigger point/myofascial release of abdominal wall muscle(s) globally  [] Bowel massage performed to help with gut motility. Pt edu in self-bowel massage technique to help with gut motility needed to have a comfortable BM.   [x] Pelvic floor muscle(s) intravaginal myofascial release       Adelaida participated in neuromuscular re-education activities to develop Coordination and Control for 29 minutes including:   [x] Kegel edu and practice.  Increased time spent on edu on proper technique.  Adelaida is not able to activate her pelvic floor muscle(s).  Used tapping intrarectally over muscle bellies to try to elicit a contraction with kegels. Performed intravaginally today   [x] Pt struggles to isolate TrA initially but improves with practice.  Tactile and verbal cues required for correct activation of TrA. Initially pt substitutes with rectus abdominus muscle but with mod verbal and tactile instruction is able to isolate out  lower abdominal muscles.    [x] Transverse abdominus muscle 5 sec x 10 reps  [x] Transverse abdominus muscle +Kegel 5 sec x 10 reps  [x] Transverse abdominus muscle +Kegel+Marching x10 reps.   Cues needed for correct TA activation and coordination with kegel.        Adelaida participated in dynamic functional therapeutic activities to improve functional performance for 8 minutes, including:  [x] Plan of care review  [x] Anatomy review and discussion of the anatomy on current level of function   [] Patient was instructed in and practice diaphragmatic breathing as a method to help control pain, decrease anxiety and help w/ sleep. Pt was provided w / handout w/ instructions for practice. Practiced  with mod verbal and tactile instruction.  Mirror was also used to help Adelaida visualize paradoxical breathing patterns      [x] Home exercise program issued and reviewed     Home Exercises Provided and Patient Education Provided     Education provided: See above  Discussed progression of plan of care with patient; educated pt in activity modification; reviewed HEP with pt. Pt demonstrated and verbalized understanding of all instruction and was provided with a handout of HEP (see Patient Instructions).    Written Home Exercises Provided: yes.  Exercises were reviewed and Adelaida was able to demonstrate them prior to the end of the session.  Adelaida demonstrated good  understanding of the education provided.     See EMR under Patient Instructions for exercises provided 3/14/2024.    Assessment     Worked on pelvic floor muscle(s) activation using intravaginal tactile feedback. Adelaida demonstrates trace activation today of her pelvic floor muscle(s) with co-contraction of her transverse abdominus muscle.  Improved ability to reduce overflow today  "though and Adelaida is making steady progress.  Progressed transverse abdominus muscle and pelvic floor muscle(s) coordination training today.  Pt will continue to benefit from skilled outpatient physical therapy to address the deficits listed in the problem list box on initial evaluation, provide pt/family education and to maximize pt's level of independence in the home and community environment.       Adelaida Is progressing well towards her goals.   Pt prognosis is Good.     Pt will continue to benefit from skilled outpatient physical therapy to address the deficits listed in the problem list box on initial evaluation, provide pt/family education and to maximize pt's level of independence in the home and community environment.     Pt's spiritual, cultural and educational needs considered and pt agreeable to plan of care and goals.     Anticipated barriers to physical therapy: none    Goals:   Short Term Goals: 4 weeks   Pt to perform "the knack" prior to coughing, laughing or sneezing to decrease risk of incontinence.  Pt to demonstrate being able to correctly and consistently perform a kegel which is needed for continence.   Pt to report a 40% increase in confidence to participate in social activities due to improving bowel function.   Pt to report a decrease in episodes of incontinence to no more than 1x/week to demonstrate improving pelvic floor muscle strength and coordination.   Pt to demonstrate an improved score in the FOTO bowel leakage survey  to at least 57 to demonstrate improving pelvic floor muscle(s) function needed for continence.           Long Term Goals: 12 weeks ,   Pt to be discharged with home plan for carry over after discharge.    Pt to report being able to delay the urge to have a BM at least 10 minutes when out in public to decrease episodes of incontinence while looking for a bathroom or waiting in line at the bathroom.   Pt to report a decrease in episodes of FI to no more than 1x every 2 " weeks to improve confidence needed to participate in social outings.   Pt to report an 80% reduction of fecal incontinence symptoms with ADL participation thereby demonstrating improved pelvic floor muscle control and strength.   Pt to demonstrate an improved score in the FOTO Bowel Leakage survey  to at least 61 to demonstrate improving pelvic floor muscle(s) function needed for continence.           PLAN      Plan of care Certification: 3/11/2024 to 6-3-24.     Outpatient Physical Therapy 1-2 times weekly for 12 weeks to include the following interventions: therapeutic exercises, therapeutic activity, neuromuscular re-education, gait training, manual therapy, modalities PRN, patient/family education, and self care/home management, and dry needling.     Plan     Continue with established Plan of Care, working toward established PT goals.      At next visit: intrarectal work to help facilitate kegel.  Hip and abdominal wall muscle(s).     Catie Martinez, PT

## 2024-03-28 NOTE — PATIENT INSTRUCTIONS
"Home Exercise Program: 03/28/2024    Abdominal Muscle Training:  Tighten your abdominal muscles without sucking in our pooching your belly.  Tighten and draw in your muscles.  Don't hold your breath!  It actually helps to exhale while your tighten.  "Blow out birthday candles."     Hold 5 seconds.  Repeat 10 times.  2 sets per day.                  "

## 2024-04-01 ENCOUNTER — CLINICAL SUPPORT (OUTPATIENT)
Dept: REHABILITATION | Facility: HOSPITAL | Age: 69
End: 2024-04-01
Payer: MEDICARE

## 2024-04-01 DIAGNOSIS — R53.1 WEAKNESS: ICD-10-CM

## 2024-04-01 DIAGNOSIS — M62.89 PELVIC FLOOR DYSFUNCTION: Primary | ICD-10-CM

## 2024-04-01 PROCEDURE — 97112 NEUROMUSCULAR REEDUCATION: CPT

## 2024-04-01 PROCEDURE — 97530 THERAPEUTIC ACTIVITIES: CPT

## 2024-04-03 ENCOUNTER — PATIENT MESSAGE (OUTPATIENT)
Dept: OTOLARYNGOLOGY | Facility: CLINIC | Age: 69
End: 2024-04-03
Payer: MEDICARE

## 2024-04-04 ENCOUNTER — PATIENT MESSAGE (OUTPATIENT)
Dept: OTOLARYNGOLOGY | Facility: CLINIC | Age: 69
End: 2024-04-04
Payer: MEDICARE

## 2024-04-08 ENCOUNTER — HOSPITAL ENCOUNTER (OUTPATIENT)
Dept: RADIOLOGY | Facility: HOSPITAL | Age: 69
Discharge: HOME | End: 2024-04-08
Attending: OTOLARYNGOLOGY
Payer: MEDICARE

## 2024-04-08 DIAGNOSIS — J32.0 CHRONIC MAXILLARY SINUSITIS: Chronic | ICD-10-CM

## 2024-04-08 PROCEDURE — 70486 CT MAXILLOFACIAL W/O DYE: CPT | Mod: TC

## 2024-04-08 PROCEDURE — 70486 CT MAXILLOFACIAL W/O DYE: CPT | Mod: 26,,, | Performed by: RADIOLOGY

## 2024-04-11 ENCOUNTER — CLINICAL SUPPORT (OUTPATIENT)
Dept: REHABILITATION | Facility: HOSPITAL | Age: 69
End: 2024-04-11
Payer: MEDICARE

## 2024-04-11 DIAGNOSIS — R53.1 WEAKNESS: ICD-10-CM

## 2024-04-11 DIAGNOSIS — M62.89 PELVIC FLOOR DYSFUNCTION: Primary | ICD-10-CM

## 2024-04-11 PROCEDURE — 97112 NEUROMUSCULAR REEDUCATION: CPT

## 2024-04-11 PROCEDURE — 97530 THERAPEUTIC ACTIVITIES: CPT

## 2024-04-11 NOTE — PATIENT INSTRUCTIONS
"Home Exercise Program: 04/11/2024    "Roll for Control"  Strategies to Delay Voiding    What to do when you feel like you "gotta go gotta go!"     Stop what you are doing.    Take a few good deep breaths (this settles down your nervous system and relaxes your bladder).    WHILE YOU CONTINUE DEEP BREATHING, activate your pelvic floor by performing several quick contractions and releases.  (Pelvic floor contractions send a message to the bladder to relax and hold urine.)  Sitting: Roll thigh in and out slowly or squeeze knees together  Standing: Roll thigh in/out slowly and gently    As you do the above, you can also count backwards from 100 (to help get your mind off the urge!).       After the urge to void has quietly, you may be able to process with your activity OR if it has been approximately 3 hours since you've voided, you may choose to go to the bathroom and void.        Remember......"Control your bladder before it controls YOU!"     "

## 2024-04-11 NOTE — PROGRESS NOTES
[facility]   Physical Therapy Treatment Note      Name: Adelaida Flores  Clinic Number: [id number]    Therapy Diagnosis:   Encounter Diagnoses   Name Primary?    Pelvic floor dysfunction Yes    Weakness      Physician: Mary Calderon NP    Visit Date: 4/11/2024    Physician Orders: PT Eval and Treat   Medical Diagnosis from Referral: fecal incontinence   Evaluation Date: 3/11/2024  Authorization Period Expiration: 12-31-24  Plan of Care Expiration: 6-3-24  Progress Note Due: 4-11-24  Visit # / Visits authorized: 6/20   FOTO: 2/3  Date last given: 4/25/24    Time In: 800  Time Out: 845  Total Billable Time: 45 minutes    Precautions: Standard, Fall, and osteoporosis       Subjective     Pt reports:  She is done with her antibiotics.   Has noticed that she is having more solid stool now which has been helpful for continence.      She was compliant with home exercise program.  Response to previous treatment: no issues reported   Functional change: no changes reported    Pain: 0/10  Location: pelvic or back     Constitutional Symptoms Review: The patient denies having any constitutional symptoms.       Objective      Objective Measures updated at progress report unless specified.     See EMR under MEDIA for written consent provided 4/11/2024  Chaperone: declined         VAGINAL PELVIC FLOOR EXAM    EXTERNAL ASSESSMENT 3-28-24  Introitus: WNL  Skin condition: clitoral saxena not observable nor labia minora   Scarring: none   Sensation: WNL   Pain:  none  Voluntary contraction: accessory muscle use  Involuntary contraction: bulge  Bearing down: reflex tightening  Perineal descent: absent      INTERNAL ASSESSMENT  Pain: none   Sensation: able to localized pressure appropriately   Vaginal vault: WNL   Muscle Bulk: slight increase in resting tension    Muscle Power: trace but starting to improve with transverse abdominus muscle co-contraction     Quality of contraction: slow rise, decreased hold,  and slow relaxation   Specificity: patient contracts: gluteals and hip adductors    Coordination: tends to hold breath during PFM contration         RECTAL PELVIC FLOOR EXAM    EXTERNAL ASSESSMENT  Anus: WNL  Skin condition: WNL   Scarring: none  Sensation: WNL   Pain:  none  Voluntary contraction:  significant accessory muscle(s) activation of gluteals. No visible contraction of the pelvic floor muscle(s)          INTERNAL ASSESSMENT  EAS tone: hypotonic   Impaction: none   Pain: none  Sensation: able to localize pressure appropriately   Muscle Bulk: atrophy   Muscle Power: 0/5  Coordination: tends to hold breath during PFM contration   Comments: significant accessory muscle(s) activation of gluteals. No  contraction felt of the pelvic floor muscle(s)    Treatment   Adelaida received the treatments listed below:    Pt verbally consents to intrarectal treatment today.  Signed consent form already on file.  Chaperone declined today.    Adelaida received the following manual therapy techniques: to develop flexibility and extensibility for 00 minutes including:   [] trigger point/myofascial release of abdominal wall muscle(s) globally  [] Bowel massage performed to help with gut motility. Pt edu in self-bowel massage technique to help with gut motility needed to have a comfortable BM.   [] Pelvic floor muscle(s) intravaginal myofascial release       Adelaida participated in neuromuscular re-education activities to develop Coordination and Control for 33 minutes including:   [] Kegel edu and practice.  Increased time spent on edu on proper technique.  Adelaida is not able to activate her pelvic floor muscle(s).  Used tapping intrarectally over muscle bellies to try to elicit a contraction with kegels. Performed intravaginally today   [] Pt struggles to isolate TrA initially but improves with practice. Tactile and verbal cues required for correct activation of TrA. Initially pt substitutes with rectus abdominus muscle but with  mod verbal and tactile instruction is able to isolate out  lower abdominal muscles.    [] Transverse abdominus muscle 5 sec x 10 reps x 2 reps   [] Transverse abdominus muscle +Kegel 5 sec x 10 reps  [] Transverse abdominus muscle +Kegel+Marching 5 sec x10 reps. X 2 sets (one with kegel and 1 without)   Cues needed for correct TA activation and coordination with kegel.    [x] RUSI for breathing, drops, contractions of the PFM to help pt visualize PFM motion and function using transperineal US view for external anal sphincter and pelvic floor muscle(s) activation   [] Rehabilitative ultrasound imaging for transverse abdominus muscle activation and rectus abdominus activation with lower extremity and upper extremity motion.    []Rehabilitative ultrasound imaging for dan training (pre-activation of pelvic floor muscle(s)        Adelaida participated in dynamic functional therapeutic activities to improve functional performance for 12 minutes, including:  [x] Plan of care review  [x] Anatomy review and discussion of the anatomy on current level of function   [] Patient was instructed in and practice diaphragmatic breathing as a method to help control pain, decrease anxiety and help w/ sleep. Pt was provided w / handout w/ instructions for practice. Practiced  with mod verbal and tactile instruction.  Mirror was also used to help Adelaida visualize paradoxical breathing patterns   [x] Pt edu in the Roll for Control technique to decrease incontinence with strong urge.  Practiced new technique in sitting and standing.      [x] Home exercise program reviewed   [x] Encouraged Adelaida to use tactile cues over anus to help activate the muscles at home     Home Exercises Provided and Patient Education Provided     Education provided: See above  Discussed progression of plan of care with patient; educated pt in activity modification; reviewed HEP with pt. Pt demonstrated and verbalized understanding of all instruction and was  "provided with a handout of HEP (see Patient Instructions).    Written Home Exercises Provided: yes.  Exercises were reviewed and Adelaida was able to demonstrate them prior to the end of the session.  Adelaida demonstrated good  understanding of the education provided.     See EMR under Patient Instructions for exercises provided 3/14/2024.    Assessment      Used rehabilitative ultrasound imaging to work on pelvic floor muscle(s) coordination and control today.  Used both transperineal and transabdomianl views.   Progressed transverse abdominus muscle and pelvic floor muscle(s) coordination training today.  Pt will continue to benefit from skilled outpatient physical therapy to address the deficits listed in the problem list box on initial evaluation, provide pt/family education and to maximize pt's level of independence in the home and community environment.       Adelaida Is progressing well towards her goals.   Pt prognosis is Good.     Pt will continue to benefit from skilled outpatient physical therapy to address the deficits listed in the problem list box on initial evaluation, provide pt/family education and to maximize pt's level of independence in the home and community environment.     Pt's spiritual, cultural and educational needs considered and pt agreeable to plan of care and goals.     Anticipated barriers to physical therapy: none    Goals:   Short Term Goals: 4 weeks   Pt to perform "the knack" prior to coughing, laughing or sneezing to decrease risk of incontinence.  Pt to demonstrate being able to correctly and consistently perform a kegel which is needed for continence.   Pt to report a 40% increase in confidence to participate in social activities due to improving bowel function.   Pt to report a decrease in episodes of incontinence to no more than 1x/week to demonstrate improving pelvic floor muscle strength and coordination.   Pt to demonstrate an improved score in the FOTO bowel leakage survey  to " at least 57 to demonstrate improving pelvic floor muscle(s) function needed for continence.           Long Term Goals: 12 weeks ,   Pt to be discharged with home plan for carry over after discharge.    Pt to report being able to delay the urge to have a BM at least 10 minutes when out in public to decrease episodes of incontinence while looking for a bathroom or waiting in line at the bathroom.   Pt to report a decrease in episodes of FI to no more than 1x every 2 weeks to improve confidence needed to participate in social outings.   Pt to report an 80% reduction of fecal incontinence symptoms with ADL participation thereby demonstrating improved pelvic floor muscle control and strength.   Pt to demonstrate an improved score in the FOTO Bowel Leakage survey  to at least 61 to demonstrate improving pelvic floor muscle(s) function needed for continence.           PLAN      Plan of care Certification: 3/11/2024 to 6-3-24.     Outpatient Physical Therapy 1-2 times weekly for 12 weeks to include the following interventions: therapeutic exercises, therapeutic activity, neuromuscular re-education, gait training, manual therapy, modalities PRN, patient/family education, and self care/home management, and dry needling.     Plan     Continue with established Plan of Care, working toward established PT goals.      At next visit: intrarectal work to help facilitate kegel.  Hip and abdominal wall muscle(s).     Catie Martinez, PT

## 2024-04-16 ENCOUNTER — PATIENT MESSAGE (OUTPATIENT)
Dept: OTOLARYNGOLOGY | Facility: CLINIC | Age: 69
End: 2024-04-16
Payer: MEDICARE

## 2024-04-16 ENCOUNTER — CLINICAL SUPPORT (OUTPATIENT)
Dept: REHABILITATION | Facility: HOSPITAL | Age: 69
End: 2024-04-16
Payer: MEDICARE

## 2024-04-16 DIAGNOSIS — R53.1 WEAKNESS: ICD-10-CM

## 2024-04-16 DIAGNOSIS — M62.89 PELVIC FLOOR DYSFUNCTION: Primary | ICD-10-CM

## 2024-04-16 PROCEDURE — 97112 NEUROMUSCULAR REEDUCATION: CPT

## 2024-04-16 NOTE — PROGRESS NOTES
[facility]   Physical Therapy Treatment Note      Name: Adelaida Flores  Clinic Number: [id number]    Therapy Diagnosis:   Encounter Diagnoses   Name Primary?    Pelvic floor dysfunction Yes    Weakness      Physician: Mary Calderon NP    Visit Date: 4/16/2024    Physician Orders: PT Eval and Treat   Medical Diagnosis from Referral: fecal incontinence   Evaluation Date: 3/11/2024  Authorization Period Expiration: 12-31-24  Plan of Care Expiration: 6-3-24  Progress Note Due: 4-11-24  Visit # / Visits authorized: 6/20   FOTO: 2/3  Date last given: 4/25/24    Time In: 1058  Time Out: 1145  Total Billable Time: 47  minutes    Precautions: Standard, Fall, and osteoporosis       Subjective     Pt reports:  Has been practicing urge delay strategies which seems to help a little.  Struggling to tell if she's using her transverse abdominus muscle correctly. Has noticed that she is having more solid stool now which has been helpful for continence.  No fecal incontinence reported but some urinary incontinence reported     She was compliant with home exercise program.  Response to previous treatment: no issues reported   Functional change: no changes reported    Pain: 0/10  Location: pelvic or back     Constitutional Symptoms Review: The patient denies having any constitutional symptoms.       Objective      Objective Measures updated at progress report unless specified.     See EMR under MEDIA for written consent provided 4/16/2024  Chaperone: declined         VAGINAL PELVIC FLOOR EXAM    EXTERNAL ASSESSMENT 3-28-24  Introitus: WNL  Skin condition: clitoral saxena not observable nor labia minora   Scarring: none   Sensation: WNL   Pain:  none  Voluntary contraction: accessory muscle use  Involuntary contraction: bulge  Bearing down: reflex tightening  Perineal descent: absent      INTERNAL ASSESSMENT  Pain: none   Sensation: able to localized pressure appropriately   Vaginal vault: WNL   Muscle Bulk:  slight increase in resting tension    Muscle Power: trace but starting to improve with transverse abdominus muscle co-contraction     Quality of contraction: slow rise, decreased hold, and slow relaxation   Specificity: patient contracts: gluteals and hip adductors    Coordination: tends to hold breath during PFM contration         RECTAL PELVIC FLOOR EXAM    EXTERNAL ASSESSMENT  Anus: WNL  Skin condition: WNL   Scarring: none  Sensation: WNL   Pain:  none  Voluntary contraction:  significant accessory muscle(s) activation of gluteals. No visible contraction of the pelvic floor muscle(s)          INTERNAL ASSESSMENT  EAS tone: hypotonic   Impaction: none   Pain: none  Sensation: able to localize pressure appropriately   Muscle Bulk: atrophy   Muscle Power: 0/5  Coordination: tends to hold breath during PFM contration   Comments: significant accessory muscle(s) activation of gluteals. No  contraction felt of the pelvic floor muscle(s)    Treatment   Adelaida received the treatments listed below:    Pt verbally consents to intrarectal treatment today.  Signed consent form already on file.  Chaperone declined today.    Adelaida received the following manual therapy techniques: to develop flexibility and extensibility for 00 minutes including:   [] trigger point/myofascial release of abdominal wall muscle(s) globally  [] Bowel massage performed to help with gut motility. Pt edu in self-bowel massage technique to help with gut motility needed to have a comfortable BM.   [] Pelvic floor muscle(s) intravaginal myofascial release       Adelaida participated in neuromuscular re-education activities to develop Coordination and Control for 47 minutes including:   [] Kegel edu and practice.  Increased time spent on edu on proper technique.  Adelaida is not able to activate her pelvic floor muscle(s).  Used tapping intrarectally over muscle bellies to try to elicit a contraction with kegels. Performed intravaginally today   [] Pt  struggles to isolate TrA initially but improves with practice. Tactile and verbal cues required for correct activation of TrA. Initially pt substitutes with rectus abdominus muscle but with mod verbal and tactile instruction is able to isolate out  lower abdominal muscles.    [x] Transverse abdominus muscle 5 sec x 10 reps x 2 reps   [x] Transverse abdominus muscle +Kegel 5 sec x 10 reps  [x] Transverse abdominus muscle +Kegel+Marching 5 sec x10 reps. X 2 sets (one with kegel and 1 without)   Cues needed for correct TA activation and coordination with kegel.    [] RUSI for breathing, drops, contractions of the PFM to help pt visualize PFM motion and function using transperineal US view for external anal sphincter and pelvic floor muscle(s) activation   [x] RUSI for breathing, drops, contractions of the PFM to help pt visualize PFM motion and function using transabdominal ultrasound   [x] Rehabilitative ultrasound imaging for transverse abdominus muscle activation and rectus abdominus activation with lower extremity and upper extremity motion.    []Rehabilitative ultrasound imaging for dan training (pre-activation of pelvic floor muscle(s)        Adelaida participated in dynamic functional therapeutic activities to improve functional performance for 12 minutes, including:  [] Plan of care review  [] Anatomy review and discussion of the anatomy on current level of function   [] Patient was instructed in and practice diaphragmatic breathing as a method to help control pain, decrease anxiety and help w/ sleep. Pt was provided w / handout w/ instructions for practice. Practiced  with mod verbal and tactile instruction.  Mirror was also used to help Adelaida visualize paradoxical breathing patterns   [] Pt edu in the Roll for Control technique to decrease incontinence with strong urge.  Practiced new technique in sitting and standing.      [] Home exercise program reviewed   [] Encouraged Adelaida to use tactile cues over anus  "to help activate the muscles at home     Home Exercises Provided and Patient Education Provided     Education provided: See above  Discussed progression of plan of care with patient; educated pt in activity modification; reviewed HEP with pt. Pt demonstrated and verbalized understanding of all instruction and was provided with a handout of HEP (see Patient Instructions).    Written Home Exercises Provided: yes.  Exercises were reviewed and Adelaida was able to demonstrate them prior to the end of the session.  Adelaida demonstrated good  understanding of the education provided.     See EMR under Patient Instructions for exercises provided 3/14/2024.    Assessment     Used rehabilitative ultrasound imaging to work on pelvic floor muscle(s) coordination and control today using transabdominal ultrasound view  Worked on pelvic floor muscle(s) activation and transverse abdominus muscle activation.  Increased time spent on technique today.  Followed rehabilitative ultrasound imaging training with strength and coordination training for transverse abdominus muscle.    Pt will continue to benefit from skilled outpatient physical therapy to address the deficits listed in the problem list box on initial evaluation, provide pt/family education and to maximize pt's level of independence in the home and community environment.       Adelaida Is progressing well towards her goals.   Pt prognosis is Good.     Pt will continue to benefit from skilled outpatient physical therapy to address the deficits listed in the problem list box on initial evaluation, provide pt/family education and to maximize pt's level of independence in the home and community environment.     Pt's spiritual, cultural and educational needs considered and pt agreeable to plan of care and goals.     Anticipated barriers to physical therapy: none    Goals:   Short Term Goals: 4 weeks   Pt to perform "the knack" prior to coughing, laughing or sneezing to decrease risk of " incontinence.  Pt to demonstrate being able to correctly and consistently perform a kegel which is needed for continence.   Pt to report a 40% increase in confidence to participate in social activities due to improving bowel function.   Pt to report a decrease in episodes of incontinence to no more than 1x/week to demonstrate improving pelvic floor muscle strength and coordination.   Pt to demonstrate an improved score in the FOTO bowel leakage survey  to at least 57 to demonstrate improving pelvic floor muscle(s) function needed for continence.           Long Term Goals: 12 weeks ,   Pt to be discharged with home plan for carry over after discharge.    Pt to report being able to delay the urge to have a BM at least 10 minutes when out in public to decrease episodes of incontinence while looking for a bathroom or waiting in line at the bathroom.   Pt to report a decrease in episodes of FI to no more than 1x every 2 weeks to improve confidence needed to participate in social outings.   Pt to report an 80% reduction of fecal incontinence symptoms with ADL participation thereby demonstrating improved pelvic floor muscle control and strength.   Pt to demonstrate an improved score in the FOTO Bowel Leakage survey  to at least 61 to demonstrate improving pelvic floor muscle(s) function needed for continence.           PLAN      Plan of care Certification: 3/11/2024 to 6-3-24.     Outpatient Physical Therapy 1-2 times weekly for 12 weeks to include the following interventions: therapeutic exercises, therapeutic activity, neuromuscular re-education, gait training, manual therapy, modalities PRN, patient/family education, and self care/home management, and dry needling.     Plan     Continue with established Plan of Care, working toward established PT goals.      At next visit: intrarectal work to help facilitate kegel.  Hip and abdominal wall muscle(s).     Catie Martinez, PT

## 2024-04-25 ENCOUNTER — PATIENT MESSAGE (OUTPATIENT)
Dept: REHABILITATION | Facility: HOSPITAL | Age: 69
End: 2024-04-25
Payer: MEDICARE

## 2024-04-29 ENCOUNTER — CLINICAL SUPPORT (OUTPATIENT)
Dept: REHABILITATION | Facility: HOSPITAL | Age: 69
End: 2024-04-29
Payer: MEDICARE

## 2024-04-29 DIAGNOSIS — R53.1 WEAKNESS: ICD-10-CM

## 2024-04-29 DIAGNOSIS — M62.89 PELVIC FLOOR DYSFUNCTION: Primary | ICD-10-CM

## 2024-04-29 PROCEDURE — 97530 THERAPEUTIC ACTIVITIES: CPT

## 2024-04-29 PROCEDURE — 97112 NEUROMUSCULAR REEDUCATION: CPT

## 2024-04-29 NOTE — PROGRESS NOTES
[facility]   Physical Therapy Treatment Note      Name: Adelaida Flores  Clinic Number: [id number]    Therapy Diagnosis:   Encounter Diagnoses   Name Primary?    Pelvic floor dysfunction Yes    Weakness      Physician: Mary Calderon NP    Visit Date: 4/29/2024    Physician Orders: PT Eval and Treat   Medical Diagnosis from Referral: fecal incontinence   Evaluation Date: 3/11/2024  Authorization Period Expiration: 12-31-24  Plan of Care Expiration: 6-3-24  Progress Note Due: 4-11-24  Visit # / Visits authorized: 6/20   FOTO: 2/3  Date last given: 4/25/24    Time In: 1235  Time Out:1330  Total Billable Time: 45 minutes    Precautions: Standard, Fall, and osteoporosis       Subjective     Pt reports:  Adelaida reports she has been feeling discouraged.  She has noticed dribbling of urine while standing up.   One episode of fecal incontinence since her last visit.  She feels like she is having more good days over all though because urge delay strategy is helping.  Does not always feel like she is doing her kegel correctly.  She reports she has made 20% progress.      Stool consistency is more solid than it used to be.      She was compliant with home exercise program.  Response to previous treatment: no issues reported   Functional change: no changes reported    Pain: 0/10  Location: pelvic or back     Constitutional Symptoms Review: The patient denies having any constitutional symptoms.       Objective      Objective Measures updated at progress report unless specified.     See EMR under MEDIA for written consent provided 4/29/2024  Chaperone: declined         VAGINAL PELVIC FLOOR EXAM    EXTERNAL ASSESSMENT 4-29-24  Introitus: WNL  Skin condition: clitoral saxena not observable nor labia minora   Scarring: none   Sensation: WNL   Pain:  none  Voluntary contraction: accessory muscle use  Involuntary contraction: bulge  Bearing down: reflex tightening  Perineal descent: absent      INTERNAL  ASSESSMENT  Pain: none   Sensation: able to localized pressure appropriately   Vaginal vault: WNL   Muscle Bulk: slight increase in resting tension    Muscle Power: 1/5 but starting to improve with transverse abdominus muscle co-contraction     Quality of contraction: slow rise, decreased hold, and slow relaxation   Specificity: patient contracts: gluteals and hip adductors    Coordination: tends to hold breath during PFM contration         RECTAL PELVIC FLOOR EXAM    EXTERNAL ASSESSMENT  Anus: WNL  Skin condition: WNL   Scarring: none  Sensation: WNL   Pain:  none  Voluntary contraction:  significant accessory muscle(s) activation of gluteals. No visible contraction of the pelvic floor muscle(s)          INTERNAL ASSESSMENT  EAS tone: hypotonic   Impaction: none   Pain: none  Sensation: able to localize pressure appropriately   Muscle Bulk: atrophy   Muscle Power: 0/5  Coordination: tends to hold breath during PFM contration   Comments: significant accessory muscle(s) activation of gluteals. No  contraction felt of the pelvic floor muscle(s)    Treatment   Adelaida received the treatments listed below:    Pt verbally consents to intrarectal treatment today.  Signed consent form already on file.  Chaperone declined today.    Adelaida received the following manual therapy techniques: to develop flexibility and extensibility for 00 minutes including:   [] trigger point/myofascial release of abdominal wall muscle(s) globally  [] Bowel massage performed to help with gut motility. Pt edu in self-bowel massage technique to help with gut motility needed to have a comfortable BM.   [] Pelvic floor muscle(s) intravaginal myofascial release       Adelaida participated in neuromuscular re-education activities to develop Coordination and Control for 37 minutes including:   [x] Kegel edu and practice.  Increased time spent on edu on proper technique.  Adelaida is not able to activate her pelvic floor muscle(s).  Used tapping  intravaginally over muscle bellies to try to elicit a contraction with kegels. Performed intravaginally today   [x] Pt struggles to isolate TrA initially but improves with practice. Tactile and verbal cues required for correct activation of TrA. Initially pt substitutes with rectus abdominus muscle but with mod verbal and tactile instruction is able to isolate out  lower abdominal muscles.    [] Transverse abdominus muscle 5 sec x 10 reps x 2 reps   [x] Transverse abdominus muscle +Kegel 5 sec x 10 reps  [] Transverse abdominus muscle +Kegel+Marching 5 sec x10 reps. X 2 sets (one with kegel and 1 without)   [x] Posterior pelvic tilt 5 sec x 10 reps and then posterior pelvic tilt with kegel 5 sec x 10 reps  [x] Hip adduction with ball squeeze and kegel 5 sec x 20 reps  [x]  Posterior pelvic tilt with kegel and bridge 5 sec x 10 reps   Cues needed for correct TA activation and coordination with kegel.    [] RUSI for breathing, drops, contractions of the PFM to help pt visualize PFM motion and function using transperineal US view for external anal sphincter and pelvic floor muscle(s) activation   [] RUSI for breathing, drops, contractions of the PFM to help pt visualize PFM motion and function using transabdominal ultrasound   [] Rehabilitative ultrasound imaging for transverse abdominus muscle activation and rectus abdominus activation with lower extremity and upper extremity motion.    []Rehabilitative ultrasound imaging for dan training (pre-activation of pelvic floor muscle(s)        Adelaida participated in dynamic functional therapeutic activities to improve functional performance for 8 minutes, including:  [x] Plan of care review  [x] Anatomy review and discussion of the anatomy on current level of function   [] Patient was instructed in and practice diaphragmatic breathing as a method to help control pain, decrease anxiety and help w/ sleep. Pt was provided w / handout w/ instructions for practice. Practiced   with mod verbal and tactile instruction.  Mirror was also used to help Adelaida visualize paradoxical breathing patterns   [] Pt edu in the Roll for Control technique to decrease incontinence with strong urge.  Practiced new technique in sitting and standing.      [] Home exercise program reviewed   [] Encouraged Adelaida to use tactile cues over anus to help activate the muscles at home     Home Exercises Provided and Patient Education Provided     Education provided: See above  Discussed progression of plan of care with patient; educated pt in activity modification; reviewed HEP with pt. Pt demonstrated and verbalized understanding of all instruction and was provided with a handout of HEP (see Patient Instructions).    Written Home Exercises Provided: yes.  Exercises were reviewed and Adelaida was able to demonstrate them prior to the end of the session.  Adelaida demonstrated good  understanding of the education provided.     See EMR under Patient Instructions for exercises provided 3/14/2024.    Assessment     Worked on kegel technique today using intravaginal feedback for muscle(s) activation.  Improving ability to contract pelvic floor muscle(s) noted today.  Also demonstrates improving transverse abdominus muscle and pelvic floor muscle(s) co-activation.  Progressed hip and core strengthening today. Pt will continue to benefit from skilled outpatient physical therapy to address the deficits listed in the problem list box on initial evaluation, provide pt/family education and to maximize pt's level of independence in the home and community environment.       Adelaida Is progressing well towards her goals.   Pt prognosis is Good.     Pt will continue to benefit from skilled outpatient physical therapy to address the deficits listed in the problem list box on initial evaluation, provide pt/family education and to maximize pt's level of independence in the home and community environment.     Pt's spiritual, cultural and  "educational needs considered and pt agreeable to plan of care and goals.     Anticipated barriers to physical therapy: none    Goals:   Short Term Goals: 4 weeks   Pt to perform "the knack" prior to coughing, laughing or sneezing to decrease risk of incontinence.  Pt to demonstrate being able to correctly and consistently perform a kegel which is needed for continence.   Pt to report a 40% increase in confidence to participate in social activities due to improving bowel function.   Pt to report a decrease in episodes of incontinence to no more than 1x/week to demonstrate improving pelvic floor muscle strength and coordination.   Pt to demonstrate an improved score in the FOTO bowel leakage survey  to at least 57 to demonstrate improving pelvic floor muscle(s) function needed for continence.           Long Term Goals: 12 weeks ,   Pt to be discharged with home plan for carry over after discharge.    Pt to report being able to delay the urge to have a BM at least 10 minutes when out in public to decrease episodes of incontinence while looking for a bathroom or waiting in line at the bathroom.   Pt to report a decrease in episodes of FI to no more than 1x every 2 weeks to improve confidence needed to participate in social outings.   Pt to report an 80% reduction of fecal incontinence symptoms with ADL participation thereby demonstrating improved pelvic floor muscle control and strength.   Pt to demonstrate an improved score in the FOTO Bowel Leakage survey  to at least 61 to demonstrate improving pelvic floor muscle(s) function needed for continence.           PLAN      Plan of care Certification: 3/11/2024 to 6-3-24.     Outpatient Physical Therapy 1-2 times weekly for 12 weeks to include the following interventions: therapeutic exercises, therapeutic activity, neuromuscular re-education, gait training, manual therapy, modalities PRN, patient/family education, and self care/home management, and dry needling.     Plan "     Continue with established Plan of Care, working toward established PT goals.      At next visit: intrarectal work to help facilitate kegel.  Hip and abdominal wall muscle(s).     Catie Martinez, PT

## 2024-05-06 ENCOUNTER — CLINICAL SUPPORT (OUTPATIENT)
Dept: REHABILITATION | Facility: HOSPITAL | Age: 69
End: 2024-05-06
Payer: MEDICARE

## 2024-05-06 DIAGNOSIS — R53.1 WEAKNESS: ICD-10-CM

## 2024-05-06 DIAGNOSIS — M62.89 PELVIC FLOOR DYSFUNCTION: Primary | ICD-10-CM

## 2024-05-06 PROCEDURE — 97112 NEUROMUSCULAR REEDUCATION: CPT

## 2024-05-06 NOTE — PROGRESS NOTES
[facility]   Physical Therapy Treatment Note      Name: Adelaida Flores  Clinic Number: [id number]    Therapy Diagnosis:   Encounter Diagnoses   Name Primary?    Pelvic floor dysfunction Yes    Weakness      Physician: Mary Calderon NP    Visit Date: 5/6/2024    Physician Orders: PT Eval and Treat   Medical Diagnosis from Referral: fecal incontinence   Evaluation Date: 3/11/2024  Authorization Period Expiration: 12-31-24  Plan of Care Expiration: 6-3-24  Progress Note Due: 4-11-24  Visit # / Visits authorized: 9/20   FOTO: 2/3  Date last given: 4/25/24    Time In: 930  Time Out:1015  Total Billable Time: 45 minutes    Precautions: Standard, Fall, and osteoporosis       Subjective     Pt reports:  Adelaida reports she has noticed a change in ability to hold her bowels and urine leakage.     She reports she has made 20% progress.      Stool consistency is more solid than it used to be.      She was compliant with home exercise program.  Response to previous treatment: no issues reported   Functional change: no changes reported    Pain: 0/10  Location: pelvic or back     Constitutional Symptoms Review: The patient denies having any constitutional symptoms.       Objective      Objective Measures updated at progress report unless specified.     See EMR under MEDIA for written consent provided 5/6/2024  Chaperone: declined         VAGINAL PELVIC FLOOR EXAM    EXTERNAL ASSESSMENT 5-6-24  Introitus: WNL  Skin condition: clitoral saxena not observable nor labia minora   Scarring: none   Sensation: WNL   Pain:  none  Voluntary contraction: accessory muscle use  Involuntary contraction: bulge  Bearing down: reflex tightening  Perineal descent: absent      INTERNAL ASSESSMENT  Pain: none   Sensation: able to localized pressure appropriately   Vaginal vault: WNL   Muscle Bulk: slight increase in resting tension    Muscle Power: 1+/5 but starting to improve with transverse abdominus muscle  co-contraction     Quality of contraction: slow rise, decreased hold, and slow relaxation   Specificity: patient contracts: gluteals and hip adductors    Coordination: tends to hold breath during PFM contration         RECTAL PELVIC FLOOR EXAM    EXTERNAL ASSESSMENT  Anus: WNL  Skin condition: WNL   Scarring: none  Sensation: WNL   Pain:  none  Voluntary contraction:  significant accessory muscle(s) activation of gluteals. No visible contraction of the pelvic floor muscle(s)          INTERNAL ASSESSMENT  EAS tone: hypotonic   Impaction: none   Pain: none  Sensation: able to localize pressure appropriately   Muscle Bulk: atrophy   Muscle Power: 0/5  Coordination: tends to hold breath during PFM contration   Comments: significant accessory muscle(s) activation of gluteals. No  contraction felt of the pelvic floor muscle(s)    Treatment   Adelaida received the treatments listed below:    Pt verbally consents to intrarectal treatment today.  Signed consent form already on file.  Chaperone declined today.    Adelaida received the following manual therapy techniques: to develop flexibility and extensibility for 00 minutes including:   [] trigger point/myofascial release of abdominal wall muscle(s) globally  [] Bowel massage performed to help with gut motility. Pt edu in self-bowel massage technique to help with gut motility needed to have a comfortable BM.   [] Pelvic floor muscle(s) intravaginal myofascial release       Adelaida participated in neuromuscular re-education activities to develop Coordination and Control for 45 minutes including:   [x] Kegel edu and practice.  Increased time spent on edu on proper technique.  Adelaida is not able to activate her pelvic floor muscle(s).  Used tapping intravaginally over muscle bellies to try to elicit a contraction with kegels. Performed intravaginally today   [x] Pt struggles to isolate TrA initially but improves with practice. Tactile and verbal cues required for correct activation  of TrA. Initially pt substitutes with rectus abdominus muscle but with mod verbal and tactile instruction is able to isolate out  lower abdominal muscles.    [x] Transverse abdominus muscle 5 sec x 10 reps x 2 reps   [x] Transverse abdominus muscle +Kegel 5 sec x 10 reps  [x] Transverse abdominus muscle +Kegel+Marching 5 sec x10 reps.   [x] Posterior pelvic tilt 5 sec x 10 reps and then posterior pelvic tilt with kegel 5 sec x 10 reps  [x] Hip adduction with ball squeeze and kegel 5 sec x 20 reps  [x]  Posterior pelvic tilt with kegel and bridge 5 sec x 10 reps   Cues needed for correct TA activation and coordination with kegel.    [] RUSI for breathing, drops, contractions of the PFM to help pt visualize PFM motion and function using transperineal US view for external anal sphincter and pelvic floor muscle(s) activation   [] RUSI for breathing, drops, contractions of the PFM to help pt visualize PFM motion and function using transabdominal ultrasound   [] Rehabilitative ultrasound imaging for transverse abdominus muscle activation and rectus abdominus activation with lower extremity and upper extremity motion.    []Rehabilitative ultrasound imaging for dan training (pre-activation of pelvic floor muscle(s)        Adelaida participated in dynamic functional therapeutic activities to improve functional performance for 8 minutes, including:  [] Plan of care review  [] Anatomy review and discussion of the anatomy on current level of function   [] Patient was instructed in and practice diaphragmatic breathing as a method to help control pain, decrease anxiety and help w/ sleep. Pt was provided w / handout w/ instructions for practice. Practiced  with mod verbal and tactile instruction.  Mirror was also used to help Adelaida visualize paradoxical breathing patterns   [] Pt edu in the Roll for Control technique to decrease incontinence with strong urge.  Practiced new technique in sitting and standing.      [] Home exercise  "program reviewed   [] Encouraged Adelaida to use tactile cues over anus to help activate the muscles at home     Home Exercises Provided and Patient Education Provided     Education provided: See above  Discussed progression of plan of care with patient; educated pt in activity modification; reviewed HEP with pt. Pt demonstrated and verbalized understanding of all instruction and was provided with a handout of HEP (see Patient Instructions).    Written Home Exercises Provided: yes.  Exercises were reviewed and Adelaida was able to demonstrate them prior to the end of the session.  Adelaida demonstrated good  understanding of the education provided.     See EMR under Patient Instructions for exercises provided 3/14/2024.    Worked on kegel technique today using intravaginal feedback for muscle(s) activation.  Improving ability to contract pelvic floor muscle(s) noted today.  Also demonstrates improving transverse abdominus muscle and pelvic floor muscle(s) co-activation.  Continued hip and core strengthening today. Pt will continue to benefit from skilled outpatient physical therapy to address the deficits listed in the problem list box on initial evaluation, provide pt/family education and to maximize pt's level of independence in the home and community environment.       Adelaida Is progressing well towards her goals.   Pt prognosis is Good.     Pt will continue to benefit from skilled outpatient physical therapy to address the deficits listed in the problem list box on initial evaluation, provide pt/family education and to maximize pt's level of independence in the home and community environment.     Pt's spiritual, cultural and educational needs considered and pt agreeable to plan of care and goals.     Anticipated barriers to physical therapy: none    Goals:   Short Term Goals: 4 weeks   Pt to perform "the knack" prior to coughing, laughing or sneezing to decrease risk of incontinence.  Pt to demonstrate being able to " correctly and consistently perform a kegel which is needed for continence.   Pt to report a 40% increase in confidence to participate in social activities due to improving bowel function.   Pt to report a decrease in episodes of incontinence to no more than 1x/week to demonstrate improving pelvic floor muscle strength and coordination.   Pt to demonstrate an improved score in the FOTO bowel leakage survey  to at least 57 to demonstrate improving pelvic floor muscle(s) function needed for continence.           Long Term Goals: 12 weeks ,   Pt to be discharged with home plan for carry over after discharge.    Pt to report being able to delay the urge to have a BM at least 10 minutes when out in public to decrease episodes of incontinence while looking for a bathroom or waiting in line at the bathroom.   Pt to report a decrease in episodes of FI to no more than 1x every 2 weeks to improve confidence needed to participate in social outings.   Pt to report an 80% reduction of fecal incontinence symptoms with ADL participation thereby demonstrating improved pelvic floor muscle control and strength.   Pt to demonstrate an improved score in the FOTO Bowel Leakage survey  to at least 61 to demonstrate improving pelvic floor muscle(s) function needed for continence.           PLAN      Plan of care Certification: 3/11/2024 to 6-3-24.     Outpatient Physical Therapy 1-2 times weekly for 12 weeks to include the following interventions: therapeutic exercises, therapeutic activity, neuromuscular re-education, gait training, manual therapy, modalities PRN, patient/family education, and self care/home management, and dry needling.     Plan     Continue with established Plan of Care, working toward established PT goals.      At next visit: intrarectal work to help facilitate kegel.  Hip and abdominal wall muscle(s).     Catie Martinez, PT

## 2024-05-07 ENCOUNTER — OFFICE VISIT (OUTPATIENT)
Dept: OTOLARYNGOLOGY | Facility: CLINIC | Age: 69
End: 2024-05-07
Payer: MEDICARE

## 2024-05-07 VITALS — SYSTOLIC BLOOD PRESSURE: 108 MMHG | DIASTOLIC BLOOD PRESSURE: 71 MMHG | HEART RATE: 64 BPM

## 2024-05-07 DIAGNOSIS — J30.89 NON-SEASONAL ALLERGIC RHINITIS, UNSPECIFIED TRIGGER: Primary | Chronic | ICD-10-CM

## 2024-05-07 DIAGNOSIS — J32.8 OTHER CHRONIC SINUSITIS: Chronic | ICD-10-CM

## 2024-05-07 DIAGNOSIS — H69.93 DYSFUNCTION OF BOTH EUSTACHIAN TUBES: Chronic | ICD-10-CM

## 2024-05-07 PROCEDURE — 1101F PT FALLS ASSESS-DOCD LE1/YR: CPT | Mod: CPTII,S$GLB,, | Performed by: OTOLARYNGOLOGY

## 2024-05-07 PROCEDURE — 3074F SYST BP LT 130 MM HG: CPT | Mod: CPTII,S$GLB,, | Performed by: OTOLARYNGOLOGY

## 2024-05-07 PROCEDURE — 1160F RVW MEDS BY RX/DR IN RCRD: CPT | Mod: CPTII,S$GLB,, | Performed by: OTOLARYNGOLOGY

## 2024-05-07 PROCEDURE — 3288F FALL RISK ASSESSMENT DOCD: CPT | Mod: CPTII,S$GLB,, | Performed by: OTOLARYNGOLOGY

## 2024-05-07 PROCEDURE — 99999 PR PBB SHADOW E&M-EST. PATIENT-LVL III: CPT | Mod: PBBFAC,,, | Performed by: OTOLARYNGOLOGY

## 2024-05-07 PROCEDURE — 99214 OFFICE O/P EST MOD 30 MIN: CPT | Mod: S$GLB,,, | Performed by: OTOLARYNGOLOGY

## 2024-05-07 PROCEDURE — 1159F MED LIST DOCD IN RCRD: CPT | Mod: CPTII,S$GLB,, | Performed by: OTOLARYNGOLOGY

## 2024-05-07 PROCEDURE — 3078F DIAST BP <80 MM HG: CPT | Mod: CPTII,S$GLB,, | Performed by: OTOLARYNGOLOGY

## 2024-05-07 PROCEDURE — 1126F AMNT PAIN NOTED NONE PRSNT: CPT | Mod: CPTII,S$GLB,, | Performed by: OTOLARYNGOLOGY

## 2024-05-07 NOTE — PROGRESS NOTES
Patient ID: Adelaida Flores is a 68 y.o. y.o. female    Chief Complaint:   Chief Complaint   Patient presents with    Follow-up     Discuss over results also stated sinus have been a little better      HPI 3/26/2024:  Patient referred to me by Nikki Dow NP(ENT) for chronic maxillary sinusitis noted on CT of temporal bones in 12/23.  She has been treated with doxycyline course. She was recently started on Augmentin course on 3/18/24 for sinusitis by primary care. She had severe bilateral maxillary pressure prior to starting antibiotics.  She has been on flonase and xyzal but had to stop xyzal due to dryness in the mouth/nose.  She also has had immunoCAP testing showing class 2 allergy to grass and dog danger, mild sensitivity to cat dander.       Interval HPI 5/7/2024:  Follow up visit. Reports that she has been feeling better regarding sinus pressure/post-nasal drip since completion of Augmentin course. She has been using nasal saline rinses and Flonase.  She does still note intermittent bilateral ear fullness/pressure.       Review of Systems   Constitutional: Negative for fever, chills, fatigue and unexpected weight change.   HENT: Positive for ear blockage. Negative for hearing loss, nosebleeds, congestion, sore throat, facial swelling, rhinorrhea, sneezing, trouble swallowing, dental problem, voice change, postnasal drip, sinus pressure, tinnitus and ear discharge.    Eyes: Negative for redness, itching and visual disturbance.   Respiratory: Negative for cough, choking and wheezing.    Cardiovascular: Negative for chest pain and palpitations.   Gastrointestinal: Negative for abdominal pain.        No reflux.   Musculoskeletal: Negative for gait problem.   Skin: Negative for rash.   Neurological: Negative for dizziness, light-headedness and headaches.     Past Medical History:   Diagnosis Date    Alcohol dependence in sustained full remission 03/21/2022    Allergy     Anxiety 02/22/2019    Broken hip      Colon polyps     Depression     GERD (gastroesophageal reflux disease)     Hyperlipidemia 02/22/2019    IBS (irritable bowel syndrome)     Osteoporosis     Recurrent upper respiratory infection (URI)     T12 compression fracture s/p corpectomy 07/13/2013    Volvulus     s/p colectomy     Past Surgical History:   Procedure Laterality Date    COLON SURGERY      due to volvulus    COLONOSCOPY N/A 8/28/2017    Procedure: COLONOSCOPY miralax;  Surgeon: Trey Haas Jr., MD;  Location: [facility] ENDO;  Service: Endoscopy;  Laterality: N/A;    COLONOSCOPY N/A 9/26/2023    Procedure: COLONOSCOPY;  Surgeon: Graeme Rasmussen MD;  Location: [facility] ENDOSCOPY;  Service: Endoscopy;  Laterality: N/A;    ESOPHAGOGASTRODUODENOSCOPY N/A 9/26/2023    Procedure: EGD (ESOPHAGOGASTRODUODENOSCOPY);  Surgeon: Graeme Rasmussen MD;  Location: [facility] ENDOSCOPY;  Service: Endoscopy;  Laterality: N/A;  ref by / suprep inst portal-RB    ESOPHAGOGASTRODUODENOSCOPY N/A 2/19/2024    Procedure: EGD (ESOPHAGOGASTRODUODENOSCOPY);  Surgeon: Alfredo Gaxiola MD;  Location: [facility] ENDOSCOPY;  Service: Endoscopy;  Laterality: N/A;  referral: DR. Rasmussne, krishan last rpocedure reporat Pt to f/u EGD in 12 weeks - esophagitis any GI, CV/ prep ins on portal - ERW  2/12- pc complete. DBM  2-16 left message with call back number did not read instructions.  [facility]    EYE SURGERY  2012    Cataract/torc    FRACTURE SURGERY      right broken wrist and had surgery    HERNIA REPAIR  2012    SMALL INTESTINE SURGERY  2011    volvulus x 2    SPINE SURGERY      due to fall while on a boat.    TONSILLECTOMY       Social History     Socioeconomic History    Marital status:    Occupational History    Occupation: Dietary Consultant      Employer: [employer]   Tobacco Use    Smoking status: Former     Current packs/day: 0.00     Average packs/day: 1 pack/day for 10.0 years (10.0 ttl pk-yrs)     Types: Cigarettes     Start date: 1/1/1983     Quit date:  1993     Years since quittin.3     Passive exposure: Never    Smokeless tobacco: Never    Tobacco comments:     Quit 28 years ago   Substance and Sexual Activity    Alcohol use: No    Drug use: No    Sexual activity: Yes     Partners: Male     Birth control/protection: None   Social History Narrative    Got  to Kristopher Calderon in 2019.     Does some physical activity daily previously. But since getting  less active. Still does weights 3x/week.     Social Determinants of Health     Financial Resource Strain: Low Risk  (2024)    Overall Financial Resource Strain (CARDIA)     Difficulty of Paying Living Expenses: Not very hard   Recent Concern: Financial Resource Strain - Medium Risk (2023)    Overall Financial Resource Strain (CARDIA)     Difficulty of Paying Living Expenses: Somewhat hard   Food Insecurity: No Food Insecurity (2024)    Hunger Vital Sign     Worried About Running Out of Food in the Last Year: Never true     Ran Out of Food in the Last Year: Never true   Transportation Needs: No Transportation Needs (2024)    PRAPARE - Transportation     Lack of Transportation (Medical): No     Lack of Transportation (Non-Medical): No   Physical Activity: Insufficiently Active (2024)    Exercise Vital Sign     Days of Exercise per Week: 7 days     Minutes of Exercise per Session: 20 min   Stress: Stress Concern Present (2024)    [place] [employer] - Occupational Stress Questionnaire     Feeling of Stress : Very much   Housing Stability: Low Risk  (2024)    Housing Stability Vital Sign     Unable to Pay for Housing in the Last Year: No     Number of Places Lived in the Last Year: 1     Unstable Housing in the Last Year: No     Family History   Problem Relation Name Age of Onset    Colon polyps Mother Mother     Cancer Mother Mother         skin    Osteoarthritis Mother Mother     Colon polyps Father Father     Cancer Father Father          prostate    Heart disease Father Father     Osteoarthritis Maternal Aunt      Osteoarthritis Maternal Grandmother      Cancer Sister Sister         skin    Asthma Brother Middle     Diabetes Neg Hx      Cirrhosis Neg Hx         Objective:      Vitals:    05/07/24 1344   BP: 108/71   Pulse: 64           Constitutional:   She appears well-developed and well-nourished.     Head:  Normocephalic and atraumatic. Salivary glands normal.  Facial strength is normal.      Ears:  Hearing normal to normal and whispered voice; external ear normal without scars, lesions, or masses; ear canal, tympanic membrane, and middle ear normal..     Nose:  Mucosal edema present. Turbinate hypertrophy.  Right sinus exhibits no maxillary sinus tenderness and no frontal sinus tenderness. Left sinus exhibits no maxillary sinus tenderness and no frontal sinus tenderness.     Mouth/Throat  Oropharynx clear and moist without lesions or asymmetry and normal uvula midline.     Neck:  Neck normal without thyromegaly masses, asymmetry, normal tracheal structure, crepitus, and tenderness.           See separate procedure note for nasal endoscopy.   CT sinus 4/8/2024: images interpreted by me and discussed with the patient in detail.   FINDINGS:  The frontal sinuses are clear bilaterally.     There is moderate opacification right posterior ethmoid air cells.  The left ethmoid air cells are clear     The sphenoid sinuses are clear bilaterally     There is small lobular opacities in the maxillary antra bilaterally suggestive for lobular mucosal thickening or mucous retention cyst this measures approximately 0.4 cm in thickness bilaterally     Bilateral maxillary septations with superimposed orbital ethmoid air cells narrow the infundibulum although the ostiomeatal units are patent bilaterally     Roof of the ethmoids relatively symmetric.  There is thinning and probable demineralization of the right lamina papyracea underlying the opacified ethmoid air  cells otherwise the lamina papyracea are grossly intact bilaterally     There is slight rightward deviation nasal septum.    CT Temporal Bones Without Contrast 12/26/2023      FINDINGS:  Right inner ear: Normal. Right ossicles and middle ear: Normal. The middle ear ossicles are intact. Right external auditory canal: Normal. Right facial nerve canal: Normal. Right jugular foramen: No jugular dehiscence. Right carotid canal: No aberrant carotid canal. Right mastoid air cells: Normal. No mastoid effusions. Left inner ear: Normal. Left ossicles and middle ear: Normal. The middle ear ossicles are intact. Left external auditory canal: Normal. Left facial nerve canal: Normal. Left jugular foramen: No jugular dehiscence. Left carotid canal: No aberrant carotid canal. Left mastoid air cells: Normal. No mastoid effusions. Paranasal sinuses: Mucosal thickening in the bilateral maxillary sinuses. Soft tissues: Unremarkable.     Impression:     1. No CT abnormalities of the temporal bones. 2. Mucosal thickening in the bilateral maxillary sinuses.    Assessment:         ICD-10-CM ICD-9-CM    1. Non-seasonal allergic rhinitis, unspecified trigger  J30.89 477.8       2. Dysfunction of both eustachian tubes  H69.93 381.81       3. Other chronic sinusitis  J32.8 473.8     improvement              Plan:      -start on nasal saline rinses bid  -continue on Flonase 2 sprays in each nostril daily  Start xyzal 5mg PO nightly.   Obtain dedicated post-treatment CT scan of sinuses  Follow up in 4-6 month for recheck.     China Figueroa MD

## 2024-05-14 ENCOUNTER — CLINICAL SUPPORT (OUTPATIENT)
Dept: REHABILITATION | Facility: HOSPITAL | Age: 69
End: 2024-05-14
Payer: MEDICARE

## 2024-05-14 DIAGNOSIS — R53.1 WEAKNESS: ICD-10-CM

## 2024-05-14 DIAGNOSIS — M62.89 PELVIC FLOOR DYSFUNCTION: Primary | ICD-10-CM

## 2024-05-14 PROCEDURE — 97112 NEUROMUSCULAR REEDUCATION: CPT

## 2024-05-14 PROCEDURE — 97530 THERAPEUTIC ACTIVITIES: CPT

## 2024-05-14 NOTE — PROGRESS NOTES
[facility]   Physical Therapy Treatment Note      Name: Adelaida Flores  Clinic Number: [id number]    Therapy Diagnosis:   Encounter Diagnoses   Name Primary?    Pelvic floor dysfunction Yes    Weakness        Physician: Mary Calderon NP    Visit Date: 5/14/2024    Physician Orders: PT Eval and Treat   Medical Diagnosis from Referral: fecal incontinence   Evaluation Date: 3/11/2024  Authorization Period Expiration: 12-31-24  Plan of Care Expiration: 6-3-24  Progress Note Due: 4-11-24  Visit # / Visits authorized: 9/20   FOTO: 2/3  Date last given: 4/25/24    Time In: 1345  Time Out: 1428  Total Billable Time: 43 minutes    Precautions: Standard, Fall, and osteoporosis       Subjective     Pt reports:  Adelaida reports she feels like rectal muscle(s) are not strong.  Has noticed improvement with bowel and bladder leakage though.      Stool consistency is more solid than it used to be.      She was compliant with home exercise program.  Response to previous treatment: no issues reported   Functional change: no changes reported    Pain: 0/10  Location: pelvic or back     Constitutional Symptoms Review: The patient denies having any constitutional symptoms.       Objective      Objective Measures updated at progress report unless specified.     See EMR under MEDIA for written consent provided 5/14/2024  Chaperone: declined         VAGINAL PELVIC FLOOR EXAM    EXTERNAL ASSESSMENT 5-6-24  Introitus: WNL  Skin condition: clitoral saxena not observable nor labia minora   Scarring: none   Sensation: WNL   Pain:  none  Voluntary contraction: accessory muscle use  Involuntary contraction: bulge  Bearing down: reflex tightening  Perineal descent: absent      INTERNAL ASSESSMENT  Pain: none   Sensation: able to localized pressure appropriately   Vaginal vault: WNL   Muscle Bulk: slight increase in resting tension    Muscle Power: 1+/5 but starting to improve with transverse abdominus muscle  co-contraction     Quality of contraction: slow rise, decreased hold, and slow relaxation   Specificity: patient contracts: gluteals and hip adductors    Coordination: tends to hold breath during PFM contration         RECTAL PELVIC FLOOR EXAM    EXTERNAL ASSESSMENT 5-14-24  Anus: WNL  Skin condition: WNL   Scarring: none  Sensation: WNL   Pain:  none  Voluntary contraction:  decreased accessory muscle(s) activation of gluteals. No visible contraction of the pelvic floor muscle(s)          INTERNAL ASSESSMENT  EAS tone: hypotonic but improved from initial assessment    Impaction: none   Pain: none  Sensation: able to localize pressure appropriately   Muscle Bulk: atrophy   Muscle Power: trace   Coordination: tends to hold breath during PFM contration   Comments:     Treatment   Adelaida received the treatments listed below:    Pt verbally consents to intrarectal treatment today.  Signed consent form already on file.  Chaperone declined today.    Adelaida received the following manual therapy techniques: to develop flexibility and extensibility for 00 minutes including:   [] trigger point/myofascial release of abdominal wall muscle(s) globally  [] Bowel massage performed to help with gut motility. Pt edu in self-bowel massage technique to help with gut motility needed to have a comfortable BM.   [] Pelvic floor muscle(s) intravaginal myofascial release       Adelaida participated in neuromuscular re-education activities to develop Coordination and Control for 35 minutes including:   [x] Kegel edu and practice.  Increased time spent on edu on proper technique.  Adelaida is not able to activate her pelvic floor muscle(s).  Used tapping rectally over muscle bellies to try to elicit a contraction with kegels. Performed intrarectally today   [] Pt struggles to isolate TrA initially but improves with practice. Tactile and verbal cues required for correct activation of TrA. Initially pt substitutes with rectus abdominus muscle but  with mod verbal and tactile instruction is able to isolate out  lower abdominal muscles.    [] Transverse abdominus muscle 5 sec x 10 reps x 2 reps   [] Transverse abdominus muscle +Kegel 5 sec x 10 reps  [] Transverse abdominus muscle +Kegel+Marching 5 sec x10 reps.   [x] Posterior pelvic tilt 5 sec x 10 reps and then posterior pelvic tilt with kegel 5 sec x 10 reps  [x] Hip adduction with ball squeeze and kegel 5 sec x 20 reps  [x]  Posterior pelvic tilt with kegel and bridge 5 sec x 10 reps   Cues needed for correct TA activation and coordination with kegel.    [] RUSI for breathing, drops, contractions of the PFM to help pt visualize PFM motion and function using transperineal US view for external anal sphincter and pelvic floor muscle(s) activation   [] RUSI for breathing, drops, contractions of the PFM to help pt visualize PFM motion and function using transabdominal ultrasound   [] Rehabilitative ultrasound imaging for transverse abdominus muscle activation and rectus abdominus activation with lower extremity and upper extremity motion.    []Rehabilitative ultrasound imaging for dan training (pre-activation of pelvic floor muscle(s)        Adelaida participated in dynamic functional therapeutic activities to improve functional performance for 8 minutes, including:  [x] Plan of care review  [x] Anatomy review and discussion of the anatomy on current level of function   [] Patient was instructed in and practice diaphragmatic breathing as a method to help control pain, decrease anxiety and help w/ sleep. Pt was provided w / handout w/ instructions for practice. Practiced  with mod verbal and tactile instruction.  Mirror was also used to help Adelaida visualize paradoxical breathing patterns   [] Pt edu in the Roll for Control technique to decrease incontinence with strong urge.  Practiced new technique in sitting and standing.      [] Home exercise program reviewed   [] Encouraged Adelaida to use tactile cues over  "anus to help activate the muscles at home   [x] Sit to stand with attempting to perform a kegel x 10 reps     Home Exercises Provided and Patient Education Provided     Education provided: See above  Discussed progression of plan of care with patient; educated pt in activity modification; reviewed HEP with pt. Pt demonstrated and verbalized understanding of all instruction and was provided with a handout of HEP (see Patient Instructions).    Written Home Exercises Provided: yes.  Exercises were reviewed and Adelaida was able to demonstrate them prior to the end of the session.  Adelaida demonstrated good  understanding of the education provided.     See EMR under Patient Instructions for exercises provided 3/14/2024.    Worked on kegel technique today using intrarectal feedback for muscle(s) activation.  Improving ability to contract pelvic floor muscle(s) noted today compared to the last rectal assessment however she is only able to activate a trace contraction.   Continued hip and core strengthening today. Pt will continue to benefit from skilled outpatient physical therapy to address the deficits listed in the problem list box on initial evaluation, provide pt/family education and to maximize pt's level of independence in the home and community environment.       Adelaida Is progressing well towards her goals.   Pt prognosis is Good.     Pt will continue to benefit from skilled outpatient physical therapy to address the deficits listed in the problem list box on initial evaluation, provide pt/family education and to maximize pt's level of independence in the home and community environment.     Pt's spiritual, cultural and educational needs considered and pt agreeable to plan of care and goals.     Anticipated barriers to physical therapy: none    Goals:   Short Term Goals: 4 weeks   Pt to perform "the knack" prior to coughing, laughing or sneezing to decrease risk of incontinence.  Pt to demonstrate being able to " correctly and consistently perform a kegel which is needed for continence.   Pt to report a 40% increase in confidence to participate in social activities due to improving bowel function.   Pt to report a decrease in episodes of incontinence to no more than 1x/week to demonstrate improving pelvic floor muscle strength and coordination.   Pt to demonstrate an improved score in the FOTO bowel leakage survey  to at least 57 to demonstrate improving pelvic floor muscle(s) function needed for continence.           Long Term Goals: 12 weeks ,   Pt to be discharged with home plan for carry over after discharge.    Pt to report being able to delay the urge to have a BM at least 10 minutes when out in public to decrease episodes of incontinence while looking for a bathroom or waiting in line at the bathroom.   Pt to report a decrease in episodes of FI to no more than 1x every 2 weeks to improve confidence needed to participate in social outings.   Pt to report an 80% reduction of fecal incontinence symptoms with ADL participation thereby demonstrating improved pelvic floor muscle control and strength.   Pt to demonstrate an improved score in the FOTO Bowel Leakage survey  to at least 61 to demonstrate improving pelvic floor muscle(s) function needed for continence.              Plan of care Certification: 3/11/2024 to 6-3-24.     Outpatient Physical Therapy 1-2 times weekly for 12 weeks to include the following interventions: therapeutic exercises, therapeutic activity, neuromuscular re-education, gait training, manual therapy, modalities PRN, patient/family education, and self care/home management, and dry needling.     Plan     Continue with established Plan of Care, working toward established PT goals.      At next visit: intrarectal work to help facilitate kegel.  Hip and abdominal wall muscle(s).     Catie Martinez, PT

## 2024-05-28 ENCOUNTER — OFFICE VISIT (OUTPATIENT)
Dept: PSYCHIATRY | Facility: CLINIC | Age: 69
End: 2024-05-28
Payer: MEDICARE

## 2024-05-28 DIAGNOSIS — R63.0 ANOREXIA: ICD-10-CM

## 2024-05-28 DIAGNOSIS — F10.21 ALCOHOL DEPENDENCE IN SUSTAINED FULL REMISSION: ICD-10-CM

## 2024-05-28 DIAGNOSIS — F41.1 GAD (GENERALIZED ANXIETY DISORDER): ICD-10-CM

## 2024-05-28 DIAGNOSIS — F33.1 MDD (MAJOR DEPRESSIVE DISORDER), RECURRENT EPISODE, MODERATE: Primary | ICD-10-CM

## 2024-05-28 PROCEDURE — 90833 PSYTX W PT W E/M 30 MIN: CPT | Mod: 95,,, | Performed by: PHYSICIAN ASSISTANT

## 2024-05-28 PROCEDURE — 99214 OFFICE O/P EST MOD 30 MIN: CPT | Mod: 95,,, | Performed by: PHYSICIAN ASSISTANT

## 2024-05-28 PROCEDURE — 1159F MED LIST DOCD IN RCRD: CPT | Mod: CPTII,95,, | Performed by: PHYSICIAN ASSISTANT

## 2024-05-28 PROCEDURE — 1160F RVW MEDS BY RX/DR IN RCRD: CPT | Mod: CPTII,95,, | Performed by: PHYSICIAN ASSISTANT

## 2024-05-28 RX ORDER — LAMOTRIGINE 150 MG/1
150 TABLET ORAL DAILY
Qty: 90 TABLET | Refills: 1 | Status: SHIPPED | OUTPATIENT
Start: 2024-05-28 | End: 2024-11-24

## 2024-05-28 RX ORDER — FLUOXETINE HYDROCHLORIDE 40 MG/1
80 CAPSULE ORAL DAILY
Qty: 180 CAPSULE | Refills: 1 | Status: SHIPPED | OUTPATIENT
Start: 2024-05-28 | End: 2024-11-24
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
## 2024-05-30 ENCOUNTER — PATIENT MESSAGE (OUTPATIENT)
Dept: GASTROENTEROLOGY | Facility: CLINIC | Age: 69
End: 2024-05-30
Payer: MEDICARE

## 2024-05-30 ENCOUNTER — CLINICAL SUPPORT (OUTPATIENT)
Dept: REHABILITATION | Facility: HOSPITAL | Age: 69
End: 2024-05-30
Payer: MEDICARE

## 2024-05-30 DIAGNOSIS — M62.89 PELVIC FLOOR DYSFUNCTION: Primary | ICD-10-CM

## 2024-05-30 DIAGNOSIS — R53.1 WEAKNESS: ICD-10-CM

## 2024-05-30 PROCEDURE — 97112 NEUROMUSCULAR REEDUCATION: CPT

## 2024-05-30 PROCEDURE — 97530 THERAPEUTIC ACTIVITIES: CPT

## 2024-05-30 NOTE — PROGRESS NOTES
[facility]   Physical Therapy Treatment Note      Name: Adelaida Flores  Clinic Number: [id number]    Therapy Diagnosis:   Encounter Diagnoses   Name Primary?    Pelvic floor dysfunction Yes    Weakness        Physician: Mary Calderon NP    Visit Date: 5/30/2024    Physician Orders: PT Eval and Treat   Medical Diagnosis from Referral: fecal incontinence   Evaluation Date: 3/11/2024  Authorization Period Expiration: 12-31-24  Plan of Care Expiration: 6-3-24  Progress Note Due: 4-11-24  Visit # / Visits authorized: 10/20   FOTO: 2/3  Date last given: 4/25/24    Time In: 1100  Time Out: 1145  Total Billable Time: 45 minutes    Precautions: Standard, Fall, and osteoporosis       Subjective     Pt reports:  Adelaida reports she is not sure if she is doing her kegels correctly.  She feels like it is easier to squeeze the back of her pelvic floor muscle(s).  She has been using her tools from physical therapy and feels like it is helping.  She report she is feeling more encouraged today.         She was compliant with home exercise program.  Response to previous treatment: no issues reported   Functional change: no changes reported    Pain: 0/10  Location: pelvic or back     Constitutional Symptoms Review: The patient denies having any constitutional symptoms.       Objective      Objective Measures updated at progress report unless specified.     See EMR under MEDIA for written consent provided 5/30/2024  Chaperone: declined         VAGINAL PELVIC FLOOR EXAM    EXTERNAL ASSESSMENT 5-6-24  Introitus: WNL  Skin condition: clitoral saxena not observable nor labia minora   Scarring: none   Sensation: WNL   Pain:  none  Voluntary contraction: accessory muscle use  Involuntary contraction: bulge  Bearing down: reflex tightening  Perineal descent: absent      INTERNAL ASSESSMENT  Pain: none   Sensation: able to localized pressure appropriately   Vaginal vault: WNL   Muscle Bulk: slight increase in  resting tension    Muscle Power: 1+/5 but starting to improve with transverse abdominus muscle co-contraction     Quality of contraction: slow rise, decreased hold, and slow relaxation   Specificity: patient contracts: gluteals and hip adductors    Coordination: tends to hold breath during PFM contration         RECTAL PELVIC FLOOR EXAM    EXTERNAL ASSESSMENT 5-30-24  Anus: WNL  Skin condition: WNL   Scarring: none  Sensation: WNL   Pain:  none  Voluntary contraction:  decreased accessory muscle(s) activation of gluteals. No visible contraction of the pelvic floor muscle(s)          INTERNAL ASSESSMENT  EAS tone: hypotonic but improved from initial assessment    Impaction: none   Pain: none  Sensation: able to localize pressure appropriately   Muscle Bulk: atrophy   Muscle Power: flicker today (improved from last visit)  Coordination: tends to hold breath during PFM contration   Comments:     Treatment   Adelaida received the treatments listed below:    Pt verbally consents to intrarectal treatment today.  Signed consent form already on file.  Chaperone declined today.    Adelaida received the following manual therapy techniques: to develop flexibility and extensibility for 00 minutes including:   [] trigger point/myofascial release of abdominal wall muscle(s) globally  [] Bowel massage performed to help with gut motility. Pt edu in self-bowel massage technique to help with gut motility needed to have a comfortable BM.   [] Pelvic floor muscle(s) intravaginal myofascial release       Adelaida participated in neuromuscular re-education activities to develop Coordination and Control for 37 minutes including:   [x] Kegel edu and practice.  Increased time spent on edu on proper technique.  Adelaida is not able to activate her pelvic floor muscle(s).  Used tapping rectally over muscle bellies to try to elicit a contraction with kegels. Performed intrarectally today   [x] Transverse abdominus muscle +Kegel+Marching 5 sec x10  reps.   [x] Posterior pelvic tilt 5 sec x 10 reps and then posterior pelvic tilt with kegel 5 sec x 10 reps  [x] Hip adduction with ball squeeze and kegel 5 sec x 20 reps  [x]  Posterior pelvic tilt with kegel and bridge 5 sec x 10 reps   Cues needed for correct TA activation and coordination with kegel.    [] RUSI for breathing, drops, contractions of the PFM to help pt visualize PFM motion and function using transperineal US view for external anal sphincter and pelvic floor muscle(s) activation   [] RUSI for breathing, drops, contractions of the PFM to help pt visualize PFM motion and function using transabdominal ultrasound   [] Rehabilitative ultrasound imaging for transverse abdominus muscle activation and rectus abdominus activation with lower extremity and upper extremity motion.    []Rehabilitative ultrasound imaging for dan training (pre-activation of pelvic floor muscle(s)        Adelaida participated in dynamic functional therapeutic activities to improve functional performance for 8 minutes, including:  [x] Plan of care review  [x] Anatomy review and discussion of the anatomy on current level of function.  Review of pelvic floor muscle(s) function and activation   [] Patient was instructed in and practice diaphragmatic breathing as a method to help control pain, decrease anxiety and help w/ sleep. Pt was provided w / handout w/ instructions for practice. Practiced  with mod verbal and tactile instruction.  Mirror was also used to help Adelaida visualize paradoxical breathing patterns   [] Pt edu in the Roll for Control technique to decrease incontinence with strong urge.  Practiced new technique in sitting and standing.      [] Home exercise program reviewed   [] Encouraged Adelaida to use tactile cues over anus to help activate the muscles at home   [] Sit to stand with attempting to perform a kegel x 10 reps     Home Exercises Provided and Patient Education Provided     Education provided: See  "above  Discussed progression of plan of care with patient; educated pt in activity modification; reviewed HEP with pt. Pt demonstrated and verbalized understanding of all instruction and was provided with a handout of HEP (see Patient Instructions).    Written Home Exercises Provided: yes.  Exercises were reviewed and Adelaida was able to demonstrate them prior to the end of the session.  Adelaida demonstrated good  understanding of the education provided.     See EMR under Patient Instructions for exercises provided 3/14/2024.    ASSESSMENT  Worked on kegel technique today using intrarectal feedback for muscle(s) activation.  Improving ability to contract pelvic floor muscle(s) noted today compared to the last rectal assessment and she is more consistently able to find and contract her muscles today.  Continued hip and core strengthening today. Pt will continue to benefit from skilled outpatient physical therapy to address the deficits listed in the problem list box on initial evaluation, provide pt/family education and to maximize pt's level of independence in the home and community environment.       Adelaida Is progressing well towards her goals.   Pt prognosis is Good.     Pt will continue to benefit from skilled outpatient physical therapy to address the deficits listed in the problem list box on initial evaluation, provide pt/family education and to maximize pt's level of independence in the home and community environment.     Pt's spiritual, cultural and educational needs considered and pt agreeable to plan of care and goals.     Anticipated barriers to physical therapy: none    Goals:   Short Term Goals: 4 weeks   Pt to perform "the knack" prior to coughing, laughing or sneezing to decrease risk of incontinence.  Pt to demonstrate being able to correctly and consistently perform a kegel which is needed for continence.   Pt to report a 40% increase in confidence to participate in social activities due to improving " bowel function.   Pt to report a decrease in episodes of incontinence to no more than 1x/week to demonstrate improving pelvic floor muscle strength and coordination.   Pt to demonstrate an improved score in the FOTO bowel leakage survey  to at least 57 to demonstrate improving pelvic floor muscle(s) function needed for continence.           Long Term Goals: 12 weeks ,   Pt to be discharged with home plan for carry over after discharge.    Pt to report being able to delay the urge to have a BM at least 10 minutes when out in public to decrease episodes of incontinence while looking for a bathroom or waiting in line at the bathroom.   Pt to report a decrease in episodes of FI to no more than 1x every 2 weeks to improve confidence needed to participate in social outings.   Pt to report an 80% reduction of fecal incontinence symptoms with ADL participation thereby demonstrating improved pelvic floor muscle control and strength.   Pt to demonstrate an improved score in the FOTO Bowel Leakage survey  to at least 61 to demonstrate improving pelvic floor muscle(s) function needed for continence.              Plan of care Certification: 3/11/2024 to 6-3-24.     Outpatient Physical Therapy 1-2 times weekly for 12 weeks to include the following interventions: therapeutic exercises, therapeutic activity, neuromuscular re-education, gait training, manual therapy, modalities PRN, patient/family education, and self care/home management, and dry needling.     Plan     Continue with established Plan of Care, working toward established PT goals.      At next visit: intrarectal work to help facilitate kegel.  Hip and abdominal wall muscle(s).     Catie Martinez, PT

## 2024-05-31 RX ORDER — OMEPRAZOLE 40 MG/1
40 CAPSULE, DELAYED RELEASE ORAL EVERY MORNING
Qty: 90 CAPSULE | Refills: 3 | Status: SHIPPED | OUTPATIENT
Start: 2024-05-31 | End: 2025-05-31

## 2024-06-03 ENCOUNTER — PATIENT MESSAGE (OUTPATIENT)
Dept: PSYCHIATRY | Facility: CLINIC | Age: 69
End: 2024-06-03
Payer: MEDICARE

## 2024-06-06 RX ORDER — HYDROXYZINE PAMOATE 25 MG/1
25 CAPSULE ORAL NIGHTLY PRN
Qty: 30 CAPSULE | Refills: 2 | Status: SHIPPED | OUTPATIENT
Start: 2024-06-06 | End: 2024-06-06 | Stop reason: SDUPTHER

## 2024-06-06 RX ORDER — HYDROXYZINE PAMOATE 25 MG/1
25 CAPSULE ORAL NIGHTLY PRN
Qty: 30 CAPSULE | Refills: 2 | Status: SHIPPED | OUTPATIENT
Start: 2024-06-06 | End: 2024-06-12

## 2024-06-10 ENCOUNTER — OFFICE VISIT (OUTPATIENT)
Dept: URGENT CARE | Facility: CLINIC | Age: 69
End: 2024-06-10
Payer: OTHER MISCELLANEOUS

## 2024-06-10 ENCOUNTER — TELEPHONE (OUTPATIENT)
Dept: PRIMARY CARE CLINIC | Facility: CLINIC | Age: 69
End: 2024-06-10
Payer: MEDICARE

## 2024-06-10 ENCOUNTER — PATIENT MESSAGE (OUTPATIENT)
Dept: REHABILITATION | Facility: HOSPITAL | Age: 69
End: 2024-06-10
Payer: MEDICARE

## 2024-06-10 ENCOUNTER — PATIENT MESSAGE (OUTPATIENT)
Dept: PRIMARY CARE CLINIC | Facility: CLINIC | Age: 69
End: 2024-06-10
Payer: MEDICARE

## 2024-06-10 VITALS
BODY MASS INDEX: 18.83 KG/M2 | DIASTOLIC BLOOD PRESSURE: 77 MMHG | HEART RATE: 64 BPM | TEMPERATURE: 98 F | WEIGHT: 113 LBS | SYSTOLIC BLOOD PRESSURE: 125 MMHG | OXYGEN SATURATION: 96 % | RESPIRATION RATE: 17 BRPM | HEIGHT: 65 IN
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
## 2024-06-10 PROCEDURE — 87389 HIV-1 AG W/HIV-1&-2 AB AG IA: CPT | Performed by: NURSE PRACTITIONER

## 2024-06-10 PROCEDURE — 90471 IMMUNIZATION ADMIN: CPT | Mod: S$GLB,,, | Performed by: NURSE PRACTITIONER

## 2024-06-10 PROCEDURE — 87341 HEP B SURFACE AG NEUTRLZJ IA: CPT | Performed by: NURSE PRACTITIONER

## 2024-06-10 PROCEDURE — 36415 COLL VENOUS BLD VENIPUNCTURE: CPT | Performed by: NURSE PRACTITIONER

## 2024-06-10 PROCEDURE — 87340 HEPATITIS B SURFACE AG IA: CPT | Performed by: NURSE PRACTITIONER

## 2024-06-10 PROCEDURE — 99213 OFFICE O/P EST LOW 20 MIN: CPT | Mod: 25,S$GLB,, | Performed by: NURSE PRACTITIONER

## 2024-06-10 PROCEDURE — 90714 TD VACC NO PRESV 7 YRS+ IM: CPT | Mod: S$GLB,,, | Performed by: NURSE PRACTITIONER

## 2024-06-10 PROCEDURE — 86803 HEPATITIS C AB TEST: CPT | Performed by: NURSE PRACTITIONER

## 2024-06-10 RX ORDER — METHOCARBAMOL 500 MG/1
500 TABLET, FILM COATED ORAL 4 TIMES DAILY
Qty: 40 TABLET | Refills: 0 | Status: SHIPPED | OUTPATIENT
Start: 2024-06-10 | End: 2024-06-20

## 2024-06-10 RX ORDER — MUPIROCIN 20 MG/G
OINTMENT TOPICAL
Qty: 22 G | Refills: 1 | Status: SHIPPED | OUTPATIENT
Start: 2024-06-10 | End: 2024-06-17

## 2024-06-10 RX ORDER — AMOXICILLIN AND CLAVULANATE POTASSIUM 875; 125 MG/1; MG/1
1 TABLET, FILM COATED ORAL 2 TIMES DAILY
Qty: 20 TABLET | Refills: 0 | Status: SHIPPED | OUTPATIENT
Start: 2024-06-10 | End: 2024-06-20

## 2024-06-10 RX ORDER — METHOCARBAMOL 500 MG/1
500 TABLET, FILM COATED ORAL 4 TIMES DAILY
Qty: 40 TABLET | Refills: 0 | Status: SHIPPED | OUTPATIENT
Start: 2024-06-10 | End: 2024-06-10

## 2024-06-10 NOTE — TELEPHONE ENCOUNTER
Spoke w/ pt. She cannot come on Tues 6/18 , and is checking on Friday 6/21. I scheduled 6/21 so that appt is in system.

## 2024-06-10 NOTE — PATIENT INSTRUCTIONS
recommend retesting for HIV, hepatitis B and hepatitis C at 3 months 6 months in a year  You may follow-up with occupational medicine for further treatment

## 2024-06-10 NOTE — TELEPHONE ENCOUNTER
Saw her UC visit. Can you check on pt? Maybe have her f/u w/ me next week in one of the open slots instead of July?
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
## 2024-06-11 ENCOUNTER — PATIENT MESSAGE (OUTPATIENT)
Dept: PRIMARY CARE CLINIC | Facility: CLINIC | Age: 69
End: 2024-06-11
Payer: MEDICARE

## 2024-06-11 DIAGNOSIS — R76.8 HEPATITIS B SURFACE ANTIGEN POSITIVE: Primary | ICD-10-CM

## 2024-06-11 LAB
HBV SURFACE AG SERPL QL IA: REACTIVE
HBV SURFACE AG SERPL QL NT: NORMAL
HCV AB SERPL QL IA: NORMAL
HIV 1+2 AB+HIV1 P24 AG SERPL QL IA: NORMAL

## 2024-06-12 DIAGNOSIS — F41.1 GAD (GENERALIZED ANXIETY DISORDER): Primary | ICD-10-CM

## 2024-06-12 RX ORDER — HYDROXYZINE HYDROCHLORIDE 10 MG/1
10 TABLET, FILM COATED ORAL 3 TIMES DAILY PRN
Qty: 60 TABLET | Refills: 2 | Status: SHIPPED | OUTPATIENT
Start: 2024-06-12 | End: 2024-06-18 | Stop reason: SDUPTHER

## 2024-06-13 ENCOUNTER — TELEPHONE (OUTPATIENT)
Dept: URGENT CARE | Facility: CLINIC | Age: 69
End: 2024-06-13
Payer: MEDICARE

## 2024-06-13 NOTE — TELEPHONE ENCOUNTER
Please call patient to see if anyone contacted her about her results. If not, let her know the test can be a false positive and additional blood work dk have to be done to determine if this is a false positive or not. Please schedule lab appt and I can put in orders and link later. Then will discuss results at appt next Friday.     She has an appt next Friday and in July. If she's coming next Friday, please cancel appt in July.

## 2024-06-14 ENCOUNTER — LAB VISIT (OUTPATIENT)
Dept: LAB | Facility: HOSPITAL | Age: 69
End: 2024-06-14
Attending: INTERNAL MEDICINE
Payer: MEDICARE

## 2024-06-14 DIAGNOSIS — R76.8 HEPATITIS B SURFACE ANTIGEN POSITIVE: ICD-10-CM

## 2024-06-14 LAB — HBV CORE AB SERPL QL IA: NORMAL

## 2024-06-14 PROCEDURE — 36415 COLL VENOUS BLD VENIPUNCTURE: CPT | Performed by: INTERNAL MEDICINE

## 2024-06-14 PROCEDURE — 86704 HEP B CORE ANTIBODY TOTAL: CPT | Performed by: INTERNAL MEDICINE

## 2024-06-14 NOTE — TELEPHONE ENCOUNTER
I spoke w/ Pt, & she was called yesterday and advised it may be false positive, no new blood work ordered. Lab appt scheduled at [facility] today for 1pm. Please add any labwork needed.   PCP f/u on 6/21

## 2024-06-17 ENCOUNTER — CLINICAL SUPPORT (OUTPATIENT)
Dept: REHABILITATION | Facility: HOSPITAL | Age: 69
End: 2024-06-17
Payer: MEDICARE

## 2024-06-17 DIAGNOSIS — R53.1 WEAKNESS: ICD-10-CM

## 2024-06-17 DIAGNOSIS — M62.89 PELVIC FLOOR DYSFUNCTION: Primary | ICD-10-CM

## 2024-06-17 PROCEDURE — 97530 THERAPEUTIC ACTIVITIES: CPT

## 2024-06-17 PROCEDURE — 97112 NEUROMUSCULAR REEDUCATION: CPT

## 2024-06-17 NOTE — PROGRESS NOTES
"  [facility]   Physical Therapy Treatment and Recertification Note      Name: Adelaida Flores  Clinic Number: [id number]    Therapy Diagnosis:   Encounter Diagnoses   Name Primary?    Pelvic floor dysfunction Yes    Weakness        Physician: Mary Calderon NP    Visit Date: 6/17/2024    Physician Orders: PT Eval and Treat   Medical Diagnosis from Referral: fecal incontinence   Evaluation Date: 3/11/2024  Authorization Period Expiration: 12-31-24  Plan of Care Expiration: 9-9-24  Progress Note Due: 7-17-24  Visit # / Visits authorized: 11/20   FOTO: 2/3  Date last given: 4/25/24    Time In: 847  Time Out: 930  Total Billable Time: 43 minutes    Precautions: Standard, Fall, and osteoporosis       Subjective     Pt reports:  Adelaida reports she has had a few episodes of urinary incontinence but "It was nothing like it was supposed to when I waited too long."  She says that counting backwards helps calm down the urge. She is having some fecal incontinence due to the antibiotics which has made her stool lose.       She was compliant with home exercise program.  Response to previous treatment: no issues reported   Functional change: no changes reported    Pain: 0/10  Location: pelvic or back     Constitutional Symptoms Review: The patient denies having any constitutional symptoms.       Objective      Objective Measures updated at progress report unless specified.     See EMR under MEDIA for written consent provided 6/17/2024  Chaperone: declined         VAGINAL PELVIC FLOOR EXAM    EXTERNAL ASSESSMENT 5-6-24  Introitus: WNL  Skin condition: clitoral saxena not observable nor labia minora   Scarring: none   Sensation: WNL   Pain:  none  Voluntary contraction: accessory muscle use  Involuntary contraction: bulge  Bearing down: reflex tightening  Perineal descent: absent      INTERNAL ASSESSMENT  Pain: none   Sensation: able to localized pressure appropriately   Vaginal vault: WNL   Muscle Bulk: " slight increase in resting tension    Muscle Power: 1+/5 but starting to improve with transverse abdominus muscle co-contraction     Quality of contraction: slow rise, decreased hold, and slow relaxation   Specificity: patient contracts: gluteals and hip adductors    Coordination: tends to hold breath during PFM contration         RECTAL PELVIC FLOOR EXAM    EXTERNAL ASSESSMENT 5-30-24  Anus: WNL  Skin condition: WNL   Scarring: none  Sensation: WNL   Pain:  none  Voluntary contraction:  decreased accessory muscle(s) activation of gluteals. No visible contraction of the pelvic floor muscle(s)          INTERNAL ASSESSMENT  EAS tone: hypotonic but improved from initial assessment    Impaction: none   Pain: none  Sensation: able to localize pressure appropriately   Muscle Bulk: atrophy   Muscle Power: flicker today (improved from last visit)  Coordination: tends to hold breath during PFM contration   Comments:     Treatment   Adelaida received the treatments listed below:    Pt verbally consents to intrarectal treatment today.  Signed consent form already on file.  Chaperone declined today.    Adelaida received the following manual therapy techniques: to develop flexibility and extensibility for 00 minutes including:   [] trigger point/myofascial release of abdominal wall muscle(s) globally  [] Bowel massage performed to help with gut motility. Pt edu in self-bowel massage technique to help with gut motility needed to have a comfortable BM.   [] Pelvic floor muscle(s) intravaginal myofascial release       Adelaida participated in neuromuscular re-education activities to develop Coordination and Control for 17 minutes including:   [] Kegel edu and practice.  Increased time spent on edu on proper technique.  Adelaida is not able to activate her pelvic floor muscle(s).  Used tapping rectally over muscle bellies to try to elicit a contraction with kegels. Performed intrarectally today   [x] Transverse abdominus muscle  +Kegel+Marching 5 sec x10 reps. With red theraband   [x] Posterior pelvic tilt 5 sec x 10 reps and then posterior pelvic tilt with kegel 5 sec x 10 reps  [] Hip adduction with ball squeeze and kegel 5 sec x 20 reps  [x]  Posterior pelvic tilt with kegel and bridge 5 sec x 10 reps and red theraband for abduction   Cues needed for correct TA activation and coordination with kegel.    [] RUSI for breathing, drops, contractions of the PFM to help pt visualize PFM motion and function using transperineal US view for external anal sphincter and pelvic floor muscle(s) activation   [] RUSI for breathing, drops, contractions of the PFM to help pt visualize PFM motion and function using transabdominal ultrasound   [] Rehabilitative ultrasound imaging for transverse abdominus muscle activation and rectus abdominus activation with lower extremity and upper extremity motion.    []Rehabilitative ultrasound imaging for dan training (pre-activation of pelvic floor muscle(s)        Adelaida participated in dynamic functional therapeutic activities to improve functional performance for 23 minutes, including:  [x] Plan of care review  [x] Anatomy review and discussion of the anatomy on current level of function.  Review of pelvic floor muscle(s) function and activation   [] Patient was instructed in and practice diaphragmatic breathing as a method to help control pain, decrease anxiety and help w/ sleep. Pt was provided w / handout w/ instructions for practice. Practiced  with mod verbal and tactile instruction.  Mirror was also used to help Adelaida visualize paradoxical breathing patterns   [x] Pt edu in the Roll for Control technique to decrease incontinence with strong urge.    [x] Home exercise program reviewed   [] Encouraged Adelaida to use tactile cues over anus to help activate the muscles at home   [x] Sit to stand with attempting to perform a kegel x 10 reps   [x] Wall squats with physioball with kegel x 10 reps  [x] Wall pushups  "with kegel x 10 reps     Home Exercises Provided and Patient Education Provided     Education provided: See above  Discussed progression of plan of care with patient; educated pt in activity modification; reviewed HEP with pt. Pt demonstrated and verbalized understanding of all instruction and was provided with a handout of HEP (see Patient Instructions).    Written Home Exercises Provided: yes.  Exercises were reviewed and Adelaida was able to demonstrate them prior to the end of the session.  Adelaida demonstrated good  understanding of the education provided.     See EMR under Patient Instructions for exercises provided 3/14/2024.    ASSESSMENT  Worked on kegel technique today using intrarectal feedback for muscle(s) activation.  Improving ability to contract pelvic floor muscle(s) noted today compared to the last rectal assessment and she is more consistently able to find and contract her muscles today.  Continued hip and core strengthening today. Pt will continue to benefit from skilled outpatient physical therapy to address the deficits listed in the problem list box on initial evaluation, provide pt/family education and to maximize pt's level of independence in the home and community environment.       Adelaida Is progressing well towards her goals.   Pt prognosis is Good.     Pt will continue to benefit from skilled outpatient physical therapy to address the deficits listed in the problem list box on initial evaluation, provide pt/family education and to maximize pt's level of independence in the home and community environment.     Pt's spiritual, cultural and educational needs considered and pt agreeable to plan of care and goals.     Anticipated barriers to physical therapy: none    Goals:   Short Term Goals: 4 weeks   Pt to perform "the knack" prior to coughing, laughing or sneezing to decrease risk of incontinence. Met   Pt to demonstrate being able to correctly and consistently perform a kegel which is needed " for continence. Met   Pt to report a 40% increase in confidence to participate in social activities due to improving bowel function. Met   Pt to report a decrease in episodes of bladder incontinence to no more than 1x/week to demonstrate improving pelvic floor muscle strength and coordination. Met   Pt to demonstrate an improved score in the FOTO bowel leakage survey  to at least 57 to demonstrate improving pelvic floor muscle(s) function needed for continence.   Met         Long Term Goals: 12 weeks ,   Pt to be discharged with home plan for carry over after discharge.    Pt to report being able to delay the urge to have a BM at least 10 minutes when out in public to decrease episodes of incontinence while looking for a bathroom or waiting in line at the bathroom. Met   Pt to report a decrease in episodes of FI to no more than 1x every 2 weeks to improve confidence needed to participate in social outings.   Pt to report an 80% reduction of fecal incontinence symptoms with ADL participation thereby demonstrating improved pelvic floor muscle control and strength.   Pt to demonstrate an improved score in the FOTO Bowel Leakage survey  to at least 61 to demonstrate improving pelvic floor muscle(s) function needed for continence.              Plan of care Certification: 6-17-24 to 9-9-24     Outpatient Physical Therapy 1-2 times weekly for 12 weeks to include the following interventions: therapeutic exercises, therapeutic activity, neuromuscular re-education, gait training, manual therapy, modalities PRN, patient/family education, and self care/home management, and dry needling.     Plan     Continue with established Plan of Care, working toward established PT goals.      At next visit: intrarectal work to help facilitate kegel.  Hip and abdominal wall muscle(s).     Catie Martinez, PT

## 2024-06-18 DIAGNOSIS — F41.1 GAD (GENERALIZED ANXIETY DISORDER): ICD-10-CM

## 2024-06-18 RX ORDER — HYDROXYZINE HYDROCHLORIDE 10 MG/1
10 TABLET, FILM COATED ORAL 3 TIMES DAILY PRN
Qty: 60 TABLET | Refills: 2 | Status: SHIPPED | OUTPATIENT
Start: 2024-06-18

## 2024-06-18 RX ORDER — HYDROXYZINE HYDROCHLORIDE 10 MG/1
10 TABLET, FILM COATED ORAL 3 TIMES DAILY PRN
Qty: 60 TABLET | Refills: 2 | Status: SHIPPED | OUTPATIENT
Start: 2024-06-18 | End: 2024-06-18 | Stop reason: SDUPTHER

## 2024-06-21 ENCOUNTER — OFFICE VISIT (OUTPATIENT)
Dept: PRIMARY CARE CLINIC | Facility: CLINIC | Age: 69
End: 2024-06-21
Payer: MEDICARE

## 2024-06-21 ENCOUNTER — PATIENT MESSAGE (OUTPATIENT)
Dept: REHABILITATION | Facility: HOSPITAL | Age: 69
End: 2024-06-21
Payer: MEDICARE

## 2024-06-21 VITALS
SYSTOLIC BLOOD PRESSURE: 114 MMHG | DIASTOLIC BLOOD PRESSURE: 70 MMHG | TEMPERATURE: 98 F | BODY MASS INDEX: 18.8 KG/M2 | OXYGEN SATURATION: 98 % | HEIGHT: 65 IN

## 2024-06-21 DIAGNOSIS — D69.2 SENILE PURPURA: ICD-10-CM
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
## 2024-06-21 DIAGNOSIS — E78.2 MIXED HYPERLIPIDEMIA: ICD-10-CM

## 2024-06-21 PROCEDURE — 99999 PR PBB SHADOW E&M-EST. PATIENT-LVL III: CPT | Mod: PBBFAC,,, | Performed by: INTERNAL MEDICINE
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
## 2024-06-24 ENCOUNTER — OFFICE VISIT (OUTPATIENT)
Dept: URGENT CARE | Facility: CLINIC | Age: 69
End: 2024-06-24
Payer: OTHER MISCELLANEOUS

## 2024-06-24 VITALS
TEMPERATURE: 98 F | DIASTOLIC BLOOD PRESSURE: 59 MMHG | HEART RATE: 70 BPM | HEIGHT: 65 IN | WEIGHT: 113 LBS | BODY MASS INDEX: 18.83 KG/M2 | SYSTOLIC BLOOD PRESSURE: 103 MMHG | RESPIRATION RATE: 18 BRPM | OXYGEN SATURATION: 98 %
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
## 2024-06-24 DIAGNOSIS — S46.911D SHOULDER STRAIN, RIGHT, SUBSEQUENT ENCOUNTER: Primary | ICD-10-CM

## 2024-06-24 DIAGNOSIS — Z02.6 ENCOUNTER RELATED TO WORKER'S COMPENSATION CLAIM: ICD-10-CM

## 2024-06-24 DIAGNOSIS — S86.912D STRAIN OF LEFT KNEE, SUBSEQUENT ENCOUNTER: ICD-10-CM

## 2024-06-24 PROCEDURE — 99215 OFFICE O/P EST HI 40 MIN: CPT | Mod: S$GLB,,, | Performed by: STUDENT IN AN ORGANIZED HEALTH CARE EDUCATION/TRAINING PROGRAM

## 2024-06-24 PROCEDURE — 73030 X-RAY EXAM OF SHOULDER: CPT | Mod: FY,RT,S$GLB, | Performed by: RADIOLOGY

## 2024-06-24 PROCEDURE — 73562 X-RAY EXAM OF KNEE 3: CPT | Mod: FY,LT,S$GLB, | Performed by: RADIOLOGY
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
## 2024-06-28 ENCOUNTER — TELEPHONE (OUTPATIENT)
Dept: URGENT CARE | Facility: CLINIC | Age: 69
End: 2024-06-28
Payer: MEDICARE

## 2024-06-28 NOTE — TELEPHONE ENCOUNTER
Pt called to taisha a follow up with Dr. Samuel.  Unable to do so as the July provider schedule is not completed.

## 2024-07-01 ENCOUNTER — CLINICAL SUPPORT (OUTPATIENT)
Dept: REHABILITATION | Facility: HOSPITAL | Age: 69
End: 2024-07-01
Payer: MEDICARE

## 2024-07-01 DIAGNOSIS — M62.89 PELVIC FLOOR DYSFUNCTION: Primary | ICD-10-CM

## 2024-07-01 DIAGNOSIS — R53.1 WEAKNESS: ICD-10-CM

## 2024-07-01 PROCEDURE — 97112 NEUROMUSCULAR REEDUCATION: CPT

## 2024-07-01 PROCEDURE — 97530 THERAPEUTIC ACTIVITIES: CPT

## 2024-07-01 NOTE — PROGRESS NOTES
"  [facility]   Physical Therapy Treatment and Discharge Note      Name: Adelaida Flores  Clinic Number: [id number]    Therapy Diagnosis:   Encounter Diagnoses   Name Primary?    Pelvic floor dysfunction Yes    Weakness        Physician: Mary Calderon NP    Visit Date: 7/1/2024    Physician Orders: PT Eval and Treat   Medical Diagnosis from Referral: fecal incontinence   Evaluation Date: 3/11/2024  Authorization Period Expiration: 12-31-24  Plan of Care Expiration: 9-9-24  Progress Note Due: 7-17-24  Visit # / Visits authorized: 12/20   FOTO: 3/3      Time In: 850  Time Out: 928  Total Billable Time: 38 minutes    Precautions: Standard, Fall, and osteoporosis       Subjective     Pt reports: Adelaida "I have definitely made an improvement but I've had a little set back."  Requests to place therapy on hold while she works on orthopedic therapy for knee and shoulder pain.      She was compliant with home exercise program.  Response to previous treatment: no issues reported   Functional change: no changes reported    Pain: 0/10  Location: pelvic or back     Constitutional Symptoms Review: The patient denies having any constitutional symptoms.       Objective      Objective Measures updated at progress report unless specified.     See EMR under MEDIA for written consent provided 7/1/2024  Chaperone: declined         VAGINAL PELVIC FLOOR EXAM    EXTERNAL ASSESSMENT 5-6-24  Introitus: WNL  Skin condition: clitoral saxena not observable nor labia minora   Scarring: none   Sensation: WNL   Pain:  none  Voluntary contraction: accessory muscle use  Involuntary contraction: bulge  Bearing down: reflex tightening  Perineal descent: absent      INTERNAL ASSESSMENT  Pain: none   Sensation: able to localized pressure appropriately   Vaginal vault: WNL   Muscle Bulk: slight increase in resting tension    Muscle Power: 1+/5 but starting to improve with transverse abdominus muscle co-contraction     Quality of " contraction: slow rise, decreased hold, and slow relaxation   Specificity: patient contracts: gluteals and hip adductors    Coordination: tends to hold breath during PFM contration         RECTAL PELVIC FLOOR EXAM    EXTERNAL ASSESSMENT 5-30-24  Anus: WNL  Skin condition: WNL   Scarring: none  Sensation: WNL   Pain:  none  Voluntary contraction:  decreased accessory muscle(s) activation of gluteals. No visible contraction of the pelvic floor muscle(s)          INTERNAL ASSESSMENT  EAS tone: hypotonic but improved from initial assessment    Impaction: none   Pain: none  Sensation: able to localize pressure appropriately   Muscle Bulk: atrophy   Muscle Power: flicker today (improved from last visit)  Coordination: tends to hold breath during PFM contration   Comments:     Treatment   Adelaida received the treatments listed below:    Adelaida participated in neuromuscular re-education activities to develop Coordination and Control for 28 minutes including:    [x] Transverse abdominus muscle +Kegel+Marching 5 sec x10 reps. With red theraband   [x] Posterior pelvic tilt 5 sec x 10 reps and then posterior pelvic tilt with kegel 5 sec x 10 reps  [x] Hip adduction with ball squeeze and kegel 5 sec x 20 reps  [x]  Posterior pelvic tilt with kegel and bridge 5 sec x 20 reps and red theraband for abduction   Cues needed for correct TA activation and coordination with kegel.        Adelaida participated in dynamic functional therapeutic activities to improve functional performance for 10 minutes, including:  [x] Discharge planning   [x] Anatomy review and discussion of the anatomy on current level of function.      [x] Home exercise program reviewed       Home Exercises Provided and Patient Education Provided     Education provided: See above  Discussed progression of plan of care with patient; educated pt in activity modification; reviewed HEP with pt. Pt demonstrated and verbalized understanding of all instruction and was provided  "with a handout of HEP (see Patient Instructions).    Written Home Exercises Provided: yes.  Exercises were reviewed and Adelaida was able to demonstrate them prior to the end of the session.  Adelaida demonstrated good  understanding of the education provided.     See EMR under Patient Instructions for exercises provided 3/14/2024.    ASSESSMENT    Adelaida has made good progress with participation in the POC towards reduction of fecal incontinence and urinary incontinence with activities of daily living.  She has met the majority of her goals and is independent with her home program.  At this time Adelaida no longer requires skilled intervention and is appropriate for discharge.       Discharge reason: Patient has reached the maximum rehab potential for the present time    Discharge FOTO Score:       Goals:   Short Term Goals: 4 weeks   Pt to perform "the knack" prior to coughing, laughing or sneezing to decrease risk of incontinence. Met   Pt to demonstrate being able to correctly and consistently perform a kegel which is needed for continence. Met   Pt to report a 40% increase in confidence to participate in social activities due to improving bowel function. Met   Pt to report a decrease in episodes of bladder incontinence to no more than 1x/week to demonstrate improving pelvic floor muscle strength and coordination. Met   Pt to demonstrate an improved score in the FOTO bowel leakage survey  to at least 57 to demonstrate improving pelvic floor muscle(s) function needed for continence.   Met         Long Term Goals: 12 weeks ,   Pt to be discharged with home plan for carry over after discharge.  Met   Pt to report being able to delay the urge to have a BM at least 10 minutes when out in public to decrease episodes of incontinence while looking for a bathroom or waiting in line at the bathroom. Met   Pt to report a decrease in episodes of FI to no more than 1x every 2 weeks to improve confidence needed to participate in " social outings. Partially Met   Pt to report an 80% reduction of fecal incontinence symptoms with ADL participation thereby demonstrating improved pelvic floor muscle control and strength. Met   Pt to demonstrate an improved score in the FOTO Bowel Leakage survey  to at least 61 to demonstrate improving pelvic floor muscle(s) function needed for continence.   Not Met            Plan     Discharge from physical therapy     Catie Martinez, PT

## 2024-07-04 ENCOUNTER — PATIENT MESSAGE (OUTPATIENT)
Dept: HEPATOLOGY | Facility: CLINIC | Age: 69
End: 2024-07-04
Payer: MEDICARE

## 2024-07-08 ENCOUNTER — OFFICE VISIT (OUTPATIENT)
Dept: URGENT CARE | Facility: CLINIC | Age: 69
End: 2024-07-08
Payer: OTHER MISCELLANEOUS

## 2024-07-08 VITALS
OXYGEN SATURATION: 97 % | RESPIRATION RATE: 17 BRPM | DIASTOLIC BLOOD PRESSURE: 65 MMHG | WEIGHT: 113 LBS | BODY MASS INDEX: 18.83 KG/M2 | TEMPERATURE: 98 F | HEART RATE: 63 BPM | HEIGHT: 65 IN | SYSTOLIC BLOOD PRESSURE: 102 MMHG

## 2024-07-08 DIAGNOSIS — Z02.6 ENCOUNTER RELATED TO WORKER'S COMPENSATION CLAIM: ICD-10-CM

## 2024-07-08 DIAGNOSIS — S86.912D KNEE STRAIN, LEFT, SUBSEQUENT ENCOUNTER: ICD-10-CM

## 2024-07-08 DIAGNOSIS — S46.911D RIGHT SHOULDER STRAIN, SUBSEQUENT ENCOUNTER: Primary | ICD-10-CM
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
## 2024-07-08 PROCEDURE — 99213 OFFICE O/P EST LOW 20 MIN: CPT | Mod: S$GLB,,, | Performed by: PHYSICIAN ASSISTANT
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
## 2024-07-09 ENCOUNTER — TELEPHONE (OUTPATIENT)
Dept: URGENT CARE | Facility: CLINIC | Age: 69
End: 2024-07-09
Payer: MEDICARE

## 2024-07-09 NOTE — TELEPHONE ENCOUNTER
Called to inform patient that the physical therapy referral was denied by worker's comp.  No answer.

## 2024-07-14 ENCOUNTER — PATIENT MESSAGE (OUTPATIENT)
Dept: PRIMARY CARE CLINIC | Facility: CLINIC | Age: 69
End: 2024-07-14
Payer: MEDICARE

## 2024-07-15 ENCOUNTER — LAB VISIT (OUTPATIENT)
Dept: LAB | Facility: HOSPITAL | Age: 69
End: 2024-07-15
Attending: INTERNAL MEDICINE
Payer: MEDICARE
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
## 2024-07-15 DIAGNOSIS — E78.2 MIXED HYPERLIPIDEMIA: ICD-10-CM

## 2024-07-15 LAB
ALBUMIN SERPL BCP-MCNC: 3.9 G/DL (ref 3.5–5.2)
ALP SERPL-CCNC: 59 U/L (ref 55–135)
ALT SERPL W/O P-5'-P-CCNC: 28 U/L (ref 10–44)
ANION GAP SERPL CALC-SCNC: 8 MMOL/L (ref 8–16)
AST SERPL-CCNC: 26 U/L (ref 10–40)
BASOPHILS # BLD AUTO: 0.02 K/UL (ref 0–0.2)
BASOPHILS NFR BLD: 0.3 % (ref 0–1.9)
BILIRUB SERPL-MCNC: 0.3 MG/DL (ref 0.1–1)
BUN SERPL-MCNC: 18 MG/DL (ref 8–23)
CALCIUM SERPL-MCNC: 9.7 MG/DL (ref 8.7–10.5)
CHLORIDE SERPL-SCNC: 99 MMOL/L (ref 95–110)
CO2 SERPL-SCNC: 27 MMOL/L (ref 23–29)
CREAT SERPL-MCNC: 0.7 MG/DL (ref 0.5–1.4)
DIFFERENTIAL METHOD BLD: ABNORMAL
EOSINOPHIL # BLD AUTO: 0.2 K/UL (ref 0–0.5)
EOSINOPHIL NFR BLD: 2.5 % (ref 0–8)
ERYTHROCYTE [DISTWIDTH] IN BLOOD BY AUTOMATED COUNT: 14.6 % (ref 11.5–14.5)
EST. GFR  (NO RACE VARIABLE): >60 ML/MIN/1.73 M^2
GLUCOSE SERPL-MCNC: 83 MG/DL (ref 70–110)
HBV CORE AB SERPL QL IA: NORMAL
HBV SURFACE AG SERPL QL IA: NORMAL
HCT VFR BLD AUTO: 37.7 % (ref 37–48.5)
HGB BLD-MCNC: 11.9 G/DL (ref 12–16)
IMM GRANULOCYTES # BLD AUTO: 0.02 K/UL (ref 0–0.04)
IMM GRANULOCYTES NFR BLD AUTO: 0.3 % (ref 0–0.5)
LYMPHOCYTES # BLD AUTO: 1.6 K/UL (ref 1–4.8)
LYMPHOCYTES NFR BLD: 27.4 % (ref 18–48)
MCH RBC QN AUTO: 28.7 PG (ref 27–31)
MCHC RBC AUTO-ENTMCNC: 31.6 G/DL (ref 32–36)
MCV RBC AUTO: 91 FL (ref 82–98)
MONOCYTES # BLD AUTO: 0.5 K/UL (ref 0.3–1)
MONOCYTES NFR BLD: 8.3 % (ref 4–15)
NEUTROPHILS # BLD AUTO: 3.6 K/UL (ref 1.8–7.7)
NEUTROPHILS NFR BLD: 61.2 % (ref 38–73)
NRBC BLD-RTO: 0 /100 WBC
PLATELET # BLD AUTO: 270 K/UL (ref 150–450)
PMV BLD AUTO: 11.5 FL (ref 9.2–12.9)
POTASSIUM SERPL-SCNC: 4.2 MMOL/L (ref 3.5–5.1)
PROT SERPL-MCNC: 6.5 G/DL (ref 6–8.4)
RBC # BLD AUTO: 4.15 M/UL (ref 4–5.4)
SODIUM SERPL-SCNC: 134 MMOL/L (ref 136–145)
WBC # BLD AUTO: 5.91 K/UL (ref 3.9–12.7)

## 2024-07-15 PROCEDURE — 85025 COMPLETE CBC W/AUTO DIFF WBC: CPT | Performed by: INTERNAL MEDICINE

## 2024-07-15 PROCEDURE — 36415 COLL VENOUS BLD VENIPUNCTURE: CPT | Performed by: INTERNAL MEDICINE

## 2024-07-15 PROCEDURE — 80053 COMPREHEN METABOLIC PANEL: CPT | Performed by: INTERNAL MEDICINE

## 2024-07-15 PROCEDURE — 86704 HEP B CORE ANTIBODY TOTAL: CPT | Performed by: INTERNAL MEDICINE

## 2024-07-15 PROCEDURE — 87340 HEPATITIS B SURFACE AG IA: CPT | Performed by: INTERNAL MEDICINE

## 2024-07-19 ENCOUNTER — OFFICE VISIT (OUTPATIENT)
Dept: PRIMARY CARE CLINIC | Facility: CLINIC | Age: 69
End: 2024-07-19
Payer: MEDICARE

## 2024-07-19 VITALS
HEART RATE: 64 BPM | SYSTOLIC BLOOD PRESSURE: 124 MMHG | DIASTOLIC BLOOD PRESSURE: 84 MMHG | TEMPERATURE: 98 F | BODY MASS INDEX: 18.8 KG/M2 | HEIGHT: 65 IN

## 2024-07-19 DIAGNOSIS — M25.511 RIGHT SHOULDER PAIN, UNSPECIFIED CHRONICITY: ICD-10-CM

## 2024-07-19 DIAGNOSIS — M79.89 FINGER SWELLING: ICD-10-CM

## 2024-07-19 DIAGNOSIS — F41.1 GAD (GENERALIZED ANXIETY DISORDER): Primary | ICD-10-CM

## 2024-07-19 PROCEDURE — 1126F AMNT PAIN NOTED NONE PRSNT: CPT | Mod: CPTII,S$GLB,, | Performed by: INTERNAL MEDICINE

## 2024-07-19 PROCEDURE — 99214 OFFICE O/P EST MOD 30 MIN: CPT | Mod: S$GLB,,, | Performed by: INTERNAL MEDICINE

## 2024-07-19 PROCEDURE — 1101F PT FALLS ASSESS-DOCD LE1/YR: CPT | Mod: CPTII,S$GLB,, | Performed by: INTERNAL MEDICINE

## 2024-07-19 PROCEDURE — 3079F DIAST BP 80-89 MM HG: CPT | Mod: CPTII,S$GLB,, | Performed by: INTERNAL MEDICINE

## 2024-07-19 PROCEDURE — 1160F RVW MEDS BY RX/DR IN RCRD: CPT | Mod: CPTII,S$GLB,, | Performed by: INTERNAL MEDICINE

## 2024-07-19 PROCEDURE — 99999 PR PBB SHADOW E&M-EST. PATIENT-LVL IV: CPT | Mod: PBBFAC,,, | Performed by: INTERNAL MEDICINE

## 2024-07-19 PROCEDURE — 3288F FALL RISK ASSESSMENT DOCD: CPT | Mod: CPTII,S$GLB,, | Performed by: INTERNAL MEDICINE

## 2024-07-19 PROCEDURE — 1159F MED LIST DOCD IN RCRD: CPT | Mod: CPTII,S$GLB,, | Performed by: INTERNAL MEDICINE

## 2024-07-19 PROCEDURE — 3074F SYST BP LT 130 MM HG: CPT | Mod: CPTII,S$GLB,, | Performed by: INTERNAL MEDICINE

## 2024-07-19 PROCEDURE — 3008F BODY MASS INDEX DOCD: CPT | Mod: CPTII,S$GLB,, | Performed by: INTERNAL MEDICINE
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
## 2024-07-24 ENCOUNTER — OFFICE VISIT (OUTPATIENT)
Dept: PRIMARY CARE CLINIC | Facility: CLINIC | Age: 69
End: 2024-07-24
Payer: MEDICARE

## 2024-07-24 ENCOUNTER — PATIENT MESSAGE (OUTPATIENT)
Dept: PRIMARY CARE CLINIC | Facility: CLINIC | Age: 69
End: 2024-07-24
Payer: MEDICARE

## 2024-07-24 VITALS — DIASTOLIC BLOOD PRESSURE: 80 MMHG | OXYGEN SATURATION: 98 % | HEART RATE: 62 BPM | SYSTOLIC BLOOD PRESSURE: 136 MMHG
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
## 2024-07-24 DIAGNOSIS — S01.81XA LACERATION OF OTHER PART OF HEAD WITHOUT FOREIGN BODY, INITIAL ENCOUNTER: ICD-10-CM

## 2024-07-24 PROCEDURE — 1125F AMNT PAIN NOTED PAIN PRSNT: CPT | Mod: CPTII,S$GLB,, | Performed by: PHYSICIAN ASSISTANT

## 2024-07-24 PROCEDURE — 99213 OFFICE O/P EST LOW 20 MIN: CPT | Mod: S$GLB,,, | Performed by: PHYSICIAN ASSISTANT

## 2024-07-24 PROCEDURE — 1159F MED LIST DOCD IN RCRD: CPT | Mod: CPTII,S$GLB,, | Performed by: PHYSICIAN ASSISTANT

## 2024-07-24 PROCEDURE — 1100F PTFALLS ASSESS-DOCD GE2>/YR: CPT | Mod: CPTII,S$GLB,, | Performed by: PHYSICIAN ASSISTANT

## 2024-07-24 PROCEDURE — 3288F FALL RISK ASSESSMENT DOCD: CPT | Mod: CPTII,S$GLB,, | Performed by: PHYSICIAN ASSISTANT

## 2024-07-24 PROCEDURE — 3075F SYST BP GE 130 - 139MM HG: CPT | Mod: CPTII,S$GLB,, | Performed by: PHYSICIAN ASSISTANT

## 2024-07-24 PROCEDURE — 1160F RVW MEDS BY RX/DR IN RCRD: CPT | Mod: CPTII,S$GLB,, | Performed by: PHYSICIAN ASSISTANT

## 2024-07-24 PROCEDURE — 99999 PR PBB SHADOW E&M-EST. PATIENT-LVL III: CPT | Mod: PBBFAC,,, | Performed by: PHYSICIAN ASSISTANT

## 2024-07-24 PROCEDURE — 3079F DIAST BP 80-89 MM HG: CPT | Mod: CPTII,S$GLB,, | Performed by: PHYSICIAN ASSISTANT
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
## 2024-08-06 ENCOUNTER — PATIENT MESSAGE (OUTPATIENT)
Dept: ENDOCRINOLOGY | Facility: CLINIC | Age: 69
End: 2024-08-06
Payer: MEDICARE

## 2024-08-09 ENCOUNTER — PATIENT MESSAGE (OUTPATIENT)
Dept: PRIMARY CARE CLINIC | Facility: CLINIC | Age: 69
End: 2024-08-09
Payer: MEDICARE

## 2024-08-09 ENCOUNTER — TELEPHONE (OUTPATIENT)
Dept: PSYCHIATRY | Facility: CLINIC | Age: 69
End: 2024-08-09
Payer: MEDICARE

## 2024-08-09 ENCOUNTER — PATIENT MESSAGE (OUTPATIENT)
Dept: PSYCHIATRY | Facility: CLINIC | Age: 69
End: 2024-08-09
Payer: MEDICARE

## 2024-08-27 ENCOUNTER — OFFICE VISIT (OUTPATIENT)
Dept: PSYCHIATRY | Facility: CLINIC | Age: 69
End: 2024-08-27
Payer: MEDICARE

## 2024-08-27 ENCOUNTER — PATIENT MESSAGE (OUTPATIENT)
Dept: OTOLARYNGOLOGY | Facility: CLINIC | Age: 69
End: 2024-08-27
Payer: MEDICARE

## 2024-08-27 DIAGNOSIS — F33.1 MDD (MAJOR DEPRESSIVE DISORDER), RECURRENT EPISODE, MODERATE: Primary | ICD-10-CM

## 2024-08-27 DIAGNOSIS — F41.1 GAD (GENERALIZED ANXIETY DISORDER): ICD-10-CM

## 2024-08-27 DIAGNOSIS — R63.0 ANOREXIA: ICD-10-CM

## 2024-08-27 PROCEDURE — 1159F MED LIST DOCD IN RCRD: CPT | Mod: CPTII,95,, | Performed by: PHYSICIAN ASSISTANT

## 2024-08-27 PROCEDURE — 99214 OFFICE O/P EST MOD 30 MIN: CPT | Mod: 95,,, | Performed by: PHYSICIAN ASSISTANT

## 2024-08-27 PROCEDURE — G2211 COMPLEX E/M VISIT ADD ON: HCPCS | Mod: 95,,, | Performed by: PHYSICIAN ASSISTANT

## 2024-08-27 PROCEDURE — 1160F RVW MEDS BY RX/DR IN RCRD: CPT | Mod: CPTII,95,, | Performed by: PHYSICIAN ASSISTANT

## 2024-08-27 RX ORDER — HYDROXYZINE HYDROCHLORIDE 10 MG/1
10 TABLET, FILM COATED ORAL 2 TIMES DAILY PRN
Qty: 180 TABLET | Refills: 1 | Status: SHIPPED | OUTPATIENT
Start: 2024-08-27

## 2024-08-27 RX ORDER — FLUOXETINE HYDROCHLORIDE 40 MG/1
80 CAPSULE ORAL DAILY
Qty: 180 CAPSULE | Refills: 1 | Status: SHIPPED | OUTPATIENT
Start: 2024-08-27 | End: 2025-02-23

## 2024-08-27 RX ORDER — LEVOCETIRIZINE DIHYDROCHLORIDE 5 MG/1
5 TABLET, FILM COATED ORAL NIGHTLY
Qty: 90 TABLET | Refills: 3 | Status: SHIPPED | OUTPATIENT
Start: 2024-08-27 | End: 2025-08-27
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
## 2024-09-02 ENCOUNTER — PATIENT MESSAGE (OUTPATIENT)
Dept: HEPATOLOGY | Facility: CLINIC | Age: 69
End: 2024-09-02
Payer: MEDICARE

## 2024-09-02 DIAGNOSIS — R74.8 ELEVATED LIVER ENZYMES: Primary | ICD-10-CM

## 2024-09-03 ENCOUNTER — PATIENT MESSAGE (OUTPATIENT)
Dept: PSYCHIATRY | Facility: CLINIC | Age: 69
End: 2024-09-03
Payer: MEDICARE

## 2024-09-03 RX ORDER — LAMOTRIGINE 200 MG/1
200 TABLET ORAL DAILY
Qty: 90 TABLET | Refills: 1 | Status: SHIPPED | OUTPATIENT
Start: 2024-09-03 | End: 2025-03-02

## 2024-09-17 ENCOUNTER — OFFICE VISIT (OUTPATIENT)
Dept: HOME HEALTH SERVICES | Facility: CLINIC | Age: 69
End: 2024-09-17
Payer: MEDICARE

## 2024-09-17 DIAGNOSIS — F33.1 MODERATE EPISODE OF RECURRENT MAJOR DEPRESSIVE DISORDER: ICD-10-CM

## 2024-09-17 DIAGNOSIS — R26.9 ABNORMALITY OF GAIT AND MOBILITY: ICD-10-CM

## 2024-09-17 DIAGNOSIS — Z00.00 ENCOUNTER FOR PREVENTIVE HEALTH EXAMINATION: Primary | ICD-10-CM

## 2024-09-17 PROCEDURE — 1158F ADVNC CARE PLAN TLK DOCD: CPT | Mod: CPTII,95,, | Performed by: NURSE PRACTITIONER

## 2024-09-17 PROCEDURE — G0439 PPPS, SUBSEQ VISIT: HCPCS | Mod: 95,,, | Performed by: NURSE PRACTITIONER

## 2024-09-17 PROCEDURE — 1100F PTFALLS ASSESS-DOCD GE2>/YR: CPT | Mod: CPTII,95,, | Performed by: NURSE PRACTITIONER

## 2024-09-17 PROCEDURE — 1159F MED LIST DOCD IN RCRD: CPT | Mod: CPTII,95,, | Performed by: NURSE PRACTITIONER

## 2024-09-17 PROCEDURE — 1170F FXNL STATUS ASSESSED: CPT | Mod: CPTII,95,, | Performed by: NURSE PRACTITIONER

## 2024-09-17 PROCEDURE — 3288F FALL RISK ASSESSMENT DOCD: CPT | Mod: CPTII,95,, | Performed by: NURSE PRACTITIONER

## 2024-09-17 PROCEDURE — 1160F RVW MEDS BY RX/DR IN RCRD: CPT | Mod: CPTII,95,, | Performed by: NURSE PRACTITIONER

## 2024-09-17 NOTE — PROGRESS NOTES
The patient location is: [place]  The chief complaint leading to consultation is: awv    Visit type: audiovisual    Face to Face time with patient: 25  60 minutes of total time spent on the encounter, which includes face to face time and non-face to face time preparing to see the patient (eg, review of tests), Obtaining and/or reviewing separately obtained history, Documenting clinical information in the electronic or other health record, Independently interpreting results (not separately reported) and communicating results to the patient/family/caregiver, or Care coordination (not separately reported).         Each patient to whom he or she provides medical services by telemedicine is:  (1) informed of the relationship between the physician and patient and the respective role of any other health care provider with respect to management of the patient; and (2) notified that he or she may decline to receive medical services by telemedicine and may withdraw from such care at any time.    Notes:       Adelaida Flores presented for an initial Medicare AWV today. The following components were reviewed and updated:    Medical history  Family History  Social history  Allergies and Current Medications  Health Risk Assessment  Health Maintenance  Care Team    **See Completed Assessments for Annual Wellness visit with in the encounter summary    The following assessments were completed:  Depression Screening  Cognitive function Screening  Timed Get Up Test  Whisper Test      Opioid documentation:      Patient does not have a current opioid prescription.          There were no vitals filed for this visit.  There is no height or weight on file to calculate BMI.       Physical Exam  Constitutional:       Appearance: Normal appearance.   Neurological:      Mental Status: She is alert.   Psychiatric:         Attention and Perception: Attention and perception normal.         Mood and Affect: Mood and affect normal.         Speech:  Speech normal.         Behavior: Behavior normal. Behavior is cooperative.         Thought Content: Thought content normal.         Cognition and Memory: Cognition normal. She exhibits impaired recent memory.         Judgment: Judgment normal.           Diagnoses and health risks identified today and associated recommendations/orders:  1. Encounter for preventive health examination  Stable, followed by provider.    2. Abnormality of gait and mobility  Stable, has trouble ascending stairs due to knee pain.    3. Moderate episode of recurrent major depressive disorder  Stable, she is currently being treated for depression, denies suicidal ideation, takes prozac       Provided Adelaida with a 5-10 year written screening schedule and personal prevention plan. Recommendations were developed using the USPSTF age appropriate recommendations. Education, counseling, and referrals were provided as needed.  After Visit Summary printed and given to patient which includes a list of additional screenings\tests needed.    Follow up in about 1 year (around 9/17/2025) for your next annual wellness visit.      Eddi Spears, ABHINAV  I offered to discuss advanced care planning, including how to pick a person who would make decisions for you if you were unable to make them for yourself, called a health care power of , and what kind of decisions you might make such as use of life sustaining treatments such as ventilators and tube feeding when faced with a life limiting illness recorded on a living will that they will need to know. (How you want to be cared for as you near the end of your natural life)     X  Patient has advanced directives on file, which we reviewed, and they do not wish to make changes.

## 2024-09-17 NOTE — PATIENT INSTRUCTIONS
Counseling and Referral of Other Preventative  (Italic type indicates deductible and co-insurance are waived)    Patient Name: Adelaida Flores  Today's Date: 9/17/2024    Health Maintenance       Date Due Completion Date    Influenza Vaccine (1) 09/01/2024 8/31/2023    COVID-19 Vaccine (6 - 2023-24 season) 09/01/2024 10/16/2023    Mammogram 02/09/2025 2/9/2023    DEXA Scan 01/29/2026 1/29/2024    Colorectal Cancer Screening 09/26/2028 9/26/2023    Lipid Panel 03/05/2029 3/5/2024    TETANUS VACCINE 06/10/2034 6/10/2024    Override on 10/5/2009: Done        No orders of the defined types were placed in this encounter.    The following information is provided to all patients.  This information is to help you find resources for any of the problems found today that may be affecting your health:                  Living healthy guide: www.[facility].[place]      Understanding Diabetes: www.diabetes.org      Eating healthy: www.cdc.gov/healthyweight      [facility] home safety checklist: www.cdc.gov/steadi/patient.html      Agency on Aging: www.goea.[place].[facility]      Alcoholics anonymous (AA): www.aa.org      Physical Activity: www.nasreen.nih.gov/sz3qsxe      Tobacco use: www.quitwithusla.org

## 2024-09-23 ENCOUNTER — PATIENT MESSAGE (OUTPATIENT)
Dept: ENDOCRINOLOGY | Facility: CLINIC | Age: 69
End: 2024-09-23
Payer: MEDICARE

## 2024-10-02 ENCOUNTER — PATIENT MESSAGE (OUTPATIENT)
Dept: OTOLARYNGOLOGY | Facility: CLINIC | Age: 69
End: 2024-10-02
Payer: MEDICARE

## 2024-10-04 ENCOUNTER — TELEPHONE (OUTPATIENT)
Dept: ALLERGY | Facility: CLINIC | Age: 69
End: 2024-10-04
Payer: MEDICARE

## 2024-10-04 NOTE — TELEPHONE ENCOUNTER
Pts. Message was sent to the wrong department. Spoke with pt. Informed her to either call her Endo doctor orthe infusion center for further questions and answers.

## 2024-10-07 ENCOUNTER — OFFICE VISIT (OUTPATIENT)
Dept: OTOLARYNGOLOGY | Facility: CLINIC | Age: 69
End: 2024-10-07
Payer: MEDICARE

## 2024-10-07 VITALS — HEART RATE: 64 BPM | SYSTOLIC BLOOD PRESSURE: 99 MMHG | DIASTOLIC BLOOD PRESSURE: 67 MMHG

## 2024-10-07 DIAGNOSIS — H69.93 EUSTACHIAN TUBE DYSFUNCTION, BILATERAL: Chronic | ICD-10-CM

## 2024-10-07 DIAGNOSIS — J30.89 NON-SEASONAL ALLERGIC RHINITIS, UNSPECIFIED TRIGGER: Primary | Chronic | ICD-10-CM

## 2024-10-07 DIAGNOSIS — J32.8 OTHER CHRONIC SINUSITIS: Chronic | ICD-10-CM

## 2024-10-07 PROCEDURE — 99214 OFFICE O/P EST MOD 30 MIN: CPT | Mod: 25,S$GLB,, | Performed by: OTOLARYNGOLOGY

## 2024-10-07 PROCEDURE — 3074F SYST BP LT 130 MM HG: CPT | Mod: CPTII,S$GLB,, | Performed by: OTOLARYNGOLOGY

## 2024-10-07 PROCEDURE — 3078F DIAST BP <80 MM HG: CPT | Mod: CPTII,S$GLB,, | Performed by: OTOLARYNGOLOGY

## 2024-10-07 PROCEDURE — 1101F PT FALLS ASSESS-DOCD LE1/YR: CPT | Mod: CPTII,S$GLB,, | Performed by: OTOLARYNGOLOGY

## 2024-10-07 PROCEDURE — 1159F MED LIST DOCD IN RCRD: CPT | Mod: CPTII,S$GLB,, | Performed by: OTOLARYNGOLOGY

## 2024-10-07 PROCEDURE — 96372 THER/PROPH/DIAG INJ SC/IM: CPT | Mod: S$GLB,,, | Performed by: OTOLARYNGOLOGY

## 2024-10-07 PROCEDURE — 1160F RVW MEDS BY RX/DR IN RCRD: CPT | Mod: CPTII,S$GLB,, | Performed by: OTOLARYNGOLOGY

## 2024-10-07 PROCEDURE — 99999 PR PBB SHADOW E&M-EST. PATIENT-LVL III: CPT | Mod: PBBFAC,,, | Performed by: OTOLARYNGOLOGY

## 2024-10-07 PROCEDURE — 1125F AMNT PAIN NOTED PAIN PRSNT: CPT | Mod: CPTII,S$GLB,, | Performed by: OTOLARYNGOLOGY

## 2024-10-07 PROCEDURE — 3288F FALL RISK ASSESSMENT DOCD: CPT | Mod: CPTII,S$GLB,, | Performed by: OTOLARYNGOLOGY

## 2024-10-07 RX ORDER — FLUTICASONE PROPIONATE 50 MCG
2 SPRAY, SUSPENSION (ML) NASAL 2 TIMES DAILY
Qty: 31.6 ML | Refills: 5 | Status: SHIPPED | OUTPATIENT
Start: 2024-10-07

## 2024-10-07 RX ORDER — AZELASTINE 1 MG/ML
1 SPRAY, METERED NASAL 2 TIMES DAILY
Qty: 30 ML | Refills: 3 | Status: SHIPPED | OUTPATIENT
Start: 2024-10-07 | End: 2025-10-07

## 2024-10-07 RX ORDER — METHYLPREDNISOLONE ACETATE 40 MG/ML
40 INJECTION, SUSPENSION INTRA-ARTICULAR; INTRALESIONAL; INTRAMUSCULAR; SOFT TISSUE
Status: COMPLETED | OUTPATIENT
Start: 2024-10-07 | End: 2024-10-07

## 2024-10-07 RX ADMIN — METHYLPREDNISOLONE ACETATE 40 MG: 40 INJECTION, SUSPENSION INTRA-ARTICULAR; INTRALESIONAL; INTRAMUSCULAR; SOFT TISSUE at 03:10

## 2024-10-07 NOTE — PROGRESS NOTES
Patient ID: Adelaida Flores is a 68 y.o. y.o. female    Chief Complaint:   Chief Complaint   Patient presents with    Sinusitis    Sore Throat     Pt stated that she got a sinus flare up last night     HPI 3/26/2024:  Patient referred to me by Nikki Dow NP(ENT) for chronic maxillary sinusitis noted on CT of temporal bones in 12/23.  She has been treated with doxycyline course. She was recently started on Augmentin course on 3/18/24 for sinusitis by primary care. She had severe bilateral maxillary pressure prior to starting antibiotics.  She has been on flonase and xyzal but had to stop xyzal due to dryness in the mouth/nose.  She also has had immunoCAP testing showing class 2 allergy to grass and dog danger, mild sensitivity to cat dander.       Interval HPI 10/7/2024:  Follow up visit. Reports that she has been feeling better but was outdoors yesterday and feels that she has had flair of her symptoms. She has had increased nasal congestion, sinus pressure, post-nasal drip. She has been using nasal saline rinses and Flonase.  She does still note intermittent bilateral ear fullness/pressure.       Review of Systems   Constitutional: Negative for fever, chills, fatigue and unexpected weight change.   HENT: Positive for ear blockage. Negative for hearing loss, nosebleeds, congestion, sore throat, facial swelling, rhinorrhea, sneezing, trouble swallowing, dental problem, voice change, postnasal drip, sinus pressure, tinnitus and ear discharge.    Eyes: Negative for redness, itching and visual disturbance.   Respiratory: Negative for cough, choking and wheezing.    Cardiovascular: Negative for chest pain and palpitations.   Gastrointestinal: Negative for abdominal pain.        No reflux.   Musculoskeletal: Negative for gait problem.   Skin: Negative for rash.   Neurological: Negative for dizziness, light-headedness and headaches.     Past Medical History:   Diagnosis Date    Alcohol dependence in sustained full  remission 03/21/2022    Allergy     Anxiety 02/22/2019    Broken hip     Colon polyps     Depression     GERD (gastroesophageal reflux disease)     Hyperlipidemia 02/22/2019    IBS (irritable bowel syndrome)     Osteoporosis     Recurrent upper respiratory infection (URI)     T12 compression fracture s/p corpectomy 07/13/2013    Volvulus     s/p colectomy     Past Surgical History:   Procedure Laterality Date    COLON SURGERY      due to volvulus    COLONOSCOPY N/A 8/28/2017    Procedure: COLONOSCOPY miralax;  Surgeon: Trey Haas Jr., MD;  Location: [facility] ENDO;  Service: Endoscopy;  Laterality: N/A;    COLONOSCOPY N/A 9/26/2023    Procedure: COLONOSCOPY;  Surgeon: Graeme Rasmussen MD;  Location: [facility] ENDOSCOPY;  Service: Endoscopy;  Laterality: N/A;    ESOPHAGOGASTRODUODENOSCOPY N/A 9/26/2023    Procedure: EGD (ESOPHAGOGASTRODUODENOSCOPY);  Surgeon: Graeme Rasmussen MD;  Location: [facility] ENDOSCOPY;  Service: Endoscopy;  Laterality: N/A;  ref by / kristie [facility] portal-RB    ESOPHAGOGASTRODUODENOSCOPY N/A 2/19/2024    Procedure: EGD (ESOPHAGOGASTRODUODENOSCOPY);  Surgeon: Alfredo Gaxiola MD;  Location: [facility] ENDOSCOPY;  Service: Endoscopy;  Laterality: N/A;  referral: DR. Rasmussen, krishan last rpocedure reporat Pt to f/u EGD in 12 weeks - esophagitis any GI, CV/ prep ins on portal - ERW  2/12- pc complete. DBM  2-16 left message with call back number did not read instructions.  [facility]    EYE SURGERY  2012    Cataract/torc    FRACTURE SURGERY      right broken wrist and had surgery    HERNIA REPAIR  2012    SMALL INTESTINE SURGERY  2011    volvulus x 2    SPINE SURGERY      due to fall while on a boat.    TONSILLECTOMY       Social History     Socioeconomic History    Marital status:    Occupational History    Occupation: Dietary Consultant      Employer: [employer]   Tobacco Use    Smoking status: Former     Current packs/day: 0.00     Average packs/day: 1 pack/day for 10.0 years (10.0 ttl  pk-yrs)     Types: Cigarettes     Start date: 1983     Quit date: 1993     Years since quittin.7     Passive exposure: Never    Smokeless tobacco: Never    Tobacco comments:     Quit 28 years ago   Substance and Sexual Activity    Alcohol use: No    Drug use: No    Sexual activity: Yes     Partners: Male     Birth control/protection: None   Social History Narrative    Got  to Kristopher Calderon in 2019.     Does some physical activity daily previously. But since getting  less active. Still does weights 3x/week.     Social Drivers of Health     Financial Resource Strain: Low Risk  (2024)    Overall Financial Resource Strain (CARDIA)     Difficulty of Paying Living Expenses: Not very hard   Recent Concern: Financial Resource Strain - Medium Risk (2023)    Overall Financial Resource Strain (CARDIA)     Difficulty of Paying Living Expenses: Somewhat hard   Food Insecurity: No Food Insecurity (2024)    Hunger Vital Sign     Worried About Running Out of Food in the Last Year: Never true     Ran Out of Food in the Last Year: Never true   Transportation Needs: No Transportation Needs (2024)    PRAPARE - Transportation     Lack of Transportation (Medical): No     Lack of Transportation (Non-Medical): No   Physical Activity: Insufficiently Active (2024)    Exercise Vital Sign     Days of Exercise per Week: 7 days     Minutes of Exercise per Session: 20 min   Stress: Stress Concern Present (2024)    [employer] - Occupational Stress Questionnaire     Feeling of Stress : Very much   Housing Stability: Low Risk  (2024)    Housing Stability Vital Sign     Unable to Pay for Housing in the Last Year: No     Number of Places Lived in the Last Year: 1     Unstable Housing in the Last Year: No     Family History   Problem Relation Name Age of Onset    Colon polyps Mother Mother     Cancer Mother Mother         skin    Osteoarthritis Mother Mother      Colon polyps Father Father     Cancer Father Father         prostate    Heart disease Father Father     Osteoarthritis Maternal Aunt      Osteoarthritis Maternal Grandmother      Cancer Sister Sister         skin    Asthma Brother Middle     Diabetes Neg Hx      Cirrhosis Neg Hx         Objective:      Vitals:    10/07/24 1428   BP: 99/67   Pulse: 64           Constitutional:   She appears well-developed and well-nourished.     Head:  Normocephalic and atraumatic. Salivary glands normal.  Facial strength is normal.      Ears:  Hearing normal to normal and whispered voice; external ear normal without scars, lesions, or masses; ear canal, tympanic membrane, and middle ear normal..     Nose:  Mucosal edema present. Turbinate hypertrophy.  Right sinus exhibits no maxillary sinus tenderness and no frontal sinus tenderness. Left sinus exhibits no maxillary sinus tenderness and no frontal sinus tenderness.     Mouth/Throat  Oropharynx clear and moist without lesions or asymmetry and normal uvula midline.     Neck:  Neck normal without thyromegaly masses, asymmetry, normal tracheal structure, crepitus, and tenderness.           CT sinus 4/8/2024: images interpreted by me and discussed with the patient in detail.   FINDINGS:  The frontal sinuses are clear bilaterally.     There is moderate opacification right posterior ethmoid air cells.  The left ethmoid air cells are clear     The sphenoid sinuses are clear bilaterally     There is small lobular opacities in the maxillary antra bilaterally suggestive for lobular mucosal thickening or mucous retention cyst this measures approximately 0.4 cm in thickness bilaterally     Bilateral maxillary septations with superimposed orbital ethmoid air cells narrow the infundibulum although the ostiomeatal units are patent bilaterally     Roof of the ethmoids relatively symmetric.  There is thinning and probable demineralization of the right lamina papyracea underlying the opacified  ethmoid air cells otherwise the lamina papyracea are grossly intact bilaterally     There is slight rightward deviation nasal septum.    CT Temporal Bones Without Contrast 12/26/2023      FINDINGS:  Right inner ear: Normal. Right ossicles and middle ear: Normal. The middle ear ossicles are intact. Right external auditory canal: Normal. Right facial nerve canal: Normal. Right jugular foramen: No jugular dehiscence. Right carotid canal: No aberrant carotid canal. Right mastoid air cells: Normal. No mastoid effusions. Left inner ear: Normal. Left ossicles and middle ear: Normal. The middle ear ossicles are intact. Left external auditory canal: Normal. Left facial nerve canal: Normal. Left jugular foramen: No jugular dehiscence. Left carotid canal: No aberrant carotid canal. Left mastoid air cells: Normal. No mastoid effusions. Paranasal sinuses: Mucosal thickening in the bilateral maxillary sinuses. Soft tissues: Unremarkable.     Impression:     1. No CT abnormalities of the temporal bones. 2. Mucosal thickening in the bilateral maxillary sinuses.    Assessment:         ICD-10-CM ICD-9-CM    1. Non-seasonal allergic rhinitis, unspecified trigger  J30.89 477.8       2. Other chronic sinusitis  J32.8 473.8       3. Eustachian tube dysfunction, bilateral  H69.93 381.81                 Plan:    Depomedrol IM injection given today  -start on nasal saline rinses bid  -continue on Flonase 2 sprays in each nostril daily  Continue xyzal 5mg PO nightly.     Follow up in 4-6 month for recheck.     China Figueroa MD

## 2024-10-08 ENCOUNTER — TELEPHONE (OUTPATIENT)
Dept: ENDOCRINOLOGY | Facility: CLINIC | Age: 69
End: 2024-10-08
Payer: MEDICARE

## 2024-10-08 NOTE — TELEPHONE ENCOUNTER
Called and spoke to patient, she stated she has contacted the infusion department at [facility] and is waiting on authorization.    ----- Message from Yammera sent at 10/8/2024 11:03 AM CDT -----  Regarding: Orders  Contact: [phone]  Who call ? Adelaida Flores     What is the request Details : Pt calling to speak with someone in provider office regarding orders for infusions. States orders was sent to wrong department. Would like to confirm if [facility] has an infusion center.  Please call pt back.       Can clinic  use patient portal  :Yes     What number to call back : [phone]

## 2024-10-16 ENCOUNTER — LAB VISIT (OUTPATIENT)
Dept: LAB | Facility: HOSPITAL | Age: 69
End: 2024-10-16
Attending: NURSE PRACTITIONER
Payer: MEDICARE

## 2024-10-16 DIAGNOSIS — R74.8 ELEVATED LIVER ENZYMES: ICD-10-CM

## 2024-10-16 LAB
ALBUMIN SERPL BCP-MCNC: 4 G/DL (ref 3.5–5.2)
ALP SERPL-CCNC: 89 U/L (ref 55–135)
ALT SERPL W/O P-5'-P-CCNC: 34 U/L (ref 10–44)
AST SERPL-CCNC: 33 U/L (ref 10–40)
BILIRUB DIRECT SERPL-MCNC: 0.1 MG/DL (ref 0.1–0.3)
BILIRUB SERPL-MCNC: 0.3 MG/DL (ref 0.1–1)
PROT SERPL-MCNC: 6.8 G/DL (ref 6–8.4)

## 2024-10-16 PROCEDURE — 80076 HEPATIC FUNCTION PANEL: CPT | Performed by: NURSE PRACTITIONER

## 2024-10-16 PROCEDURE — 36415 COLL VENOUS BLD VENIPUNCTURE: CPT | Performed by: NURSE PRACTITIONER

## 2024-10-23 ENCOUNTER — PATIENT MESSAGE (OUTPATIENT)
Dept: OTOLARYNGOLOGY | Facility: CLINIC | Age: 69
End: 2024-10-23
Payer: MEDICARE

## 2024-11-05 ENCOUNTER — PATIENT MESSAGE (OUTPATIENT)
Dept: PRIMARY CARE CLINIC | Facility: CLINIC | Age: 69
End: 2024-11-05
Payer: MEDICARE

## 2024-11-11 ENCOUNTER — OFFICE VISIT (OUTPATIENT)
Dept: PRIMARY CARE CLINIC | Facility: CLINIC | Age: 69
End: 2024-11-11
Payer: MEDICARE

## 2024-11-11 ENCOUNTER — LAB VISIT (OUTPATIENT)
Dept: LAB | Facility: HOSPITAL | Age: 69
End: 2024-11-11
Attending: INTERNAL MEDICINE
Payer: MEDICARE

## 2024-11-11 VITALS
BODY MASS INDEX: 18.8 KG/M2 | SYSTOLIC BLOOD PRESSURE: 102 MMHG | HEIGHT: 65 IN | DIASTOLIC BLOOD PRESSURE: 72 MMHG | HEART RATE: 57 BPM | OXYGEN SATURATION: 100 % | TEMPERATURE: 99 F

## 2024-11-11 DIAGNOSIS — F33.1 MODERATE EPISODE OF RECURRENT MAJOR DEPRESSIVE DISORDER: ICD-10-CM

## 2024-11-11 DIAGNOSIS — F41.1 GAD (GENERALIZED ANXIETY DISORDER): ICD-10-CM

## 2024-11-11 DIAGNOSIS — Z12.31 ENCOUNTER FOR SCREENING MAMMOGRAM FOR MALIGNANT NEOPLASM OF BREAST: ICD-10-CM

## 2024-11-11 DIAGNOSIS — F33.1 MODERATE EPISODE OF RECURRENT MAJOR DEPRESSIVE DISORDER: Primary | ICD-10-CM

## 2024-11-11 DIAGNOSIS — M81.0 SENILE OSTEOPOROSIS: ICD-10-CM

## 2024-11-11 DIAGNOSIS — E46 PROTEIN-CALORIE MALNUTRITION, UNSPECIFIED SEVERITY: ICD-10-CM

## 2024-11-11 DIAGNOSIS — F10.21 ALCOHOL DEPENDENCE IN SUSTAINED FULL REMISSION: ICD-10-CM

## 2024-11-11 DIAGNOSIS — R61 NIGHT SWEATS: ICD-10-CM

## 2024-11-11 DIAGNOSIS — F50.82 AVOIDANT-RESTRICTIVE FOOD INTAKE DISORDER (ARFID): ICD-10-CM

## 2024-11-11 LAB
ALBUMIN SERPL BCP-MCNC: 4 G/DL (ref 3.5–5.2)
ALP SERPL-CCNC: 102 U/L (ref 40–150)
ALT SERPL W/O P-5'-P-CCNC: 22 U/L (ref 10–44)
ANION GAP SERPL CALC-SCNC: 8 MMOL/L (ref 8–16)
AST SERPL-CCNC: 25 U/L (ref 10–40)
BASOPHILS # BLD AUTO: 0.02 K/UL (ref 0–0.2)
BASOPHILS NFR BLD: 0.4 % (ref 0–1.9)
BILIRUB SERPL-MCNC: 0.3 MG/DL (ref 0.1–1)
BUN SERPL-MCNC: 11 MG/DL (ref 8–23)
CALCIUM SERPL-MCNC: 9.8 MG/DL (ref 8.7–10.5)
CHLORIDE SERPL-SCNC: 101 MMOL/L (ref 95–110)
CO2 SERPL-SCNC: 26 MMOL/L (ref 23–29)
CREAT SERPL-MCNC: 0.7 MG/DL (ref 0.5–1.4)
DIFFERENTIAL METHOD BLD: ABNORMAL
EOSINOPHIL # BLD AUTO: 0.2 K/UL (ref 0–0.5)
EOSINOPHIL NFR BLD: 3 % (ref 0–8)
ERYTHROCYTE [DISTWIDTH] IN BLOOD BY AUTOMATED COUNT: 13.5 % (ref 11.5–14.5)
EST. GFR  (NO RACE VARIABLE): >60 ML/MIN/1.73 M^2
FERRITIN SERPL-MCNC: 62 NG/ML (ref 20–300)
GLUCOSE SERPL-MCNC: 81 MG/DL (ref 70–110)
HCT VFR BLD AUTO: 40.6 % (ref 37–48.5)
HGB BLD-MCNC: 12.8 G/DL (ref 12–16)
IMM GRANULOCYTES # BLD AUTO: 0.01 K/UL (ref 0–0.04)
IMM GRANULOCYTES NFR BLD AUTO: 0.2 % (ref 0–0.5)
IRON SERPL-MCNC: 39 UG/DL (ref 30–160)
LYMPHOCYTES # BLD AUTO: 1.7 K/UL (ref 1–4.8)
LYMPHOCYTES NFR BLD: 29.7 % (ref 18–48)
MCH RBC QN AUTO: 29.6 PG (ref 27–31)
MCHC RBC AUTO-ENTMCNC: 31.5 G/DL (ref 32–36)
MCV RBC AUTO: 94 FL (ref 82–98)
MONOCYTES # BLD AUTO: 0.5 K/UL (ref 0.3–1)
MONOCYTES NFR BLD: 8 % (ref 4–15)
NEUTROPHILS # BLD AUTO: 3.3 K/UL (ref 1.8–7.7)
NEUTROPHILS NFR BLD: 58.7 % (ref 38–73)
NRBC BLD-RTO: 0 /100 WBC
PLATELET # BLD AUTO: 334 K/UL (ref 150–450)
PMV BLD AUTO: 11 FL (ref 9.2–12.9)
POTASSIUM SERPL-SCNC: 5.3 MMOL/L (ref 3.5–5.1)
PREALB SERPL-MCNC: 20 MG/DL (ref 20–43)
PROT SERPL-MCNC: 7 G/DL (ref 6–8.4)
RBC # BLD AUTO: 4.32 M/UL (ref 4–5.4)
SATURATED IRON: 10 % (ref 20–50)
SODIUM SERPL-SCNC: 135 MMOL/L (ref 136–145)
TOTAL IRON BINDING CAPACITY: 385 UG/DL (ref 250–450)
TRANSFERRIN SERPL-MCNC: 260 MG/DL (ref 200–375)
TSH SERPL DL<=0.005 MIU/L-ACNC: 2.7 UIU/ML (ref 0.4–4)
WBC # BLD AUTO: 5.62 K/UL (ref 3.9–12.7)

## 2024-11-11 PROCEDURE — 84134 ASSAY OF PREALBUMIN: CPT | Performed by: INTERNAL MEDICINE

## 2024-11-11 PROCEDURE — 3078F DIAST BP <80 MM HG: CPT | Mod: CPTII,S$GLB,, | Performed by: INTERNAL MEDICINE

## 2024-11-11 PROCEDURE — 1125F AMNT PAIN NOTED PAIN PRSNT: CPT | Mod: CPTII,S$GLB,, | Performed by: INTERNAL MEDICINE

## 2024-11-11 PROCEDURE — 80053 COMPREHEN METABOLIC PANEL: CPT | Performed by: INTERNAL MEDICINE

## 2024-11-11 PROCEDURE — 1101F PT FALLS ASSESS-DOCD LE1/YR: CPT | Mod: CPTII,S$GLB,, | Performed by: INTERNAL MEDICINE

## 2024-11-11 PROCEDURE — 99999 PR PBB SHADOW E&M-EST. PATIENT-LVL IV: CPT | Mod: PBBFAC,,, | Performed by: INTERNAL MEDICINE

## 2024-11-11 PROCEDURE — 3288F FALL RISK ASSESSMENT DOCD: CPT | Mod: CPTII,S$GLB,, | Performed by: INTERNAL MEDICINE

## 2024-11-11 PROCEDURE — 82728 ASSAY OF FERRITIN: CPT | Performed by: INTERNAL MEDICINE

## 2024-11-11 PROCEDURE — 85025 COMPLETE CBC W/AUTO DIFF WBC: CPT | Performed by: INTERNAL MEDICINE

## 2024-11-11 PROCEDURE — 84443 ASSAY THYROID STIM HORMONE: CPT | Performed by: INTERNAL MEDICINE

## 2024-11-11 PROCEDURE — 1159F MED LIST DOCD IN RCRD: CPT | Mod: CPTII,S$GLB,, | Performed by: INTERNAL MEDICINE

## 2024-11-11 PROCEDURE — 1160F RVW MEDS BY RX/DR IN RCRD: CPT | Mod: CPTII,S$GLB,, | Performed by: INTERNAL MEDICINE

## 2024-11-11 PROCEDURE — 36415 COLL VENOUS BLD VENIPUNCTURE: CPT | Performed by: INTERNAL MEDICINE

## 2024-11-11 PROCEDURE — 99214 OFFICE O/P EST MOD 30 MIN: CPT | Mod: S$GLB,,, | Performed by: INTERNAL MEDICINE

## 2024-11-11 PROCEDURE — 3074F SYST BP LT 130 MM HG: CPT | Mod: CPTII,S$GLB,, | Performed by: INTERNAL MEDICINE

## 2024-11-11 PROCEDURE — 3008F BODY MASS INDEX DOCD: CPT | Mod: CPTII,S$GLB,, | Performed by: INTERNAL MEDICINE

## 2024-11-11 PROCEDURE — 84466 ASSAY OF TRANSFERRIN: CPT | Performed by: INTERNAL MEDICINE

## 2024-11-11 RX ORDER — LEVOCETIRIZINE DIHYDROCHLORIDE 5 MG/1
5 TABLET, FILM COATED ORAL NIGHTLY
Qty: 90 TABLET | Refills: 3 | Status: SHIPPED | OUTPATIENT
Start: 2024-11-11 | End: 2025-11-11

## 2024-11-11 NOTE — PROGRESS NOTES
Subjective:       Patient ID: Adelaida Flores is a 68 y.o. female.    Chief Complaint: Follow-up    HPI  Pt is well known to me.     Had work incident back in June 2024 where one of the residents at [facility] she was working with bit her back. Her hep b lab was abnormal and that caused a lot of anxiety. Repeated labs in a mo was negative. She does report R shoulder pain that's worsened as a result. Currently undergoing eval w/ ortho from  along w/ PT. Reports PT has helped w/ the shoulder pain but still present.   Since our last visit in July, she also saw FEMI Jalloh s/p fall from bed and hitting head on dresser.   Followed up w/ FEMI Munoz in psychiatry 8/27/24 for MDD/MISTY/anorexia. Continued on prozac 80mg daily, lamictal 150mg daily, atarax 10mg BID prn anxiety. F/u in 1 mo. She is also going through the program [facility] via a [facility] and followed w/ nutritionist and therapist who specifically work w/ eating disorders.   eAWV done 9/17/24  Saw Dr. Piero Figueroa 10/7/24 in ENT and s/p IM depomedrol. Started on nasal saline rinses BID and flonase 2 SEN qd and cont on xyzal 5mg qhs. F/u in 4-6 mo for recheck.   Last prealbumin was low at 13. Is now eating solids every day.     Osteoporosis - prolia q 6 mo. Last injection 10/16/24.  Drink lots of soy milk. Getting Ca from diet. Tries to remember getting extra vitamin D. Doing some weight bearing exercises at PT.   Last DEXA 1/29/24 - repeat in 2 yrs.    Reports chronic night sweats intermittently for months - about 1-2 nights a week where she's drenched in sweat.   Both fluoxetine and lamotrigine and mobic can all cause diaphoresis. Night sweats were much worse when she was taking meloxicam. She had to stop it due to vomiting. Since stopping meloxicam vomiting stopped and night sweats are better.   Reports diarrhea has improved since working w/ eating disorder nutritionist.   Her chest tightness/palpitations she attributes to worsened anxiety -  "neighbor reported her dog to animal protection. Had also been dealing w/ lawsuits. This is a known trigger for her eating disorder. Is working w/ therapist through [employer].    Using fluoromethalone 0.1% daily of B eyes. Follows w/ Dr. Pickens - outside ophthal - saw her last week.    Review of Systems   Constitutional:  Negative for activity change and unexpected weight change.   HENT:  Negative for hearing loss, rhinorrhea and trouble swallowing.    Eyes:  Positive for discharge. Negative for visual disturbance.   Respiratory:  Positive for chest tightness. Negative for shortness of breath and wheezing.    Cardiovascular:  Positive for palpitations. Negative for chest pain.   Gastrointestinal:  Positive for diarrhea. Negative for abdominal pain, blood in stool, constipation and vomiting.   Endocrine: Negative for polydipsia and polyuria.   Genitourinary:  Negative for difficulty urinating, dysuria, hematuria and menstrual problem.   Musculoskeletal:  Positive for arthralgias and neck pain. Negative for joint swelling.   Neurological:  Positive for headaches. Negative for weakness.   Psychiatric/Behavioral:  Positive for dysphoric mood. Negative for confusion.          Objective:      Physical Exam    /72   Pulse (!) 57   Temp 99.3 °F (37.4 °C) (Oral)   Ht 5' 5" (1.651 m)   SpO2 100%   BMI 18.80 kg/m²     GEN - A+OX4, NAD, thin.  HEENT - PERRL, EOMI, OP clear. MMM.   CV - RRR, no m/r   Chest - CTAB, no wheezing or rhonchi  Abd - S/NT/ND/+BS.   Ext - 1+BDP and radial pulses. No C/C/E.  MSK - unable to abduct R shoulder above 80 degrees. Normal gait.   Skin - RUE w/ small bruise and a scabbed scratch.     Previous labs reviewed.     Assessment/Plan     Adelaida was seen today for follow-up.    Diagnoses and all orders for this visit:    Moderate episode of recurrent major depressive disorder - lots of social stressors. F/u w/ psychiatry. Follows w/ [facility] for her eating d/o. Has a therapist and " nutritionist. Has been helpful. Will get medical release for progress notes and weights. Has plans to move away from toxic neighbor.  -     TSH; Future    MISTY (generalized anxiety disorder) - as above.   -     TSH; Future    Senile osteoporosis - will be due for Prolia in April.   -     Comprehensive Metabolic Panel; Future    Protein-calorie malnutrition, unspecified severity - as above.  -     CBC Auto Differential; Future  -     Comprehensive Metabolic Panel; Future  -     PREALBUMIN; Future  -     Ferritin; Future  -     Iron and TIBC; Future  -     TSH; Future    Night sweats - likely medicine related. Better off mobic.   -     TSH; Future    Encounter for screening mammogram for malignant neoplasm of breast  -     Mammo Digital Screening Bilat w/ Jc; Future    Alcohol dependence in sustained full remission - has been abstinent from alcohol for 20+ years! Great job!    Avoidant-restrictive food intake disorder (ARFID) - cont working w/ [employer] [facility]. She does not get weighed in clinic due to eating disorder. Gets weighed at home on a coded scale that's bluetoothed to her nutritionist w/ [facility].    Other orders  -     levocetirizine (XYZAL) 5 MG tablet; Take 1 tablet (5 mg total) by mouth every evening.        Follow up in about 4 months (around 3/11/2025). Pt prefers to f/u in 4 mo. Will call if any issues.       Catie Portillo MD  Department of Internal Medicine - [facility]  9:06 AM

## 2024-11-12 ENCOUNTER — TELEPHONE (OUTPATIENT)
Dept: PRIMARY CARE CLINIC | Facility: CLINIC | Age: 69
End: 2024-11-12
Payer: MEDICARE

## 2024-11-14 ENCOUNTER — PATIENT MESSAGE (OUTPATIENT)
Dept: PRIMARY CARE CLINIC | Facility: CLINIC | Age: 69
End: 2024-11-14
Payer: MEDICARE

## 2024-12-03 ENCOUNTER — OFFICE VISIT (OUTPATIENT)
Dept: PSYCHIATRY | Facility: CLINIC | Age: 69
End: 2024-12-03
Payer: COMMERCIAL

## 2024-12-03 DIAGNOSIS — F33.1 MDD (MAJOR DEPRESSIVE DISORDER), RECURRENT EPISODE, MODERATE: Primary | ICD-10-CM

## 2024-12-03 DIAGNOSIS — F10.21 ALCOHOL DEPENDENCE IN SUSTAINED FULL REMISSION: ICD-10-CM

## 2024-12-03 DIAGNOSIS — R63.0 ANOREXIA: ICD-10-CM

## 2024-12-03 DIAGNOSIS — F41.1 GAD (GENERALIZED ANXIETY DISORDER): ICD-10-CM

## 2024-12-03 RX ORDER — FLUOXETINE HYDROCHLORIDE 40 MG/1
80 CAPSULE ORAL DAILY
Qty: 180 CAPSULE | Refills: 1 | Status: SHIPPED | OUTPATIENT
Start: 2024-12-03 | End: 2025-06-01

## 2024-12-03 RX ORDER — HYDROXYZINE HYDROCHLORIDE 10 MG/1
10 TABLET, FILM COATED ORAL 2 TIMES DAILY PRN
Qty: 180 TABLET | Refills: 1 | Status: SHIPPED | OUTPATIENT
Start: 2024-12-03

## 2024-12-03 RX ORDER — LAMOTRIGINE 200 MG/1
200 TABLET ORAL DAILY
Qty: 90 TABLET | Refills: 1 | Status: SHIPPED | OUTPATIENT
Start: 2024-12-03 | End: 2025-06-01
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
## 2024-12-16 ENCOUNTER — PATIENT MESSAGE (OUTPATIENT)
Dept: PRIMARY CARE CLINIC | Facility: CLINIC | Age: 69
End: 2024-12-16
Payer: MEDICARE

## 2024-12-16 DIAGNOSIS — S41.159A: Primary | ICD-10-CM
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
## 2024-12-17 ENCOUNTER — LAB VISIT (OUTPATIENT)
Dept: LAB | Facility: HOSPITAL | Age: 69
End: 2024-12-17
Attending: INTERNAL MEDICINE
Payer: MEDICARE
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
## 2024-12-17 DIAGNOSIS — S41.159A: ICD-10-CM

## 2024-12-17 LAB
HBV CORE AB SERPL QL IA: NORMAL
HCV AB SERPL QL IA: NORMAL
HIV 1+2 AB+HIV1 P24 AG SERPL QL IA: NORMAL

## 2024-12-17 PROCEDURE — 87389 HIV-1 AG W/HIV-1&-2 AB AG IA: CPT | Performed by: INTERNAL MEDICINE

## 2024-12-17 PROCEDURE — 36415 COLL VENOUS BLD VENIPUNCTURE: CPT | Performed by: INTERNAL MEDICINE

## 2024-12-17 PROCEDURE — 86704 HEP B CORE ANTIBODY TOTAL: CPT | Performed by: INTERNAL MEDICINE

## 2024-12-17 PROCEDURE — 86803 HEPATITIS C AB TEST: CPT | Performed by: INTERNAL MEDICINE

## 2025-01-15 ENCOUNTER — HOSPITAL ENCOUNTER (OUTPATIENT)
Dept: RADIOLOGY | Facility: HOSPITAL | Age: 70
Discharge: HOME | End: 2025-01-15
Attending: INTERNAL MEDICINE
Payer: MEDICARE

## 2025-01-15 VITALS — HEIGHT: 65 IN | WEIGHT: 113 LBS | BODY MASS INDEX: 18.83 KG/M2

## 2025-01-15 DIAGNOSIS — Z12.31 ENCOUNTER FOR SCREENING MAMMOGRAM FOR MALIGNANT NEOPLASM OF BREAST: ICD-10-CM

## 2025-01-15 PROCEDURE — 77063 BREAST TOMOSYNTHESIS BI: CPT | Mod: TC

## 2025-01-15 PROCEDURE — 77067 SCR MAMMO BI INCL CAD: CPT | Mod: 26,,, | Performed by: RADIOLOGY

## 2025-01-15 PROCEDURE — 77063 BREAST TOMOSYNTHESIS BI: CPT | Mod: 26,,, | Performed by: RADIOLOGY

## 2025-01-29 ENCOUNTER — HOSPITAL ENCOUNTER (OUTPATIENT)
Dept: RADIOLOGY | Facility: HOSPITAL | Age: 70
Discharge: HOME | End: 2025-01-29
Attending: INTERNAL MEDICINE
Payer: MEDICARE

## 2025-01-29 DIAGNOSIS — R92.8 ABNORMAL MAMMOGRAM: ICD-10-CM

## 2025-01-29 PROCEDURE — 77065 DX MAMMO INCL CAD UNI: CPT | Mod: 26,LT,, | Performed by: RADIOLOGY

## 2025-01-29 PROCEDURE — 77061 BREAST TOMOSYNTHESIS UNI: CPT | Mod: 26,LT,, | Performed by: RADIOLOGY

## 2025-01-29 PROCEDURE — 77065 DX MAMMO INCL CAD UNI: CPT | Mod: TC,LT

## 2025-02-05 ENCOUNTER — PATIENT MESSAGE (OUTPATIENT)
Dept: PRIMARY CARE CLINIC | Facility: CLINIC | Age: 70
End: 2025-02-05
Payer: MEDICARE

## 2025-02-10 ENCOUNTER — PATIENT MESSAGE (OUTPATIENT)
Dept: PSYCHIATRY | Facility: CLINIC | Age: 70
End: 2025-02-10
Payer: MEDICARE

## 2025-02-11 ENCOUNTER — OFFICE VISIT (OUTPATIENT)
Dept: PSYCHIATRY | Facility: CLINIC | Age: 70
End: 2025-02-11
Payer: COMMERCIAL

## 2025-02-11 DIAGNOSIS — F10.21 ALCOHOL DEPENDENCE IN SUSTAINED FULL REMISSION: ICD-10-CM

## 2025-02-11 DIAGNOSIS — G47.00 INSOMNIA, UNSPECIFIED TYPE: ICD-10-CM

## 2025-02-11 DIAGNOSIS — F41.1 GAD (GENERALIZED ANXIETY DISORDER): ICD-10-CM

## 2025-02-11 DIAGNOSIS — R63.0 ANOREXIA: ICD-10-CM

## 2025-02-11 DIAGNOSIS — F33.1 MDD (MAJOR DEPRESSIVE DISORDER), RECURRENT EPISODE, MODERATE: Primary | ICD-10-CM

## 2025-02-11 PROCEDURE — 98006 SYNCH AUDIO-VIDEO EST MOD 30: CPT | Mod: 95,,, | Performed by: PHYSICIAN ASSISTANT

## 2025-02-11 PROCEDURE — 1159F MED LIST DOCD IN RCRD: CPT | Mod: CPTII,95,, | Performed by: PHYSICIAN ASSISTANT

## 2025-02-11 PROCEDURE — 1160F RVW MEDS BY RX/DR IN RCRD: CPT | Mod: CPTII,95,, | Performed by: PHYSICIAN ASSISTANT

## 2025-02-11 PROCEDURE — 90833 PSYTX W PT W E/M 30 MIN: CPT | Mod: 95,,, | Performed by: PHYSICIAN ASSISTANT

## 2025-02-11 RX ORDER — TRAZODONE HYDROCHLORIDE 50 MG/1
TABLET ORAL
Qty: 60 TABLET | Refills: 2 | Status: SHIPPED | OUTPATIENT
Start: 2025-02-11
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
## 2025-02-12 ENCOUNTER — PATIENT MESSAGE (OUTPATIENT)
Dept: PRIMARY CARE CLINIC | Facility: CLINIC | Age: 70
End: 2025-02-12
Payer: MEDICARE

## 2025-03-11 ENCOUNTER — OFFICE VISIT (OUTPATIENT)
Dept: PRIMARY CARE CLINIC | Facility: CLINIC | Age: 70
End: 2025-03-11
Payer: MEDICARE

## 2025-03-11 VITALS
TEMPERATURE: 98 F | HEIGHT: 65 IN | HEART RATE: 64 BPM | DIASTOLIC BLOOD PRESSURE: 72 MMHG | OXYGEN SATURATION: 98 % | SYSTOLIC BLOOD PRESSURE: 118 MMHG | BODY MASS INDEX: 18.8 KG/M2

## 2025-03-11 DIAGNOSIS — F10.21 ALCOHOL DEPENDENCE IN SUSTAINED FULL REMISSION: ICD-10-CM

## 2025-03-11 DIAGNOSIS — M81.0 SENILE OSTEOPOROSIS: ICD-10-CM

## 2025-03-11 DIAGNOSIS — F50.019 ANOREXIA NERVOSA, RESTRICTING TYPE, UNSPECIFIED SEVERITY: ICD-10-CM

## 2025-03-11 DIAGNOSIS — E78.2 MIXED HYPERLIPIDEMIA: ICD-10-CM

## 2025-03-11 DIAGNOSIS — E44.1 MILD PROTEIN-CALORIE MALNUTRITION: Primary | ICD-10-CM

## 2025-03-11 DIAGNOSIS — F33.1 MODERATE EPISODE OF RECURRENT MAJOR DEPRESSIVE DISORDER: ICD-10-CM

## 2025-03-11 DIAGNOSIS — H61.23 EXCESSIVE CERUMEN IN BOTH EAR CANALS: ICD-10-CM

## 2025-03-11 DIAGNOSIS — F41.1 GAD (GENERALIZED ANXIETY DISORDER): ICD-10-CM

## 2025-03-11 PROBLEM — F50.00 ANOREXIA NERVOSA: Status: ACTIVE | Noted: 2022-11-07

## 2025-03-11 PROCEDURE — 1160F RVW MEDS BY RX/DR IN RCRD: CPT | Mod: CPTII,S$GLB,, | Performed by: INTERNAL MEDICINE

## 2025-03-11 PROCEDURE — 3074F SYST BP LT 130 MM HG: CPT | Mod: CPTII,S$GLB,, | Performed by: INTERNAL MEDICINE

## 2025-03-11 PROCEDURE — 1101F PT FALLS ASSESS-DOCD LE1/YR: CPT | Mod: CPTII,S$GLB,, | Performed by: INTERNAL MEDICINE

## 2025-03-11 PROCEDURE — 1159F MED LIST DOCD IN RCRD: CPT | Mod: CPTII,S$GLB,, | Performed by: INTERNAL MEDICINE

## 2025-03-11 PROCEDURE — G2211 COMPLEX E/M VISIT ADD ON: HCPCS | Mod: S$GLB,,, | Performed by: INTERNAL MEDICINE

## 2025-03-11 PROCEDURE — 3288F FALL RISK ASSESSMENT DOCD: CPT | Mod: CPTII,S$GLB,, | Performed by: INTERNAL MEDICINE

## 2025-03-11 PROCEDURE — 3008F BODY MASS INDEX DOCD: CPT | Mod: CPTII,S$GLB,, | Performed by: INTERNAL MEDICINE

## 2025-03-11 PROCEDURE — 99999 PR PBB SHADOW E&M-EST. PATIENT-LVL IV: CPT | Mod: PBBFAC,,, | Performed by: INTERNAL MEDICINE

## 2025-03-11 PROCEDURE — 3078F DIAST BP <80 MM HG: CPT | Mod: CPTII,S$GLB,, | Performed by: INTERNAL MEDICINE

## 2025-03-11 PROCEDURE — 1125F AMNT PAIN NOTED PAIN PRSNT: CPT | Mod: CPTII,S$GLB,, | Performed by: INTERNAL MEDICINE

## 2025-03-11 PROCEDURE — 99214 OFFICE O/P EST MOD 30 MIN: CPT | Mod: S$GLB,,, | Performed by: INTERNAL MEDICINE
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
## 2025-03-12 ENCOUNTER — PATIENT MESSAGE (OUTPATIENT)
Dept: PSYCHIATRY | Facility: CLINIC | Age: 70
End: 2025-03-12
Payer: MEDICARE

## 2025-03-14 ENCOUNTER — RESULTS FOLLOW-UP (OUTPATIENT)
Dept: PRIMARY CARE CLINIC | Facility: CLINIC | Age: 70
End: 2025-03-14

## 2025-03-14 ENCOUNTER — OFFICE VISIT (OUTPATIENT)
Dept: PSYCHIATRY | Facility: CLINIC | Age: 70
End: 2025-03-14
Payer: COMMERCIAL

## 2025-03-14 ENCOUNTER — LAB VISIT (OUTPATIENT)
Dept: LAB | Facility: HOSPITAL | Age: 70
End: 2025-03-14
Payer: MEDICARE

## 2025-03-14 DIAGNOSIS — R63.0 ANOREXIA: ICD-10-CM

## 2025-03-14 DIAGNOSIS — F41.1 GAD (GENERALIZED ANXIETY DISORDER): ICD-10-CM

## 2025-03-14 DIAGNOSIS — E44.1 MILD PROTEIN-CALORIE MALNUTRITION: ICD-10-CM

## 2025-03-14 DIAGNOSIS — E78.2 MIXED HYPERLIPIDEMIA: ICD-10-CM

## 2025-03-14 DIAGNOSIS — M81.0 SENILE OSTEOPOROSIS: ICD-10-CM

## 2025-03-14 DIAGNOSIS — G47.00 INSOMNIA, UNSPECIFIED TYPE: ICD-10-CM

## 2025-03-14 DIAGNOSIS — F10.21 ALCOHOL DEPENDENCE IN SUSTAINED FULL REMISSION: ICD-10-CM

## 2025-03-14 DIAGNOSIS — F50.019 ANOREXIA NERVOSA, RESTRICTING TYPE, UNSPECIFIED SEVERITY: ICD-10-CM

## 2025-03-14 DIAGNOSIS — F33.1 MODERATE EPISODE OF RECURRENT MAJOR DEPRESSIVE DISORDER: ICD-10-CM

## 2025-03-14 DIAGNOSIS — F33.1 MDD (MAJOR DEPRESSIVE DISORDER), RECURRENT EPISODE, MODERATE: Primary | ICD-10-CM

## 2025-03-14 LAB
ALBUMIN SERPL BCP-MCNC: 3.7 G/DL (ref 3.5–5.2)
ALP SERPL-CCNC: 94 U/L (ref 40–150)
ALT SERPL W/O P-5'-P-CCNC: 36 U/L (ref 10–44)
ANION GAP SERPL CALC-SCNC: 6 MMOL/L (ref 8–16)
AST SERPL-CCNC: 29 U/L (ref 10–40)
BASOPHILS # BLD AUTO: 0.02 K/UL (ref 0–0.2)
BASOPHILS NFR BLD: 0.3 % (ref 0–1.9)
BILIRUB SERPL-MCNC: 0.2 MG/DL (ref 0.1–1)
BUN SERPL-MCNC: 17 MG/DL (ref 8–23)
CALCIUM SERPL-MCNC: 9.8 MG/DL (ref 8.7–10.5)
CHLORIDE SERPL-SCNC: 100 MMOL/L (ref 95–110)
CO2 SERPL-SCNC: 29 MMOL/L (ref 23–29)
CREAT SERPL-MCNC: 0.7 MG/DL (ref 0.5–1.4)
CRP SERPL-MCNC: 2.9 MG/L (ref 0–8.2)
DIFFERENTIAL METHOD BLD: ABNORMAL
EOSINOPHIL # BLD AUTO: 0.2 K/UL (ref 0–0.5)
EOSINOPHIL NFR BLD: 2.5 % (ref 0–8)
ERYTHROCYTE [DISTWIDTH] IN BLOOD BY AUTOMATED COUNT: 14 % (ref 11.5–14.5)
EST. GFR  (NO RACE VARIABLE): >60 ML/MIN/1.73 M^2
GLUCOSE SERPL-MCNC: 94 MG/DL (ref 70–110)
HCT VFR BLD AUTO: 38.8 % (ref 37–48.5)
HGB BLD-MCNC: 12.2 G/DL (ref 12–16)
IMM GRANULOCYTES # BLD AUTO: 0.02 K/UL (ref 0–0.04)
IMM GRANULOCYTES NFR BLD AUTO: 0.3 % (ref 0–0.5)
LYMPHOCYTES # BLD AUTO: 1.9 K/UL (ref 1–4.8)
LYMPHOCYTES NFR BLD: 26.5 % (ref 18–48)
MCH RBC QN AUTO: 28.8 PG (ref 27–31)
MCHC RBC AUTO-ENTMCNC: 31.4 G/DL (ref 32–36)
MCV RBC AUTO: 92 FL (ref 82–98)
MONOCYTES # BLD AUTO: 0.6 K/UL (ref 0.3–1)
MONOCYTES NFR BLD: 8.7 % (ref 4–15)
NEUTROPHILS # BLD AUTO: 4.5 K/UL (ref 1.8–7.7)
NEUTROPHILS NFR BLD: 61.7 % (ref 38–73)
NRBC BLD-RTO: 0 /100 WBC
PLATELET # BLD AUTO: 334 K/UL (ref 150–450)
PMV BLD AUTO: 11.1 FL (ref 9.2–12.9)
POTASSIUM SERPL-SCNC: 4.4 MMOL/L (ref 3.5–5.1)
PREALB SERPL-MCNC: 21 MG/DL (ref 20–43)
PROT SERPL-MCNC: 7 G/DL (ref 6–8.4)
RBC # BLD AUTO: 4.23 M/UL (ref 4–5.4)
SODIUM SERPL-SCNC: 135 MMOL/L (ref 136–145)
WBC # BLD AUTO: 7.21 K/UL (ref 3.9–12.7)

## 2025-03-14 PROCEDURE — 86140 C-REACTIVE PROTEIN: CPT | Performed by: INTERNAL MEDICINE

## 2025-03-14 PROCEDURE — 84134 ASSAY OF PREALBUMIN: CPT | Performed by: INTERNAL MEDICINE

## 2025-03-14 PROCEDURE — 85025 COMPLETE CBC W/AUTO DIFF WBC: CPT | Performed by: INTERNAL MEDICINE

## 2025-03-14 PROCEDURE — 80053 COMPREHEN METABOLIC PANEL: CPT | Performed by: INTERNAL MEDICINE

## 2025-03-14 PROCEDURE — 36415 COLL VENOUS BLD VENIPUNCTURE: CPT | Performed by: INTERNAL MEDICINE

## 2025-03-14 RX ORDER — OMEPRAZOLE 40 MG/1
40 CAPSULE, DELAYED RELEASE ORAL EVERY MORNING
Qty: 90 CAPSULE | Refills: 3 | Status: SHIPPED | OUTPATIENT
Start: 2025-03-14
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
## 2025-03-20 ENCOUNTER — PATIENT MESSAGE (OUTPATIENT)
Dept: ENDOCRINOLOGY | Facility: CLINIC | Age: 70
End: 2025-03-20
Payer: MEDICARE

## 2025-03-24 ENCOUNTER — OFFICE VISIT (OUTPATIENT)
Dept: ENDOCRINOLOGY | Facility: CLINIC | Age: 70
End: 2025-03-24
Payer: MEDICARE

## 2025-03-24 DIAGNOSIS — M81.0 OSTEOPOROSIS, POST-MENOPAUSAL: Primary | ICD-10-CM

## 2025-03-24 DIAGNOSIS — R26.89 BALANCE PROBLEM: ICD-10-CM

## 2025-03-24 PROCEDURE — 3288F FALL RISK ASSESSMENT DOCD: CPT | Mod: CPTII,95,, | Performed by: INTERNAL MEDICINE

## 2025-03-24 PROCEDURE — 1101F PT FALLS ASSESS-DOCD LE1/YR: CPT | Mod: CPTII,95,, | Performed by: INTERNAL MEDICINE

## 2025-03-24 PROCEDURE — G2211 COMPLEX E/M VISIT ADD ON: HCPCS | Mod: 95,,, | Performed by: INTERNAL MEDICINE

## 2025-03-24 PROCEDURE — 1125F AMNT PAIN NOTED PAIN PRSNT: CPT | Mod: CPTII,95,, | Performed by: INTERNAL MEDICINE

## 2025-03-24 PROCEDURE — 1160F RVW MEDS BY RX/DR IN RCRD: CPT | Mod: CPTII,95,, | Performed by: INTERNAL MEDICINE

## 2025-03-24 PROCEDURE — 1159F MED LIST DOCD IN RCRD: CPT | Mod: CPTII,95,, | Performed by: INTERNAL MEDICINE

## 2025-03-24 PROCEDURE — 98006 SYNCH AUDIO-VIDEO EST MOD 30: CPT | Mod: 95,,, | Performed by: INTERNAL MEDICINE

## 2025-03-24 NOTE — ASSESSMENT & PLAN NOTE
Reviewed fall precautions.    RDA calcium and vitamin-D  Discuss switching over to Reclast x1, she thinks it will be more expensive and she is not interested at this time.  Her preferences to continue Prolia   Reviewed that she should not stop Prolia without letting me know so I could follow it up with a bisphosphonate

## 2025-03-24 NOTE — PATIENT INSTRUCTIONS
Thank you for completing a virtual visit with me!     Per our conversation, continue Prolia.  Let me know if you would like to transition to Reclast x1.  Please do your best to focus on fall prevention.    We will plan a visit in 1 year and a repeat bone density in February of 2026    Please let me know if you have any other questions.    Thank you,  Adriana Ybarra MD
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
## 2025-04-02 ENCOUNTER — OFFICE VISIT (OUTPATIENT)
Dept: OTOLARYNGOLOGY | Facility: CLINIC | Age: 70
End: 2025-04-02
Payer: MEDICARE

## 2025-04-02 VITALS
SYSTOLIC BLOOD PRESSURE: 97 MMHG | HEART RATE: 61 BPM | HEIGHT: 65 IN | BODY MASS INDEX: 18.8 KG/M2 | DIASTOLIC BLOOD PRESSURE: 57 MMHG

## 2025-04-02 DIAGNOSIS — J32.8 OTHER CHRONIC SINUSITIS: Primary | Chronic | ICD-10-CM

## 2025-04-02 DIAGNOSIS — J30.89 NON-SEASONAL ALLERGIC RHINITIS, UNSPECIFIED TRIGGER: Chronic | ICD-10-CM

## 2025-04-02 DIAGNOSIS — H69.93 EUSTACHIAN TUBE DYSFUNCTION, BILATERAL: Chronic | ICD-10-CM

## 2025-04-02 PROCEDURE — 1159F MED LIST DOCD IN RCRD: CPT | Mod: CPTII,S$GLB,, | Performed by: OTOLARYNGOLOGY

## 2025-04-02 PROCEDURE — 1101F PT FALLS ASSESS-DOCD LE1/YR: CPT | Mod: CPTII,S$GLB,, | Performed by: OTOLARYNGOLOGY

## 2025-04-02 PROCEDURE — 99999 PR PBB SHADOW E&M-EST. PATIENT-LVL III: CPT | Mod: PBBFAC,,, | Performed by: OTOLARYNGOLOGY

## 2025-04-02 PROCEDURE — 31231 NASAL ENDOSCOPY DX: CPT | Mod: S$GLB,,, | Performed by: OTOLARYNGOLOGY

## 2025-04-02 PROCEDURE — 3288F FALL RISK ASSESSMENT DOCD: CPT | Mod: CPTII,S$GLB,, | Performed by: OTOLARYNGOLOGY

## 2025-04-02 PROCEDURE — 99214 OFFICE O/P EST MOD 30 MIN: CPT | Mod: 25,S$GLB,, | Performed by: OTOLARYNGOLOGY

## 2025-04-02 PROCEDURE — 3078F DIAST BP <80 MM HG: CPT | Mod: CPTII,S$GLB,, | Performed by: OTOLARYNGOLOGY

## 2025-04-02 PROCEDURE — 1126F AMNT PAIN NOTED NONE PRSNT: CPT | Mod: CPTII,S$GLB,, | Performed by: OTOLARYNGOLOGY

## 2025-04-02 PROCEDURE — 3074F SYST BP LT 130 MM HG: CPT | Mod: CPTII,S$GLB,, | Performed by: OTOLARYNGOLOGY

## 2025-04-02 PROCEDURE — 3008F BODY MASS INDEX DOCD: CPT | Mod: CPTII,S$GLB,, | Performed by: OTOLARYNGOLOGY

## 2025-04-02 RX ORDER — AZELASTINE 1 MG/ML
1 SPRAY, METERED NASAL 2 TIMES DAILY
Qty: 30 ML | Refills: 3 | Status: SHIPPED | OUTPATIENT
Start: 2025-04-02 | End: 2026-04-02

## 2025-04-02 NOTE — PROCEDURES
Nasal/sinus endoscopy    Date/Time: 4/2/2025 11:00 AM    Performed by: China Diehl MD  Authorized by: China Diehl MD    Consent Done?:  Yes (Verbal)  Anesthesia:     Local anesthetic:  Lidocaine 2% and [place]-Synephrine 1/2%  Nose:     Procedure Performed:  Nasal Endoscopy  External:      No external nasal deformity  Intranasal:      Mucosa no polyps     Mucosa ulcers not present     No mucosa lesions present     Turbinates not enlarged     No septum gross deformity  Nasopharynx:      Posterior choanae patent     Eustachian tube patent     Bilateral inferior turbinate edema/hypertrophy; patent inferior meatus bilaterally. Bilateral middle turbinate edema with narrowing of the middle meatus bilaterally; normal superior turbinates patent sphenoethmoid recess
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
## 2025-04-11 ENCOUNTER — PATIENT MESSAGE (OUTPATIENT)
Dept: ENDOCRINOLOGY | Facility: CLINIC | Age: 70
End: 2025-04-11
Payer: MEDICARE

## 2025-04-15 ENCOUNTER — TELEPHONE (OUTPATIENT)
Dept: RHEUMATOLOGY | Facility: CLINIC | Age: 70
End: 2025-04-15
Payer: MEDICARE

## 2025-04-21 ENCOUNTER — PATIENT MESSAGE (OUTPATIENT)
Dept: PRIMARY CARE CLINIC | Facility: CLINIC | Age: 70
End: 2025-04-21
Payer: MEDICARE

## 2025-04-25 ENCOUNTER — OFFICE VISIT (OUTPATIENT)
Dept: PRIMARY CARE CLINIC | Facility: CLINIC | Age: 70
End: 2025-04-25
Payer: MEDICARE

## 2025-04-25 VITALS
HEIGHT: 65 IN | DIASTOLIC BLOOD PRESSURE: 68 MMHG | TEMPERATURE: 98 F | BODY MASS INDEX: 18.8 KG/M2 | SYSTOLIC BLOOD PRESSURE: 112 MMHG | HEART RATE: 65 BPM

## 2025-04-25 DIAGNOSIS — F41.1 GAD (GENERALIZED ANXIETY DISORDER): ICD-10-CM

## 2025-04-25 DIAGNOSIS — M81.0 SENILE OSTEOPOROSIS: ICD-10-CM

## 2025-04-25 DIAGNOSIS — F33.1 MODERATE EPISODE OF RECURRENT MAJOR DEPRESSIVE DISORDER: ICD-10-CM

## 2025-04-25 DIAGNOSIS — F50.00 ANOREXIA NERVOSA: ICD-10-CM

## 2025-04-25 DIAGNOSIS — M71.22 SYNOVIAL CYST OF LEFT KNEE: ICD-10-CM

## 2025-04-25 DIAGNOSIS — M25.511 CHRONIC RIGHT SHOULDER PAIN: Primary | ICD-10-CM

## 2025-04-25 DIAGNOSIS — G89.29 CHRONIC RIGHT SHOULDER PAIN: Primary | ICD-10-CM

## 2025-04-25 PROCEDURE — 99999 PR PBB SHADOW E&M-EST. PATIENT-LVL III: CPT | Mod: PBBFAC,,, | Performed by: INTERNAL MEDICINE
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
## 2025-05-13 ENCOUNTER — OFFICE VISIT (OUTPATIENT)
Dept: PSYCHIATRY | Facility: CLINIC | Age: 70
End: 2025-05-13
Payer: MEDICARE

## 2025-05-13 DIAGNOSIS — F33.1 MDD (MAJOR DEPRESSIVE DISORDER), RECURRENT EPISODE, MODERATE: Primary | ICD-10-CM

## 2025-05-13 DIAGNOSIS — F41.1 GAD (GENERALIZED ANXIETY DISORDER): ICD-10-CM

## 2025-05-13 DIAGNOSIS — G47.00 INSOMNIA, UNSPECIFIED TYPE: ICD-10-CM

## 2025-05-13 DIAGNOSIS — F10.21 ALCOHOL DEPENDENCE IN SUSTAINED FULL REMISSION: ICD-10-CM

## 2025-05-13 DIAGNOSIS — R63.0 ANOREXIA: ICD-10-CM

## 2025-05-13 PROCEDURE — 1159F MED LIST DOCD IN RCRD: CPT | Mod: CPTII,95,, | Performed by: PHYSICIAN ASSISTANT

## 2025-05-13 PROCEDURE — 1160F RVW MEDS BY RX/DR IN RCRD: CPT | Mod: CPTII,95,, | Performed by: PHYSICIAN ASSISTANT

## 2025-05-13 PROCEDURE — 98006 SYNCH AUDIO-VIDEO EST MOD 30: CPT | Mod: 95,,, | Performed by: PHYSICIAN ASSISTANT

## 2025-05-13 RX ORDER — FLUOXETINE HYDROCHLORIDE 40 MG/1
80 CAPSULE ORAL DAILY
Qty: 180 CAPSULE | Refills: 1 | Status: SHIPPED | OUTPATIENT
Start: 2025-05-13 | End: 2025-11-09

## 2025-05-13 RX ORDER — LAMOTRIGINE 200 MG/1
200 TABLET ORAL DAILY
Qty: 90 TABLET | Refills: 1 | Status: SHIPPED | OUTPATIENT
Start: 2025-05-13 | End: 2025-11-09
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
## 2025-05-15 ENCOUNTER — TELEPHONE (OUTPATIENT)
Dept: PRIMARY CARE CLINIC | Facility: CLINIC | Age: 70
End: 2025-05-15
Payer: MEDICARE

## 2025-05-15 ENCOUNTER — PATIENT MESSAGE (OUTPATIENT)
Dept: PRIMARY CARE CLINIC | Facility: CLINIC | Age: 70
End: 2025-05-15
Payer: MEDICARE

## 2025-05-15 NOTE — TELEPHONE ENCOUNTER
Placed call to patient, per Dr. Portillo pt to see FEMI Rivero tomorrow. Pt instructed and verbalized understanding. Appt made for 5/16/25 at 900 am

## 2025-05-16 ENCOUNTER — RESULTS FOLLOW-UP (OUTPATIENT)
Dept: PRIMARY CARE CLINIC | Facility: CLINIC | Age: 70
End: 2025-05-16

## 2025-05-16 ENCOUNTER — OFFICE VISIT (OUTPATIENT)
Dept: PRIMARY CARE CLINIC | Facility: CLINIC | Age: 70
End: 2025-05-16
Payer: MEDICARE

## 2025-05-16 VITALS
SYSTOLIC BLOOD PRESSURE: 109 MMHG | BODY MASS INDEX: 18.8 KG/M2 | RESPIRATION RATE: 18 BRPM | OXYGEN SATURATION: 99 % | DIASTOLIC BLOOD PRESSURE: 70 MMHG | HEIGHT: 65 IN | HEART RATE: 67 BPM

## 2025-05-16 DIAGNOSIS — H66.92 LEFT OTITIS MEDIA, UNSPECIFIED OTITIS MEDIA TYPE: ICD-10-CM

## 2025-05-16 DIAGNOSIS — J01.90 ACUTE BACTERIAL SINUSITIS: Primary | ICD-10-CM

## 2025-05-16 DIAGNOSIS — B96.89 ACUTE BACTERIAL SINUSITIS: Primary | ICD-10-CM

## 2025-05-16 LAB
CTP QC/QA: YES
CTP QC/QA: YES
POC MOLECULAR INFLUENZA A AGN: NEGATIVE
POC MOLECULAR INFLUENZA B AGN: NEGATIVE
SARS-COV-2 RDRP RESP QL NAA+PROBE: NEGATIVE

## 2025-05-16 PROCEDURE — 99999 PR PBB SHADOW E&M-EST. PATIENT-LVL III: CPT | Mod: PBBFAC,,, | Performed by: PHYSICIAN ASSISTANT

## 2025-05-16 RX ORDER — IBUPROFEN 600 MG/1
600 TABLET, FILM COATED ORAL EVERY 6 HOURS PRN
COMMUNITY
Start: 2025-04-08

## 2025-05-16 RX ORDER — AMOXICILLIN AND CLAVULANATE POTASSIUM 875; 125 MG/1; MG/1
1 TABLET, FILM COATED ORAL 2 TIMES DAILY
Qty: 10 TABLET | Refills: 0 | Status: SHIPPED | OUTPATIENT
Start: 2025-05-16 | End: 2025-05-21

## 2025-05-16 NOTE — PROGRESS NOTES
[facility] Care Provider Appointment   [facility]        Subjective:       Patient ID:  Adelaida is a 69 y.o. female being seen for Sinus Problem (Congestion, ear pain (feels like fluid is in ears), head pressure, swollen glands - took over the counter medicine and has not helped) and Hoarse      Chief Complaint: Sinus Problem (Congestion, ear pain (feels like fluid is in ears), head pressure, swollen glands - took over the counter medicine and has not helped) and Hoarse    HPI: 68 yo female presents for sinus issues. Complains of sinus and chest congestion for over 10 days. Symptoms worsening. Started with hoarseness. Her ears now feel plugged and full of fluid, slight cough, feels weak and run down. Denies sore throat, fever. Taking Flonase, xyzal, sinus rinses.    Past Medical History:   Diagnosis Date    Alcohol dependence in sustained full remission 03/21/2022    Allergy     Anxiety 02/22/2019    Broken hip     Colon polyps     Depression     GERD (gastroesophageal reflux disease)     Hyperlipidemia 02/22/2019    IBS (irritable bowel syndrome)     Osteoporosis     Recurrent upper respiratory infection (URI)     T12 compression fracture s/p corpectomy 07/13/2013    Volvulus     s/p colectomy       Review of Systems   HENT:  Positive for ear pain and hearing loss. Negative for ear discharge, rhinorrhea and sore throat.    Respiratory:  Positive for cough and chest tightness. Negative for shortness of breath.    Gastrointestinal:  Positive for diarrhea. Negative for abdominal pain and vomiting.   Musculoskeletal:  Negative for neck pain.   Integumentary:  Negative for rash.   Neurological:  Positive for weakness and headaches.             Health Maintenance         Date Due Completion Date    DEXA Scan 01/29/2026 1/29/2024    Mammogram 01/29/2027 1/29/2025    Colorectal Cancer Screening 09/26/2028 9/26/2023    Lipid Panel 03/05/2029 3/5/2024    TETANUS VACCINE 06/10/2034 6/10/2024  "   Override on 10/5/2009: Done                     Objective:      Vitals:    05/16/25 0907   BP: 109/70   BP Location: Right arm   Patient Position: Sitting   Pulse: 67   Resp: 18   SpO2: 99%   Height: 5' 5" (1.651 m)     Estimated body mass index is 18.8 kg/m² as calculated from the following:    Height as of this encounter: 5' 5" (1.651 m).    Weight as of 1/15/25: 51.3 kg (113 lb).  Physical Exam  Constitutional:       Appearance: Normal appearance.   HENT:      Head: Normocephalic and atraumatic.      Right Ear: Tympanic membrane is not erythematous.      Left Ear: Tympanic membrane is erythematous.      Nose: Congestion and rhinorrhea present.      Right Sinus: Maxillary sinus tenderness present.      Left Sinus: Maxillary sinus tenderness present.   Cardiovascular:      Rate and Rhythm: Normal rate and regular rhythm.   Pulmonary:      Effort: Pulmonary effort is normal.      Breath sounds: Normal breath sounds. No wheezing.   Neurological:      Mental Status: She is alert.   Psychiatric:         Mood and Affect: Mood normal.           Assessment and Plan:         1. Acute bacterial sinusitis  -     POCT Influenza A/B Molecular  -     POCT COVID-19 Rapid Screening  -     amoxicillin-clavulanate 875-125mg (AUGMENTIN) 875-125 mg per tablet; Take 1 tablet by mouth 2 (two) times daily. for 5 days  Dispense: 10 tablet; Refill: 0  -     advised her to try mucinex-D. Continue allergy meds and spray. Call next week if no improvement     2. Left otitis media, unspecified otitis media type  -     amoxicillin-clavulanate 875-125mg (AUGMENTIN) 875-125 mg per tablet; Take 1 tablet by mouth 2 (two) times daily. for 5 days  Dispense: 10 tablet; Refill: 0         Follow Up:   No follow-ups on file.        Amy Lado, PA-C [facility]   "

## 2025-05-20 ENCOUNTER — INFUSION (OUTPATIENT)
Dept: INFECTIOUS DISEASES | Facility: HOSPITAL | Age: 70
End: 2025-05-20
Payer: MEDICARE

## 2025-05-20 VITALS
WEIGHT: 126.19 LBS | BODY MASS INDEX: 21.02 KG/M2 | HEIGHT: 65 IN | SYSTOLIC BLOOD PRESSURE: 145 MMHG | HEART RATE: 66 BPM | OXYGEN SATURATION: 100 % | TEMPERATURE: 98 F | DIASTOLIC BLOOD PRESSURE: 63 MMHG | RESPIRATION RATE: 21 BRPM

## 2025-05-20 DIAGNOSIS — M81.0 OSTEOPOROSIS, POST-MENOPAUSAL: Primary | ICD-10-CM

## 2025-05-20 PROCEDURE — 63600175 PHARM REV CODE 636 W HCPCS: Mod: JZ,TB | Performed by: INTERNAL MEDICINE

## 2025-05-20 PROCEDURE — 96372 THER/PROPH/DIAG INJ SC/IM: CPT

## 2025-05-20 RX ADMIN — DENOSUMAB 60 MG: 60 INJECTION SUBCUTANEOUS at 03:05

## 2025-05-20 NOTE — PLAN OF CARE
Patient counseled on monitoring and reporting pain to provider - verbalized understanding  ]     Brandin Stallworth(Attending)

## 2025-05-20 NOTE — PROGRESS NOTES
Patient arrives for Prolia injection - confirms use of calcium and vitamin D supplements and denies dental procedures over past 3 months - administered per guidelines    Limited head-to-toe assessment due to privacy issues and visit reason though the opportunity was given for patient to express any concerns    Next appt scheduled, pt made aware.

## 2025-07-08 ENCOUNTER — PATIENT MESSAGE (OUTPATIENT)
Dept: PRIMARY CARE CLINIC | Facility: CLINIC | Age: 70
End: 2025-07-08
Payer: MEDICARE

## 2025-07-22 ENCOUNTER — OFFICE VISIT (OUTPATIENT)
Dept: PSYCHIATRY | Facility: CLINIC | Age: 70
End: 2025-07-22
Payer: MEDICARE

## 2025-07-22 DIAGNOSIS — R63.0 ANOREXIA: ICD-10-CM

## 2025-07-22 DIAGNOSIS — F41.1 GAD (GENERALIZED ANXIETY DISORDER): ICD-10-CM

## 2025-07-22 DIAGNOSIS — F33.2 MDD (MAJOR DEPRESSIVE DISORDER), RECURRENT SEVERE, WITHOUT PSYCHOSIS: Primary | ICD-10-CM

## 2025-07-22 DIAGNOSIS — F33.1 MDD (MAJOR DEPRESSIVE DISORDER), RECURRENT EPISODE, MODERATE: ICD-10-CM

## 2025-07-22 PROCEDURE — 1160F RVW MEDS BY RX/DR IN RCRD: CPT | Mod: CPTII,95,, | Performed by: PHYSICIAN ASSISTANT

## 2025-07-22 PROCEDURE — 1159F MED LIST DOCD IN RCRD: CPT | Mod: CPTII,95,, | Performed by: PHYSICIAN ASSISTANT

## 2025-07-22 PROCEDURE — 98006 SYNCH AUDIO-VIDEO EST MOD 30: CPT | Mod: 95,,, | Performed by: PHYSICIAN ASSISTANT

## 2025-07-22 RX ORDER — CLONAZEPAM 0.5 MG/1
0.5 TABLET ORAL 2 TIMES DAILY PRN
Qty: 60 TABLET | Refills: 1 | Status: SHIPPED | OUTPATIENT
Start: 2025-07-22 | End: 2026-07-22
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
## 2025-07-25 ENCOUNTER — TELEPHONE (OUTPATIENT)
Dept: PRIMARY CARE CLINIC | Facility: CLINIC | Age: 70
End: 2025-07-25
Payer: MEDICARE

## 2025-07-25 ENCOUNTER — OFFICE VISIT (OUTPATIENT)
Dept: PRIMARY CARE CLINIC | Facility: CLINIC | Age: 70
End: 2025-07-25
Payer: MEDICARE

## 2025-07-25 VITALS
HEART RATE: 63 BPM | OXYGEN SATURATION: 98 % | HEIGHT: 65 IN | BODY MASS INDEX: 21 KG/M2 | DIASTOLIC BLOOD PRESSURE: 60 MMHG | SYSTOLIC BLOOD PRESSURE: 94 MMHG | TEMPERATURE: 99 F

## 2025-07-25 DIAGNOSIS — L08.9 SKIN INFECTION: ICD-10-CM

## 2025-07-25 DIAGNOSIS — H61.23 EXCESSIVE CERUMEN IN BOTH EAR CANALS: ICD-10-CM

## 2025-07-25 DIAGNOSIS — E44.1 MILD PROTEIN-CALORIE MALNUTRITION: ICD-10-CM

## 2025-07-25 DIAGNOSIS — M81.0 SENILE OSTEOPOROSIS: ICD-10-CM

## 2025-07-25 DIAGNOSIS — F50.00 ANOREXIA NERVOSA: ICD-10-CM

## 2025-07-25 DIAGNOSIS — F41.1 GAD (GENERALIZED ANXIETY DISORDER): ICD-10-CM

## 2025-07-25 DIAGNOSIS — E78.2 MIXED HYPERLIPIDEMIA: ICD-10-CM

## 2025-07-25 DIAGNOSIS — F33.2 SEVERE EPISODE OF RECURRENT MAJOR DEPRESSIVE DISORDER, WITHOUT PSYCHOTIC FEATURES: Primary | ICD-10-CM

## 2025-07-25 PROCEDURE — 99999 PR PBB SHADOW E&M-EST. PATIENT-LVL IV: CPT | Mod: PBBFAC,,, | Performed by: INTERNAL MEDICINE
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
## 2025-07-28 ENCOUNTER — LAB VISIT (OUTPATIENT)
Dept: LAB | Facility: HOSPITAL | Age: 70
End: 2025-07-28
Attending: INTERNAL MEDICINE
Payer: MEDICARE

## 2025-07-28 DIAGNOSIS — M79.89 FINGER SWELLING: ICD-10-CM

## 2025-07-28 DIAGNOSIS — E44.1 MILD PROTEIN-CALORIE MALNUTRITION: ICD-10-CM

## 2025-07-28 DIAGNOSIS — E78.2 MIXED HYPERLIPIDEMIA: ICD-10-CM

## 2025-07-28 DIAGNOSIS — F41.1 GAD (GENERALIZED ANXIETY DISORDER): ICD-10-CM

## 2025-07-28 DIAGNOSIS — M81.0 SENILE OSTEOPOROSIS: ICD-10-CM

## 2025-07-28 DIAGNOSIS — F50.00 ANOREXIA NERVOSA: ICD-10-CM

## 2025-07-28 DIAGNOSIS — F33.2 SEVERE EPISODE OF RECURRENT MAJOR DEPRESSIVE DISORDER, WITHOUT PSYCHOTIC FEATURES: ICD-10-CM

## 2025-07-28 LAB
ALBUMIN SERPL BCP-MCNC: 3.8 G/DL (ref 3.5–5.2)
ALP SERPL-CCNC: 94 UNIT/L (ref 40–150)
ALT SERPL W/O P-5'-P-CCNC: 34 UNIT/L (ref 0–55)
ANION GAP (OHS): 8 MMOL/L (ref 8–16)
AST SERPL-CCNC: 34 UNIT/L (ref 0–50)
BILIRUB SERPL-MCNC: 0.2 MG/DL (ref 0.1–1)
BUN SERPL-MCNC: 18 MG/DL (ref 8–23)
CALCIUM SERPL-MCNC: 9.7 MG/DL (ref 8.7–10.5)
CHLORIDE SERPL-SCNC: 102 MMOL/L (ref 95–110)
CHOLEST SERPL-MCNC: 209 MG/DL (ref 120–199)
CHOLEST/HDLC SERPL: 4.8 {RATIO} (ref 2–5)
CO2 SERPL-SCNC: 29 MMOL/L (ref 23–29)
CREAT SERPL-MCNC: 0.7 MG/DL (ref 0.5–1.4)
CRP SERPL-MCNC: 2.4 MG/L
ERYTHROCYTE [SEDIMENTATION RATE] IN BLOOD BY PHOTOMETRIC METHOD: 7 MM/HR
GFR SERPLBLD CREATININE-BSD FMLA CKD-EPI: >60 ML/MIN/1.73/M2
GLUCOSE SERPL-MCNC: 90 MG/DL (ref 70–110)
HDLC SERPL-MCNC: 44 MG/DL (ref 40–75)
HDLC SERPL: 21.1 % (ref 20–50)
LDLC SERPL CALC-MCNC: 151.8 MG/DL (ref 63–159)
NONHDLC SERPL-MCNC: 165 MG/DL
POTASSIUM SERPL-SCNC: 4.7 MMOL/L (ref 3.5–5.1)
PREALB SERPL-MCNC: 17 MG/DL (ref 20–43)
PROT SERPL-MCNC: 6.7 GM/DL (ref 6–8.4)
SODIUM SERPL-SCNC: 139 MMOL/L (ref 136–145)
TRIGL SERPL-MCNC: 66 MG/DL (ref 30–150)
TSH SERPL-ACNC: 3.18 UIU/ML (ref 0.4–4)

## 2025-07-28 PROCEDURE — 84132 ASSAY OF SERUM POTASSIUM: CPT

## 2025-07-28 PROCEDURE — 85652 RBC SED RATE AUTOMATED: CPT

## 2025-07-28 PROCEDURE — 84134 ASSAY OF PREALBUMIN: CPT

## 2025-07-28 PROCEDURE — 86140 C-REACTIVE PROTEIN: CPT

## 2025-07-28 PROCEDURE — 36415 COLL VENOUS BLD VENIPUNCTURE: CPT

## 2025-07-28 PROCEDURE — 80061 LIPID PANEL: CPT

## 2025-07-28 PROCEDURE — 84443 ASSAY THYROID STIM HORMONE: CPT

## 2025-07-28 PROCEDURE — 86038 ANTINUCLEAR ANTIBODIES: CPT

## 2025-07-29 LAB — ANA (OHS): NORMAL

## 2025-08-20 ENCOUNTER — OFFICE VISIT (OUTPATIENT)
Dept: PSYCHIATRY | Facility: CLINIC | Age: 70
End: 2025-08-20
Payer: MEDICARE

## 2025-08-20 DIAGNOSIS — F33.2 MDD (MAJOR DEPRESSIVE DISORDER), RECURRENT SEVERE, WITHOUT PSYCHOSIS: Primary | ICD-10-CM

## 2025-08-20 DIAGNOSIS — F33.1 MDD (MAJOR DEPRESSIVE DISORDER), RECURRENT EPISODE, MODERATE: ICD-10-CM

## 2025-08-20 DIAGNOSIS — F10.21 ALCOHOL DEPENDENCE IN SUSTAINED FULL REMISSION: ICD-10-CM

## 2025-08-20 DIAGNOSIS — F41.1 GAD (GENERALIZED ANXIETY DISORDER): ICD-10-CM

## 2025-08-20 DIAGNOSIS — G47.00 INSOMNIA, UNSPECIFIED TYPE: ICD-10-CM

## 2025-08-20 DIAGNOSIS — R63.0 ANOREXIA: ICD-10-CM

## 2025-08-20 PROCEDURE — 1159F MED LIST DOCD IN RCRD: CPT | Mod: CPTII,95,, | Performed by: PHYSICIAN ASSISTANT

## 2025-08-20 PROCEDURE — 1160F RVW MEDS BY RX/DR IN RCRD: CPT | Mod: CPTII,95,, | Performed by: PHYSICIAN ASSISTANT

## 2025-08-20 PROCEDURE — 98006 SYNCH AUDIO-VIDEO EST MOD 30: CPT | Mod: 95,,, | Performed by: PHYSICIAN ASSISTANT

## 2025-08-20 RX ORDER — FLUOXETINE HYDROCHLORIDE 40 MG/1
120 CAPSULE ORAL DAILY
Qty: 270 CAPSULE | Refills: 1 | Status: SHIPPED | OUTPATIENT
Start: 2025-08-20 | End: 2026-05-17

## 2025-08-25 DIAGNOSIS — Z00.00 ENCOUNTER FOR MEDICARE ANNUAL WELLNESS EXAM: ICD-10-CM

## 2025-08-31 ENCOUNTER — PATIENT MESSAGE (OUTPATIENT)
Dept: PRIMARY CARE CLINIC | Facility: CLINIC | Age: 70
End: 2025-08-31
Payer: MEDICARE